# Patient Record
Sex: FEMALE | Race: WHITE | NOT HISPANIC OR LATINO | Employment: FULL TIME | URBAN - METROPOLITAN AREA
[De-identification: names, ages, dates, MRNs, and addresses within clinical notes are randomized per-mention and may not be internally consistent; named-entity substitution may affect disease eponyms.]

---

## 2017-01-09 DIAGNOSIS — Z12.39 BREAST CANCER SCREENING: Primary | ICD-10-CM

## 2017-02-06 ENCOUNTER — OFFICE VISIT (OUTPATIENT)
Dept: OBSTETRICS AND GYNECOLOGY | Facility: CLINIC | Age: 52
End: 2017-02-06
Payer: COMMERCIAL

## 2017-02-06 VITALS
SYSTOLIC BLOOD PRESSURE: 120 MMHG | DIASTOLIC BLOOD PRESSURE: 78 MMHG | WEIGHT: 231.5 LBS | BODY MASS INDEX: 38.57 KG/M2 | HEIGHT: 65 IN

## 2017-02-06 DIAGNOSIS — B00.9 HERPES: ICD-10-CM

## 2017-02-06 DIAGNOSIS — Z01.419 ENCOUNTER FOR GYNECOLOGICAL EXAMINATION: ICD-10-CM

## 2017-02-06 DIAGNOSIS — Z11.51 SCREENING FOR HPV (HUMAN PAPILLOMAVIRUS): ICD-10-CM

## 2017-02-06 DIAGNOSIS — Z12.4 PAP SMEAR FOR CERVICAL CANCER SCREENING: Primary | ICD-10-CM

## 2017-02-06 PROCEDURE — 87624 HPV HI-RISK TYP POOLED RSLT: CPT

## 2017-02-06 PROCEDURE — 99999 PR PBB SHADOW E&M-EST. PATIENT-LVL II: CPT | Mod: PBBFAC,,, | Performed by: OBSTETRICS & GYNECOLOGY

## 2017-02-06 PROCEDURE — 88175 CYTOPATH C/V AUTO FLUID REDO: CPT

## 2017-02-06 PROCEDURE — 99396 PREV VISIT EST AGE 40-64: CPT | Mod: S$GLB,,, | Performed by: OBSTETRICS & GYNECOLOGY

## 2017-02-06 RX ORDER — FLUTICASONE FUROATE AND VILANTEROL TRIFENATATE 100; 25 UG/1; UG/1
POWDER RESPIRATORY (INHALATION)
Refills: 2 | COMMUNITY
Start: 2017-01-24 | End: 2019-09-17

## 2017-02-06 RX ORDER — LISINOPRIL 20 MG/1
TABLET ORAL
COMMUNITY
Start: 2016-01-27 | End: 2019-09-17

## 2017-02-06 RX ORDER — TRAZODONE HYDROCHLORIDE 50 MG/1
TABLET ORAL
Refills: 0 | COMMUNITY
Start: 2016-11-01 | End: 2020-05-20

## 2017-02-06 RX ORDER — VALACYCLOVIR HYDROCHLORIDE 500 MG/1
500 TABLET, FILM COATED ORAL 2 TIMES DAILY
Qty: 60 TABLET | Refills: 6 | Status: SHIPPED | OUTPATIENT
Start: 2017-02-06 | End: 2018-03-01

## 2017-02-06 RX ORDER — ALBUTEROL SULFATE 90 UG/1
AEROSOL, METERED RESPIRATORY (INHALATION)
Refills: 2 | COMMUNITY
Start: 2017-01-19 | End: 2017-10-16

## 2017-02-06 RX ORDER — DULOXETIN HYDROCHLORIDE 60 MG/1
CAPSULE, DELAYED RELEASE ORAL
Refills: 2 | COMMUNITY
Start: 2017-01-14 | End: 2021-01-24 | Stop reason: SDUPTHER

## 2017-02-06 RX ORDER — METFORMIN HYDROCHLORIDE 500 MG/1
TABLET, EXTENDED RELEASE ORAL
Refills: 2 | COMMUNITY
Start: 2017-01-24 | End: 2017-10-16

## 2017-02-06 RX ORDER — ALPRAZOLAM 0.5 MG/1
TABLET ORAL
Refills: 0 | COMMUNITY
Start: 2016-12-26 | End: 2020-05-28 | Stop reason: SDUPTHER

## 2017-02-06 NOTE — MR AVS SNAPSHOT
[id number]  [place] [address]  Phone: [phone]  Fax: [phone]                  Joy Crawford   2017 2:15 PM   Office Visit    Description:  Female : 1965   Provider:  José Miguel Leon MD   Department:  [facility]           Reason for Visit     Well Woman           Diagnoses this Visit        Comments    Pap smear for cervical cancer screening    -  Primary     Herpes         Screening for HPV (human papillomavirus)         Encounter for gynecological examination                To Do List           Goals (5 Years of Data)     None      Follow-Up and Disposition     Return in about 1 year (around 2018).    Follow-up and Disposition History       These Medications        Disp Refills Start End    valacyclovir (VALTREX) 500 MG tablet 60 tablet 6 2017 3/8/2017    Take 1 tablet (500 mg total) by mouth 2 (two) times daily. - Oral    Pharmacy: [facility] [address] [place] [address] EXPY AT [facility] Ph #: [phone]         [facility] On Call     [facility] On Call Nurse Care Line -  Assistance  Registered nurses in the [facility] On Call Center provide clinical advisement, health education, appointment booking, and other advisory services.  Call for this free service at [phone].             Medications           Message regarding Medications     Verify the changes and/or additions to your medication regime listed below are the same as discussed with your clinician today.  If any of these changes or additions are incorrect, please notify your healthcare provider.             Verify that the below list of medications is an accurate representation of the medications you are currently taking.  If none reported, the list may be blank. If incorrect, please contact your healthcare provider. Carry this list with you in case of emergency.           Current Medications     albuterol 2.5 mg /3 mL (0.083  "%) Nebu 3 mL, albuterol 5 mg/mL Nebu 0.5 mL use as directed    lisinopril (PRINIVIL,ZESTRIL) 20 MG tablet once a day    alprazolam (XANAX) 0.5 MG tablet TK 1 T PO QD TO BID PRF ANXIETY    BREO ELLIPTA 100-25 mcg/dose diskus inhaler INHALE 1 PUFF PO QD    duloxetine (CYMBALTA) 60 MG capsule TK 1 C PO QD    metformin (GLUCOPHAGE-XR) 500 MG 24 hr tablet TK 1 T PO QD    PROAIR HFA 90 mcg/actuation inhaler INHALE 1 PUFF PO Q 4 TO 6 H PRF SOB    trazodone (DESYREL) 50 MG tablet TK 1 T PO  QHS    valacyclovir (VALTREX) 500 MG tablet Take 1 tablet (500 mg total) by mouth 2 (two) times daily.           Clinical Reference Information           Your Vitals Were     BP Height Weight BMI       120/78 5' 5" (1.651 m) 105 kg (231 lb 7.7 oz) 38.52 kg/m2       Blood Pressure          Most Recent Value    BP  120/78      Allergies as of 2/6/2017     No Known Allergies      Immunizations Administered on Date of Encounter - 2/6/2017     None      Orders Placed During Today's Visit      Normal Orders This Visit    HPV DNA probe, amplified     Liquid-based pap smear, screening       [facility] Sign-Up     Activating your [facility] account is as easy as 1-2-3!     1) Visit [email or web address], select Sign Up Now, enter this activation code and your date of birth, then select Next.  [email or web address]  Expires: 3/23/2017  2:02 PM      2) Create a username and password to use when you visit [facility] in the future and select a security question in case you lose your password and select Next.    3) Enter your e-mail address and click Sign Up!    Additional Information  If you have questions, please e-mail [email or web address] or call [phone] to talk to our [facility] staff. Remember, [facility] is NOT to be used for urgent needs. For medical emergencies, dial 911.         Language Assistance Services     ATTENTION: Language assistance services are available, free of charge. Please call [phone].      ATENCIÓN: Si jenny conte " disposición servicios gratuitos de asistencia lingüística. Elder al [phone].     SHANIA Ý: N?u b?n nói Ti?ng Vi?t, có các d?ch v? h? tr? ngôn ng? mi?n phí dành cho b?n. G?i s? [phone].         [facility] complies with applicable Federal civil rights laws and does not discriminate on the basis of race, color, national origin, age, disability, or sex.

## 2017-02-06 NOTE — PROGRESS NOTES
"CC: Well woman exam    Joy Crawford is a 51 y.o. female  presents for a well woman exam.  She is established.  LMP: No LMP recorded. Patient is not currently having periods (Reason: Perimenopausal)..   Annual Exam, c/o no cycle since Nov. Stopped ocp's..Last mammo   Patient stopped her OCPs in October.  Had one last cycle in November and none since.  Discussed menopause being defined as the absence of the period for an entire year.  Patient concerned about inability to lose weight.  Discussed options of high proteins low-carb diet.  Also discussed medical weight loss.     Health Maintenance   Topic Date Due    TETANUS VACCINE  1984    Pap Smear  1987    Mammogram  2006    Lipid Panel  10/01/2012    Colonoscopy  2016    Influenza Vaccine  2016         Past Medical History   Diagnosis Date    Anxiety     Esophageal reflux     Herpes simplex virus (HSV) infection     Peptic ulcer        Past Surgical History   Procedure Laterality Date    Rhinoplasty         OB History    Para Term  AB SAB TAB Ectopic Multiple Living   1 1 1       1      # Outcome Date GA Lbr Balwinder/2nd Weight Sex Delivery Anes PTL Lv   1 Term  40w0d  3.175 kg (7 lb) M Vag-Spont   Y          Family History   Problem Relation Age of Onset    Skin cancer Father     Hypertension Father     Hypertension Mother     No Known Problems Son        Social History   Substance Use Topics    Smoking status: Never Smoker    Smokeless tobacco: None    Alcohol use Yes       Visit Vitals    /78    Ht 5' 5" (1.651 m)    Wt 105 kg (231 lb 7.7 oz)    BMI 38.52 kg/m2         ROS:  GENERAL: Denies weight gain or weight loss. Feeling well overall.   SKIN: Denies rash or lesions.   HEAD: Denies head injury or headache.   NODES: Denies enlarged lymph nodes.   CHEST: Denies chest pain or shortness of breath.   CARDIOVASCULAR: Denies palpitations or left sided chest pain.   ABDOMEN: No " abdominal pain, constipation, diarrhea, nausea, vomiting or rectal bleeding.   URINARY: No frequency, dysuria, hematuria, or burning on urination.  REPRODUCTIVE: See HPI.   BREASTS: The patient performs breast self-examination and denies pain, lumps, or nipple discharge.   HEMATOLOGIC: No easy bruisability or excessive bleeding.  MUSCULOSKELETAL: Denies joint pain or swelling.   NEUROLOGIC: Denies syncope or weakness.   PSYCHIATRIC: Denies depression, anxiety or mood swings.    Physical Exam:    APPEARANCE: Well nourished, well developed, in no acute distress.  AFFECT: WNL, alert and oriented x 3  SKIN: No acne or hirsutism  ABDOMEN: Soft.  No tenderness or masses.  No hepatosplenomegaly.  No hernias.  BREASTS: Symmetrical, no skin changes or visible lesions.  No palpable masses, nipple discharge bilaterally.  PELVIC: Normal external genitalia without lesions.  Normal hair distribution.  Adequate perineal body, normal urethral meatus.  Vagina moist and well rugated without lesions or discharge.  Cervix pink, without lesions, discharge or tenderness.  No significant cystocele or rectocele.  Bimanual exam shows uterus to be normal size, regular, mobile and nontender.  Adnexa without masses or tenderness.    EXTREMITIES: No edema.    ASSESSMENT AND PLAN  1. Pap smear for cervical cancer screening  Liquid-based pap smear, screening   2. Herpes  valacyclovir (VALTREX) 500 MG tablet   3. Screening for HPV (human papillomavirus)  HPV DNA probe, amplified   4. Encounter for gynecological examination   Pap smear performed.         Patient was counseled today on A.C.S. Pap guidelines and recommendations for yearly pelvic exams, mammograms and monthly self breast exams; to see her PCP for other health maintenance.     Return in about 1 year (around 2/6/2018).

## 2017-02-13 LAB — HUMAN PAPILLOMAVIRUS (HPV): NOT DETECTED

## 2017-10-11 ENCOUNTER — OFFICE VISIT (OUTPATIENT)
Dept: URGENT CARE | Facility: CLINIC | Age: 52
End: 2017-10-11
Payer: COMMERCIAL

## 2017-10-11 VITALS
DIASTOLIC BLOOD PRESSURE: 94 MMHG | HEART RATE: 93 BPM | RESPIRATION RATE: 18 BRPM | BODY MASS INDEX: 39.15 KG/M2 | OXYGEN SATURATION: 96 % | HEIGHT: 65 IN | WEIGHT: 235 LBS | TEMPERATURE: 98 F | SYSTOLIC BLOOD PRESSURE: 142 MMHG

## 2017-10-11 DIAGNOSIS — R53.83 FATIGUE, UNSPECIFIED TYPE: ICD-10-CM

## 2017-10-11 DIAGNOSIS — J01.90 ACUTE SINUSITIS, RECURRENCE NOT SPECIFIED, UNSPECIFIED LOCATION: Primary | ICD-10-CM

## 2017-10-11 DIAGNOSIS — J45.901 EXACERBATION OF ASTHMA, UNSPECIFIED ASTHMA SEVERITY, UNSPECIFIED WHETHER PERSISTENT: ICD-10-CM

## 2017-10-11 PROCEDURE — 99203 OFFICE O/P NEW LOW 30 MIN: CPT | Mod: 25,S$GLB,, | Performed by: NURSE PRACTITIONER

## 2017-10-11 PROCEDURE — 96372 THER/PROPH/DIAG INJ SC/IM: CPT | Mod: S$GLB,,, | Performed by: NURSE PRACTITIONER

## 2017-10-11 RX ORDER — CYANOCOBALAMIN 1000 UG/ML
1000 INJECTION, SOLUTION INTRAMUSCULAR; SUBCUTANEOUS ONCE
Status: COMPLETED | OUTPATIENT
Start: 2017-10-11 | End: 2017-10-11

## 2017-10-11 RX ORDER — BETAMETHASONE SODIUM PHOSPHATE AND BETAMETHASONE ACETATE 3; 3 MG/ML; MG/ML
6 INJECTION, SUSPENSION INTRA-ARTICULAR; INTRALESIONAL; INTRAMUSCULAR; SOFT TISSUE
Status: COMPLETED | OUTPATIENT
Start: 2017-10-11 | End: 2017-10-11

## 2017-10-11 RX ORDER — AMOXICILLIN AND CLAVULANATE POTASSIUM 875; 125 MG/1; MG/1
1 TABLET, FILM COATED ORAL 2 TIMES DAILY
Qty: 20 TABLET | Refills: 0 | Status: SHIPPED | OUTPATIENT
Start: 2017-10-11 | End: 2017-10-21

## 2017-10-11 RX ORDER — METHYLPREDNISOLONE 4 MG/1
TABLET ORAL
Qty: 1 PACKAGE | Refills: 0 | Status: SHIPPED | OUTPATIENT
Start: 2017-10-11 | End: 2018-03-01

## 2017-10-11 RX ADMIN — BETAMETHASONE SODIUM PHOSPHATE AND BETAMETHASONE ACETATE 6 MG: 3; 3 INJECTION, SUSPENSION INTRA-ARTICULAR; INTRALESIONAL; INTRAMUSCULAR; SOFT TISSUE at 06:10

## 2017-10-11 RX ADMIN — CYANOCOBALAMIN 1000 MCG: 1000 INJECTION, SOLUTION INTRAMUSCULAR; SUBCUTANEOUS at 06:10

## 2017-10-11 NOTE — PATIENT INSTRUCTIONS
Please drink plenty of fluids.  Please get plenty of rest.    Please return here or go to the Emergency Department for any concerns or worsening of condition.    If you were prescribed antibiotics, please take them to completion.    If you were given a steroid shot in the clinic and have also been given a prescription for a steroid such as Prednisone or a Medrol Dose Pack, please begin taking them tomorrow.    It is ok to continue taking mucinex OTC as directed on box.    If you do have Hypertension or palpitations, it is safe to take Coricidin HBP for relief of sinus symptoms.    If not allergic, please take over the counter Tylenol (Acetaminophen) and/or Motrin (Ibuprofen) as directed for control of pain and/or fever.  Please follow up with your primary care doctor or specialist as needed.    If you  smoke, please stop smoking.      Upper Respiratory Illness with Wheezing (Adult)   When the infection causes a lot of irritation, the air passages can go into spasm. This causes wheezing and shortness of breath.    This illness is contagious during the first few days. It is spread through the air by coughing and sneezing. It may also be spread by direct contact (touching the sick person and then touching your own eyes, nose, or mouth). Frequent handwashing will decrease the risk.  Most upper respiratory illnesses go away within 7 to 10 days with rest and simple home remedies. Sometimes the illness may last for several weeks. Antibiotics will not kill a virus, and they are generally not prescribed for this condition.  Home care  · If symptoms are severe, rest at home for the first 2 to 3 days. When you resume activity, don't let yourself get too tired.  · Avoid being exposed to cigarette smoke (yours or others).  · You may use acetaminophen or ibuprofen to control pain and fever, unless another medicine was prescribed. (Note: If you have chronic liver or kidney disease, have ever had a stomach ulcer or  gastrointestinal bleeding, or are taking blood-thinning medicines, talk with your healthcare provider before using these medicines.) Aspirin should never be given to anyone under 18 years of age who is ill with a viral infection or fever. It may cause severe liver or brain damage.  · Your appetite may be poor, so a light diet is fine. Avoid dehydration by drinking 6 to 8 glasses of fluids per day (water, soft drinks, juices, tea, or soup). Extra fluids will help loosen secretions in the nose and lungs.  · Over-the-counter cold medicines will not shorten the length of time youre sick, but they may be helpful for the following symptoms: cough, sore throat, and nasal and sinus congestion. (Note: Do not use decongestants if you have high blood pressure.)  Follow-up care  Follow up with your healthcare provider, or as advised.  When to seek medical advice  Call your healthcare provider right away if any of these occur:  · Cough with lots of colored sputum (mucus)  · Severe headache; face, neck, or ear pain  · Difficulty swallowing due to throat pain  · Fever of 100.4ºF (38ºC) or higher, or as directed by your healthcare provider  Call 911, or get immediate medical care  Call emergency services right away if any of these occur:  · Chest pain, shortness of breath, worsening wheezing, or difficulty breathing  · Coughing up blood  · Inability to swallow due to throat pain  Date Last Reviewed: 9/13/2015  © 3817-4981 [employer]. [address] [place] [address], [place] [address]. All rights reserved. This information is not intended as a substitute for professional medical care. Always follow your healthcare professional's instructions.

## 2017-10-11 NOTE — PROGRESS NOTES
"Subjective:       Patient ID: Joy Crawford is a 52 y.o. female.    Vitals:  height is 5' 5" (1.651 m) and weight is 106.6 kg (235 lb). Her temperature is 98.1 °F (36.7 °C). Her blood pressure is 142/94 (abnormal) and her pulse is 93. Her respiration is 18 and oxygen saturation is 96%.     Chief Complaint: URI    Pt reports getting sick 4 weeks ago with cough, sore throat, fatigue, sinus congestion, with some improvement and then recent worsening with wheezing and increased need for albuterol inhaler.  Pt has a history of asthma.  Pt reports some pain in left posterior lower ribs upon deep inspiration.  Denies chest pressure or heaviness.  Pt states she had pleuricy about a year ago and feels a similar pain with deep breaths.      URI    This is a new problem. The current episode started 1 to 4 weeks ago. The problem has been unchanged. There has been no fever. Associated symptoms include congestion, coughing, headaches, a plugged ear sensation, rhinorrhea, sinus pain and a sore throat. Pertinent negatives include no abdominal pain, chest pain, ear pain, nausea or wheezing. She has tried inhaler use, decongestant, antihistamine and acetaminophen for the symptoms. The treatment provided mild relief.     Review of Systems   Constitution: Negative for chills, fever and malaise/fatigue.   HENT: Positive for congestion, rhinorrhea, sinus pain and sore throat. Negative for ear pain and hoarse voice.    Eyes: Negative for discharge and redness.   Cardiovascular: Positive for dyspnea on exertion. Negative for chest pain and leg swelling.   Respiratory: Positive for cough and sputum production. Negative for shortness of breath and wheezing.    Musculoskeletal: Positive for back pain. Negative for myalgias.   Gastrointestinal: Negative for abdominal pain and nausea.   Neurological: Positive for headaches.       Objective:      Physical Exam   Constitutional: She is oriented to person, place, and time. She appears " well-developed and well-nourished. She is cooperative.  Non-toxic appearance. She does not have a sickly appearance. She does not appear ill. No distress.   HENT:   Head: Normocephalic and atraumatic.   Right Ear: Hearing, tympanic membrane, external ear and ear canal normal.   Left Ear: Hearing, tympanic membrane, external ear and ear canal normal.   Nose: Mucosal edema and rhinorrhea present. Right sinus exhibits maxillary sinus tenderness. Left sinus exhibits maxillary sinus tenderness.   Mouth/Throat: Uvula is midline. Posterior oropharyngeal edema and posterior oropharyngeal erythema present. Tonsils are 1+ on the right. Tonsils are 1+ on the left. No tonsillar exudate.   Eyes: Conjunctivae and lids are normal.   Neck: Normal range of motion and full passive range of motion without pain. Neck supple. No neck rigidity. No edema, no erythema and normal range of motion present.   Cardiovascular: Normal rate, regular rhythm and normal heart sounds.    Pulmonary/Chest: Effort normal. No accessory muscle usage. No apnea, no tachypnea and no bradypnea. No respiratory distress. She has no decreased breath sounds. She has wheezes in the right upper field, the right middle field, the left upper field and the left middle field. She has no rhonchi. She has no rales.   Abdominal: Normal appearance.   Lymphadenopathy:        Head (right side): No submental, no submandibular, no tonsillar, no preauricular, no posterior auricular and no occipital adenopathy present.        Head (left side): No submental, no submandibular, no tonsillar, no preauricular, no posterior auricular and no occipital adenopathy present.     She has cervical adenopathy.        Right cervical: Superficial cervical adenopathy present.        Left cervical: Superficial cervical adenopathy present.   Neurological: She is alert and oriented to person, place, and time.   Psychiatric: She has a normal mood and affect. Her speech is normal and behavior is  normal.   Nursing note and vitals reviewed.      Assessment:       1. Upper respiratory tract infection, unspecified type        Plan:         Upper respiratory tract infection, unspecified type  -     X-Ray Chest PA And Lateral; Future; Expected date: 10/11/2017      Pt instructed to follow up with primary care for no improvement after treatment therapy or go to ER for worsening of symptoms.

## 2017-10-12 ENCOUNTER — TELEPHONE (OUTPATIENT)
Dept: OBSTETRICS AND GYNECOLOGY | Facility: CLINIC | Age: 52
End: 2017-10-12

## 2017-10-12 NOTE — TELEPHONE ENCOUNTER
Bone pt -pt said she is having a burning sensation in her left nipple and would like to see what  wants her to come in to get it checked out.

## 2017-10-16 ENCOUNTER — OFFICE VISIT (OUTPATIENT)
Dept: OBSTETRICS AND GYNECOLOGY | Facility: CLINIC | Age: 52
End: 2017-10-16
Payer: COMMERCIAL

## 2017-10-16 VITALS
DIASTOLIC BLOOD PRESSURE: 82 MMHG | BODY MASS INDEX: 39.82 KG/M2 | WEIGHT: 239 LBS | SYSTOLIC BLOOD PRESSURE: 126 MMHG | HEIGHT: 65 IN

## 2017-10-16 DIAGNOSIS — M79.622 AXILLARY PAIN, LEFT: ICD-10-CM

## 2017-10-16 DIAGNOSIS — N64.4 MASTODYNIA OF LEFT BREAST: Primary | ICD-10-CM

## 2017-10-16 PROCEDURE — 99213 OFFICE O/P EST LOW 20 MIN: CPT | Mod: S$GLB,,, | Performed by: NURSE PRACTITIONER

## 2017-10-16 PROCEDURE — 99999 PR PBB SHADOW E&M-EST. PATIENT-LVL III: CPT | Mod: PBBFAC,,, | Performed by: NURSE PRACTITIONER

## 2017-10-16 NOTE — PROGRESS NOTES
"Chief Complaint: Breast pain     (Dr. Leon patient)    HPI:      Joy Crawford is a 52 y.o.  who presents complaining of left breast pain.  Reports feeling intermittent shooting pain that had originally started in the nipple, but now she reports feeling pain at bottom half of breast that extends to side beneath axilla, and wraps around to back. Patient has regular monthly menses. Patient's last menstrual period was 2017.   Mammogram done at [facility] in 2017 was normal. (see report under media tab)      ROS:     GENERAL: Denies unintentional weight gain or weight loss. Feeling well overall.   ABDOMEN: Denies abdominal pain, constipation, diarrhea, nausea, vomiting, change in appetite.   URINARY: Denies frequency, dysuria, hematuria.  BREAST: See HPI  GYN: Denies abnormal bleeding or discharge.    Physical Exam:      PHYSICAL EXAM:  /82   Ht 5' 5" (1.651 m)   Wt 108.4 kg (238 lb 15.7 oz)   LMP 2017   BMI 39.77 kg/m²   Body mass index is 39.77 kg/m².      APPEARANCE: Well nourished, well developed, in no acute distress.  BREASTS: Left breast:  Soft, tender to palpation to lower half of breast beneath areola and towards side/axilla; no lumps/masses, no changes in skin texture, and no nipple discharge.                     Right breast:  Soft, non-tender, no masses/lumps; no changes in skin texture, no nipple discharge.  ABDOMEN: Soft.  No tenderness or masses.    EXTREMITIES: No edema.         Assessment/Plan:   Mastodynia of left breast  -     US Breast Left Complete; Future; Expected date: 10/16/2017  -     Mammo Digital Diagnostic Left with Tomosynthesis_CAD; Future; Expected date: 10/16/2017    Axillary pain, left  -     US Soft Tissue Axilla; Future; Expected date: 10/16/2017    * D/W the patient that the pain to her left side beneath the left axilla that wraps around her back may/may not be related to breast tissue.  She reports that she had very bad URI past 2-3 weeks, and was coughing a " jacqueline.  Reviewed possibility that the pain could also be related to costochondritis from excessive coughing.  She verbalized understanding.  Will get ultrasounds, and determine follow-up POC at that time.      Orders Placed This Encounter   Procedures    US Breast Left Complete     Standing Status:   Future     Standing Expiration Date:   12/16/2018     Order Specific Question:   May the Radiologist modify the order per protocol to meet the clinical needs of the patient?     Answer:   Yes    Mammo Digital Diagnostic Left with Tomosynthesis_CAD     Standing Status:   Future     Standing Expiration Date:   12/16/2018     Order Specific Question:   May the Radiologist modify the order per protocol to meet the clinical needs of the patient?     Answer:   Yes    US Soft Tissue Axilla     Standing Status:   Future     Standing Expiration Date:   10/16/2018     Order Specific Question:   May the Radiologist modify the order per protocol to meet the clinical needs of the patient?     Answer:   Yes         Return if symptoms worsen or fail to improve, for Annual.

## 2018-01-15 PROBLEM — J01.90 ACUTE SINUSITIS: Status: RESOLVED | Noted: 2017-10-11 | Resolved: 2018-01-15

## 2018-02-11 ENCOUNTER — OFFICE VISIT (OUTPATIENT)
Dept: URGENT CARE | Facility: CLINIC | Age: 53
End: 2018-02-11
Payer: COMMERCIAL

## 2018-02-11 VITALS
SYSTOLIC BLOOD PRESSURE: 151 MMHG | DIASTOLIC BLOOD PRESSURE: 105 MMHG | BODY MASS INDEX: 36.82 KG/M2 | HEIGHT: 65 IN | TEMPERATURE: 101 F | HEART RATE: 101 BPM | RESPIRATION RATE: 18 BRPM | WEIGHT: 221 LBS | OXYGEN SATURATION: 98 %

## 2018-02-11 DIAGNOSIS — J10.1 INFLUENZA A: Primary | ICD-10-CM

## 2018-02-11 DIAGNOSIS — R11.2 NON-INTRACTABLE VOMITING WITH NAUSEA, UNSPECIFIED VOMITING TYPE: ICD-10-CM

## 2018-02-11 LAB
CTP QC/QA: YES
FLUAV AG NPH QL: POSITIVE
FLUBV AG NPH QL: NEGATIVE

## 2018-02-11 PROCEDURE — 87804 INFLUENZA ASSAY W/OPTIC: CPT | Mod: QW,S$GLB,, | Performed by: NURSE PRACTITIONER

## 2018-02-11 PROCEDURE — S0119 ONDANSETRON 4 MG: HCPCS | Mod: S$GLB,,, | Performed by: EMERGENCY MEDICINE

## 2018-02-11 PROCEDURE — 99213 OFFICE O/P EST LOW 20 MIN: CPT | Mod: S$GLB,,, | Performed by: NURSE PRACTITIONER

## 2018-02-11 PROCEDURE — 3008F BODY MASS INDEX DOCD: CPT | Mod: S$GLB,,, | Performed by: NURSE PRACTITIONER

## 2018-02-11 RX ORDER — ONDANSETRON 4 MG/1
4 TABLET, ORALLY DISINTEGRATING ORAL
Status: COMPLETED | OUTPATIENT
Start: 2018-02-11 | End: 2018-02-11

## 2018-02-11 RX ORDER — OSELTAMIVIR PHOSPHATE 75 MG/1
75 CAPSULE ORAL 2 TIMES DAILY
Qty: 10 CAPSULE | Refills: 0 | Status: SHIPPED | OUTPATIENT
Start: 2018-02-11 | End: 2018-02-16

## 2018-02-11 RX ORDER — ACETAMINOPHEN 500 MG
1000 TABLET ORAL
Status: COMPLETED | OUTPATIENT
Start: 2018-02-11 | End: 2018-02-11

## 2018-02-11 RX ORDER — CODEINE PHOSPHATE AND GUAIFENESIN 10; 100 MG/5ML; MG/5ML
5 SOLUTION ORAL NIGHTLY PRN
Qty: 118 ML | Refills: 0 | Status: SHIPPED | OUTPATIENT
Start: 2018-02-11 | End: 2018-02-21

## 2018-02-11 RX ORDER — ONDANSETRON 4 MG/1
4 TABLET, ORALLY DISINTEGRATING ORAL EVERY 6 HOURS PRN
Qty: 20 TABLET | Refills: 0 | Status: SHIPPED | OUTPATIENT
Start: 2018-02-11 | End: 2018-02-16

## 2018-02-11 RX ADMIN — ONDANSETRON 4 MG: 4 TABLET, ORALLY DISINTEGRATING ORAL at 12:02

## 2018-02-11 RX ADMIN — Medication 1000 MG: at 12:02

## 2018-02-11 NOTE — PROGRESS NOTES
"Subjective:       Patient ID: Joy Crawford is a 52 y.o. female.    Vitals:  height is 5' 5" (1.651 m) and weight is 100.2 kg (221 lb). Her oral temperature is 101.3 °F (38.5 °C) (abnormal). Her blood pressure is 151/105 (abnormal) and her pulse is 101. Her respiration is 18 and oxygen saturation is 98%.     Chief Complaint: Fever    Pt states symptoms began yesterday.  Pt has a history of asthma.      Fever    This is a recurrent problem. The current episode started yesterday. The problem occurs constantly. The problem has been gradually worsening. Her temperature was unmeasured prior to arrival. Associated symptoms include coughing, ear pain, headaches and a sore throat. Pertinent negatives include no abdominal pain, chest pain, congestion, nausea or wheezing. She has tried acetaminophen for the symptoms.     Review of Systems   Constitution: Positive for chills, fever and malaise/fatigue.   HENT: Positive for ear pain and sore throat. Negative for congestion and hoarse voice.    Eyes: Negative for discharge and redness.   Cardiovascular: Negative for chest pain, dyspnea on exertion and leg swelling.   Respiratory: Positive for cough and sputum production. Negative for shortness of breath and wheezing.    Musculoskeletal: Negative for myalgias.   Gastrointestinal: Negative for abdominal pain and nausea.   Neurological: Positive for headaches.       Objective:      Physical Exam   Constitutional: She is oriented to person, place, and time. Vital signs are normal. She appears well-developed and well-nourished. She is cooperative.  Non-toxic appearance. She does not have a sickly appearance. She does not appear ill. No distress.   HENT:   Head: Normocephalic and atraumatic.   Right Ear: Hearing, external ear and ear canal normal. A middle ear effusion is present.   Left Ear: Hearing, external ear and ear canal normal. A middle ear effusion is present.   Nose: Mucosal edema and rhinorrhea present. Right sinus exhibits " maxillary sinus tenderness. Left sinus exhibits maxillary sinus tenderness.   Mouth/Throat: Uvula is midline and mucous membranes are normal. Posterior oropharyngeal edema and posterior oropharyngeal erythema present.   Eyes: Conjunctivae and lids are normal.   Neck: Normal range of motion and full passive range of motion without pain. Neck supple. No neck rigidity. No edema, no erythema and normal range of motion present.   Cardiovascular: Normal rate, regular rhythm and normal heart sounds.    Pulmonary/Chest: Effort normal and breath sounds normal. No accessory muscle usage. No apnea, no tachypnea and no bradypnea. No respiratory distress. She has no decreased breath sounds. She has no wheezes. She has no rhonchi. She has no rales.   Abdominal: Normal appearance.   Lymphadenopathy:        Head (right side): No submental, no submandibular, no tonsillar, no preauricular, no posterior auricular and no occipital adenopathy present.        Head (left side): No submental, no submandibular, no tonsillar, no preauricular, no posterior auricular and no occipital adenopathy present.     She has cervical adenopathy.        Right cervical: Superficial cervical adenopathy present.        Left cervical: Superficial cervical adenopathy present.   Neurological: She is alert and oriented to person, place, and time.   Psychiatric: She has a normal mood and affect. Her speech is normal and behavior is normal.   Nursing note and vitals reviewed.      Assessment:       1. Influenza A    2. Non-intractable vomiting with nausea, unspecified vomiting type        Plan:         Influenza A  -     POCT Influenza A/B  -     acetaminophen tablet 1,000 mg; Take 2 tablets (1,000 mg total) by mouth one time.  -     oseltamivir (TAMIFLU) 75 MG capsule; Take 1 capsule (75 mg total) by mouth 2 (two) times daily.  Dispense: 10 capsule; Refill: 0  -     guaifenesin-codeine 100-10 mg/5 ml (TUSSI-ORGANIDIN NR)  mg/5 mL syrup; Take 5 mLs by mouth  nightly as needed.  Dispense: 118 mL; Refill: 0    Non-intractable vomiting with nausea, unspecified vomiting type  -     ondansetron disintegrating tablet 4 mg; Take 1 tablet (4 mg total) by mouth one time.  -     ondansetron (ZOFRAN-ODT) 4 MG TbDL; Take 1 tablet (4 mg total) by mouth every 6 (six) hours as needed.  Dispense: 20 tablet; Refill: 0      Discussed positive results of flu test with pt and symptom therapy for fevers and congestion with tylenol, ibuprofen, and mucinex as directed. Increase fluid intake. Instructed pt not to take decongestants due to her elevated BP.

## 2018-02-11 NOTE — PATIENT INSTRUCTIONS
Please drink plenty of fluids.  Please get plenty of rest.    Please return here or go to the Emergency Department for any concerns or worsening of condition.    If you were prescribed antibiotics, please take them to completion.    If you were prescribed a narcotic medication (cough syrup), do not drive or operate heavy equipment or machinery while taking these medications.    If you do have Hypertension or palpitations, it is safe to take Coricidin HBP or Mucinex DM for relief of congestion and cough. Take as directed on bottle with at least 2 glasses of water.    If not allergic, please take over the counter Tylenol (Acetaminophen) and/or Motrin (Ibuprofen) as directed on bottle for control of pain and/or fever.    Please follow up with your primary care doctor or specialist as needed.    If you  smoke, please stop smoking.    Influenza (Adult)    Influenza is also called the flu. It is a viral illness that affects the air passages of your lungs. It is different from the common cold. The flu can easily be passed from one to person to another. It may be spread through the air by coughing and sneezing. Or it can be spread by touching the sick person and then touching your own eyes, nose, or mouth.  The flu starts 1 to 3 days after you are exposed to the flu virus. It may last for 1 to 2 weeks but many people feel tired or fatigued for many weeks afterward. You usually dont need to take antibiotics unless you have a complication. This might be an ear or sinus infection or pneumonia.  Symptoms of the flu may be mild or severe. They can include extreme tiredness (wanting to stay in bed all day), chills, fevers, muscle aches, soreness with eye movement, headache, and a dry, hacking cough.  Home care  Follow these guidelines when caring for yourself at home:  · Avoid being around cigarette smoke, whether yours or other peoples.  · Acetaminophen or ibuprofen will help ease your fever, muscle aches, and headache. Dont  give aspirin to anyone younger than 18 who has the flu. Aspirin can harm the liver.  · Nausea and loss of appetite are common with the flu. Eat light meals. Drink 6 to 8 glasses of liquids every day. Good choices are water, sport drinks, soft drinks without caffeine, juices, tea, and soup. Extra fluids will also help loosen secretions in your nose and lungs.  · Over-the-counter cold medicines will not make the flu go away faster. But the medicines may help with coughing, sore throat, and congestion in your nose and sinuses. Dont use a decongestant if you have high blood pressure.  · Stay home until your fever has been gone for at least 24 hours without using medicine to reduce fever.  Follow-up care  Follow up with your healthcare provider, or as advised, if you are not getting better over the next week.  If you are age 65 or older, talk with your provider about getting a pneumococcal vaccine every 5 years. You should also get this vaccine if you have chronic asthma or COPD. All adults should get a flu vaccine every fall. Ask your provider about this.  When to seek medical advice  Call your healthcare provider right away if any of these occur:  · Cough with lots of colored mucus (sputum) or blood in your mucus  · Chest pain, shortness of breath, wheezing, or trouble breathing  · Severe headache, or face, neck, or ear pain  · New rash with fever  · Fever of 100.4°F (38°C) or higher, or as directed by your healthcare provider  · Confusion, behavior change, or seizure  · Severe weakness or dizziness  · You get a new fever or cough after getting better for a few days  Date Last Reviewed: 1/1/2017  © 4479-5718 [employer]. [address] [place] [address], [place] [address]. All rights reserved. This information is not intended as a substitute for professional medical care. Always follow your healthcare professional's instructions.

## 2018-03-01 ENCOUNTER — OFFICE VISIT (OUTPATIENT)
Dept: OBSTETRICS AND GYNECOLOGY | Facility: CLINIC | Age: 53
End: 2018-03-01
Payer: COMMERCIAL

## 2018-03-01 VITALS
HEIGHT: 65 IN | BODY MASS INDEX: 37.83 KG/M2 | WEIGHT: 227.06 LBS | DIASTOLIC BLOOD PRESSURE: 84 MMHG | SYSTOLIC BLOOD PRESSURE: 128 MMHG

## 2018-03-01 DIAGNOSIS — Z01.419 ENCOUNTER FOR GYNECOLOGICAL EXAMINATION: ICD-10-CM

## 2018-03-01 DIAGNOSIS — Z12.31 ENCOUNTER FOR SCREENING MAMMOGRAM FOR BREAST CANCER: Primary | ICD-10-CM

## 2018-03-01 PROCEDURE — 99396 PREV VISIT EST AGE 40-64: CPT | Mod: S$GLB,,, | Performed by: OBSTETRICS & GYNECOLOGY

## 2018-03-01 PROCEDURE — 99999 PR PBB SHADOW E&M-EST. PATIENT-LVL III: CPT | Mod: PBBFAC,,, | Performed by: OBSTETRICS & GYNECOLOGY

## 2018-03-01 RX ORDER — ALBUTEROL SULFATE 90 UG/1
AEROSOL, METERED RESPIRATORY (INHALATION)
Refills: 1 | COMMUNITY
Start: 2017-12-29 | End: 2021-07-30 | Stop reason: SDUPTHER

## 2018-03-01 NOTE — PROGRESS NOTES
"CC: Well woman exam    Joy Crawford is a 52 y.o. female  presents for a well woman exam.  She is established.  LMP: No LMP recorded..      Annual Exam, c/o of small skin tag on mons   Last Pap/Hpv 17 Negative --   MMG 17, Normal (DIS) --   Colonosocpy 12 yrs ago  LMP 18 light  Still with irreg cycles    Health Maintenance   Topic Date Due    TETANUS VACCINE  1984    Mammogram  2006    Lipid Panel  10/01/2012    Colonoscopy  2016    Influenza Vaccine  2017    Pap Smear with HPV Cotest  2020         Past Medical History:   Diagnosis Date    Anxiety     Esophageal reflux     H/O mammogram 2017    Normal  (DIS)     Herpes simplex virus (HSV) infection     Peptic ulcer        Past Surgical History:   Procedure Laterality Date    RHINOPLASTY         OB History    Para Term  AB Living   1 1 1     1   SAB TAB Ectopic Multiple Live Births           1      # Outcome Date GA Lbr Balwinder/2nd Weight Sex Delivery Anes PTL Lv   1 Term  40w0d  3.175 kg (7 lb) M Vag-Spont EPI  ENEIDA          Family History   Problem Relation Age of Onset    Skin cancer Father     Hypertension Father     Hypertension Mother     Diabetes Mother     No Known Problems Son     Breast cancer Neg Hx        Social History   Substance Use Topics    Smoking status: Never Smoker    Smokeless tobacco: Never Used    Alcohol use Yes       /84   Ht 5' 5" (1.651 m)   Wt 103 kg (227 lb 1.2 oz)   BMI 37.79 kg/m²       ROS:  GENERAL: Denies weight gain or weight loss. Feeling well overall.   SKIN: Denies rash or lesions.   HEAD: Denies head injury or headache.   NODES: Denies enlarged lymph nodes.   CHEST: Denies chest pain or shortness of breath.   CARDIOVASCULAR: Denies palpitations or left sided chest pain.   ABDOMEN: No abdominal pain, constipation, diarrhea, nausea, vomiting or rectal bleeding.   URINARY: No frequency, dysuria, hematuria, or burning on " urination.  REPRODUCTIVE: See HPI.   BREASTS: The patient performs breast self-examination and denies pain, lumps, or nipple discharge.   HEMATOLOGIC: No easy bruisability or excessive bleeding.  MUSCULOSKELETAL: Denies joint pain or swelling.   NEUROLOGIC: Denies syncope or weakness.   PSYCHIATRIC: Denies depression, anxiety or mood swings.    Physical Exam:    APPEARANCE: Well nourished, well developed, in no acute distress.  AFFECT: WNL, alert and oriented x 3  SKIN: No acne or hirsutism  ABDOMEN: Soft.  No tenderness or masses.  No hepatosplenomegaly.  No hernias.  BREASTS: Symmetrical, no skin changes or visible lesions.  No palpable masses, nipple discharge bilaterally.  PELVIC: Normal external genitalia with small 3-4 mm slin tag on mons..  Normal hair distribution.  Adequate perineal body, normal urethral meatus.  Vagina moist and well rugated without lesions or discharge.  Cervix pink, without lesions, discharge or tenderness.  No significant cystocele or rectocele.  Bimanual exam shows uterus to be normal size, regular, mobile and nontender.  Adnexa without masses or tenderness.    EXTREMITIES: No edema.    ASSESSMENT AND PLAN  1. Encounter for screening mammogram for breast cancer  Mammo Digital Screening Bilat with Tomosynthesis CAD    Mammo Digital Screening Bilat with Tomosynthesis CAD   2. Encounter for gynecological examination  No pap done,   rec MMG and colonoscopy       Patient was counseled today on A.C.S. Pap guidelines and recommendations for yearly pelvic exams, mammograms and monthly self breast exams; to see her PCP for other health maintenance.     Follow-up in about 1 year (around 3/1/2019).

## 2018-04-17 ENCOUNTER — OFFICE VISIT (OUTPATIENT)
Dept: URGENT CARE | Facility: CLINIC | Age: 53
End: 2018-04-17
Payer: COMMERCIAL

## 2018-04-17 VITALS
SYSTOLIC BLOOD PRESSURE: 108 MMHG | BODY MASS INDEX: 34.99 KG/M2 | HEIGHT: 65 IN | RESPIRATION RATE: 12 BRPM | TEMPERATURE: 97 F | OXYGEN SATURATION: 99 % | HEART RATE: 84 BPM | WEIGHT: 210 LBS | DIASTOLIC BLOOD PRESSURE: 78 MMHG

## 2018-04-17 DIAGNOSIS — J45.901 EXACERBATION OF ASTHMA, UNSPECIFIED ASTHMA SEVERITY, UNSPECIFIED WHETHER PERSISTENT: Primary | ICD-10-CM

## 2018-04-17 PROCEDURE — 96372 THER/PROPH/DIAG INJ SC/IM: CPT | Mod: S$GLB,,, | Performed by: EMERGENCY MEDICINE

## 2018-04-17 PROCEDURE — 99214 OFFICE O/P EST MOD 30 MIN: CPT | Mod: 25,S$GLB,, | Performed by: NURSE PRACTITIONER

## 2018-04-17 RX ORDER — BETAMETHASONE SODIUM PHOSPHATE AND BETAMETHASONE ACETATE 3; 3 MG/ML; MG/ML
6 INJECTION, SUSPENSION INTRA-ARTICULAR; INTRALESIONAL; INTRAMUSCULAR; SOFT TISSUE
Status: COMPLETED | OUTPATIENT
Start: 2018-04-17 | End: 2018-04-17

## 2018-04-17 RX ADMIN — BETAMETHASONE SODIUM PHOSPHATE AND BETAMETHASONE ACETATE 6 MG: 3; 3 INJECTION, SUSPENSION INTRA-ARTICULAR; INTRALESIONAL; INTRAMUSCULAR; SOFT TISSUE at 10:04

## 2018-04-17 NOTE — PATIENT INSTRUCTIONS
Asthma Action Plan     Your name:  _________________________  Emergency contact:  _________________________  Healthcare provider:  _________________________ Today's Date:  _________________________  Phone:  _________________________  Signature:  _________________________ Next appt (date/time):  _________________________  Phone:  _________________________  Phone:  _________________________      Green zone   My symptoms What I should do My medicine   · No wheezing, coughing, or chest tightness  · Asthma is not bothering your sleep, work, or school  · You rarely or never use your quick-relief medicine  Peak flow is:     _____________________  80%-100% of personal best · Keep taking your long-term  controller medicines  · Take your quick-relief   medicines as needed  Avoid your asthma triggers (list):  __________________________     __________________________     __________________________     __________________________     __________________________ Long-term controllers:  __________________________  Name:  __________________________  Dose:  __________________________  How often:  __________________________  Special instructions:  __________________________  Quick-relief:  __________________________  __________________________  Before exercise:  __________________________      Yellow zone   My symptoms What I should do My medicine   · Some wheezing, coughing, or chest tightness  · When at rest, your breathing is a little faster than normal  · Asthma symptoms wake you up at night  Peak flow is:     ___________________________  50%-80% of personal best, or   has lessened by at least 15%     You begin to have symptoms of a respiratory infection, if infections trigger your symptoms · Keep taking your long-term controller medicines  · Use your quick-relief medicine  · If you do not feel better within an hour after using your quick-relief medicine, make sure you know what to do! You might use more medicine or use another  medicine.  · Call your healthcare provider if you are unsure Continue to take long-term controllers:  _________________________  Name:  _________________________  Dose:  _________________________  How often:  _________________________  Special instructions  _________________________     Name:  _________________________  Dose:  _________________________  How often:  _________________________  Special instructions:  _________________________  Quick-relief:  _________________________  _________________________     If your symptoms don't go away after 1 hour, take:  _________________________      Red zone   My symptoms What I should do My medicine   · Continuous wheezing, coughing, or trouble breathing  · Trouble walking or talking  · Asthma symptoms make it hard for you to sleep     Peak flow is:     __________________________  Less than 50% of personal best · Use your quick-relief medicines  · Call your healthcare provider     Call 911 if:  · It is getting harder to breathe  · You can't walk or talk  · Your lips or fingers look gray or blue Quick-relief:  __________________________  __________________________     Quick-relief:  __________________________  __________________________     Quick-relief:  __________________________  __________________________      Date Last Reviewed: 10/1/2016  © 3064-9192 [employer]. [address] [place] [address], [place] [address]. All rights reserved. This information is not intended as a substitute for professional medical care. Always follow your healthcare professional's instructions.        My Asthma Symptom Diary  Keep track of symptoms with the chart below. (Make some copies first.) Show your records to your healthcare provider at your visits. As your asthma control improves you should have fewer episodes of symptoms to record.     Date Symptoms Possible triggers Action taken Results Did symptoms interfere with your sleep? Yes or No?      3/3 Example:  Wheezing, peak flow  75%    Cold air    2 puffs albuterol, went inside    Symptoms gone in 20 min. No                                                                                                                           Date Last Reviewed: 10/1/2016  © 5180-8999 [employer]. [address] [place] [address], [place] [address]. All rights reserved. This information is not intended as a substitute for professional medical care. Always follow your healthcare professional's instructions.        Asthma Trigger Checklist  Allergens, irritants, and other things may trigger your asthma. Check the box next to each of your triggers. After each trigger is a list of ways to avoid it.     Dust mites. Dust mites live in mattresses, bedding, carpets, curtains, and indoor dust.  · To kill dust mites, wash bedding in hot water (130°F) each week.  · Cover mattress and pillows with special dust-mite-proof cases.  · Dont use upholstered furniture like sofas or chairs in the bedroom.  · Use allergy-proof filters for air conditioners and furnaces. Replace or clean them as instructed.  · If you can, replace carpeting with wood or tile betty, especially in the bedroom.    Animals. Animals with fur or feathers shed dander (allergens).  · Its best to choose a pet that doesnt have fur or feathers, such as a fish or a reptile.  · If you have pets, keep them off of your bed and out of your bedroom.  · Wash your hands and clothes after handling pets.      Mold. Mold grows in damp places, such as bathrooms, basements, and closets.  · Ask someone to clean damp areas in your home every week. Or try wearing a face mask while you clean.  · Run an exhaust fan while bathing. Or leave a window open in the bathroom.  · Repair water leaks in or around your home.  · Have someone else cut grass or rake leaves, if possible.  · Dont use vaporizers or humidifiers. They encourage mold growth.      Pollen. Pollen from trees, grasses, and weeds is a common allergen.  (Flower pollens are generally not a problem).  · Try to learn what types of pollen affect you most. Pollen levels vary depending on the plant, the season, and the time of day.  · If possible, use air conditioning instead of opening the windows in your home or car.  · Have someone else do yard work, if possible.      Cockroaches. Roaches are found in many homes and produce allergens.  · Keep your kitchen clean and dry. A leaky faucet or drain can attract roaches.  · Remove garbage from your home daily.  · Store food in tightly sealed containers. Wash dishes as soon as they are used.  · Use bait stations or traps to control roaches. Avoid using chemical sprays.      Smoke. Smoke may be from cigarettes, cigars, pipes, incense or candles, barbecues or grills, and fireplaces.  · Dont smoke. And dont let people smoke in your home or car.  · When you travel, ask for nonsmoking rental cars and hotel rooms.  · Avoid fireplaces and wood stoves. If you cant, sit away from them. Make sure the smoke is directed outside.  · Dont burn incense or use candles.  · Move away from smoky outdoor cooking grills.      Smog.  Smog is from car exhaust and other pollution.  · Read or listen to local air-quality reports. These let you know when air quality is poor.  · Stay indoors as much as you can on smoggy days. If possible, use air conditioning instead of opening the windows.  · In your car, set air conditioning to recirculate air, so less pollution gets in.      Strong odors. These include air fresheners, deodorizers, and cleaning products; perfume, deodorant, and other beauty products; incense and candles; and insect sprays and other sprays.  · Use scent-free products like deodorant or body lotion.  · Avoid using cleaning products with bleach and ammonia. Make your own cleaning solution with white vinegar, baking soda, or mild dish soap.  · Use exhaust fans while cooking. Or open a window, if possible.   · Avoid perfumes, air  fresheners, potpourri, and other scented products.      Other irritants. These include dust, aerosol sprays, and powders.  · Wear a face mask while doing tasks like sanding, dusting, sweeping, and yard work. Open doors and windows if working indoors.  · Use pump spray bottles instead of aerosols.  · Pour liquid  onto a rag or cloth instead of spraying them.      Weather. Weather conditions can trigger symptoms or make them worse.  · Watch for very high or low temperatures, very humid conditions, or a lot of wind, as these conditions can make symptoms worse.  · Limit outdoor activity during the type of weather that affects you.  · Wear a scarf over your mouth and nose in cold weather.      Colds, flu, and sinus infections. Upper respiratory infections can trigger asthma.  · Wash your hands often with soap and warm water or use a hand  containing alcohol.  · Get a yearly flu shot. And ask if you should get a pneumonia vaccine.  · Take care of your general health. Get plenty of sleep. And eat a variety of healthy foods.      Food additives. Food additives can trigger asthma flare-ups in some people.  · Check food labels for sulfites or other similar ingredients. These are often found in foods such as wine, beer, and dried fruits.  · Avoid foods that contain these additives.      Medicine. Aspirin, NSAIDS like ibuprofen and naproxen, and heart medicines like beta-blockers may be triggers.  · Tell your health care provider if you think certain medicines trigger symptoms.   · Be sure to read the labels on over-the-counter medicines. They may have ingredients that cause symptoms for you.     , Emotions. Laughing, crying, or feeling excited are triggers for some people.   · To help you stay calm: Try breathing in slowly through your nose for a count of 2 seconds. Close your lips and breathe out for 4 seconds. Repeat.  · Try to focus on a soothing image in your mind. This will help relax you and calm your  breathing.  · Remember to take your daily controller medicines. When youre upset or under stress, its easy to forget.      Exercise. For some people, exercise can trigger symptoms. Dont let this keep you from being active.   · If you have not been exercising regularly, start slow and work up gradually.  · Take all of your medicines as prescribed.  · If you use quick-relief medicine, make sure you have it with you when you exercise.  · Stop if you have any symptoms. Make sure you talk with your provider about these symptoms.  Date Last Reviewed: 1/1/2017 © 2000-2017 [employer]. [address] [place] [address], [place] [address]. All rights reserved. This information is not intended as a substitute for professional medical care. Always follow your healthcare professional's instructions.        Asthma (Adult)  Asthma is a disease where the medium and  small air passages within the lung go into spasm and restrict the flow of air. Inflammation and swelling of the airways cause further restriction. During an acute asthma attack, these factors cause difficulty breathing, wheezing, cough and chest tightness.    An asthma attack can be triggered by many things. Common triggers include infections such as the common cold, bronchitis, pneumonia. Irritants such as smoke or pollutants in the air, emotional upset, and exercise can also trigger an attack. In many adults with asthma, allergies to dust, mold, pollen and animal dander can cause an asthma attack. Skipping doses of daily asthma medicine can also bring on an asthma attack.  Asthma can be controlled using the proper medicines prescribed by your healthcare provider and avoiding exposure to known triggers including allergens and irritants.  Home care  · Take prescribed medicine exactly at the times advised. If you need medicine such as from a hand held inhaler or aerosol breathing machine more than every 4 hours, contact your healthcare provider or seek immediate  "medical attention. If prescribed an antibiotic or prednisone, take all of the medicine as prescribed, even if you are feeling better after a few days.  · Do not smoke. Avoid being exposed to the smoke of others.  · Some people with asthma have worsening of their symptoms when they take aspirin and non-steroidal or fever-reducing medicines like ibuprofen and naproxen. Talk to your healthcare provider if you think this may apply to you.  Follow-up care  Follow up with your healthcare provider, or as advised. Always bring all of your current medicines to any appointments with your healthcare provider. Also bring a complete list of medications even those not taken for asthma. If you do not already have one, talk to your healthcare provider about developing a personalized "Asthma Action Plan."  A pneumococcal (pneumonia) vaccine and yearly flu shot (every fall) are recommended. Ask your doctor about this.  When to seek medical advice  Call your healthcare provider right away if any of these occur:   · Increased wheezing or shortness of breath  · Need to use your inhalers more often than usual without relief  · Fever of 100.4ºF (38ºC) or higher, or as directed by your healthcare provider  · Coughing up lots of dark-colored or bloody sputum (mucus)  · Chest pain with each breath  · If you use a peak flow meter as part of an Asthma Action Plan, and you are still in the yellow zone (50% to 80%) 15 minutes after using inhaler medicine.  Call 911  Call 911 if any of the following occur  · Trouble walking or talking because of shortness of breath  · If you use a peak flow meter as part of an Asthma Action Plan and you are still in the red zone (less than 50%) 15 minutes after using inhaler medicine  · Lips or fingernails turning gray or blue  Date Last Reviewed: 12/2/2015  © 2595-3150 [employer]. [address] [place] [address], [place] [address]. All rights reserved. This information is not intended as a substitute for " professional medical care. Always follow your healthcare professional's instructions.      It is critical to f/u with your pcp who is currently managing your asthma as a flare can result in serious injury or death.  Please drink plenty of fluids.  Please get plenty of rest.  Please return here or go to the Emergency Department for any concerns or worsening of condition.  If you were given a steroid shot in the clinic and have also been given a prescription for a steroid such as Prednisone or a Medrol Dose Pack, please begin taking them tomorrow.  Please follow up with your primary care doctor or specialist as needed.    If you  smoke, please stop smoking.

## 2018-04-17 NOTE — LETTER
April 17, 2018      [facility] [address], [address]  [place] [address]  Phone: [phone]  Fax: [phone]       Patient: Joy Crawford   YOB: 1965  Date of Visit: 04/17/2018    To Whom It May Concern:    Nelson Crawford  was at [facility] on 04/17/2018. She may return to work/school on 04/17/18 with limited exertion restrictions and if symptoms worsen she is to report to the ER. If you have any questions or concerns, or if I can be of further assistance, please do not hesitate to contact me.    Sincerely,    Chai Nuñez NP

## 2018-04-17 NOTE — PROGRESS NOTES
"Subjective:       Patient ID: Joy Crawford is a 53 y.o. female.    Vitals:  height is 5' 5" (1.651 m) and weight is 95.3 kg (210 lb). Her oral temperature is 97.4 °F (36.3 °C). Her blood pressure is 108/78 and her pulse is 84. Her respiration is 12 and oxygen saturation is 99%.     Chief Complaint: Shortness of Breath    Shortness of Breath   This is a new problem. The current episode started yesterday. The problem occurs constantly. The problem has been gradually worsening. Associated symptoms include chest pain (pressure), rhinorrhea, sputum production and wheezing. Pertinent negatives include no abdominal pain, claudication, coryza, ear pain, fever, headaches, hemoptysis, leg pain, leg swelling, neck pain, orthopnea, PND, rash, sore throat, swollen glands, syncope or vomiting. The symptoms are aggravated by any activity. The patient has no known risk factors for DVT/PE. She has tried beta agonist inhalers for the symptoms. The treatment provided no relief. Her past medical history is significant for allergies, asthma and pneumonia. There is no history of aspirin allergies, bronchiolitis, CAD, chronic lung disease, COPD, DVT, a heart failure, PE or a recent surgery.     Review of Systems   Constitution: Negative for chills, fever and malaise/fatigue.   HENT: Positive for congestion and rhinorrhea. Negative for ear pain, hoarse voice and sore throat.    Eyes: Negative for discharge and redness.   Cardiovascular: Positive for chest pain (pressure). Negative for claudication, dyspnea on exertion, leg swelling, orthopnea, paroxysmal nocturnal dyspnea and syncope.   Respiratory: Positive for cough, shortness of breath, sputum production and wheezing. Negative for hemoptysis.    Skin: Negative for rash.   Musculoskeletal: Negative for myalgias and neck pain.   Gastrointestinal: Negative for abdominal pain, nausea and vomiting.   Neurological: Negative for headaches.   All other systems reviewed and are negative.    "   Objective:      Physical Exam   Constitutional: She is oriented to person, place, and time. She appears well-developed and well-nourished. She is cooperative.  Non-toxic appearance. She does not appear ill. No distress.   HENT:   Head: Normocephalic and atraumatic.   Right Ear: Hearing, tympanic membrane, external ear and ear canal normal.   Left Ear: Hearing, tympanic membrane, external ear and ear canal normal.   Nose: Rhinorrhea present. No mucosal edema or nasal deformity. No epistaxis. Right sinus exhibits no maxillary sinus tenderness and no frontal sinus tenderness. Left sinus exhibits no maxillary sinus tenderness and no frontal sinus tenderness.   Mouth/Throat: Uvula is midline, oropharynx is clear and moist and mucous membranes are normal. No trismus in the jaw. Normal dentition. No uvula swelling. No posterior oropharyngeal erythema.   Eyes: Conjunctivae and lids are normal. No scleral icterus.   Sclera clear bilat   Neck: Trachea normal, full passive range of motion without pain and phonation normal. Neck supple.   Cardiovascular: Normal rate, regular rhythm, normal heart sounds, intact distal pulses and normal pulses.    Pulmonary/Chest: Effort normal and breath sounds normal. No respiratory distress. She has no decreased breath sounds. She has no wheezes. She has no rhonchi. She has no rales.   Negative assessment   Abdominal: Soft. Normal appearance and bowel sounds are normal. She exhibits no distension. There is no tenderness.   Musculoskeletal: Normal range of motion. She exhibits no edema or deformity.   Neurological: She is alert and oriented to person, place, and time. She exhibits normal muscle tone. Coordination normal.   Skin: Skin is warm, dry and intact. She is not diaphoretic. No pallor.   Psychiatric: She has a normal mood and affect. Her speech is normal and behavior is normal. Judgment and thought content normal. Cognition and memory are normal.   Nursing note and vitals reviewed.       Assessment:       1. Exacerbation of asthma, unspecified asthma severity, unspecified whether persistent        Plan:         Exacerbation of asthma, unspecified asthma severity, unspecified whether persistent    Other orders  -     betamethasone acetate-betamethasone sodium phosphate injection 6 mg; Inject 1 mL (6 mg total) into the muscle one time.      Pt was stable upon exam with no wheezing and in no distress.  Pt is high risk for a severe event and has been directed to f/u asap with the provider who is managing her asthma.  She stated she called this morning for an appointment and steroids.       Asthma Action Plan     Your name:  _________________________  Emergency contact:  _________________________  Healthcare provider:  _________________________ Today's Date:  _________________________  Phone:  _________________________  Signature:  _________________________ Next appt (date/time):  _________________________  Phone:  _________________________  Phone:  _________________________      Green zone   My symptoms What I should do My medicine   · No wheezing, coughing, or chest tightness  · Asthma is not bothering your sleep, work, or school  · You rarely or never use your quick-relief medicine  Peak flow is:     _____________________  80%-100% of personal best · Keep taking your long-term  controller medicines  · Take your quick-relief   medicines as needed  Avoid your asthma triggers (list):  __________________________     __________________________     __________________________     __________________________     __________________________ Long-term controllers:  __________________________  Name:  __________________________  Dose:  __________________________  How often:  __________________________  Special instructions:  __________________________  Quick-relief:  __________________________  __________________________  Before exercise:  __________________________      Yellow zone   My symptoms What I should do  My medicine   · Some wheezing, coughing, or chest tightness  · When at rest, your breathing is a little faster than normal  · Asthma symptoms wake you up at night  Peak flow is:     ___________________________  50%-80% of personal best, or   has lessened by at least 15%     You begin to have symptoms of a respiratory infection, if infections trigger your symptoms · Keep taking your long-term controller medicines  · Use your quick-relief medicine  · If you do not feel better within an hour after using your quick-relief medicine, make sure you know what to do! You might use more medicine or use another medicine.  · Call your healthcare provider if you are unsure Continue to take long-term controllers:  _________________________  Name:  _________________________  Dose:  _________________________  How often:  _________________________  Special instructions  _________________________     Name:  _________________________  Dose:  _________________________  How often:  _________________________  Special instructions:  _________________________  Quick-relief:  _________________________  _________________________     If your symptoms don't go away after 1 hour, take:  _________________________      Red zone   My symptoms What I should do My medicine   · Continuous wheezing, coughing, or trouble breathing  · Trouble walking or talking  · Asthma symptoms make it hard for you to sleep     Peak flow is:     __________________________  Less than 50% of personal best · Use your quick-relief medicines  · Call your healthcare provider     Call 911 if:  · It is getting harder to breathe  · You can't walk or talk  · Your lips or fingers look gray or blue Quick-relief:  __________________________  __________________________     Quick-relief:  __________________________  __________________________     Quick-relief:  __________________________  __________________________      Date Last Reviewed: 10/1/2016  © 6154-2346 [employer]. [address] [place] [address]. All rights reserved. This information is not intended as a substitute for professional medical care. Always follow your healthcare professional's instructions.        My Asthma Symptom Diary  Keep track of symptoms with the chart below. (Make some copies first.) Show your records to your healthcare provider at your visits. As your asthma control improves you should have fewer episodes of symptoms to record.     Date Symptoms Possible triggers Action taken Results Did symptoms interfere with your sleep? Yes or No?      3/3 Example:  Wheezing, peak flow 75%    Cold air    2 puffs albuterol, went inside    Symptoms gone in 20 min. No                                                                                                                           Date Last Reviewed: 10/1/2016  © 5753-9595 [employer]. [address] [place] [address]. All rights reserved. This information is not intended as a substitute for professional medical care. Always follow your healthcare professional's instructions.        Asthma Trigger Checklist  Allergens, irritants, and other things may trigger your asthma. Check the box next to each of your triggers. After each trigger is a list of ways to avoid it.     Dust mites. Dust mites live in mattresses, bedding, carpets, curtains, and indoor dust.  · To kill dust mites, wash bedding in hot water (130°F) each week.  · Cover mattress and pillows with special dust-mite-proof cases.  · Dont use upholstered furniture like sofas or chairs in the bedroom.  · Use allergy-proof filters for air conditioners and furnaces. Replace or clean them as instructed.  · If you can, replace carpeting with wood or tile betty, especially in the bedroom.    Animals. Animals with fur or feathers shed dander (allergens).  · Its best to choose a pet that doesnt have fur or feathers, such as a fish or a reptile.  · If you have pets,  keep them off of your bed and out of your bedroom.  · Wash your hands and clothes after handling pets.      Mold. Mold grows in damp places, such as bathrooms, basements, and closets.  · Ask someone to clean damp areas in your home every week. Or try wearing a face mask while you clean.  · Run an exhaust fan while bathing. Or leave a window open in the bathroom.  · Repair water leaks in or around your home.  · Have someone else cut grass or rake leaves, if possible.  · Dont use vaporizers or humidifiers. They encourage mold growth.      Pollen. Pollen from trees, grasses, and weeds is a common allergen. (Flower pollens are generally not a problem).  · Try to learn what types of pollen affect you most. Pollen levels vary depending on the plant, the season, and the time of day.  · If possible, use air conditioning instead of opening the windows in your home or car.  · Have someone else do yard work, if possible.      Cockroaches. Roaches are found in many homes and produce allergens.  · Keep your kitchen clean and dry. A leaky faucet or drain can attract roaches.  · Remove garbage from your home daily.  · Store food in tightly sealed containers. Wash dishes as soon as they are used.  · Use bait stations or traps to control roaches. Avoid using chemical sprays.      Smoke. Smoke may be from cigarettes, cigars, pipes, incense or candles, barbecues or grills, and fireplaces.  · Dont smoke. And dont let people smoke in your home or car.  · When you travel, ask for nonsmoking rental cars and hotel rooms.  · Avoid fireplaces and wood stoves. If you cant, sit away from them. Make sure the smoke is directed outside.  · Dont burn incense or use candles.  · Move away from smoky outdoor cooking grills.      Smog.  Smog is from car exhaust and other pollution.  · Read or listen to local air-quality reports. These let you know when air quality is poor.  · Stay indoors as much as you can on smoggy days. If possible, use air  conditioning instead of opening the windows.  · In your car, set air conditioning to recirculate air, so less pollution gets in.      Strong odors. These include air fresheners, deodorizers, and cleaning products; perfume, deodorant, and other beauty products; incense and candles; and insect sprays and other sprays.  · Use scent-free products like deodorant or body lotion.  · Avoid using cleaning products with bleach and ammonia. Make your own cleaning solution with white vinegar, baking soda, or mild dish soap.  · Use exhaust fans while cooking. Or open a window, if possible.   · Avoid perfumes, air fresheners, potpourri, and other scented products.      Other irritants. These include dust, aerosol sprays, and powders.  · Wear a face mask while doing tasks like sanding, dusting, sweeping, and yard work. Open doors and windows if working indoors.  · Use pump spray bottles instead of aerosols.  · Pour liquid  onto a rag or cloth instead of spraying them.      Weather. Weather conditions can trigger symptoms or make them worse.  · Watch for very high or low temperatures, very humid conditions, or a lot of wind, as these conditions can make symptoms worse.  · Limit outdoor activity during the type of weather that affects you.  · Wear a scarf over your mouth and nose in cold weather.      Colds, flu, and sinus infections. Upper respiratory infections can trigger asthma.  · Wash your hands often with soap and warm water or use a hand  containing alcohol.  · Get a yearly flu shot. And ask if you should get a pneumonia vaccine.  · Take care of your general health. Get plenty of sleep. And eat a variety of healthy foods.      Food additives. Food additives can trigger asthma flare-ups in some people.  · Check food labels for sulfites or other similar ingredients. These are often found in foods such as wine, beer, and dried fruits.  · Avoid foods that contain these additives.      Medicine. Aspirin, NSAIDS  like ibuprofen and naproxen, and heart medicines like beta-blockers may be triggers.  · Tell your health care provider if you think certain medicines trigger symptoms.   · Be sure to read the labels on over-the-counter medicines. They may have ingredients that cause symptoms for you.     , Emotions. Laughing, crying, or feeling excited are triggers for some people.   · To help you stay calm: Try breathing in slowly through your nose for a count of 2 seconds. Close your lips and breathe out for 4 seconds. Repeat.  · Try to focus on a soothing image in your mind. This will help relax you and calm your breathing.  · Remember to take your daily controller medicines. When youre upset or under stress, its easy to forget.      Exercise. For some people, exercise can trigger symptoms. Dont let this keep you from being active.   · If you have not been exercising regularly, start slow and work up gradually.  · Take all of your medicines as prescribed.  · If you use quick-relief medicine, make sure you have it with you when you exercise.  · Stop if you have any symptoms. Make sure you talk with your provider about these symptoms.  Date Last Reviewed: 1/1/2017  © 4114-6238 [employer]. [address] [place] [address], [place] [address]. All rights reserved. This information is not intended as a substitute for professional medical care. Always follow your healthcare professional's instructions.        Asthma (Adult)  Asthma is a disease where the medium and  small air passages within the lung go into spasm and restrict the flow of air. Inflammation and swelling of the airways cause further restriction. During an acute asthma attack, these factors cause difficulty breathing, wheezing, cough and chest tightness.    An asthma attack can be triggered by many things. Common triggers include infections such as the common cold, bronchitis, pneumonia. Irritants such as smoke or pollutants in the air, emotional upset, and  "exercise can also trigger an attack. In many adults with asthma, allergies to dust, mold, pollen and animal dander can cause an asthma attack. Skipping doses of daily asthma medicine can also bring on an asthma attack.  Asthma can be controlled using the proper medicines prescribed by your healthcare provider and avoiding exposure to known triggers including allergens and irritants.  Home care  · Take prescribed medicine exactly at the times advised. If you need medicine such as from a hand held inhaler or aerosol breathing machine more than every 4 hours, contact your healthcare provider or seek immediate medical attention. If prescribed an antibiotic or prednisone, take all of the medicine as prescribed, even if you are feeling better after a few days.  · Do not smoke. Avoid being exposed to the smoke of others.  · Some people with asthma have worsening of their symptoms when they take aspirin and non-steroidal or fever-reducing medicines like ibuprofen and naproxen. Talk to your healthcare provider if you think this may apply to you.  Follow-up care  Follow up with your healthcare provider, or as advised. Always bring all of your current medicines to any appointments with your healthcare provider. Also bring a complete list of medications even those not taken for asthma. If you do not already have one, talk to your healthcare provider about developing a personalized "Asthma Action Plan."  A pneumococcal (pneumonia) vaccine and yearly flu shot (every fall) are recommended. Ask your doctor about this.  When to seek medical advice  Call your healthcare provider right away if any of these occur:   · Increased wheezing or shortness of breath  · Need to use your inhalers more often than usual without relief  · Fever of 100.4ºF (38ºC) or higher, or as directed by your healthcare provider  · Coughing up lots of dark-colored or bloody sputum (mucus)  · Chest pain with each breath  · If you use a peak flow meter as part of " an Asthma Action Plan, and you are still in the yellow zone (50% to 80%) 15 minutes after using inhaler medicine.  Call 911  Call 911 if any of the following occur  · Trouble walking or talking because of shortness of breath  · If you use a peak flow meter as part of an Asthma Action Plan and you are still in the red zone (less than 50%) 15 minutes after using inhaler medicine  · Lips or fingernails turning gray or blue  Date Last Reviewed: 12/2/2015  © 2427-7574 [employer]. [address] [place] [address]. All rights reserved. This information is not intended as a substitute for professional medical care. Always follow your healthcare professional's instructions.      It is critical to f/u with your pcp who is currently managing your asthma as a flare can result in serious injury or death.  Please drink plenty of fluids.  Please get plenty of rest.  Please return here or go to the Emergency Department for any concerns or worsening of condition.  If you were given a steroid shot in the clinic and have also been given a prescription for a steroid such as Prednisone or a Medrol Dose Pack, please begin taking them tomorrow.  Please follow up with your primary care doctor or specialist as needed.    If you  smoke, please stop smoking.

## 2019-05-05 ENCOUNTER — OFFICE VISIT (OUTPATIENT)
Dept: URGENT CARE | Facility: CLINIC | Age: 54
End: 2019-05-05
Payer: COMMERCIAL

## 2019-05-05 VITALS
OXYGEN SATURATION: 96 % | BODY MASS INDEX: 34.99 KG/M2 | RESPIRATION RATE: 18 BRPM | WEIGHT: 210 LBS | HEIGHT: 65 IN | DIASTOLIC BLOOD PRESSURE: 90 MMHG | SYSTOLIC BLOOD PRESSURE: 135 MMHG | HEART RATE: 94 BPM | TEMPERATURE: 99 F

## 2019-05-05 DIAGNOSIS — H60.503 ACUTE OTITIS EXTERNA OF BOTH EARS, UNSPECIFIED TYPE: ICD-10-CM

## 2019-05-05 DIAGNOSIS — T20.15XA SUPERFICIAL BURN OF SCALP, INITIAL ENCOUNTER: Primary | ICD-10-CM

## 2019-05-05 PROCEDURE — 3008F PR BODY MASS INDEX (BMI) DOCUMENTED: ICD-10-PCS | Mod: CPTII,S$GLB,, | Performed by: PHYSICIAN ASSISTANT

## 2019-05-05 PROCEDURE — 3008F BODY MASS INDEX DOCD: CPT | Mod: CPTII,S$GLB,, | Performed by: PHYSICIAN ASSISTANT

## 2019-05-05 PROCEDURE — 99214 OFFICE O/P EST MOD 30 MIN: CPT | Mod: S$GLB,,, | Performed by: PHYSICIAN ASSISTANT

## 2019-05-05 PROCEDURE — 99214 PR OFFICE/OUTPT VISIT, EST, LEVL IV, 30-39 MIN: ICD-10-PCS | Mod: S$GLB,,, | Performed by: PHYSICIAN ASSISTANT

## 2019-05-05 RX ORDER — OFLOXACIN 3 MG/ML
5 SOLUTION AURICULAR (OTIC) DAILY
Qty: 5 ML | Refills: 0 | Status: SHIPPED | OUTPATIENT
Start: 2019-05-05 | End: 2019-05-05

## 2019-05-05 RX ORDER — OFLOXACIN 3 MG/ML
5 SOLUTION AURICULAR (OTIC) DAILY
Qty: 5 ML | Refills: 0 | Status: SHIPPED | OUTPATIENT
Start: 2019-05-05 | End: 2020-03-09 | Stop reason: CLARIF

## 2019-05-05 RX ORDER — SILVER SULFADIAZINE 10 G/1000G
CREAM TOPICAL 2 TIMES DAILY
Qty: 20 G | Refills: 0 | Status: SHIPPED | OUTPATIENT
Start: 2019-05-05 | End: 2019-09-17

## 2019-05-05 RX ORDER — SILVER SULFADIAZINE 10 G/1000G
CREAM TOPICAL 2 TIMES DAILY
Qty: 20 G | Refills: 0 | Status: SHIPPED | OUTPATIENT
Start: 2019-05-05 | End: 2019-05-05

## 2019-05-05 NOTE — PROGRESS NOTES
Subjective:       Patient ID: Joy Crawford is a 54 y.o. female.    Vitals:  vitals were not taken for this visit.     Chief Complaint: Sinus Problem    Sinus problems  Right ear problem    Sinus Problem   Pertinent negatives include no chills, congestion, coughing, diaphoresis, ear pain, shortness of breath, sinus pressure or sore throat.       Constitution: Negative for chills, sweating, fatigue and fever.   HENT: Negative for ear pain, congestion, sinus pain, sinus pressure, sore throat and voice change.    Neck: Negative for painful lymph nodes.   Eyes: Negative for eye redness.   Respiratory: Negative for chest tightness, cough, sputum production, bloody sputum, COPD, shortness of breath, stridor, wheezing and asthma.    Gastrointestinal: Negative for nausea and vomiting.   Musculoskeletal: Negative for muscle ache.   Skin: Negative for rash.   Allergic/Immunologic: Negative for seasonal allergies and asthma.   Hematologic/Lymphatic: Negative for swollen lymph nodes.       Objective:      Physical Exam    Assessment:       No diagnosis found.    Plan:         There are no diagnoses linked to this encounter.

## 2019-05-05 NOTE — PROGRESS NOTES
"Subjective:       Patient ID: Joy Crawford is a 54 y.o. female.    Vitals:  height is 5' 5" (1.651 m) and weight is 95.3 kg (210 lb). Her temperature is 98.7 °F (37.1 °C). Her blood pressure is 135/90 (abnormal) and her pulse is 94. Her respiration is 18 and oxygen saturation is 96%.     Chief Complaint: Sinus Problem    for 3 days pt has been having bilat ear pain (left > right) that has gotten worse since last night and sinus congestion for a week now. She has taken ibuprofen , tylenol for sinus that has helped. She isnt having any hearing loss or ringing.  Pt is having headaches. States that she is also having some pain at the tip of her scalp. States that she may have burned her head with her curling iron.    Sinus Problem   This is a new problem. The current episode started in the past 7 days. The problem is unchanged. There has been no fever. The pain is mild. Associated symptoms include congestion, ear pain and headaches. Pertinent negatives include no chills, coughing, diaphoresis, shortness of breath, sinus pressure or sore throat.       Constitution: Negative for chills, sweating, fatigue and fever.   HENT: Positive for ear pain and congestion. Negative for sinus pain, sinus pressure, sore throat and voice change.    Neck: Negative for painful lymph nodes.   Eyes: Negative for eye redness.   Respiratory: Negative for chest tightness, cough, sputum production, bloody sputum, COPD, shortness of breath, stridor, wheezing and asthma.    Gastrointestinal: Negative for nausea and vomiting.   Musculoskeletal: Negative for muscle ache.   Skin: Negative for rash.   Allergic/Immunologic: Negative for seasonal allergies and asthma.   Neurological: Positive for headaches.   Hematologic/Lymphatic: Negative for swollen lymph nodes.       Objective:      Physical Exam   Constitutional: She is oriented to person, place, and time. She appears well-developed and well-nourished. She is cooperative.  Non-toxic appearance. " She does not appear ill. No distress.   HENT:   Head: Normocephalic and atraumatic.       Right Ear: Hearing, external ear and ear canal normal. Tympanic membrane is erythematous and bulging.   Left Ear: Hearing, external ear and ear canal normal. Tympanic membrane is erythematous and bulging.   Nose: Nose normal. No mucosal edema, rhinorrhea or nasal deformity. No epistaxis. Right sinus exhibits no maxillary sinus tenderness and no frontal sinus tenderness. Left sinus exhibits no maxillary sinus tenderness and no frontal sinus tenderness.   Mouth/Throat: Uvula is midline, oropharynx is clear and moist and mucous membranes are normal. No trismus in the jaw. Normal dentition. No uvula swelling. No posterior oropharyngeal erythema.   bilat erythematous ear canal   Eyes: Conjunctivae and lids are normal. No scleral icterus.   Sclera clear bilat   Neck: Trachea normal, full passive range of motion without pain and phonation normal. Neck supple.   Cardiovascular: Normal rate, regular rhythm, normal heart sounds, intact distal pulses and normal pulses.   Pulmonary/Chest: Effort normal and breath sounds normal. No respiratory distress.   Abdominal: Soft. Normal appearance and bowel sounds are normal. She exhibits no distension. There is no tenderness.   Musculoskeletal: Normal range of motion. She exhibits no edema or deformity.   Neurological: She is alert and oriented to person, place, and time. She exhibits normal muscle tone. Coordination normal.   Skin: Skin is warm, dry and intact. She is not diaphoretic. No pallor.   Psychiatric: She has a normal mood and affect. Her speech is normal and behavior is normal. Judgment and thought content normal. Cognition and memory are normal.   Nursing note and vitals reviewed.      Assessment:       1. Superficial burn of scalp, initial encounter    2. Acute otitis externa of both ears, unspecified type        Plan:         Superficial burn of scalp, initial encounter  -     silver  sulfADIAZINE 1% (SILVADENE) 1 % cream; Apply topically 2 (two) times daily.  Dispense: 20 g; Refill: 0    Acute otitis externa of both ears, unspecified type  -     ofloxacin (FLOXIN) 0.3 % otic solution; Place 5 drops into both ears once daily.  Dispense: 5 mL; Refill: 0      Patient Instructions   General Discharge Instructions   If you were prescribed a narcotic or controlled medication, do not drive or operate heavy equipment or machinery while taking these medications.  If you were prescribed antibiotics, please take them to completion.  You must understand that you've received an Urgent Care treatment only and that you may be released before all your medical problems are known or treated. You, the patient, will arrange for follow up care as instructed.  Follow up with your PCP or specialty clinic as directed in the next 1-2 weeks if not improved or as needed.  You can call [phone] to schedule an appointment with the appropriate provider.  If your condition worsens we recommend that you receive another evaluation at the emergency room immediately or contact your primary medical clinics after hours call service to discuss your concerns.  Please return here or go to the Emergency Department for any concerns or worsening of condition.      External Ear Infection (Adult)    External otitis (also called swimmers ear) is an infection in the ear canal. It is often caused by bacteria or fungus. It can occur a few days after water gets trapped in the ear canal (from swimming or bathing). It can also occur after cleaning too deeply in the ear canal with a cotton swab or other object. Sometimes, hair care products get into the ear canal and cause this problem.  Symptoms can include pain, fever, itching, redness, drainage, or swelling of the ear canal. Temporary hearing loss may also occur.  Home care  · Do not try to clean the ear canal. This can push pus and bacteria deeper into the canal.  · Use prescribed ear  drops as directed. These help reduce swelling and fight the infection. If an ear wick was placed in the ear canal, apply drops right onto the end of the wick. The wick will draw the medication into the ear canal even if it is swollen closed.  · A cotton ball may be loosely placed in the outer ear to absorb any drainage.  · You may use acetaminophen or ibuprofen to control pain, unless another medication was prescribed. Note: If you have chronic liver or kidney disease or ever had a stomach ulcer or GI bleeding, talk to your health care provider before taking any of these medications.  · Do not allow water to get into your ear when bathing. Also, avoid swimming until the infection has cleared.  Prevention  · Keep your ears dry. This helps lower the risk of infection. Dry your ears with a towel or hair dryer after getting wet. Also, use ear plugs when swimming.  · Do not stick any objects in the ear to remove wax.  · If you feel water trapped in your ear, use ear drops right away. You can get these drops over the counter at most drugstores. They work by removing water from the ear canal.  Follow-up care  Follow up with your health care provider in one week, or as advised.  When to seek medical advice  Call your health care provider right away if any of these occur:  · Ear pain becomes worse or doesnt improve after 3 days of treatment  · Redness or swelling of the outer ear occurs or gets worse  · Headache  · Painful or stiff neck  · Drowsiness or confusion  · Fever of 100.4ºF (38ºC) or higher, or as directed by your health care provider  · Seizure  Date Last Reviewed: 3/22/2015  © 9286-7253 [employer]. [address] [place] [address], [place] [address]. All rights reserved. This information is not intended as a substitute for professional medical care. Always follow your healthcare professional's instructions.        First-Degree Burn  A burn occurs when skin is exposed to too much heat, sun, or harsh  chemicals. A first-degree burn (superficial burn) causes mainly redness. It heals in a few days.  Home care  Follow these guidelines when caring for yourself at home:  · Use pain medicine as directed. You may use over-the-counter medicine to control pain if no pain medicine was prescribed. If you have chronic liver or kidney disease, talk with your healthcare provider before using acetaminophen or ibuprofen. Also talk with your provider if you've had a stomach ulcer or GI bleeding.  · On the first day, you may put a cool compress on the burn to relieve pain. You can use a small towel soaked in cool water as a cool compress.  · Numbing medicines for sunburn can be used for temporary relief of the burning feeling. These medicines include lidocaine or benzocaine. You dont need a prescription for them.  · Moisturizers with aloe vera can help soothe the burn.  · Don't pick or scratch at the affected areas. Use over-the-counter medicines like diphenhydramine for itching. This medicine is taken by mouth.  · Since you don't have an open wound, you don't need to use antibiotic cream or ointment. Sometimes an infection may occur even with proper treatment. Be sure to check the burn daily for the signs of infection listed below.  · Wear a hat, sunscreen, and long sleeves while in the sun.  · Wear loose-fitting clothing until the burn heals.  Follow-up care  Follow up with your healthcare provider, or as advised. Most first-degree burns heal well without complications.  When to seek medical advice  Call your healthcare provider right away if any of these signs of infection occur:  · Fever of 100.4°F (38°C) or higher, or as directed by your healthcare provider  · Pain gets worse  · Redness or swelling gets worse  · Pus comes from the burn  · Red streaks in your skin come from the burn  · Wound doesn't appear to be healing  Date Last Reviewed: 12/1/2016  © 8430-8693 [employer]. [address] [place] [address], [place]  [address]. All rights reserved. This information is not intended as a substitute for professional medical care. Always follow your healthcare professional's instructions.

## 2019-06-06 ENCOUNTER — OFFICE VISIT (OUTPATIENT)
Dept: URGENT CARE | Facility: CLINIC | Age: 54
End: 2019-06-06
Payer: COMMERCIAL

## 2019-06-06 VITALS — HEIGHT: 65 IN | TEMPERATURE: 99 F | WEIGHT: 215 LBS | BODY MASS INDEX: 35.82 KG/M2

## 2019-06-06 DIAGNOSIS — M54.6 ACUTE LEFT-SIDED THORACIC BACK PAIN: ICD-10-CM

## 2019-06-06 DIAGNOSIS — R07.89 MUSCULOSKELETAL CHEST PAIN: ICD-10-CM

## 2019-06-06 DIAGNOSIS — S20.212A CONTUSION OF LEFT CHEST WALL, INITIAL ENCOUNTER: ICD-10-CM

## 2019-06-06 DIAGNOSIS — S39.012A BACK STRAIN, INITIAL ENCOUNTER: Primary | ICD-10-CM
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
## 2019-06-06 PROCEDURE — 71100 XR RIBS 2 VIEW LEFT: ICD-10-PCS | Mod: FY,LT,S$GLB, | Performed by: RADIOLOGY

## 2019-06-06 PROCEDURE — 99203 OFFICE O/P NEW LOW 30 MIN: CPT | Mod: S$GLB,,, | Performed by: PHYSICIAN ASSISTANT

## 2019-06-06 PROCEDURE — 71100 X-RAY EXAM RIBS UNI 2 VIEWS: CPT | Mod: FY,LT,S$GLB, | Performed by: RADIOLOGY

## 2019-06-06 PROCEDURE — 72070 XR THORACIC SPINE AP LATERAL: ICD-10-PCS | Mod: FY,S$GLB,, | Performed by: RADIOLOGY

## 2019-06-06 PROCEDURE — 72070 X-RAY EXAM THORAC SPINE 2VWS: CPT | Mod: FY,S$GLB,, | Performed by: RADIOLOGY

## 2019-06-06 PROCEDURE — 99203 PR OFFICE/OUTPT VISIT, NEW, LEVL III, 30-44 MIN: ICD-10-PCS | Mod: S$GLB,,, | Performed by: PHYSICIAN ASSISTANT

## 2019-06-06 RX ORDER — NAPROXEN 500 MG/1
500 TABLET ORAL 2 TIMES DAILY WITH MEALS
Qty: 30 TABLET | Refills: 0 | Status: SHIPPED | OUTPATIENT
Start: 2019-06-06 | End: 2019-06-27 | Stop reason: SDUPTHER

## 2019-06-06 RX ORDER — CYCLOBENZAPRINE HCL 10 MG
10 TABLET ORAL 3 TIMES DAILY PRN
Qty: 20 TABLET | Refills: 0 | Status: SHIPPED | OUTPATIENT
Start: 2019-06-06 | End: 2019-06-13

## 2019-06-06 NOTE — LETTER
[facility]  [facility] [facility], [address]  [facility] [address]  Phone: [phone]  Fax: [phone]  [facility]: [email or web address]    Pt Name: Joy Boogie Date: 05/17/2019   Employee ID:  Date of FIRST Treatment: 06/06/2019   Company: [employer]      Appointment Time: NONE Arrived: 1342 PM   Provider: Ishmael Hansen PA-C Time Out: 1530 PM     Office Treatment:   EXAM  XRAYS  RX GIVEN X 2  PHYSICAL THERAPY TO BEGIN ONCE APPROVED  REGULAR DUTY      1. Back strain, initial encounter    2. Acute left-sided thoracic back pain    3. Musculoskeletal chest pain    4. Fall, initial encounter    5. Contusion of left chest wall, initial encounter      Medications Ordered This Encounter   Medications    cyclobenzaprine (FLEXERIL) 10 MG tablet    naproxen (NAPROSYN) 500 MG tablet      Patient Instructions: Daily home exercises/warm soaks, PT to be scheduled once authorized      Restrictions: Regular Duty     Return Appointment: 6/27/2019 at 0930 AM

## 2019-06-06 NOTE — PROGRESS NOTES
Subjective:       Patient ID: Joy Crawford is a 54 y.o. female.    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  She did not get treatments since that day even though she reports being in pain since the fall.  She originally hurt both hands, both knees, and her chest.  Today she complains of CHEST PAIN and BACK PAIN.  She has been taking OTC tylenol and Ibuprofen and BC powder since the fall.  CT    Fall   The accident occurred more than 1 week ago. The fall occurred while walking. She landed on grass. The pain is present in the back. The pain is at a severity of 9/10. The pain is moderate. The symptoms are aggravated by standing, pressure on injury, rotation, sitting and movement. Pertinent negatives include no abdominal pain, fever or numbness. She has tried NSAID and acetaminophen for the symptoms. The treatment provided no relief.   Back Pain   This is a new problem. The current episode started 1 to 4 weeks ago. The problem occurs constantly. The problem is unchanged. The pain is present in the thoracic spine, lumbar spine and costovertebral angle. The quality of the pain is described as aching, shooting, stabbing and cramping. The pain does not radiate. The pain is at a severity of 8/10. The pain is moderate. The pain is the same all the time. The symptoms are aggravated by sitting, standing, twisting and position. Stiffness is present all day. Associated symptoms include chest pain (Musculoskeletal). Pertinent negatives include no abdominal pain, fever, numbness or paresthesias. Risk factors include recent trauma. She has tried NSAIDs for the symptoms. The treatment provided no relief.   Chest Pain    This is a new problem. The current episode started 1 to 4 weeks ago. The problem occurs  constantly. The problem has been waxing and waning. The pain is present in the lateral region. The pain is at a severity of 8/10. The pain is moderate. The quality of the pain is described as sharp. The pain does not radiate. Associated symptoms include back pain. Pertinent negatives include no abdominal pain, cough, fever, numbness, shortness of breath or syncope. The pain is aggravated by deep breathing, movement, breathing and coughing. She has tried acetaminophen and NSAIDs for the symptoms. The treatment provided no relief.   Her past medical history is significant for muscle weakness.     Review of Systems   Constitution: Negative for chills and fever.   HENT: Negative for hearing loss and nosebleeds.    Eyes: Negative for blurred vision and redness.   Cardiovascular: Positive for chest pain (Musculoskeletal). Negative for syncope.   Respiratory: Negative for cough and shortness of breath.    Hematologic/Lymphatic: Negative for bleeding problem.   Skin: Negative for color change and rash.   Musculoskeletal: Positive for back pain, muscle cramps, muscle weakness and stiffness. Negative for joint pain and neck pain.   Gastrointestinal: Negative for abdominal pain.   Neurological: Negative for numbness and paresthesias.   Psychiatric/Behavioral: The patient is not nervous/anxious.        Objective:      Physical Exam   Constitutional: She appears well-developed and well-nourished. She is active. No distress.   HENT:   Head: Normocephalic and atraumatic.   Right Ear: Hearing and external ear normal.   Left Ear: Hearing and external ear normal.   Nose: Nose normal. No nasal deformity. No epistaxis.   Mouth/Throat: Oropharynx is clear and moist and mucous membranes are normal.   Eyes: Conjunctivae and lids are normal. No scleral icterus.   Neck: Trachea normal and normal range of motion.   Cardiovascular: Intact distal pulses and normal pulses.   Pulmonary/Chest: Effort normal and breath sounds normal. No stridor.  No respiratory distress.         She exhibits tenderness. She exhibits no edema and no swelling.       Musculoskeletal:        Right shoulder: Normal.        Left shoulder: Normal.        Cervical back: Normal. She exhibits normal range of motion and no tenderness.        Thoracic back: She exhibits tenderness. She exhibits normal range of motion.        Lumbar back: She exhibits normal range of motion and no tenderness.   Neurological: She is alert. She has normal strength. She is not disoriented. No sensory deficit. GCS eye subscore is 4. GCS verbal subscore is 5. GCS motor subscore is 6.   Reflex Scores:       Patellar reflexes are 2+ on the right side and 2+ on the left side.  Skin: Skin is warm, dry and intact. Capillary refill takes less than 2 seconds. She is not diaphoretic.   Psychiatric: She has a normal mood and affect. Her speech is normal and behavior is normal. She is attentive.   Nursing note and vitals reviewed.        X-ray Ribs 2 View Left    Result Date: 6/6/2019  EXAMINATION: XR RIBS 2 VIEW LEFT CLINICAL HISTORY: Pain in thoracic spine FINDINGS: No rib fracture or rib lesion seen.  No pneumothorax pleural fluid or lung contusion seen. Electronically signed by: Robert Cervantes MD Date:    06/06/2019 Time:    15:02    X-ray Thoracic Spine Ap Lateral    Result Date: 6/6/2019  EXAMINATION: XR THORACIC SPINE AP LATERAL CLINICAL HISTORY: Pain in thoracic spine FINDINGS: No fracture dislocation bone destruction seen.  There is mild DJD mild kyphosis.     Mild DJD and mild kyphosis. Electronically signed by: Robert Cervantes MD Date:    06/06/2019 Time:    15:02    Assessment:       1. Back strain, initial encounter    2. Acute left-sided thoracic back pain    3. Musculoskeletal chest pain    4. Fall, initial encounter    5. Contusion of left chest wall, initial encounter        Plan:         Medications Ordered This Encounter   Medications    cyclobenzaprine (FLEXERIL) 10 MG tablet     Sig: Take 1 tablet  (10 mg total) by mouth 3 (three) times daily as needed for Muscle spasms. Take off duty only. May cause drowsiness.     Dispense:  20 tablet     Refill:  0    naproxen (NAPROSYN) 500 MG tablet     Sig: Take 1 tablet (500 mg total) by mouth 2 (two) times daily with meals.     Dispense:  30 tablet     Refill:  0     Patient Instructions: Daily home exercises/warm soaks, PT to be scheduled once authorized   Restrictions: Regular Duty  Follow up in about 3 weeks (around 6/27/2019).        Patient Instructions     Back Sprain or Strain    Injury to the muscles (strain) or ligaments (sprain) around the spine can be troubling. Injury may occur after a sudden forceful twisting or bending force such as in a car accident, after a simple awkward movement, or after lifting something heavy with poor body positioning. In any case, muscle spasm is often present and adds to the pain.  Thankfully, most people feel better in 1 to 2 weeks, and most of the rest in 1 to 2 months. Most people can remain active. Unless you had a forceful or traumatic physical injury such as a car accident or fall, X-rays may not be ordered for the first evaluation of a back sprain or strain. If pain continues and does not respond to medical treatment, your healthcare provider may then order X-rays and other tests.  Home care  The following guidelines will help you care for your injury at home:  · When in bed, try to find a comfortable position. A firm mattress is best. Try lying flat on your back with pillows under your knees. You can also try lying on your side with your knees bent up toward your chest and a pillow between your knees.  · Don't sit for long periods. Try not to take long car rides or take other trips that have you sitting for a long time. This puts more stress on the lower back than standing or walking.  · During the first 24 to 72 hours after an injury or flare-up, apply an ice pack to the painful area for 20 minutes. Then remove it for  20 minutes. Do this for 60 to 90 minutes, or several times a day. This will reduce swelling and pain. Be sure to wrap the ice pack in a thin towel or plastic to protect your skin.  · You can start with ice, then switch to heat. Heat from a hot shower, hot bath, or heating pad reduces pain and works well for muscle spasms. Put heat on the painful area for 20 minutes, then remove for 20 minutes. Do this for 60 to 90 minutes, or several times a day. Do not use a heating pad while sleeping. It can burn the skin.  · You can alternate the ice and heat. Talk with your healthcare provider to find out the best treatment or therapy for your back pain.  · Therapeutic massage will help relax the back muscles without stretching them.  · Be aware of safe lifting methods. Do not lift anything over 15 pounds until all of the pain is gone.  Medicines  Talk to your healthcare provider before using medicines, especially if you have other health problems or are taking other medicines.  · You may use acetaminophen or ibuprofen to control pain, unless another pain medicine was prescribed. If you have chronic conditions like diabetes, liver or kidney disease, stomach ulcers, or gastrointestinal bleeding, or are taking blood-thinner medicines, talk with your doctor before taking any medicines.  · Be careful if you are given prescription medicines, narcotics, or medicine for muscle spasm. They can cause drowsiness, and affect your coordination, reflexes, and judgment. Do not drive or operate heavy machinery when taking these types of medicines. Only take pain medicine as prescribed by your healthcare provider.  Follow-up care  Follow up with your healthcare provider, or as advised. You may need physical therapy or more tests if your symptoms get worse.  If you had X-rays your healthcare provider may be checking for any broken bones, breaks, or fractures. Bruises and sprains can sometimes hurt as much as a fracture. These injuries can take  time to heal completely. If your symptoms dont improve or they get worse, talk with your healthcare provider. You may need a repeat X-ray or other tests.  Call 911  Call for emergency care if any of the following occur:  · Trouble breathing  · Confused  · Very drowsy or trouble awakening  · Fainting or loss of consciousness  · Rapid or very slow heart rate  · Loss of bowel or bladder control  When to seek medical advice  Call your healthcare provider right away if any of the following occur:  · Pain gets worse or spreads to your arms or legs  · Weakness or numbness in one or both arms or legs  · Numbness in the groin or genital area  Date Last Reviewed: 6/1/2016  © 2948-0855 [employer]. [address] [place] [address], [place] [address]. All rights reserved. This information is not intended as a substitute for professional medical care. Always follow your healthcare professional's instructions.

## 2019-06-06 NOTE — PATIENT INSTRUCTIONS
Back Sprain or Strain    Injury to the muscles (strain) or ligaments (sprain) around the spine can be troubling. Injury may occur after a sudden forceful twisting or bending force such as in a car accident, after a simple awkward movement, or after lifting something heavy with poor body positioning. In any case, muscle spasm is often present and adds to the pain.  Thankfully, most people feel better in 1 to 2 weeks, and most of the rest in 1 to 2 months. Most people can remain active. Unless you had a forceful or traumatic physical injury such as a car accident or fall, X-rays may not be ordered for the first evaluation of a back sprain or strain. If pain continues and does not respond to medical treatment, your healthcare provider may then order X-rays and other tests.  Home care  The following guidelines will help you care for your injury at home:  · When in bed, try to find a comfortable position. A firm mattress is best. Try lying flat on your back with pillows under your knees. You can also try lying on your side with your knees bent up toward your chest and a pillow between your knees.  · Don't sit for long periods. Try not to take long car rides or take other trips that have you sitting for a long time. This puts more stress on the lower back than standing or walking.  · During the first 24 to 72 hours after an injury or flare-up, apply an ice pack to the painful area for 20 minutes. Then remove it for 20 minutes. Do this for 60 to 90 minutes, or several times a day. This will reduce swelling and pain. Be sure to wrap the ice pack in a thin towel or plastic to protect your skin.  · You can start with ice, then switch to heat. Heat from a hot shower, hot bath, or heating pad reduces pain and works well for muscle spasms. Put heat on the painful area for 20 minutes, then remove for 20 minutes. Do this for 60 to 90 minutes, or several times a day. Do not use a heating pad while sleeping. It can burn the  skin.  · You can alternate the ice and heat. Talk with your healthcare provider to find out the best treatment or therapy for your back pain.  · Therapeutic massage will help relax the back muscles without stretching them.  · Be aware of safe lifting methods. Do not lift anything over 15 pounds until all of the pain is gone.  Medicines  Talk to your healthcare provider before using medicines, especially if you have other health problems or are taking other medicines.  · You may use acetaminophen or ibuprofen to control pain, unless another pain medicine was prescribed. If you have chronic conditions like diabetes, liver or kidney disease, stomach ulcers, or gastrointestinal bleeding, or are taking blood-thinner medicines, talk with your doctor before taking any medicines.  · Be careful if you are given prescription medicines, narcotics, or medicine for muscle spasm. They can cause drowsiness, and affect your coordination, reflexes, and judgment. Do not drive or operate heavy machinery when taking these types of medicines. Only take pain medicine as prescribed by your healthcare provider.  Follow-up care  Follow up with your healthcare provider, or as advised. You may need physical therapy or more tests if your symptoms get worse.  If you had X-rays your healthcare provider may be checking for any broken bones, breaks, or fractures. Bruises and sprains can sometimes hurt as much as a fracture. These injuries can take time to heal completely. If your symptoms dont improve or they get worse, talk with your healthcare provider. You may need a repeat X-ray or other tests.  Call 911  Call for emergency care if any of the following occur:  · Trouble breathing  · Confused  · Very drowsy or trouble awakening  · Fainting or loss of consciousness  · Rapid or very slow heart rate  · Loss of bowel or bladder control  When to seek medical advice  Call your healthcare provider right away if any of the following occur:  · Pain  gets worse or spreads to your arms or legs  · Weakness or numbness in one or both arms or legs  · Numbness in the groin or genital area  Date Last Reviewed: 6/1/2016  © 1785-2356 [employer]. [address] [place] [address], [place] [address]. All rights reserved. This information is not intended as a substitute for professional medical care. Always follow your healthcare professional's instructions.

## 2019-06-19 ENCOUNTER — TELEPHONE (OUTPATIENT)
Dept: OBSTETRICS AND GYNECOLOGY | Facility: CLINIC | Age: 54
End: 2019-06-19

## 2019-06-19 DIAGNOSIS — Z12.39 SCREENING FOR BREAST CANCER: Primary | ICD-10-CM

## 2019-06-19 NOTE — TELEPHONE ENCOUNTER
Pt has annual scheduled on 9/17 and would like mammo orders faxed to [facility].

## 2019-06-27 ENCOUNTER — OFFICE VISIT (OUTPATIENT)
Dept: URGENT CARE | Facility: CLINIC | Age: 54
End: 2019-06-27
Payer: COMMERCIAL

## 2019-06-27 VITALS
WEIGHT: 215 LBS | OXYGEN SATURATION: 99 % | DIASTOLIC BLOOD PRESSURE: 79 MMHG | BODY MASS INDEX: 35.78 KG/M2 | HEART RATE: 88 BPM | SYSTOLIC BLOOD PRESSURE: 119 MMHG | TEMPERATURE: 98 F

## 2019-06-27 DIAGNOSIS — S39.012A BACK STRAIN, INITIAL ENCOUNTER: ICD-10-CM

## 2019-06-27 DIAGNOSIS — S39.012D BACK STRAIN, SUBSEQUENT ENCOUNTER: Primary | ICD-10-CM
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
## 2019-06-27 DIAGNOSIS — M54.6 ACUTE LEFT-SIDED THORACIC BACK PAIN: ICD-10-CM

## 2019-06-27 PROCEDURE — 99214 PR OFFICE/OUTPT VISIT, EST, LEVL IV, 30-39 MIN: ICD-10-PCS | Mod: S$GLB,,, | Performed by: PHYSICIAN ASSISTANT

## 2019-06-27 PROCEDURE — 99214 OFFICE O/P EST MOD 30 MIN: CPT | Mod: S$GLB,,, | Performed by: PHYSICIAN ASSISTANT

## 2019-06-27 RX ORDER — ORPHENADRINE CITRATE 100 MG/1
100 TABLET, EXTENDED RELEASE ORAL 2 TIMES DAILY PRN
Qty: 20 TABLET | Refills: 0 | Status: SHIPPED | OUTPATIENT
Start: 2019-06-27 | End: 2019-07-07

## 2019-06-27 RX ORDER — NAPROXEN 500 MG/1
500 TABLET ORAL 2 TIMES DAILY WITH MEALS
Qty: 30 TABLET | Refills: 0 | Status: SHIPPED | OUTPATIENT
Start: 2019-06-27 | End: 2019-09-17

## 2019-06-27 RX ORDER — ACETAMINOPHEN 500 MG
1000 TABLET ORAL EVERY 6 HOURS PRN
Qty: 60 TABLET | Refills: 0 | Status: SHIPPED | OUTPATIENT
Start: 2019-06-27 | End: 2019-09-17

## 2019-06-27 NOTE — LETTER
[facility] [place] [address] [address] [facility] [address]  Phone: [phone]  Fax: [phone]  [facility]: [email or web address]    Pt Name: Joy Crawford  Injury Date: 05/17/2019   Employee ID:  Date of First Treatment: 06/27/2019   Company: [employer]      Appointment Time: 03:55 PM Arrived: 1956   Provider: Ishmael Hansen PA-C Time Out:6976     Office Treatment:   1. Back strain, subsequent encounter    2. Acute left-sided thoracic back pain    3. Work related injury    4. Back strain, initial encounter      Medications Ordered This Encounter   Medications    acetaminophen (TYLENOL EXTRA STRENGTH) 500 MG tablet    naproxen (NAPROSYN) 500 MG tablet    orphenadrine (NORFLEX) 100 mg tablet      Patient Instructions: Daily home exercises/warm soaks, Continue Physical Therapy    Restrictions: Regular Duty     Return Appointment: 2 weeks

## 2019-06-27 NOTE — PROGRESS NOTES
Subjective:       Patient ID: Joy Crawford is a 54 y.o. female.    Chief Complaint: Shoulder Pain    Pt still having significant pain in the left scapular and neck area. Pt uses heat and is in physical therapy now.     Shoulder Pain    The pain is present in the left shoulder and neck. This is a new problem. The current episode started 1 to 4 weeks ago. The problem occurs intermittently. The problem has been gradually improving. The quality of the pain is described as aching. The pain is mild. Associated symptoms include stiffness. Pertinent negatives include no fever, itching or numbness. The symptoms are aggravated by standing. She has tried heat (physical therapy) for the symptoms.     Review of Systems   Constitution: Negative for chills, fever and malaise/fatigue.   HENT: Negative for hearing loss and nosebleeds.    Eyes: Negative for blurred vision and redness.   Cardiovascular: Negative for chest pain and syncope.   Respiratory: Negative for cough and shortness of breath.    Skin: Negative for itching and rash.   Musculoskeletal: Positive for back pain, neck pain and stiffness. Negative for joint pain, muscle cramps and muscle weakness.   Gastrointestinal: Negative for abdominal pain and bowel incontinence.   Genitourinary: Negative for bladder incontinence, dysuria, hematuria and urgency.   Neurological: Negative for disturbances in coordination, numbness and paresthesias.   Psychiatric/Behavioral: The patient is not nervous/anxious.        Objective:      Physical Exam   Constitutional: She appears well-developed and well-nourished. She is active. No distress.   HENT:   Head: Normocephalic and atraumatic.   Right Ear: Hearing and external ear normal.   Left Ear: Hearing and external ear normal.   Nose: Nose normal. No nasal deformity. No epistaxis.   Mouth/Throat: Oropharynx is clear and moist and mucous membranes are normal.   Eyes: Conjunctivae and lids are normal. No scleral icterus.   Neck: Trachea  normal and normal range of motion.   Cardiovascular: Intact distal pulses and normal pulses.   Pulmonary/Chest: Effort normal. No stridor. No respiratory distress.   Musculoskeletal:        Cervical back: She exhibits tenderness. She exhibits normal range of motion.        Thoracic back: She exhibits tenderness. She exhibits normal range of motion.        Back:    Neurological: She is alert. She has normal strength. She is not disoriented. No sensory deficit. GCS eye subscore is 4. GCS verbal subscore is 5. GCS motor subscore is 6.   Skin: Skin is warm, dry and intact. Capillary refill takes less than 2 seconds. She is not diaphoretic.   Psychiatric: She has a normal mood and affect. Her speech is normal and behavior is normal. She is attentive.   Nursing note and vitals reviewed.      Assessment:       1. Back strain, subsequent encounter    2. Acute left-sided thoracic back pain    3. Work related injury    4. Back strain, initial encounter        Plan:         Medications Ordered This Encounter   Medications    acetaminophen (TYLENOL EXTRA STRENGTH) 500 MG tablet     Sig: Take 2 tablets (1,000 mg total) by mouth every 6 (six) hours as needed for Pain.     Dispense:  60 tablet     Refill:  0    naproxen (NAPROSYN) 500 MG tablet     Sig: Take 1 tablet (500 mg total) by mouth 2 (two) times daily with meals.     Dispense:  30 tablet     Refill:  0    orphenadrine (NORFLEX) 100 mg tablet     Sig: Take 1 tablet (100 mg total) by mouth 2 (two) times daily as needed for Muscle spasms or Pain. Take off duty only. May cause drowsiness.     Dispense:  20 tablet     Refill:  0     Patient Instructions: Daily home exercises/warm soaks, Continue Physical Therapy   Restrictions: Regular Duty  Follow up in about 2 weeks (around 7/11/2019).

## 2019-07-22 ENCOUNTER — OFFICE VISIT (OUTPATIENT)
Dept: URGENT CARE | Facility: CLINIC | Age: 54
End: 2019-07-22
Payer: COMMERCIAL
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
## 2019-07-22 DIAGNOSIS — S39.012D BACK STRAIN, SUBSEQUENT ENCOUNTER: ICD-10-CM

## 2019-07-22 DIAGNOSIS — M54.6 ACUTE LEFT-SIDED THORACIC BACK PAIN: Primary | ICD-10-CM

## 2019-07-22 PROCEDURE — 99213 OFFICE O/P EST LOW 20 MIN: CPT | Mod: S$GLB,,, | Performed by: FAMILY MEDICINE

## 2019-07-22 PROCEDURE — 99213 PR OFFICE/OUTPT VISIT, EST, LEVL III, 20-29 MIN: ICD-10-PCS | Mod: S$GLB,,, | Performed by: FAMILY MEDICINE

## 2019-07-22 NOTE — LETTER
[facility]  [address] [facility], [address]  [facility] [address]  Phone: [phone]  Fax: [phone]  [facility]: [id number]    Pt Name: Joy Crawford  Injury Date: 05/17/2019   Employee ID:  Date of Treatment: 07/22/2019   Company: [employer]      Appointment Time: 08:15 AM Arrived: 8:30 AM   Provider: Heladio Rascon MD Time Out: 9:20 AM     Office Treatment:      1. Acute left-sided thoracic back pain    2. Back strain, subsequent encounter    3. Work related injury          Patient Instructions: Continue Physical Therapy    Restrictions: Avoid frequent bending/lifting/twisting     Return Appointment: 7/29/2019 at 11:00AM

## 2019-07-22 NOTE — PROGRESS NOTES
"Subjective:       Patient ID: Joy Crawford is a 54 y.o. female.    Chief Complaint: Neck Injury (left)    Pt still having  pain in the left scapular and neck area. Pt uses heat and is in physical therapy the patient pain today's 3/10.    Feels much improved post PT. Describes pain as "sharp" and "bearable". Off of all meds. Only taking the occasional OTC analgesic. Has 3 more PT sessions left.    Neck Injury    The current episode started more than 1 month ago. The problem occurs constantly. The pain is present in the left side. The quality of the pain is described as aching. The pain is at a severity of 3/10. Pertinent negatives include no fever or numbness.   Shoulder Pain    The pain is present in the left shoulder and neck. This is a new problem. The current episode started 1 to 4 weeks ago. The problem occurs intermittently. The problem has been gradually improving. The quality of the pain is described as aching. The pain is mild. Associated symptoms include stiffness. Pertinent negatives include no fever, itching or numbness. The symptoms are aggravated by standing. She has tried heat (physical therapy) for the symptoms.     Review of Systems   Constitution: Negative for fever.   Skin: Negative for itching.   Musculoskeletal: Positive for stiffness.   Neurological: Negative for numbness.       Objective:      Physical Exam   Constitutional: She appears well-developed and well-nourished.   Eyes: Pupils are equal, round, and reactive to light. Conjunctivae and EOM are normal.   Neck: Normal range of motion. Neck supple.   Musculoskeletal:        Thoracic back: She exhibits decreased range of motion and tenderness. She exhibits no bony tenderness, no swelling, no edema, no deformity, no laceration, no pain, no spasm and normal pulse.        Back:    Limited twisting motion right and left. Causes sharp back pain.      FROM flexion and extension and lateral leaning b/l   Psychiatric: She has a normal mood and " affect. Her behavior is normal. Judgment and thought content normal.       Assessment:       1. Acute left-sided thoracic back pain    2. Back strain, subsequent encounter    3. Work related injury        Plan:            Patient Instructions: Continue Physical Therapy   Restrictions: Avoid frequent bending/lifting/twisting  Follow up in about 1 week (around 7/29/2019).

## 2019-09-17 ENCOUNTER — OFFICE VISIT (OUTPATIENT)
Dept: OBSTETRICS AND GYNECOLOGY | Facility: CLINIC | Age: 54
End: 2019-09-17
Attending: OBSTETRICS & GYNECOLOGY
Payer: COMMERCIAL

## 2019-09-17 VITALS
WEIGHT: 216.06 LBS | DIASTOLIC BLOOD PRESSURE: 80 MMHG | SYSTOLIC BLOOD PRESSURE: 124 MMHG | HEIGHT: 65 IN | BODY MASS INDEX: 36 KG/M2

## 2019-09-17 DIAGNOSIS — L91.8 SKIN TAG: ICD-10-CM

## 2019-09-17 DIAGNOSIS — N90.89 VULVAR LESION: ICD-10-CM

## 2019-09-17 DIAGNOSIS — Z01.419 ENCOUNTER FOR GYNECOLOGICAL EXAMINATION: Primary | ICD-10-CM

## 2019-09-17 PROCEDURE — 56605 BIOPSY (GYNECOLOGICAL): ICD-10-PCS | Mod: 51,S$GLB,, | Performed by: OBSTETRICS & GYNECOLOGY

## 2019-09-17 PROCEDURE — 11200 RMVL SKIN TAGS UP TO&INC 15: CPT | Mod: S$GLB,,, | Performed by: OBSTETRICS & GYNECOLOGY

## 2019-09-17 PROCEDURE — 88305 TISSUE EXAM BY PATHOLOGIST: CPT | Performed by: PATHOLOGY

## 2019-09-17 PROCEDURE — 88341 PR IHC OR ICC EACH ADD'L SINGLE ANTIBODY  STAINPR: ICD-10-PCS | Mod: 26,,, | Performed by: PATHOLOGY

## 2019-09-17 PROCEDURE — 11200 BIOPSY (GYNECOLOGICAL): ICD-10-PCS | Mod: S$GLB,,, | Performed by: OBSTETRICS & GYNECOLOGY

## 2019-09-17 PROCEDURE — 88341 IMHCHEM/IMCYTCHM EA ADD ANTB: CPT | Mod: 26,,, | Performed by: PATHOLOGY

## 2019-09-17 PROCEDURE — 99396 PREV VISIT EST AGE 40-64: CPT | Mod: 25,S$GLB,, | Performed by: OBSTETRICS & GYNECOLOGY

## 2019-09-17 PROCEDURE — 56606 BIOPSY OF VULVA/PERINEUM: CPT | Mod: S$GLB,,, | Performed by: OBSTETRICS & GYNECOLOGY

## 2019-09-17 PROCEDURE — 99999 PR PBB SHADOW E&M-EST. PATIENT-LVL III: CPT | Mod: PBBFAC,,, | Performed by: OBSTETRICS & GYNECOLOGY

## 2019-09-17 PROCEDURE — 56605 BIOPSY OF VULVA/PERINEUM: CPT | Mod: 51,S$GLB,, | Performed by: OBSTETRICS & GYNECOLOGY

## 2019-09-17 PROCEDURE — 88342 IMHCHEM/IMCYTCHM 1ST ANTB: CPT | Mod: 26,,, | Performed by: PATHOLOGY

## 2019-09-17 PROCEDURE — 99999 PR PBB SHADOW E&M-EST. PATIENT-LVL III: ICD-10-PCS | Mod: PBBFAC,,, | Performed by: OBSTETRICS & GYNECOLOGY

## 2019-09-17 PROCEDURE — 99396 PR PREVENTIVE VISIT,EST,40-64: ICD-10-PCS | Mod: 25,S$GLB,, | Performed by: OBSTETRICS & GYNECOLOGY

## 2019-09-17 PROCEDURE — 56606 PR BX,VULVA/PERINEUM,ADDL LESION: ICD-10-PCS | Mod: S$GLB,,, | Performed by: OBSTETRICS & GYNECOLOGY

## 2019-09-17 PROCEDURE — 88305 TISSUE SPECIMEN TO PATHOLOGY, OBSTETRICS/GYNECOLOGY: ICD-10-PCS | Mod: 26,,, | Performed by: PATHOLOGY

## 2019-09-17 PROCEDURE — 88342 TISSUE SPECIMEN TO PATHOLOGY, OBSTETRICS/GYNECOLOGY: ICD-10-PCS | Mod: 26,,, | Performed by: PATHOLOGY

## 2019-09-17 RX ORDER — MONTELUKAST SODIUM 10 MG/1
TABLET ORAL
Refills: 0 | COMMUNITY
Start: 2019-07-25 | End: 2020-03-09 | Stop reason: CLARIF

## 2019-09-17 RX ORDER — CELECOXIB 200 MG/1
CAPSULE ORAL
Refills: 1 | COMMUNITY
Start: 2019-09-12 | End: 2020-03-09 | Stop reason: CLARIF

## 2019-09-17 RX ORDER — TRIAMCINOLONE ACETONIDE 1 MG/G
OINTMENT TOPICAL
Refills: 0 | COMMUNITY
Start: 2019-06-26 | End: 2020-03-09 | Stop reason: CLARIF

## 2019-09-17 RX ORDER — FLUTICASONE FUROATE AND VILANTEROL TRIFENATATE 200; 25 UG/1; UG/1
POWDER RESPIRATORY (INHALATION)
Refills: 0 | COMMUNITY
Start: 2019-08-04 | End: 2020-09-04

## 2019-09-17 RX ORDER — LISINOPRIL 10 MG/1
TABLET ORAL
Refills: 2 | COMMUNITY
Start: 2019-09-10 | End: 2021-04-16 | Stop reason: SDUPTHER

## 2019-09-17 NOTE — PROCEDURES
Biopsy (Gynecological)  Date/Time: 9/17/2019 1:10 PM  Performed by: José Miguel Leon MD  Authorized by: José Miguel Leon MD     Consent Done?:  Yes (Verbal)   Patient was prepped and draped in the normal sterile fashion.  Local anesthesia used?: Yes    Local anesthetic:  Lidocaine 1% without epinephrine  Anesthetic total (ml):  5    Biopsy Location:  Vulva  Vulva:     # of lesions:  3  Estimated blood loss (cc):  2   Patient tolerated the procedure well with no immediate complications.     Two small 4 mm flat hyper pigmented areas on the upper mons consistent with possible flat condyloma.  One small 3 mm nodule on the left labia minora  One small skin tag noted along the right inner thigh.  All 3 excised with ease and sent to pathology.    Silver nitrate applied to the base.

## 2019-09-17 NOTE — PROGRESS NOTES
Chief Complaint: well woman exam  Well Woman (Annual Exam, Last pap/hpv  negative, Last mammo -24-19 birads 2 [facility]. C/o vaginal skin tags, would like removed)      Joy Crawford is a 54 y.o. female  presents for a well woman exam.    She is established.  C/o vaginal skin tags and wants them removed today    Cycles:none No cycle for over a year 2018  LMP: Patient's last menstrual period was 2018 (approximate)..    Last pap: 2017  normal HPV neg  Last MMG:  2019 diagnostic imaging BI-RADS 2  Last colonoscopy:  13 years ago patient to follow up with GI physician this year or at very least primary care doctor for Cologuard        Medication List with Changes/Refills   Current Medications    ALBUTEROL 2.5 MG /3 ML (0.083 %) NEBU 3 ML, ALBUTEROL 5 MG/ML NEBU 0.5 ML    use as directed    ALPRAZOLAM (XANAX) 0.5 MG TABLET    TK 1 T PO QD TO BID PRF ANXIETY    BREO ELLIPTA 200-25 MCG/DOSE DSDV DISKUS INHALER    INL 1 PUFF PO QD    CELECOXIB (CELEBREX) 200 MG CAPSULE        DULOXETINE (CYMBALTA) 60 MG CAPSULE    TK 1 C PO QD    LISINOPRIL 10 MG TABLET    TK 1 T PO QD    MONTELUKAST (SINGULAIR) 10 MG TABLET    TK 1 T PO QD FOR ALLERGY AND CONGESTION PRF  INFECTION    OFLOXACIN (FLOXIN) 0.3 % OTIC SOLUTION    Place 5 drops into both ears once daily.    TRAZODONE (DESYREL) 50 MG TABLET    TK 1 T PO  QHS    TRIAMCINOLONE ACETONIDE 0.1% (KENALOG) 0.1 % OINTMENT    APPLY TO THE EAR BID  PRN    VENTOLIN HFA 90 MCG/ACTUATION INHALER    INHALE ONE PUFF ORALLY Q 4 TO 6 H PRF SOB   Discontinued Medications    ACETAMINOPHEN (TYLENOL EXTRA STRENGTH) 500 MG TABLET    Take 2 tablets (1,000 mg total) by mouth every 6 (six) hours as needed for Pain.    BREO ELLIPTA 100-25 MCG/DOSE DISKUS INHALER    INHALE 1 PUFF PO QD    LISINOPRIL (PRINIVIL,ZESTRIL) 20 MG TABLET    once a day    NAPROXEN (NAPROSYN) 500 MG TABLET    Take 1 tablet (500 mg total) by mouth 2 (two) times daily with meals.    SILVER SULFADIAZINE 1%  "(SILVADENE) 1 % CREAM    Apply topically 2 (two) times daily.       Past Medical History:   Diagnosis Date    Anxiety     Depression     Esophageal reflux     H/O mammogram 2017    Normal  ([facility])     Herpes simplex virus (HSV) infection     Hypertension     Peptic ulcer        Past Surgical History:   Procedure Laterality Date    RHINOPLASTY  2006    SINUS SURGERY         OB History    Para Term  AB Living   1 1 1     1   SAB TAB Ectopic Multiple Live Births           1      # Outcome Date GA Lbr Balwinder/2nd Weight Sex Delivery Anes PTL Lv   1 Term  40w0d  3.175 kg (7 lb) M Vag-Spont EPI  ENEIDA       Family History   Problem Relation Age of Onset    Skin cancer Father     Hypertension Father     Hyperlipidemia Father     Hypertension Mother     Diabetes Mother     Hyperlipidemia Mother     No Known Problems Son     Breast cancer Neg Hx        Social History     Tobacco Use    Smoking status: Never Smoker    Smokeless tobacco: Never Used   Substance Use Topics    Alcohol use: Yes    Drug use: No         ROS:  GENERAL: Denies weight gain or weight loss. Feeling well overall.   SKIN: Denies rash or lesions.   HEENT: Denies headaches, or vision changes.   CARDIOVASCULAR: Denies palpitations or left sided chest pain.   RESPIRATORY: Denies shortness of breath or dyspnea on exertion.  BREASTS: Denies pain, lumps, or nipple discharge.   ABDOMEN: Denies abdominal pain, constipation, diarrhea, nausea, vomiting, change in appetite or rectal bleeding.   URINARY: Denies frequency, dysuria, hematuria.  NEUROLOGIC: Denies syncope or weakness.   PSYCHIATRIC: Denies depression, anxiety or mood swings.    Physical Exam:  /80   Ht 5' 5" (1.651 m)   Wt 98 kg (216 lb 0.8 oz)   LMP 2018 (Approximate)   BMI 35.95 kg/m²     APPEARANCE: Well nourished, well developed, in no acute distress.  AFFECT: WNL, alert and oriented x 3  SKIN: No acne or hirsutism  BREASTS: Symmetrical, no skin " changes.                     No nipple discharge. No palpable masses bilaterally  NODES: No inguinal nor axillary LAD  ABDOMEN: soft Non tender Non distended No masses  PELVIC:   Two small 4 mm flat hyper pigmented areas on the upper mons consistent with possible flat condyloma.  One small 3 mm nodule on the left labia minora  One small skin tag noted along the right inner thigh.  See separate procedure note for excision of all 3 lesions    Normal hair distribution.   Adequate perineal body, normal urethral meatus. No signif cystocele or rectocele.  Vagina moist and well rugated without lesions or discharge.    Cervix pink, without lesions, discharge or tenderness.     PAP not performed   Bimanual exam shows uterus to be normal size, regular, mobile and nontender.  Adnexa without masses or tenderness.    EXTREMITIES: No edema.        ASSESSMENT AND PLAN    Joy was seen today for well woman.    Diagnoses and all orders for this visit:    Encounter for gynecological examination    Skin tag  -     Tissue Specimen To Pathology, Obstetrics/Gynecology    Vulvar lesion x3  -     Biopsy (Gynecological) x3   Three separate specimens were sent to the lab.          Follow up in about 1 week (around 9/24/2019).    Patient was counseled today on A.C.S. Pap guidelines and recommendations for yearly pelvic exams, mammograms and monthly self breast exams; to see her PCP for other health maintenance.     Patient encouraged to register for portal and results will be sent via portal.       Health Maintenance   Topic Date Due    TETANUS VACCINE  04/05/1984    Mammogram  04/05/2006    Lipid Panel  10/01/2012    Colonoscopy  04/05/2016    Pap Smear with HPV Cotest  02/06/2020

## 2019-10-04 NOTE — PROGRESS NOTES
Spoke to pt re vulvar bx  Bx 1 and 3 benign   Bx 2 sent to [facility]  Results pending   If they are not bacck in a week- pt to call me next fri so I can f/u with path

## 2019-10-11 NOTE — PROGRESS NOTES
Spoke to patient.  Biopsy 2.  Finally returned with mild dysplasia.  Explain that she had mildly atypical cells that were seen on the biopsy and needs close follow-up.  We will let the area heal and follow her up in 6 months.  Plan to do a vulvar colposcopy at that appointment.    Daniela please call pt Monday and sched a 6 mo f/u   pt aware she is going to come back and see me in 6 months.  Please  schedule patient in 6 months reevaluation and do it in the colpo room so I can look at the vulva more carefully.

## 2019-10-18 ENCOUNTER — TELEPHONE (OUTPATIENT)
Dept: OBSTETRICS AND GYNECOLOGY | Facility: CLINIC | Age: 54
End: 2019-10-18

## 2019-10-18 DIAGNOSIS — N95.0 PMB (POSTMENOPAUSAL BLEEDING): Primary | ICD-10-CM

## 2019-10-18 NOTE — TELEPHONE ENCOUNTER
Pt was having cramping, sometimes feels like she is getting a period but hasn't had a period in over 1 year.  She reports scant amount of vaginal bleeding today.  Denies vaginal dryness and other vaginal complaints.  Scheduled US and appt with Fariha per Dr. Leon 10/28

## 2019-10-18 NOTE — TELEPHONE ENCOUNTER
Dr Leon pt calling, noticed today she has vaginal spotting bright red not a lot but pt said she is a worrier and wants to discuss.Pt # [phone]

## 2019-11-29 ENCOUNTER — OFFICE VISIT (OUTPATIENT)
Dept: URGENT CARE | Facility: CLINIC | Age: 54
End: 2019-11-29
Payer: COMMERCIAL

## 2019-11-29 ENCOUNTER — CLINICAL SUPPORT (OUTPATIENT)
Dept: URGENT CARE | Facility: CLINIC | Age: 54
End: 2019-11-29

## 2019-11-29 VITALS
RESPIRATION RATE: 18 BRPM | DIASTOLIC BLOOD PRESSURE: 88 MMHG | HEART RATE: 83 BPM | SYSTOLIC BLOOD PRESSURE: 147 MMHG | TEMPERATURE: 98 F | WEIGHT: 216 LBS | BODY MASS INDEX: 35.99 KG/M2 | HEIGHT: 65 IN | OXYGEN SATURATION: 97 %

## 2019-11-29 DIAGNOSIS — Z20.828 EXPOSURE TO THE FLU: ICD-10-CM

## 2019-11-29 DIAGNOSIS — J02.9 SORE THROAT: ICD-10-CM

## 2019-11-29 DIAGNOSIS — R53.83 FATIGUE, UNSPECIFIED TYPE: Primary | ICD-10-CM

## 2019-11-29 DIAGNOSIS — H66.001 NON-RECURRENT ACUTE SUPPURATIVE OTITIS MEDIA OF RIGHT EAR WITHOUT SPONTANEOUS RUPTURE OF TYMPANIC MEMBRANE: Primary | ICD-10-CM

## 2019-11-29 DIAGNOSIS — R09.82 POST-NASAL DRIP: ICD-10-CM

## 2019-11-29 LAB
CTP QC/QA: YES
CTP QC/QA: YES
FLUAV AG NPH QL: NEGATIVE
FLUBV AG NPH QL: NEGATIVE
S PYO RRNA THROAT QL PROBE: NEGATIVE

## 2019-11-29 PROCEDURE — 96372 THER/PROPH/DIAG INJ SC/IM: CPT | Mod: S$GLB,,, | Performed by: PHYSICIAN ASSISTANT

## 2019-11-29 PROCEDURE — 99214 PR OFFICE/OUTPT VISIT, EST, LEVL IV, 30-39 MIN: ICD-10-PCS | Mod: 25,S$GLB,, | Performed by: PHYSICIAN ASSISTANT

## 2019-11-29 PROCEDURE — 99214 OFFICE O/P EST MOD 30 MIN: CPT | Mod: 25,S$GLB,, | Performed by: PHYSICIAN ASSISTANT

## 2019-11-29 PROCEDURE — 87804 INFLUENZA ASSAY W/OPTIC: CPT | Mod: QW,S$GLB,, | Performed by: PHYSICIAN ASSISTANT

## 2019-11-29 PROCEDURE — 87880 POCT RAPID STREP A: ICD-10-PCS | Mod: QW,S$GLB,, | Performed by: PHYSICIAN ASSISTANT

## 2019-11-29 PROCEDURE — 87880 STREP A ASSAY W/OPTIC: CPT | Mod: QW,S$GLB,, | Performed by: PHYSICIAN ASSISTANT

## 2019-11-29 PROCEDURE — 87804 POCT INFLUENZA A/B: ICD-10-PCS | Mod: 59,QW,S$GLB, | Performed by: PHYSICIAN ASSISTANT

## 2019-11-29 PROCEDURE — 96372 PR INJECTION,THERAP/PROPH/DIAG2ST, IM OR SUBCUT: ICD-10-PCS | Mod: S$GLB,,, | Performed by: PHYSICIAN ASSISTANT

## 2019-11-29 RX ORDER — CYANOCOBALAMIN 1000 UG/ML
1000 INJECTION, SOLUTION INTRAMUSCULAR; SUBCUTANEOUS
Status: COMPLETED | OUTPATIENT
Start: 2019-11-29 | End: 2019-11-29

## 2019-11-29 RX ORDER — AMOXICILLIN AND CLAVULANATE POTASSIUM 875; 125 MG/1; MG/1
1 TABLET, FILM COATED ORAL 2 TIMES DAILY
Qty: 14 TABLET | Refills: 0 | Status: SHIPPED | OUTPATIENT
Start: 2019-11-29 | End: 2019-12-06

## 2019-11-29 RX ORDER — BETAMETHASONE SODIUM PHOSPHATE AND BETAMETHASONE ACETATE 3; 3 MG/ML; MG/ML
9 INJECTION, SUSPENSION INTRA-ARTICULAR; INTRALESIONAL; INTRAMUSCULAR; SOFT TISSUE
Status: COMPLETED | OUTPATIENT
Start: 2019-11-29 | End: 2019-11-29

## 2019-11-29 RX ADMIN — BETAMETHASONE SODIUM PHOSPHATE AND BETAMETHASONE ACETATE 9 MG: 3; 3 INJECTION, SUSPENSION INTRA-ARTICULAR; INTRALESIONAL; INTRAMUSCULAR; SOFT TISSUE at 10:11

## 2019-11-29 RX ADMIN — CYANOCOBALAMIN 1000 MCG: 1000 INJECTION, SOLUTION INTRAMUSCULAR; SUBCUTANEOUS at 10:11

## 2019-11-29 NOTE — PATIENT INSTRUCTIONS
Ear Infection  Take full course of antibiotics as prescribed.  -  Take antibiotics with meals and to add yogurt or probiotic over the counter to diet to help prevent GI upset while taking antibiotic.    Warm compresses to affected ear.  Elevate head on a pillow at night.     Use flonase nasal spray directed for nasal congestion.  Use zyrtec or claritin daily for allergies and drainage.    You received a steroid shot today - As discussed, this can elevate your blood pressure, elevate your blood sugar, water weight gain, nervous energy, redness to the face and dimpling of the skin where the shot goes in.     Tylenol or Motrin every 4 - 6 hours as needed for fever or ear pain.    Follow up with your PCP in 1 week of initiating antibiotics or sooner for no improvement in symptoms  Follow up in the ER for any worsening of symptoms such as new fever, increasing ear pain, neck stiffness, shortness of breath, etc.  If you smoke, stop smoking.          Middle Ear Infection (Adult)  You have an infection of the middle ear, the space behind the eardrum. This is also called acute otitis media (AOM). Sometimes it is caused by the common cold. This is because congestion can block the internal passage (eustachian tube) that drains fluid from the middle ear. When the middle ear fills with fluid, bacteria can grow there and cause an infection. Oral antibiotics are used to treat this illness, not ear drops. Symptoms usually start to improve within 1 to 2 days of treatment.    Home care  The following are general care guidelines:  · Finish all of the antibiotic medicine given, even though you may feel better after the first few days.  · You may use over-the-counter medicine, such as acetaminophen or ibuprofen, to control pain and fever, unless something else was prescribed. If you have chronic liver or kidney disease or have ever had a stomach ulcer or gastrointestinal bleeding, talk with your healthcare provider before using these  medicines. Do not give aspirin to anyone under 18 years of age who has a fever. It may cause severe illness or death.  Follow-up care  Follow up with your healthcare provider, or as advised, in 2 weeks if all symptoms have not gotten better, or if hearing doesn't go back to normal within 1 month.  When to seek medical advice  Call your healthcare provider right away if any of these occur:  · Ear pain gets worse or does not improve after 3 days of treatment  · Unusual drowsiness or confusion  · Neck pain, stiff neck, or headache  · Fluid or blood draining from the ear canal  · Fever of 100.4°F (38°C) or as advised   · Seizure  Date Last Reviewed: 6/1/2016  © 3837-1801 [employer]. [address] [place] [address], [place] [address]. All rights reserved. This information is not intended as a substitute for professional medical care. Always follow your healthcare professional's instructions.

## 2019-11-29 NOTE — PROGRESS NOTES
"Subjective:       Patient ID: Joy Crawford is a 54 y.o. female.    Vitals:  height is 5' 5" (1.651 m) and weight is 98 kg (216 lb). Her temperature is 97.7 °F (36.5 °C). Her blood pressure is 147/88 (abnormal) and her pulse is 83. Her respiration is 18 and oxygen saturation is 97%.     Chief Complaint: Sinus Problem    Pt c/o right sided ear pain, PND, and sore throat. States she has been taking tylenol and Zpak for her ear with no relief. Denies any medical allerigies.      Sinus Problem   This is a new problem. Episode onset: 4 days  The problem has been gradually worsening since onset. There has been no fever. Her pain is at a severity of 7/10. The pain is moderate. Associated symptoms include ear pain and a sore throat. Pertinent negatives include no chills, congestion, coughing, diaphoresis, shortness of breath or sinus pressure. Past treatments include antibiotics. The treatment provided no relief.       Constitution: Negative for chills, sweating, fatigue and fever.   HENT: Positive for ear pain, sore throat and trouble swallowing. Negative for congestion, sinus pain, sinus pressure and voice change.    Neck: Negative for painful lymph nodes.   Eyes: Negative for eye redness.   Respiratory: Negative for chest tightness, cough, sputum production, bloody sputum, COPD, shortness of breath, stridor, wheezing and asthma.    Gastrointestinal: Negative for nausea and vomiting.   Musculoskeletal: Negative for muscle ache.   Skin: Negative for rash.   Allergic/Immunologic: Negative for seasonal allergies and asthma.   Hematologic/Lymphatic: Negative for swollen lymph nodes.       Objective:      Physical Exam   Constitutional: She is oriented to person, place, and time. She appears well-developed and well-nourished. She is cooperative.  Non-toxic appearance. She does not have a sickly appearance. She does not appear ill. No distress.   Patient is sitting pleasantly on exam table in no acute distress. Nontoxic " appearing.    HENT:   Head: Normocephalic and atraumatic.   Right Ear: Hearing, external ear and ear canal normal. Tympanic membrane is injected and bulging. A middle ear effusion is present.   Left Ear: Hearing, external ear and ear canal normal. Tympanic membrane is bulging. A middle ear effusion is present.   Nose: Mucosal edema present. No rhinorrhea or nasal deformity. No epistaxis. Right sinus exhibits no maxillary sinus tenderness and no frontal sinus tenderness. Left sinus exhibits no maxillary sinus tenderness and no frontal sinus tenderness.   Mouth/Throat: Uvula is midline and mucous membranes are normal. No trismus in the jaw. Normal dentition. No uvula swelling. Posterior oropharyngeal erythema and cobblestoning (with PND) present. No oropharyngeal exudate or posterior oropharyngeal edema. No tonsillar exudate.   Eyes: Pupils are equal, round, and reactive to light. Conjunctivae and lids are normal. No scleral icterus.   Neck: Trachea normal, full passive range of motion without pain and phonation normal. Neck supple. No neck rigidity. No edema and no erythema present.   Cardiovascular: Normal rate, regular rhythm, normal heart sounds, intact distal pulses and normal pulses.   Pulmonary/Chest: Effort normal and breath sounds normal. No respiratory distress. She has no decreased breath sounds. She has no wheezes. She has no rhonchi.   Abdominal: Normal appearance.   Musculoskeletal: Normal range of motion. She exhibits no edema or deformity.   Lymphadenopathy:     She has cervical adenopathy.   Neurological: She is alert and oriented to person, place, and time. She exhibits normal muscle tone. Coordination normal.   Skin: Skin is warm, dry, intact, not diaphoretic and not pale.   Psychiatric: She has a normal mood and affect. Her speech is normal and behavior is normal. Judgment and thought content normal. Cognition and memory are normal.   Nursing note and vitals reviewed.        Results for orders  placed or performed in visit on 11/29/19   POCT rapid strep A   Result Value Ref Range    Rapid Strep A Screen Negative Negative     Acceptable Yes    POCT Influenza A/B   Result Value Ref Range    Rapid Influenza A Ag Negative Negative    Rapid Influenza B Ag Negative Negative     Acceptable Yes      Discussed risks of steroids with patient. Failure on Zpak. Will change to augmentin. Advised to take antibiotics with meals and to add yogurt or probiotic over the counter to diet to help prevent GI upset while taking antibiotic. Advised on return/follow-up precautions. Advised on ER precautions. Answered all patient questions. Patient verbalized understanding and voiced agreement with current treatment plan.    Assessment:       1. Non-recurrent acute suppurative otitis media of right ear without spontaneous rupture of tympanic membrane    2. Post-nasal drip    3. Sore throat    4. Exposure to the flu        Plan:         Non-recurrent acute suppurative otitis media of right ear without spontaneous rupture of tympanic membrane  -     amoxicillin-clavulanate 875-125mg (AUGMENTIN) 875-125 mg per tablet; Take 1 tablet by mouth 2 (two) times daily. for 7 days  Dispense: 14 tablet; Refill: 0  -     betamethasone acetate-betamethasone sodium phosphate injection 9 mg    Post-nasal drip    Sore throat  -     POCT rapid strep A    Exposure to the flu  -     POCT Influenza A/B      Patient Instructions     Ear Infection  Take full course of antibiotics as prescribed.  -  Take antibiotics with meals and to add yogurt or probiotic over the counter to diet to help prevent GI upset while taking antibiotic.    Warm compresses to affected ear.  Elevate head on a pillow at night.     Use flonase nasal spray directed for nasal congestion.  Use zyrtec or claritin daily for allergies and drainage.    You received a steroid shot today - As discussed, this can elevate your blood pressure, elevate your blood  sugar, water weight gain, nervous energy, redness to the face and dimpling of the skin where the shot goes in.     Tylenol or Motrin every 4 - 6 hours as needed for fever or ear pain.    Follow up with your PCP in 1 week of initiating antibiotics or sooner for no improvement in symptoms  Follow up in the ER for any worsening of symptoms such as new fever, increasing ear pain, neck stiffness, shortness of breath, etc.  If you smoke, stop smoking.          Middle Ear Infection (Adult)  You have an infection of the middle ear, the space behind the eardrum. This is also called acute otitis media (AOM). Sometimes it is caused by the common cold. This is because congestion can block the internal passage (eustachian tube) that drains fluid from the middle ear. When the middle ear fills with fluid, bacteria can grow there and cause an infection. Oral antibiotics are used to treat this illness, not ear drops. Symptoms usually start to improve within 1 to 2 days of treatment.    Home care  The following are general care guidelines:  · Finish all of the antibiotic medicine given, even though you may feel better after the first few days.  · You may use over-the-counter medicine, such as acetaminophen or ibuprofen, to control pain and fever, unless something else was prescribed. If you have chronic liver or kidney disease or have ever had a stomach ulcer or gastrointestinal bleeding, talk with your healthcare provider before using these medicines. Do not give aspirin to anyone under 18 years of age who has a fever. It may cause severe illness or death.  Follow-up care  Follow up with your healthcare provider, or as advised, in 2 weeks if all symptoms have not gotten better, or if hearing doesn't go back to normal within 1 month.  When to seek medical advice  Call your healthcare provider right away if any of these occur:  · Ear pain gets worse or does not improve after 3 days of treatment  · Unusual drowsiness or  confusion  · Neck pain, stiff neck, or headache  · Fluid or blood draining from the ear canal  · Fever of 100.4°F (38°C) or as advised   · Seizure  Date Last Reviewed: 6/1/2016  © 9499-7028 [employer]. [address] [place] [address], [place] [address]. All rights reserved. This information is not intended as a substitute for professional medical care. Always follow your healthcare professional's instructions.

## 2020-01-27 ENCOUNTER — OFFICE VISIT (OUTPATIENT)
Dept: URGENT CARE | Facility: CLINIC | Age: 55
End: 2020-01-27
Payer: COMMERCIAL

## 2020-01-27 VITALS
WEIGHT: 216 LBS | HEIGHT: 65 IN | SYSTOLIC BLOOD PRESSURE: 131 MMHG | TEMPERATURE: 99 F | DIASTOLIC BLOOD PRESSURE: 85 MMHG | BODY MASS INDEX: 35.99 KG/M2 | OXYGEN SATURATION: 97 % | RESPIRATION RATE: 16 BRPM | HEART RATE: 79 BPM

## 2020-01-27 DIAGNOSIS — R05.9 COUGH: ICD-10-CM

## 2020-01-27 DIAGNOSIS — J06.9 VIRAL URI: Primary | ICD-10-CM

## 2020-01-27 LAB
CTP QC/QA: YES
FLUAV AG NPH QL: NEGATIVE
FLUBV AG NPH QL: NEGATIVE

## 2020-01-27 PROCEDURE — 87804 INFLUENZA ASSAY W/OPTIC: CPT | Mod: QW,S$GLB,, | Performed by: NURSE PRACTITIONER

## 2020-01-27 PROCEDURE — 87804 POCT INFLUENZA A/B: ICD-10-PCS | Mod: 59,QW,S$GLB, | Performed by: NURSE PRACTITIONER

## 2020-01-27 PROCEDURE — 99214 OFFICE O/P EST MOD 30 MIN: CPT | Mod: 25,S$GLB,, | Performed by: NURSE PRACTITIONER

## 2020-01-27 PROCEDURE — 99214 PR OFFICE/OUTPT VISIT, EST, LEVL IV, 30-39 MIN: ICD-10-PCS | Mod: 25,S$GLB,, | Performed by: NURSE PRACTITIONER

## 2020-01-27 RX ORDER — PROMETHAZINE HYDROCHLORIDE AND CODEINE PHOSPHATE 6.25; 1 MG/5ML; MG/5ML
5 SOLUTION ORAL EVERY 6 HOURS PRN
Qty: 140 ML | Refills: 0 | Status: SHIPPED | OUTPATIENT
Start: 2020-01-27 | End: 2020-02-06

## 2020-01-27 RX ORDER — PREDNISONE 20 MG/1
40 TABLET ORAL DAILY
Qty: 10 TABLET | Refills: 0 | Status: SHIPPED | OUTPATIENT
Start: 2020-01-27 | End: 2020-02-01

## 2020-01-27 NOTE — PATIENT INSTRUCTIONS
"If your condition worsens or fails to improve we recommend that you receive another evaluation at the ER immediately or contact your PCP to discuss your concerns or return here. You must understand that you've received an urgent care treatment only and that you may be released before all your medical problems are known or treated. You the patient will arrange for follouwp care as instructed.     If we discussed that I think your illness is viral, it will not respond to antibiotics and will last 5-7 days.    You received a steroid rx today -  this can elevate your blood pressure, elevate your blood sugar, water weight gain, nervous energy, redness to the face.    -  You can used cough medication prescribed as directed as needed for cough.    -  Flonase (fluticasone) is a nasal spray which may help with your symptoms.     -  If you just have drainage you can take plain Zyrtec, Claritin or Allegra   -  Zyrtec D, Claritin D or Allegra D can also help with symptoms of congestion and drainage.   -  If you have hypertension avoid using the "D" which is the decongestant.  Instead you can use Coricidin HBP for cold and cough symptoms.      -  If you just have a congested feeling you can take pseudoephedrine (unless you have high blood pressure) which you have to sign for behind the counter. Do not buy the phenylephrine which is on the shelf as it is not effective     -  Rest and fluids are also important.     Below are suggestions for symptomatic relief:              -Salt water gargles to soothe throat pain.              -Chloroseptic spray also helps to numb throat pain.              -Warm face compresses to help with facial sinus pain/pressure.              -Vicks vapor rub at night.              -Simple foods like chicken noodle soup.    -  Tylenol or ibuprofen can also be used as directed for pain unless you have an allergy to them or medical condition such as stomach ulcers, kidney or liver disease or blood thinners " etc for which you should not be taking these type of medications.

## 2020-01-27 NOTE — LETTER
January 27, 2020      [facility] [address], [address] [address]  Phone: [phone]  Fax: [phone]       Patient: Joy Crawford   YOB: 1965  Date of Visit: 01/27/2020    To Whom It May Concern:    Nelson Crawford  was at [facility] on 01/27/2020. She may return to work/school on January 29th with no restrictions. If you have any questions or concerns, or if I can be of further assistance, please do not hesitate to contact me.    Sincerely,      Fatmata Nur, NP

## 2020-01-27 NOTE — PROGRESS NOTES
"Subjective:       Patient ID: Joy Crawford is a 54 y.o. female.    Vitals:  height is 5' 5" (1.651 m) and weight is 98 kg (216 lb). Her temperature is 98.6 °F (37 °C). Her blood pressure is 131/85 and her pulse is 79. Her respiration is 16 and oxygen saturation is 97%.     Chief Complaint: URI    Pt c/o cough with sputum production, congestion, sinus pressure and sore throat x 3 days. She took some Sudafed over the weekend with mild relief. No fever, chills, body aches, abd pain, n/v/d.hx of asthma. Uses breo daily. Used her albuterol inhaler several times last night.     URI    This is a new problem. The current episode started in the past 7 days. The problem has been unchanged. There has been no fever. Associated symptoms include congestion and coughing. Pertinent negatives include no chest pain, diarrhea, dysuria, headaches, nausea, rash, sinus pain, sore throat or vomiting. Treatments tried: sudafed.       Constitution: Negative for chills, fatigue and fever.   HENT: Positive for congestion and sinus pressure. Negative for sinus pain and sore throat.    Neck: Negative for painful lymph nodes.   Cardiovascular: Negative for chest pain and leg swelling.   Eyes: Negative for double vision and blurred vision.   Respiratory: Positive for cough. Negative for shortness of breath.    Gastrointestinal: Negative for nausea, vomiting and diarrhea.   Genitourinary: Negative for dysuria, frequency, urgency and history of kidney stones.   Musculoskeletal: Negative for joint pain, joint swelling, muscle cramps and muscle ache.   Skin: Negative for color change, pale, rash and bruising.   Allergic/Immunologic: Negative for seasonal allergies.   Neurological: Negative for dizziness, history of vertigo, light-headedness, passing out and headaches.   Hematologic/Lymphatic: Negative for swollen lymph nodes.   Psychiatric/Behavioral: Negative for nervous/anxious, sleep disturbance and depression. The patient is not nervous/anxious. "        Objective:      Physical Exam   Constitutional: She is oriented to person, place, and time. She appears well-developed and well-nourished. She is cooperative.  Non-toxic appearance. She does not have a sickly appearance. She does not appear ill. No distress.   HENT:   Head: Normocephalic and atraumatic.   Right Ear: Hearing, tympanic membrane, external ear and ear canal normal.   Left Ear: Hearing, tympanic membrane, external ear and ear canal normal.   Nose: Mucosal edema and rhinorrhea present. No nasal deformity. No epistaxis. Right sinus exhibits no maxillary sinus tenderness and no frontal sinus tenderness. Left sinus exhibits no maxillary sinus tenderness and no frontal sinus tenderness.   Mouth/Throat: Uvula is midline and mucous membranes are normal. No trismus in the jaw. Normal dentition. No uvula swelling. Posterior oropharyngeal erythema and cobblestoning present. No oropharyngeal exudate or posterior oropharyngeal edema. Tonsils are 1+ on the right. Tonsils are 1+ on the left. No tonsillar exudate.   Eyes: Conjunctivae and lids are normal. No scleral icterus.   Neck: Trachea normal, normal range of motion, full passive range of motion without pain and phonation normal. Neck supple. No neck rigidity. No edema and no erythema present.   Cardiovascular: Normal rate, regular rhythm, normal heart sounds, intact distal pulses and normal pulses.   Pulmonary/Chest: Effort normal and breath sounds normal. No respiratory distress. She has no decreased breath sounds. She has no wheezes. She has no rhonchi. She has no rales.   No wheezing. Lungs clear.   Abdominal: Normal appearance.   Musculoskeletal: Normal range of motion. She exhibits no edema or deformity.   Lymphadenopathy:     She has no cervical adenopathy.   Neurological: She is alert and oriented to person, place, and time. She exhibits normal muscle tone. Coordination normal.   Skin: Skin is warm, dry, intact, not diaphoretic and not pale.    Psychiatric: She has a normal mood and affect. Her speech is normal and behavior is normal. Judgment and thought content normal. Cognition and memory are normal.   Nursing note and vitals reviewed.    Results for orders placed or performed in visit on 01/27/20   POCT Influenza A/B   Result Value Ref Range    Rapid Influenza A Ag Negative Negative    Rapid Influenza B Ag Negative Negative     Acceptable Yes            Assessment:       1. Viral URI    2. Cough        Plan:         Viral URI  -     predniSONE (DELTASONE) 20 MG tablet; Take 2 tablets (40 mg total) by mouth once daily. for 5 days  Dispense: 10 tablet; Refill: 0  -     promethazine-codeine 6.25-10 mg/5 ml (PHENERGAN WITH CODEINE) 6.25-10 mg/5 mL syrup; Take 5 mLs by mouth every 6 (six) hours as needed for Cough.  Dispense: 140 mL; Refill: 0    Cough  -     POCT Influenza A/B         Reviewed previous pertinent office visits, PMH, PSH, fam hx  We discussed that this is likely a viral illness and will not benefit from antibiotics.  Recommended otc motrin or tylenol as needed for fever/aches unless contraindicated  Advised on return/follow-up precautions. Advised on ER precautions. Answered all patient questions. Patient verbalized understanding and voiced agreement with current treatment plan.    Patient Instructions   If your condition worsens or fails to improve we recommend that you receive another evaluation at the ER immediately or contact your PCP to discuss your concerns or return here. You must understand that you've received an urgent care treatment only and that you may be released before all your medical problems are known or treated. You the patient will arrange for follouw care as instructed.     If we discussed that I think your illness is viral, it will not respond to antibiotics and will last 5-7 days.    You received a steroid rx today -  this can elevate your blood pressure, elevate your blood sugar, water weight gain,  "nervous energy, redness to the face.    -  You can used cough medication prescribed as directed as needed for cough.    -  Flonase (fluticasone) is a nasal spray which may help with your symptoms.     -  If you just have drainage you can take plain Zyrtec, Claritin or Allegra   -  Zyrtec D, Claritin D or Allegra D can also help with symptoms of congestion and drainage.   -  If you have hypertension avoid using the "D" which is the decongestant.  Instead you can use Coricidin HBP for cold and cough symptoms.      -  If you just have a congested feeling you can take pseudoephedrine (unless you have high blood pressure) which you have to sign for behind the counter. Do not buy the phenylephrine which is on the shelf as it is not effective     -  Rest and fluids are also important.     Below are suggestions for symptomatic relief:              -Salt water gargles to soothe throat pain.              -Chloroseptic spray also helps to numb throat pain.              -Warm face compresses to help with facial sinus pain/pressure.              -Vicks vapor rub at night.              -Simple foods like chicken noodle soup.    -  Tylenol or ibuprofen can also be used as directed for pain unless you have an allergy to them or medical condition such as stomach ulcers, kidney or liver disease or blood thinners etc for which you should not be taking these type of medications.             "

## 2020-02-17 ENCOUNTER — PROCEDURE VISIT (OUTPATIENT)
Dept: OBSTETRICS AND GYNECOLOGY | Facility: CLINIC | Age: 55
End: 2020-02-17
Payer: COMMERCIAL

## 2020-02-17 ENCOUNTER — TELEPHONE (OUTPATIENT)
Dept: OBSTETRICS AND GYNECOLOGY | Facility: CLINIC | Age: 55
End: 2020-02-17

## 2020-02-17 DIAGNOSIS — R87.69 VULVAR LOW-GRADE SQUAMOUS INTRAEPITHELIAL LESION (LGSIL): Primary | ICD-10-CM

## 2020-02-17 PROCEDURE — 56820 COLPOSCOPY VULVA: CPT | Mod: S$GLB,,, | Performed by: OBSTETRICS & GYNECOLOGY

## 2020-02-17 PROCEDURE — 56820 COLPOSCOPY: ICD-10-PCS | Mod: S$GLB,,, | Performed by: OBSTETRICS & GYNECOLOGY

## 2020-02-17 NOTE — PROCEDURES
Colposcopy  Date/Time: 2/17/2020 1:00 PM  Performed by: José Miguel Leon MD  Authorized by: José Miguel Leon MD     Consent Done?:  Yes (Written)  Assistants?: Yes    List of assistants:  Annmarie   SHANIA was present for the entire procedure.    Colposcopy Site:  Vulva  Position:  Supine   Patient was prepped and draped in the normal sterile fashion.  Acrowhite Lesion? Yes    Atypical Vessels: No    Biopsy?: No     Patient tolerated the procedure well with no immediate complications.   Post-operative instructions were provided for the patient.   Patient was discharged and will follow up if any complications occur     Patient was seen 6 months ago with a biopsy of the left vulva which showed mild dysplasia.  Patient here for follow-up 6 months later and repeat re-evaluation.  Colpo performed on vulva.  Small aceto-white focal lesion along the left labia minora in the same place as her previous biopsy.  In light of her suspected recurrence of mild dysplasia will do a wide local excision for AVELINA 1.    Pt is postmenopausal last menstrual period well over year ago.  Had an episode of spotting in September but never came in for the ultrasound as directed.  Will do ultrasound now prior to taking to patient patient to OR.  Has not had any spotting since

## 2020-02-18 NOTE — TELEPHONE ENCOUNTER
Requested Date: Any   Requested Time: Any   Case Length:30 minutes    Surgeon: José Miguel Leon      Co- Surgeon: None   Visit Type: Outpatient    PROCEDURE:Wide local excision of Vulva  Diagnosis: VIN1  Anesthesia type: General   Comments/Special Equipment Needed:  Rep needed: no  U/S needed at pre-op: no  PCP clearance: Not Needed

## 2020-02-20 ENCOUNTER — TELEPHONE (OUTPATIENT)
Dept: OBSTETRICS AND GYNECOLOGY | Facility: CLINIC | Age: 55
End: 2020-02-20

## 2020-02-20 NOTE — TELEPHONE ENCOUNTER
L/M want to offer pt 03/10 or 03/24          Requested Date: Any   Requested Time: Any   Case Length:30 minutes     Surgeon: José Miguel Leon      Co- Surgeon: None   Visit Type: Outpatient     PROCEDURE:Wide local excision of Vulva  Diagnosis: VIN1  Anesthesia type: General   Comments/Special Equipment Needed:  Rep needed: no  U/S needed at pre-op: no  PCP clearance: Not Needed

## 2020-02-26 ENCOUNTER — TELEPHONE (OUTPATIENT)
Dept: OBSTETRICS AND GYNECOLOGY | Facility: CLINIC | Age: 55
End: 2020-02-26

## 2020-02-26 DIAGNOSIS — N90.0 VIN I (VULVAR INTRAEPITHELIAL NEOPLASIA I): Primary | ICD-10-CM

## 2020-02-27 ENCOUNTER — OFFICE VISIT (OUTPATIENT)
Dept: OBSTETRICS AND GYNECOLOGY | Facility: CLINIC | Age: 55
End: 2020-02-27
Payer: COMMERCIAL

## 2020-02-27 VITALS — BODY MASS INDEX: 35.99 KG/M2 | WEIGHT: 216 LBS | HEIGHT: 65 IN

## 2020-02-27 DIAGNOSIS — N95.0 POST-MENOPAUSAL BLEEDING: Primary | ICD-10-CM

## 2020-02-27 DIAGNOSIS — N90.0 VIN I (VULVAR INTRAEPITHELIAL NEOPLASIA I): ICD-10-CM

## 2020-02-27 PROCEDURE — 99999 PR PBB SHADOW E&M-EST. PATIENT-LVL III: ICD-10-PCS | Mod: PBBFAC,,, | Performed by: OBSTETRICS & GYNECOLOGY

## 2020-02-27 PROCEDURE — 99999 PR PBB SHADOW E&M-EST. PATIENT-LVL III: CPT | Mod: PBBFAC,,, | Performed by: OBSTETRICS & GYNECOLOGY

## 2020-02-27 PROCEDURE — 3008F PR BODY MASS INDEX (BMI) DOCUMENTED: ICD-10-PCS | Mod: CPTII,S$GLB,, | Performed by: OBSTETRICS & GYNECOLOGY

## 2020-02-27 PROCEDURE — 3008F BODY MASS INDEX DOCD: CPT | Mod: CPTII,S$GLB,, | Performed by: OBSTETRICS & GYNECOLOGY

## 2020-02-27 PROCEDURE — 99213 OFFICE O/P EST LOW 20 MIN: CPT | Mod: S$GLB,,, | Performed by: OBSTETRICS & GYNECOLOGY

## 2020-02-27 PROCEDURE — 99213 PR OFFICE/OUTPT VISIT, EST, LEVL III, 20-29 MIN: ICD-10-PCS | Mod: S$GLB,,, | Performed by: OBSTETRICS & GYNECOLOGY

## 2020-02-27 RX ORDER — TRIAMCINOLONE ACETONIDE 55 UG/1
SPRAY, METERED NASAL
COMMUNITY
Start: 2020-02-24 | End: 2021-07-12

## 2020-02-27 NOTE — PROGRESS NOTES
Subjective:       Patient ID: Joy Crawford is a 54 y.o. female.    Chief Complaint:  Gyn ultrasound      History of Present Illness  Here for u/s due to one episode of PMB spotting last Sept   She was sched for u/s but cancelled appt but has not happened since   U/s today Normal uterus [id number] aith a 3mm strip   Right and left ovary normal with a .9mm follicle      GYN & OB History  Patient's last menstrual period was 2018 (approximate).   Date of Last Pap: 2017    OB History    Para Term  AB Living   1 1 1     1   SAB TAB Ectopic Multiple Live Births           1      # Outcome Date GA Lbr Balwinder/2nd Weight Sex Delivery Anes PTL Lv   1 Term  40w0d  3.175 kg (7 lb) M Vag-Spont EPI  ENEIDA       Review of Systems  Review of Systems   All other systems reviewed and are negative.       Objective:     There were no vitals filed for this visit.    Physical Exam:   Constitutional: She is oriented to person, place, and time. She appears well-developed and well-nourished.    HENT:   Head: Normocephalic.       Pulmonary/Chest: Effort normal.                  Musculoskeletal: Normal range of motion.       Neurological: She is alert and oriented to person, place, and time.    Skin: Skin is warm and dry.    Psychiatric: She has a normal mood and affect. Her behavior is normal.          Assessment/ Plan:          Joy was seen today for gyn ultrasound.    Diagnoses and all orders for this visit:    Post-menopausal bleeding   resolved and u/s with 3mm stripe    AVELINA I (vulvar intraepithelial neoplasia I)   Consents done today   Scheduled for [facility] on 3/11/2020   Consents done today      Follow up in about 4 weeks (around 3/26/2020), or postop visit.      Health Maintenance       Date Due Completion Date    HIV Screening 1981 ---    TETANUS VACCINE 1984 ---    Shingles Vaccine (1 of 2) 2016 ---    Colonoscopy 2016 ---    Mammogram 2017    Influenza Vaccine (1)  09/01/2019 ---    Pap Smear with HPV Cotest 02/06/2020 2/6/2017    Lipid Panel 01/10/2022 1/10/2017

## 2020-03-09 ENCOUNTER — HOSPITAL ENCOUNTER (OUTPATIENT)
Dept: PREADMISSION TESTING | Facility: OTHER | Age: 55
Discharge: HOME | End: 2020-03-09
Attending: OBSTETRICS & GYNECOLOGY
Payer: COMMERCIAL

## 2020-03-09 ENCOUNTER — ANESTHESIA EVENT (OUTPATIENT)
Dept: SURGERY | Facility: OTHER | Age: 55
End: 2020-03-09
Payer: COMMERCIAL

## 2020-03-09 VITALS
TEMPERATURE: 98 F | DIASTOLIC BLOOD PRESSURE: 92 MMHG | HEART RATE: 85 BPM | WEIGHT: 216 LBS | BODY MASS INDEX: 35.99 KG/M2 | SYSTOLIC BLOOD PRESSURE: 128 MMHG | HEIGHT: 65 IN | OXYGEN SATURATION: 97 %

## 2020-03-09 DIAGNOSIS — Z01.818 PREOP TESTING: Primary | ICD-10-CM

## 2020-03-09 LAB
BASOPHILS # BLD AUTO: 0.05 K/UL (ref 0–0.2)
BASOPHILS NFR BLD: 0.6 % (ref 0–1.9)
DIFFERENTIAL METHOD: ABNORMAL
EOSINOPHIL # BLD AUTO: 0.6 K/UL (ref 0–0.5)
EOSINOPHIL NFR BLD: 8.2 % (ref 0–8)
ERYTHROCYTE [DISTWIDTH] IN BLOOD BY AUTOMATED COUNT: 13.3 % (ref 11.5–14.5)
HCT VFR BLD AUTO: 43.7 % (ref 37–48.5)
HGB BLD-MCNC: 13.6 G/DL (ref 12–16)
IMM GRANULOCYTES # BLD AUTO: 0.02 K/UL (ref 0–0.04)
IMM GRANULOCYTES NFR BLD AUTO: 0.3 % (ref 0–0.5)
LYMPHOCYTES # BLD AUTO: 2.3 K/UL (ref 1–4.8)
LYMPHOCYTES NFR BLD: 29 % (ref 18–48)
MCH RBC QN AUTO: 29.7 PG (ref 27–31)
MCHC RBC AUTO-ENTMCNC: 31.1 G/DL (ref 32–36)
MCV RBC AUTO: 95 FL (ref 82–98)
MONOCYTES # BLD AUTO: 0.7 K/UL (ref 0.3–1)
MONOCYTES NFR BLD: 8.6 % (ref 4–15)
NEUTROPHILS # BLD AUTO: 4.1 K/UL (ref 1.8–7.7)
NEUTROPHILS NFR BLD: 53.3 % (ref 38–73)
NRBC BLD-RTO: 0 /100 WBC
PLATELET # BLD AUTO: 277 K/UL (ref 150–350)
PMV BLD AUTO: 9.3 FL (ref 9.2–12.9)
RBC # BLD AUTO: 4.58 M/UL (ref 4–5.4)
WBC # BLD AUTO: 7.77 K/UL (ref 3.9–12.7)

## 2020-03-09 PROCEDURE — 36415 COLL VENOUS BLD VENIPUNCTURE: CPT

## 2020-03-09 PROCEDURE — 85025 COMPLETE CBC W/AUTO DIFF WBC: CPT

## 2020-03-09 RX ORDER — MIDAZOLAM HYDROCHLORIDE 1 MG/ML
2 INJECTION INTRAMUSCULAR; INTRAVENOUS
Status: CANCELLED | OUTPATIENT
Start: 2020-03-09 | End: 2020-03-09

## 2020-03-09 RX ORDER — LIDOCAINE HYDROCHLORIDE 10 MG/ML
0.5 INJECTION, SOLUTION EPIDURAL; INFILTRATION; INTRACAUDAL; PERINEURAL ONCE
Status: CANCELLED | OUTPATIENT
Start: 2020-03-09 | End: 2020-03-09

## 2020-03-09 RX ORDER — ALBUTEROL SULFATE 0.83 MG/ML
2.5 SOLUTION RESPIRATORY (INHALATION)
Status: CANCELLED | OUTPATIENT
Start: 2020-03-09 | End: 2020-03-09

## 2020-03-09 RX ORDER — PREGABALIN 50 MG/1
50 CAPSULE ORAL
Status: CANCELLED | OUTPATIENT
Start: 2020-03-09 | End: 2020-03-09

## 2020-03-09 RX ORDER — ACETAMINOPHEN 500 MG
1000 TABLET ORAL
Status: CANCELLED | OUTPATIENT
Start: 2020-03-09 | End: 2020-03-09

## 2020-03-09 RX ORDER — SODIUM CHLORIDE, SODIUM LACTATE, POTASSIUM CHLORIDE, CALCIUM CHLORIDE 600; 310; 30; 20 MG/100ML; MG/100ML; MG/100ML; MG/100ML
INJECTION, SOLUTION INTRAVENOUS CONTINUOUS
Status: CANCELLED | OUTPATIENT
Start: 2020-03-09

## 2020-03-09 RX ORDER — CELECOXIB 200 MG/1
400 CAPSULE ORAL
Status: CANCELLED | OUTPATIENT
Start: 2020-03-09 | End: 2020-03-09

## 2020-03-09 NOTE — ANESTHESIA PREPROCEDURE EVALUATION
03/09/2020  Joy Crawford is a 54 y.o., female.    Anesthesia Evaluation    I have reviewed the Patient Summary Reports.    I have reviewed the Nursing Notes.   I have reviewed the Medications.     Review of Systems  Anesthesia Hx:  No problems with previous Anesthesia  History of prior surgery of interest to airway management or planning: Previous anesthesia: General Rhinoplasty 2006 [facility]  with general anesthesia.  Denies Family Hx of Anesthesia complications.   Denies Personal Hx of Anesthesia complications.   Social:  Non-Smoker    Hematology/Oncology:  Hematology Normal   Oncology Normal     EENT/Dental:EENT/Dental Normal   Cardiovascular:   Exercise tolerance: good Hypertension, well controlled    Pulmonary:   Asthma mild Sleep Apnea    Renal/:  Renal/ Normal     Hepatic/GI:   GERD, well controlled    Musculoskeletal:  Musculoskeletal Normal    Neurological:  Neurology Normal    Endocrine:  Endocrine Normal    Psych:   Psychiatric History anxiety          Physical Exam  General:  Obesity    Airway/Jaw/Neck:  Airway Findings: Mouth Opening: Normal Tongue: Normal  General Airway Assessment: Adult  Mallampati: II      Dental:  Dental Findings: In tact        Mental Status:  Mental Status Findings:  Anxious, Cooperative         Anesthesia Plan  Type of Anesthesia, risks & benefits discussed:  Anesthesia Type:  general  Patient's Preference:   Intra-op Monitoring Plan: standard ASA monitors  Intra-op Monitoring Plan Comments:   Post Op Pain Control Plan: per primary service following discharge from PACU and multimodal analgesia  Post Op Pain Control Plan Comments:   Induction:   IV  Beta Blocker:         Informed Consent: Patient understands risks and agrees with Anesthesia plan.  Questions answered. Anesthesia consent signed with patient.  ASA Score: 2     Day of Surgery Review of History & Physical:     H&P update referred to the surgeon.     Anesthesia Plan Notes: CBC today,takes pain meds for ganglion on foot    HCT 43.7        Ready For Surgery From Anesthesia Perspective.

## 2020-03-09 NOTE — DISCHARGE INSTRUCTIONS
Information to Prepare you for your Surgery    PRE-ADMIT TESTING -  [phone]    [address]  [place] [facility]          Your surgery has been scheduled at [facility]. We are pleased to have the opportunity to serve you. For Further Information please call [phone].    On the day of surgery please report to the Information Desk on the 1st floor.    · CONTACT YOUR PHYSICIAN'S OFFICE THE DAY PRIOR TO YOUR SURGERY TO OBTAIN YOUR ARRIVAL TIME.     · The evening before surgery do not eat anything after 9 p.m. ( this includes hard candy, chewing gum and mints).  You may only have GATORADE, POWERADE AND WATER  from 9 p.m. until you leave your home.   DO NOT DRINK ANY LIQUIDS ON THE WAY TO THE HOSPITAL.      SPECIAL MEDICATION INSTRUCTIONS: TAKE medications checked off by the Anesthesiologist on your Medication List.    Angiogram Patients: Take medications as instructed by your physician, including aspirin.     Surgery Patients:    If you take ASPIRIN - Your PHYSICIAN/SURGEON will need to inform you IF/OR when you need to stop taking aspirin prior to your surgery.     Do Not take any medications containing IBUPROFEN.  Do Not Wear any make-up or dark nail polish   (especially eye make-up) to surgery. If you come to surgery with makeup on you will be required to remove the makeup or nail polish.    Do not shave your surgical area at least 5 days prior to your surgery. The surgical prep will be performed at the hospital according to Infection Control regulations.    Leave all valuables at home.   Do Not wear any jewelry or watches, including any metal in body piercings. Jewelry must be removed prior to coming to the hospital.  There is a possibility that rings that are unable to be removed may be cut off if they are on the surgical extremity.    Contact Lens must be removed before surgery. Either do not wear the contact lens or bring a case and solution for  storage.  Please bring a container for eyeglasses or dentures as required.  Bring any paperwork your physician has provided, such as consent forms,  history and physicals, doctor's orders, etc.   Bring comfortable clothes that are loose fitting to wear upon discharge. Take into consideration the type of surgery being performed.  Maintain your diet as advised per your physician the day prior to surgery.      Adequate rest the night before surgery is advised.   Park in the Parking lot behind the hospital or in the [facility] across the street from the parking lot. Parking is complimentary.  If you will be discharged the same day as your procedure, please arrange for a responsible adult to drive you home or to accompany you if traveling by taxi.   YOU WILL NOT BE PERMITTED TO DRIVE OR TO LEAVE THE HOSPITAL ALONE AFTER SURGERY.   It is strongly recommended that you arrange for someone to remain with you for the first 24 hrs following your surgery.    The Surgeon will speak to your family/visitor after your surgery regarding the outcome of your surgery and post op care.  The Surgeon may speak to you after your surgery, but there is a possibility you may not remember the details.  Please check with your family members regarding the conversation with the Surgeon.    We strongly recommend whoever is bringing you home be present for discharge instructions.  This will ensure a thorough understanding for your post op home care.    EACH PATIENT IS ALLOWED TWO FAMILY MEMBERS OR VISITORS IN THE ROOM AND IN THE WAITING ROOMS WHILE YOU ARE IN SURGERY. ALL CHILDREN MUST ALWAYS BE ACCOMPANIED BY AN ADULT.    Thank you for your cooperation.  The Staff of [facility].                Bathing Instructions with Hibiclens     Shower the evening before and morning of your procedure with Hibiclens:   Wash your face with water and your regular face wash/soap   Apply Hibiclens directly on your skin or on a  wet washcloth and wash gently. When showering: Move away from the shower stream when applying Hibiclens to avoid rinsing off too soon.   Rinse thoroughly with warm water   Do not dilute Hibiclens         Dry off as usual, do not use any deodorant, powder, body lotions, perfume, after shave or cologne.

## 2020-03-10 NOTE — H&P
54 y.o.  presents for pre-op H&P for Wide local excision of the vulva for VIN1  Found of Bx that since removal has recurred on left labia.  Patient was seen 6 months ago with a biopsy of the left vulva which showed mild dysplasia.  Patient here for follow-up 6 months later and repeat re-evaluation.  Colpo performed on vulva.  Small aceto-white focal lesion along the left labia minora in the same place as her previous biopsy.  In light of her suspected recurrence of mild dysplasia will do a wide local excision   .  Patient's last menstrual period was 2018 (approximate)..    Past Medical History:   Diagnosis Date    Anxiety     Asthma     Depression     Esophageal reflux     H/O mammogram 2017    Normal  (DIS)     Herpes simplex virus (HSV) infection     Hypertension     Peptic ulcer     Sleep apnea     doesnt use cpap     Past Surgical History:   Procedure Laterality Date    RHINOPLASTY  2006    SINUS SURGERY       Family History   Problem Relation Age of Onset    Skin cancer Father     Hypertension Father     Hyperlipidemia Father     Hypertension Mother     Diabetes Mother     Hyperlipidemia Mother     No Known Problems Son     Breast cancer Neg Hx      Review of patient's allergies indicates:  No Known Allergies  No current facility-administered medications for this encounter.     Current Outpatient Medications:     24 HOUR NASAL ALLERGY 55 mcg nasal inhaler, SPRAY TWICE IEN QD, Disp: , Rfl:     albuterol 2.5 mg /3 mL (0.083 %) Nebu 3 mL, albuterol 5 mg/mL Nebu 0.5 mL, use as directed, Disp: , Rfl:     alprazolam (XANAX) 0.5 MG tablet, TK 1 T PO QD TO BID PRF ANXIETY, Disp: , Rfl: 0    BREO ELLIPTA 200-25 mcg/dose DsDv diskus inhaler, INL 1 PUFF PO QD, Disp: , Rfl: 0    duloxetine (CYMBALTA) 60 MG capsule, TK 1 C PO QD, Disp: , Rfl: 2    lisinopril 10 MG tablet, TK 1 T PO QD, Disp: , Rfl: 2    trazodone (DESYREL) 50 MG tablet, TK 1 T PO  QHS, Disp: , Rfl: 0    VENTOLIN  HFA 90 mcg/actuation inhaler, INHALE ONE PUFF ORALLY Q 4 TO 6 H PRF SOB, Disp: , Rfl: 1  Social History     Socioeconomic History    Marital status: Single     Spouse name: Not on file    Number of children: Not on file    Years of education: Not on file    Highest education level: Not on file   Occupational History    Not on file   Social Needs    Financial resource strain: Not on file    Food insecurity:     Worry: Not on file     Inability: Not on file    Transportation needs:     Medical: Not on file     Non-medical: Not on file   Tobacco Use    Smoking status: Never Smoker    Smokeless tobacco: Never Used   Substance and Sexual Activity    Alcohol use: Yes     Comment: social    Drug use: No    Sexual activity: Not Currently     Partners: Male     Birth control/protection: Post-menopausal   Lifestyle    Physical activity:     Days per week: Not on file     Minutes per session: Not on file    Stress: Not on file   Relationships    Social connections:     Talks on phone: Not on file     Gets together: Not on file     Attends Worship service: Not on file     Active member of club or organization: Not on file     Attends meetings of clubs or organizations: Not on file     Relationship status: Not on file   Other Topics Concern    Not on file   Social History Narrative    Not on file         Last pap  2017 normal and hpv eng  Last pathology:Biopsy 2.  returned with mild dysplasia.     #1Two small 4 mm flat hyper pigmented areas on the upper mons consistent with possible flat condyloma.-  #2One small 3 mm nodule on the left labia minora  #3One small skin tag noted along the right inner thigh.  Last labs:  Lab Results   Component Value Date    WBC 7.77 03/09/2020    HGB 13.6 03/09/2020    HCT 43.7 03/09/2020    MCV 95 03/09/2020     03/09/2020       There were no vitals filed for this visit.  General Appearance: Alert, appropriate appearance for age. No acute distress, Chest/Respiratory  Exam: Normal chest wall and respirations. Clear to auscultation.   Cardiovascular Exam: regular rate and rhythm,   Gastrointestinal Exam: soft, non-tender; bowel sounds normal; no masses  Pelvic Exam Female: Exam deferred but Small 4mm aceto-white focal lesion along the left labia minora in the same place as her previous biopsy  Psychiatric Exam: Alert and oriented, appropriate affect.    Assessment: AVELINA recurrent      Problem List Items Addressed This Visit     None      Visit Diagnoses     AVELINA I (vulvar intraepithelial neoplasia I)    -  Primary          Plan: Wide local excision  I have discussed the risks, benefits, indications, and alternatives of the procedure in detail.  The patient verbalizes her understanding.  All questions answered.  Consents signed.  The patient agrees to proceed to proceed as planned.

## 2020-03-11 ENCOUNTER — ANESTHESIA (OUTPATIENT)
Dept: SURGERY | Facility: OTHER | Age: 55
End: 2020-03-11
Payer: COMMERCIAL

## 2020-03-11 ENCOUNTER — HOSPITAL ENCOUNTER (OUTPATIENT)
Facility: OTHER | Age: 55
Discharge: HOME | End: 2020-03-11
Attending: OBSTETRICS & GYNECOLOGY | Admitting: OBSTETRICS & GYNECOLOGY
Payer: COMMERCIAL

## 2020-03-11 VITALS
DIASTOLIC BLOOD PRESSURE: 70 MMHG | BODY MASS INDEX: 35.99 KG/M2 | HEART RATE: 87 BPM | RESPIRATION RATE: 16 BRPM | WEIGHT: 216 LBS | OXYGEN SATURATION: 96 % | SYSTOLIC BLOOD PRESSURE: 133 MMHG | TEMPERATURE: 99 F | HEIGHT: 65 IN

## 2020-03-11 DIAGNOSIS — N90.0 VIN I (VULVAR INTRAEPITHELIAL NEOPLASIA I): Primary | ICD-10-CM

## 2020-03-11 PROCEDURE — 25000003 PHARM REV CODE 250: Performed by: ANESTHESIOLOGY

## 2020-03-11 PROCEDURE — 37000008 HC ANESTHESIA 1ST 15 MINUTES: Performed by: OBSTETRICS & GYNECOLOGY

## 2020-03-11 PROCEDURE — 37000009 HC ANESTHESIA EA ADD 15 MINS: Performed by: OBSTETRICS & GYNECOLOGY

## 2020-03-11 PROCEDURE — 71000016 HC POSTOP RECOV ADDL HR: Performed by: OBSTETRICS & GYNECOLOGY

## 2020-03-11 PROCEDURE — 36000706: Performed by: OBSTETRICS & GYNECOLOGY

## 2020-03-11 PROCEDURE — 71000033 HC RECOVERY, INTIAL HOUR: Performed by: OBSTETRICS & GYNECOLOGY

## 2020-03-11 PROCEDURE — 56620 PR PART SIMPLE REMV VULVA: ICD-10-PCS | Mod: ,,, | Performed by: OBSTETRICS & GYNECOLOGY

## 2020-03-11 PROCEDURE — 94761 N-INVAS EAR/PLS OXIMETRY MLT: CPT

## 2020-03-11 PROCEDURE — 11400 EXC TR-EXT B9+MARG 0.5 CM<: CPT | Mod: 51,,, | Performed by: OBSTETRICS & GYNECOLOGY

## 2020-03-11 PROCEDURE — 27201423 OPTIME MED/SURG SUP & DEVICES STERILE SUPPLY: Performed by: OBSTETRICS & GYNECOLOGY

## 2020-03-11 PROCEDURE — 88305 TISSUE EXAM BY PATHOLOGIST: ICD-10-PCS | Mod: 26,,, | Performed by: PATHOLOGY

## 2020-03-11 PROCEDURE — 56620 VULVECTOMY SIMPLE PARTIAL: CPT | Mod: ,,, | Performed by: OBSTETRICS & GYNECOLOGY

## 2020-03-11 PROCEDURE — 88305 TISSUE EXAM BY PATHOLOGIST: CPT | Performed by: PATHOLOGY

## 2020-03-11 PROCEDURE — 94640 AIRWAY INHALATION TREATMENT: CPT

## 2020-03-11 PROCEDURE — 25000242 PHARM REV CODE 250 ALT 637 W/ HCPCS: Performed by: ANESTHESIOLOGY

## 2020-03-11 PROCEDURE — 88305 TISSUE EXAM BY PATHOLOGIST: CPT | Mod: 26,,, | Performed by: PATHOLOGY

## 2020-03-11 PROCEDURE — 36000707: Performed by: OBSTETRICS & GYNECOLOGY

## 2020-03-11 PROCEDURE — 25000003 PHARM REV CODE 250: Performed by: NURSE ANESTHETIST, CERTIFIED REGISTERED

## 2020-03-11 PROCEDURE — 63600175 PHARM REV CODE 636 W HCPCS: Performed by: NURSE ANESTHETIST, CERTIFIED REGISTERED

## 2020-03-11 PROCEDURE — 11400 PR EXC SKIN BENIG <0.5 CM TRUNK,ARM,LEG: ICD-10-PCS | Mod: 51,,, | Performed by: OBSTETRICS & GYNECOLOGY

## 2020-03-11 PROCEDURE — 63600175 PHARM REV CODE 636 W HCPCS: Performed by: ANESTHESIOLOGY

## 2020-03-11 PROCEDURE — 63600175 PHARM REV CODE 636 W HCPCS: Performed by: OBSTETRICS & GYNECOLOGY

## 2020-03-11 PROCEDURE — 71000015 HC POSTOP RECOV 1ST HR: Performed by: OBSTETRICS & GYNECOLOGY

## 2020-03-11 RX ORDER — SCOLOPAMINE TRANSDERMAL SYSTEM 1 MG/1
1 PATCH, EXTENDED RELEASE TRANSDERMAL
Status: COMPLETED | OUTPATIENT
Start: 2020-03-11 | End: 2020-03-11

## 2020-03-11 RX ORDER — MEPERIDINE HYDROCHLORIDE 25 MG/ML
12.5 INJECTION INTRAMUSCULAR; INTRAVENOUS; SUBCUTANEOUS ONCE AS NEEDED
Status: DISCONTINUED | OUTPATIENT
Start: 2020-03-11 | End: 2020-03-11 | Stop reason: HOSPADM

## 2020-03-11 RX ORDER — IBUPROFEN 800 MG/1
800 TABLET ORAL EVERY 8 HOURS PRN
Qty: 15 TABLET | Refills: 0 | Status: SHIPPED | OUTPATIENT
Start: 2020-03-11 | End: 2020-09-16

## 2020-03-11 RX ORDER — HYDROCODONE BITARTRATE AND ACETAMINOPHEN 5; 325 MG/1; MG/1
1 TABLET ORAL EVERY 4 HOURS PRN
Status: DISCONTINUED | OUTPATIENT
Start: 2020-03-11 | End: 2020-03-11 | Stop reason: HOSPADM

## 2020-03-11 RX ORDER — IBUPROFEN 600 MG/1
600 TABLET ORAL EVERY 6 HOURS PRN
Status: DISCONTINUED | OUTPATIENT
Start: 2020-03-11 | End: 2020-03-11 | Stop reason: HOSPADM

## 2020-03-11 RX ORDER — PROPOFOL 10 MG/ML
VIAL (ML) INTRAVENOUS
Status: DISCONTINUED | OUTPATIENT
Start: 2020-03-11 | End: 2020-03-11

## 2020-03-11 RX ORDER — DIPHENHYDRAMINE HCL 25 MG
25 CAPSULE ORAL EVERY 4 HOURS PRN
Status: DISCONTINUED | OUTPATIENT
Start: 2020-03-11 | End: 2020-03-11 | Stop reason: HOSPADM

## 2020-03-11 RX ORDER — PREGABALIN 50 MG/1
50 CAPSULE ORAL
Status: DISCONTINUED | OUTPATIENT
Start: 2020-03-11 | End: 2020-03-11 | Stop reason: HOSPADM

## 2020-03-11 RX ORDER — FENTANYL CITRATE 50 UG/ML
INJECTION, SOLUTION INTRAMUSCULAR; INTRAVENOUS
Status: DISCONTINUED | OUTPATIENT
Start: 2020-03-11 | End: 2020-03-11

## 2020-03-11 RX ORDER — SODIUM CHLORIDE 9 MG/ML
INJECTION, SOLUTION INTRAVENOUS CONTINUOUS
Status: DISCONTINUED | OUTPATIENT
Start: 2020-03-11 | End: 2020-03-11 | Stop reason: HOSPADM

## 2020-03-11 RX ORDER — ONDANSETRON 2 MG/ML
4 INJECTION INTRAMUSCULAR; INTRAVENOUS DAILY PRN
Status: DISCONTINUED | OUTPATIENT
Start: 2020-03-11 | End: 2020-03-11 | Stop reason: HOSPADM

## 2020-03-11 RX ORDER — LIDOCAINE HYDROCHLORIDE 10 MG/ML
0.5 INJECTION, SOLUTION EPIDURAL; INFILTRATION; INTRACAUDAL; PERINEURAL ONCE
Status: DISCONTINUED | OUTPATIENT
Start: 2020-03-11 | End: 2020-03-11 | Stop reason: HOSPADM

## 2020-03-11 RX ORDER — OXYCODONE HYDROCHLORIDE 5 MG/1
5 TABLET ORAL
Status: DISCONTINUED | OUTPATIENT
Start: 2020-03-11 | End: 2020-03-11 | Stop reason: HOSPADM

## 2020-03-11 RX ORDER — CELECOXIB 200 MG/1
400 CAPSULE ORAL
Status: DISCONTINUED | OUTPATIENT
Start: 2020-03-11 | End: 2020-03-11 | Stop reason: HOSPADM

## 2020-03-11 RX ORDER — LIDOCAINE HCL/PF 100 MG/5ML
SYRINGE (ML) INTRAVENOUS
Status: DISCONTINUED | OUTPATIENT
Start: 2020-03-11 | End: 2020-03-11

## 2020-03-11 RX ORDER — KETOROLAC TROMETHAMINE 30 MG/ML
INJECTION, SOLUTION INTRAMUSCULAR; INTRAVENOUS
Status: DISCONTINUED | OUTPATIENT
Start: 2020-03-11 | End: 2020-03-11

## 2020-03-11 RX ORDER — ALBUTEROL SULFATE 0.83 MG/ML
2.5 SOLUTION RESPIRATORY (INHALATION)
Status: COMPLETED | OUTPATIENT
Start: 2020-03-11 | End: 2020-03-11

## 2020-03-11 RX ORDER — CEFAZOLIN SODIUM 2 G/50ML
2 SOLUTION INTRAVENOUS
Status: COMPLETED | OUTPATIENT
Start: 2020-03-11 | End: 2020-03-11

## 2020-03-11 RX ORDER — SODIUM CHLORIDE, SODIUM LACTATE, POTASSIUM CHLORIDE, CALCIUM CHLORIDE 600; 310; 30; 20 MG/100ML; MG/100ML; MG/100ML; MG/100ML
INJECTION, SOLUTION INTRAVENOUS CONTINUOUS
Status: DISCONTINUED | OUTPATIENT
Start: 2020-03-11 | End: 2020-03-11 | Stop reason: HOSPADM

## 2020-03-11 RX ORDER — DOXYCYCLINE HYCLATE 100 MG
200 TABLET ORAL ONCE
Status: DISCONTINUED | OUTPATIENT
Start: 2020-03-11 | End: 2020-03-11

## 2020-03-11 RX ORDER — ACETAMINOPHEN 500 MG
1000 TABLET ORAL
Status: DISCONTINUED | OUTPATIENT
Start: 2020-03-11 | End: 2020-03-11 | Stop reason: HOSPADM

## 2020-03-11 RX ORDER — HYDROMORPHONE HYDROCHLORIDE 2 MG/ML
0.4 INJECTION, SOLUTION INTRAMUSCULAR; INTRAVENOUS; SUBCUTANEOUS EVERY 5 MIN PRN
Status: DISCONTINUED | OUTPATIENT
Start: 2020-03-11 | End: 2020-03-11 | Stop reason: HOSPADM

## 2020-03-11 RX ORDER — DIPHENHYDRAMINE HYDROCHLORIDE 50 MG/ML
25 INJECTION INTRAMUSCULAR; INTRAVENOUS EVERY 4 HOURS PRN
Status: DISCONTINUED | OUTPATIENT
Start: 2020-03-11 | End: 2020-03-11 | Stop reason: HOSPADM

## 2020-03-11 RX ORDER — SODIUM CHLORIDE 0.9 % (FLUSH) 0.9 %
3 SYRINGE (ML) INJECTION
Status: DISCONTINUED | OUTPATIENT
Start: 2020-03-11 | End: 2020-03-11 | Stop reason: HOSPADM

## 2020-03-11 RX ORDER — AMOXICILLIN 250 MG
1 CAPSULE ORAL DAILY
COMMUNITY
Start: 2020-03-11 | End: 2020-12-03

## 2020-03-11 RX ORDER — DEXAMETHASONE SODIUM PHOSPHATE 4 MG/ML
INJECTION, SOLUTION INTRA-ARTICULAR; INTRALESIONAL; INTRAMUSCULAR; INTRAVENOUS; SOFT TISSUE
Status: DISCONTINUED | OUTPATIENT
Start: 2020-03-11 | End: 2020-03-11

## 2020-03-11 RX ORDER — GLYCOPYRROLATE 0.2 MG/ML
INJECTION INTRAMUSCULAR; INTRAVENOUS
Status: DISCONTINUED | OUTPATIENT
Start: 2020-03-11 | End: 2020-03-11

## 2020-03-11 RX ORDER — ONDANSETRON HYDROCHLORIDE 2 MG/ML
INJECTION, SOLUTION INTRAMUSCULAR; INTRAVENOUS
Status: DISCONTINUED | OUTPATIENT
Start: 2020-03-11 | End: 2020-03-11

## 2020-03-11 RX ORDER — MIDAZOLAM HYDROCHLORIDE 1 MG/ML
2 INJECTION INTRAMUSCULAR; INTRAVENOUS
Status: COMPLETED | OUTPATIENT
Start: 2020-03-11 | End: 2020-03-11

## 2020-03-11 RX ADMIN — FENTANYL CITRATE 100 MCG: 50 INJECTION, SOLUTION INTRAMUSCULAR; INTRAVENOUS at 11:03

## 2020-03-11 RX ADMIN — KETOROLAC TROMETHAMINE 30 MG: 30 INJECTION, SOLUTION INTRAMUSCULAR; INTRAVENOUS at 11:03

## 2020-03-11 RX ADMIN — CEFAZOLIN SODIUM 2 G: 2 SOLUTION INTRAVENOUS at 11:03

## 2020-03-11 RX ADMIN — ALBUTEROL SULFATE 2.5 MG: 2.5 SOLUTION RESPIRATORY (INHALATION) at 09:03

## 2020-03-11 RX ADMIN — LIDOCAINE HYDROCHLORIDE 40 MG: 20 INJECTION, SOLUTION INTRAVENOUS at 11:03

## 2020-03-11 RX ADMIN — FENTANYL CITRATE 50 MCG: 50 INJECTION, SOLUTION INTRAMUSCULAR; INTRAVENOUS at 11:03

## 2020-03-11 RX ADMIN — ONDANSETRON 4 MG: 2 INJECTION, SOLUTION INTRAMUSCULAR; INTRAVENOUS at 11:03

## 2020-03-11 RX ADMIN — SCOPALAMINE 1 PATCH: 1 PATCH, EXTENDED RELEASE TRANSDERMAL at 10:03

## 2020-03-11 RX ADMIN — DEXAMETHASONE SODIUM PHOSPHATE 8 MG: 4 INJECTION, SOLUTION INTRAMUSCULAR; INTRAVENOUS at 11:03

## 2020-03-11 RX ADMIN — CARBOXYMETHYLCELLULOSE SODIUM 2 DROP: 2.5 SOLUTION/ DROPS OPHTHALMIC at 11:03

## 2020-03-11 RX ADMIN — GLYCOPYRROLATE 0.2 MG: 0.2 INJECTION, SOLUTION INTRAMUSCULAR; INTRAVENOUS at 11:03

## 2020-03-11 RX ADMIN — MIDAZOLAM HYDROCHLORIDE 2 MG: 1 INJECTION, SOLUTION INTRAMUSCULAR; INTRAVENOUS at 10:03

## 2020-03-11 RX ADMIN — OXYCODONE 5 MG: 5 TABLET ORAL at 12:03

## 2020-03-11 RX ADMIN — PROPOFOL 200 MG: 10 INJECTION, EMULSION INTRAVENOUS at 11:03

## 2020-03-11 RX ADMIN — SODIUM CHLORIDE, SODIUM LACTATE, POTASSIUM CHLORIDE, AND CALCIUM CHLORIDE: 600; 310; 30; 20 INJECTION, SOLUTION INTRAVENOUS at 10:03

## 2020-03-11 NOTE — INTERVAL H&P NOTE
The patient has been examined and the H&P has been reviewed:    I concur with the findings and no changes have occurred since H&P was written.   Will also do EMB while alseep so will not need to eval in clinic at later date. Consent for EMB signed    Anesthesia/Surgery risks, benefits and alternative options discussed and understood by patient/family.          Active Hospital Problems    Diagnosis  POA    AVELINA I (vulvar intraepithelial neoplasia I) [N90.0]  Yes      Resolved Hospital Problems   No resolved problems to display.

## 2020-03-11 NOTE — DISCHARGE INSTRUCTIONS

## 2020-03-11 NOTE — TRANSFER OF CARE
"Anesthesia Transfer of Care Note    Patient: Joy Crawford    Procedure(s) Performed: Procedure(s) (LRB):  VULVECTOMY (N/A)  BIOPSY, ENDOMETRIUM (N/A)    Patient location: PACU    Anesthesia Type: general    Transport from OR: Transported from OR on 2-3 L/min O2 by NC with adequate spontaneous ventilation    Post pain: adequate analgesia    Post assessment: no apparent anesthetic complications    Post vital signs: stable    Level of consciousness: awake and alert    Nausea/Vomiting: no nausea/vomiting    Complications: none    Transfer of care protocol was followed      Last vitals:   Visit Vitals  /75 (BP Location: Left arm, Patient Position: Lying)   Pulse 79   Temp 36.8 °C (98.2 °F) (Oral)   Resp 18   Ht 5' 5" (1.651 m)   Wt 98 kg (216 lb)   LMP 07/12/2018 (Approximate)   SpO2 98%   Breastfeeding? No   BMI 35.94 kg/m²     "

## 2020-03-11 NOTE — OP NOTE
[facility]  General Surgery  Operative Note    SUMMARY     Date of Procedure: 3/11/2020     Procedure: Procedure(s) (LRB):  VULVECTOMY (N/A)  BIOPSY, ENDOMETRIUM (N/A)       Surgeon(s) and Role:  Panel 1:     * José Miguel Leon MD - Primary  Panel 2:     * José Miguel Leon MD - Primary    Assisting Surgeon: None    Pre-Operative Diagnosis: AVELINA I (vulvar intraepithelial neoplasia I) [N90.0]  Postmenopausal spotting x 1 episode    Post-Operative Diagnosis: Post-Op Diagnosis Codes:     * AVELINA I (vulvar intraepithelial neoplasia I) [N90.0]  Post menopausal spotting x 1 episode    Anesthesia: General    Procedure:   Left wide local excision of vulva  Right Wide local excision of vulva  Right anal biopsy  Endometrial biospy    Description of the Findings of the Procedure: small 4 mm linear area c/w of AVELINA on left perineum  3 small nodules on right perineum and  A 2mm nodule to right anal area     OP Note:  After assuring informed consent the patient was taken to the operating room where general laryngeal anesthesia was administered.  A time-out was taken.  She was then prepped and draped the usual sterile fashion in the dorsal lithotomy position using the Dano stirrups.  Her bladder was drained  .    Upon evaluation of the patient's perineum a 4 mm linear raised area on her left perineum was noted.  An elliptical incision was made and this entire area was excised approximately 1-1/2 by 1 cm.  This area was reapproximated using 3-0 Vicryl. 3 small nodules on the right side which was previously biopsied and benign was noted on the right perineum.  An elliptical incision was also made 1/2 by 1 cm and both of these 3 nodules were excised also.  A 3 0 Vicryl was also used to close this area in a running stitch.    A small nodular area to the right of the rectum was also noted. This was only 2-3 mm in width.  This was also excised and sent to pathology.  Adequate hemostasis was achieved.  No sutures were  needed.    Patient had reported at preop that approximately 6 months ago she had an episode of postmenopausal bleeding/spotting for 1 day with no recurrence since.  Her endometrial stripe was 3 mm with no significant pathology.  but since we are here in the OR we will proceed with doing an endometrial biopsy under anesthesia.  Endometrial Pipelle was then introduced into the uterine cavity with ease.  Uterine sound was 6 cm.  Only a very scant amount of tissue was obtained and this was sent to pathology.    Complications: No    Estimated Blood Loss (EBL): * No values recorded between 3/11/2020 11:22 AM and 3/11/2020 11:56 AM *           Specimens:    Left perineum  Right perineum  Right anal  Endometrial bx    Condition: Good    Disposition: PACU - hemodynamically stable.    Attestation: I was present and scrubbed for the entire procedure.

## 2020-03-11 NOTE — DISCHARGE SUMMARY
[facility]  Discharge Summary  Gynecology      Admit Date: 3/11/2020    Discharge Date and Time: 3/11/2020    Attending Physician: José Miguel Leon MD     Discharge Provider: José Miguel Leon    Reason for Admission:  VIN1 of left vulva,  Postmenopausalspotting x1     Surgery:  Wide local excision and Endometerial biospy,   Procedures Performed: Procedure(s) (LRB):  VULVECTOMY (N/A)  BIOPSY, ENDOMETRIUM (N/A)    Hospital Course (synopsis of major diagnoses, care, treatment, and services provided during the course of the hospital stay): Patient was admitted for surgery. Following surgery she was transferred to the floor and underwent routine postoperative care. She tolerated po, ambulated without difficulty, and pain was well controlled. She remained afebrile throughout hospital course and her labs were stable. She was discharged to home in stable condition.      Consults: none    Significant Diagnostic Studies: Labs: CBC   Lab Results   Component Value Date    WBC 7.77 03/09/2020    HGB 13.6 03/09/2020    HCT 43.7 03/09/2020    MCV 95 03/09/2020     03/09/2020       Final Diagnoses:   Principal Problem: AVELINA I (vulvar intraepithelial neoplasia I)   Secondary Diagnoses: Postmenopausal spotting  Active Hospital Problems    Diagnosis  POA    *AVELINA I (vulvar intraepithelial neoplasia I) [N90.0]  Yes      Resolved Hospital Problems   No resolved problems to display.       Discharged Condition: stable    Disposition: Home    Follow Up/Patient Instructions: 2 weeks    Medications:  Reconciled Home Medications:   Current Discharge Medication List      START taking these medications    Details   ibuprofen (ADVIL,MOTRIN) 800 MG tablet Take 1 tablet (800 mg total) by mouth every 8 (eight) hours as needed for Pain.  Qty: 15 tablet, Refills: 0      senna-docusate 8.6-50 mg (PERICOLACE) 8.6-50 mg per tablet Take 1 tablet by mouth once daily.         CONTINUE these medications which have NOT CHANGED    Details    24 HOUR NASAL ALLERGY 55 mcg nasal inhaler SPRAY TWICE IEN QD      alprazolam (XANAX) 0.5 MG tablet TK 1 T PO QD TO BID PRF ANXIETY  Refills: 0      BREO ELLIPTA 200-25 mcg/dose DsDv diskus inhaler INL 1 PUFF PO QD  Refills: 0      duloxetine (CYMBALTA) 60 MG capsule TK 1 C PO QD  Refills: 2      lisinopril 10 MG tablet TK 1 T PO QD  Refills: 2      trazodone (DESYREL) 50 MG tablet TK 1 T PO  QHS  Refills: 0      VENTOLIN HFA 90 mcg/actuation inhaler INHALE ONE PUFF ORALLY Q 4 TO 6 H PRF SOB  Refills: 1      albuterol 2.5 mg /3 mL (0.083 %) Nebu 3 mL, albuterol 5 mg/mL Nebu 0.5 mL use as directed           Discharge Procedure Orders   Diet general     Call MD for:  temperature >100.4     Call MD for:  persistent nausea and vomiting     Call MD for:  severe uncontrolled pain     Call MD for:  redness, tenderness, or signs of infection (pain, swelling, redness, odor or green/yellow discharge around incision site)

## 2020-03-12 NOTE — TELEPHONE ENCOUNTER
Have her send him a portal message or call his office an explain that she does not want to come in now for visit due to coronavirus.  Maybe if she schedules in a month then he will give her enough until then??

## 2020-03-12 NOTE — TELEPHONE ENCOUNTER
Spoke with pt, she is asking if  would be willing to refill her medications from her pcp. She called there & its an answering machine. She does not want to sit in the pcp's waiting room due to the corona virus.   Cymbalta, generic  Albuterol inhaler  Trazadone  Lisinopril

## 2020-03-13 LAB
FINAL PATHOLOGIC DIAGNOSIS: NORMAL
GROSS: NORMAL

## 2020-03-20 NOTE — PROGRESS NOTES
Spoke to pt   No dysplasia on bx  Can cancel postop appt for next week and resched in 6 months  Ptlisbeth call and sched on mon

## 2020-03-23 ENCOUNTER — TELEPHONE (OUTPATIENT)
Dept: OBSTETRICS AND GYNECOLOGY | Facility: CLINIC | Age: 55
End: 2020-03-23

## 2020-03-23 NOTE — TELEPHONE ENCOUNTER
Pt. Notified that  does not need to see her until 6 mnths from her surgery. She has appt scheduled in Sept. 2020.   States she is doing fine.

## 2020-05-17 ENCOUNTER — TELEPHONE (OUTPATIENT)
Dept: INTERNAL MEDICINE | Facility: CLINIC | Age: 55
End: 2020-05-17

## 2020-05-20 ENCOUNTER — HOSPITAL ENCOUNTER (OUTPATIENT)
Dept: RADIOLOGY | Facility: HOSPITAL | Age: 55
Discharge: HOME | End: 2020-05-20
Attending: INTERNAL MEDICINE
Payer: COMMERCIAL

## 2020-05-20 ENCOUNTER — OFFICE VISIT (OUTPATIENT)
Dept: INTERNAL MEDICINE | Facility: CLINIC | Age: 55
End: 2020-05-20
Payer: COMMERCIAL

## 2020-05-20 VITALS
SYSTOLIC BLOOD PRESSURE: 132 MMHG | WEIGHT: 240.5 LBS | OXYGEN SATURATION: 98 % | DIASTOLIC BLOOD PRESSURE: 80 MMHG | HEIGHT: 65 IN | BODY MASS INDEX: 40.07 KG/M2 | HEART RATE: 78 BPM

## 2020-05-20 DIAGNOSIS — F32.A DEPRESSION, UNSPECIFIED DEPRESSION TYPE: ICD-10-CM

## 2020-05-20 DIAGNOSIS — M25.572 CHRONIC PAIN OF LEFT ANKLE: ICD-10-CM

## 2020-05-20 DIAGNOSIS — R06.02 SHORTNESS OF BREATH: ICD-10-CM

## 2020-05-20 DIAGNOSIS — J45.909 ASTHMA, CURRENTLY ACTIVE: ICD-10-CM

## 2020-05-20 DIAGNOSIS — G89.29 CHRONIC PAIN OF LEFT ANKLE: ICD-10-CM

## 2020-05-20 DIAGNOSIS — I10 HYPERTENSION, UNSPECIFIED TYPE: ICD-10-CM

## 2020-05-20 DIAGNOSIS — Z00.00 ROUTINE GENERAL MEDICAL EXAMINATION AT A HEALTH CARE FACILITY: ICD-10-CM

## 2020-05-20 DIAGNOSIS — J45.909 ASTHMA, CURRENTLY ACTIVE: Primary | ICD-10-CM

## 2020-05-20 DIAGNOSIS — F41.9 ANXIETY: ICD-10-CM

## 2020-05-20 DIAGNOSIS — R79.9 ABNORMAL BLOOD CHEMISTRY: ICD-10-CM

## 2020-05-20 PROCEDURE — 99999 PR PBB SHADOW E&M-EST. PATIENT-LVL IV: ICD-10-PCS | Mod: PBBFAC,,, | Performed by: INTERNAL MEDICINE

## 2020-05-20 PROCEDURE — 99386 PR PREVENTIVE VISIT,NEW,40-64: ICD-10-PCS | Mod: S$GLB,,, | Performed by: INTERNAL MEDICINE

## 2020-05-20 PROCEDURE — 73610 X-RAY EXAM OF ANKLE: CPT | Mod: 26,LT,, | Performed by: RADIOLOGY

## 2020-05-20 PROCEDURE — 71046 X-RAY EXAM CHEST 2 VIEWS: CPT | Mod: TC

## 2020-05-20 PROCEDURE — 71046 XR CHEST PA AND LATERAL: ICD-10-PCS | Mod: 26,,, | Performed by: RADIOLOGY

## 2020-05-20 PROCEDURE — 99999 PR PBB SHADOW E&M-EST. PATIENT-LVL IV: CPT | Mod: PBBFAC,,, | Performed by: INTERNAL MEDICINE

## 2020-05-20 PROCEDURE — 73610 X-RAY EXAM OF ANKLE: CPT | Mod: TC,LT

## 2020-05-20 PROCEDURE — 3079F DIAST BP 80-89 MM HG: CPT | Mod: CPTII,S$GLB,, | Performed by: INTERNAL MEDICINE

## 2020-05-20 PROCEDURE — 71046 X-RAY EXAM CHEST 2 VIEWS: CPT | Mod: 26,,, | Performed by: RADIOLOGY

## 2020-05-20 PROCEDURE — 3075F SYST BP GE 130 - 139MM HG: CPT | Mod: CPTII,S$GLB,, | Performed by: INTERNAL MEDICINE

## 2020-05-20 PROCEDURE — 73610 XR ANKLE COMPLETE 3 VIEW LEFT: ICD-10-PCS | Mod: 26,LT,, | Performed by: RADIOLOGY

## 2020-05-20 PROCEDURE — 3079F PR MOST RECENT DIASTOLIC BLOOD PRESSURE 80-89 MM HG: ICD-10-PCS | Mod: CPTII,S$GLB,, | Performed by: INTERNAL MEDICINE

## 2020-05-20 PROCEDURE — 99386 PREV VISIT NEW AGE 40-64: CPT | Mod: S$GLB,,, | Performed by: INTERNAL MEDICINE

## 2020-05-20 PROCEDURE — 3075F PR MOST RECENT SYSTOLIC BLOOD PRESS GE 130-139MM HG: ICD-10-PCS | Mod: CPTII,S$GLB,, | Performed by: INTERNAL MEDICINE

## 2020-05-20 RX ORDER — TRAZODONE HYDROCHLORIDE 150 MG/1
150 TABLET ORAL NIGHTLY
Qty: 30 TABLET | Refills: 3 | Status: SHIPPED | OUTPATIENT
Start: 2020-05-20 | End: 2020-10-06

## 2020-05-20 RX ORDER — MELOXICAM 15 MG/1
1 TABLET ORAL DAILY
COMMUNITY
Start: 2020-03-29 | End: 2020-08-24 | Stop reason: ALTCHOICE

## 2020-05-20 RX ORDER — LISDEXAMFETAMINE DIMESYLATE 40 MG/1
1 CAPSULE ORAL DAILY
COMMUNITY
Start: 2020-04-29 | End: 2020-12-03

## 2020-05-20 RX ORDER — TRAZODONE HYDROCHLORIDE 100 MG/1
1 TABLET ORAL NIGHTLY
COMMUNITY
Start: 2020-04-01 | End: 2020-05-20

## 2020-05-20 RX ORDER — DULOXETIN HYDROCHLORIDE 30 MG/1
30 CAPSULE, DELAYED RELEASE ORAL DAILY
Qty: 30 CAPSULE | Refills: 3 | Status: SHIPPED | OUTPATIENT
Start: 2020-05-20 | End: 2021-01-24 | Stop reason: SDUPTHER

## 2020-05-20 RX ORDER — HYDROXYZINE PAMOATE 25 MG/1
1 CAPSULE ORAL NIGHTLY
COMMUNITY
Start: 2020-04-01 | End: 2020-05-20

## 2020-05-20 NOTE — PROGRESS NOTES
Chief Complaint: Establish care    HPI: This is a 55 year old woman who presents to [facility]    She has had asthma since a young child. She was hospitalized as a child. She lived in [place] as a child. She has not been hospitalized for her asthma as an adult.  Last ER visit was 5-6 year ago.  She takes Breo one puff daily.  She takes prednisone when she feels a flare coming.  She took prednisone yesterday when necessary (not often at all). She has lots of allergies. She has not seen a pulmonogist in a long time. She was using a walk in clinic in [place] as her primary care (Dr Winter and Janis).  It has been years since her last pulmonary function tests.  She is short of breath upon exertion - right away.  She has palpitations with anxiety    She has anxiety and depression.  She had a bad divorce 10-11 years ago.   She routinely takes duloxetine 60 mg once daily and trazodone 50 mg at bedtime. Her been more nervous and depressed with the COVID pandemic..  She had lots of difficulty sleeping. She is now taking duloxetine 60 mg daily and trazodone 100 mg at bedtime and hydroxyzine 25 mg at bedtime - she is sleeping well now.  She had a panic attack recently (3-4 days ago).  Depression 5/10. No homicidal or suicidal ideations. She has sadness.     She has had difficulty focusing in the past. Dr Carlton prescribed Vyvanse 40 mg in the past which helped her a lot. She has never been formally diagnosed with ADHD. She had been off Vyvanse 40 mg for over a year. She recently started back on Vyvanse 40 mg daily (prescribed on 4/29/20) which has helped her focus.     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 She has been working remotely which has been very different for her which has caused some stress.       She has a ganglion cyst of the left ankle and takes meloxicam 15 mg daily    She takes lisinopril 10 mg once daily for hypertension.     She has gained weight recently - 20 pounds in the last several months    She  "has wine every evening - 2 glasses wine.     No periods.  She is uptodate on mammogram. Has not had a colonoscopy    Past Medical History:   Diagnosis Date    Anxiety     Asthma     Depression     Esophageal reflux     H/O mammogram 01/20/2017    Normal  (DIS)     Herpes simplex virus (HSV) infection     no out breaks    Hypertension     Peptic ulcer     Sleep apnea     doesnt use cpap     Past Surgical History:   Procedure Laterality Date    ENDOMETRIAL BIOPSY N/A 3/11/2020    Procedure: BIOPSY, ENDOMETRIUM;  Surgeon: José Miguel Leon MD;  Location: [facility] OR;  Service: OB/GYN;  Laterality: N/A;    RHINOPLASTY  2006    SINUS SURGERY  2006    VULVECTOMY N/A 3/11/2020    Procedure: VULVECTOMY;  Surgeon: José Miguel Leon MD;  Location: [facility] OR;  Service: OB/GYN;  Laterality: N/A;         Meds and allergies: updated on EPIC    REVIEW OF SYSTEMS: She denies fevers, chills, night sweats, fatigue, visual change, hearing loss, sinus congestion, sore throat, chest pain, nausea, vomiting, diarrhea, dysuria, hematuria, polydipsia, polyuria headaches    She has a BM every other day recently - she is trying to do a ketogenic diet.     Sh has back issues, hip issues.     Physical exam:  /80   Pulse 78   Ht 5' 5" (1.651 m)   Wt 109.1 kg (240 lb 8.4 oz)   LMP 07/12/2018 (Approximate)   SpO2 98%   BMI 40.02 kg/m²     General: alert, oriented x 3, no apparent distress.  Affect normal  HEENT: Conjunctivae: anicteric, PERRL, EOMI, TM clear, Oralpharynx clear  Neck: supple, no thyroid enlargement, no cervical lymphadenopathy  Resp: effort normal, lungs clear bilaterally  CV: Regular rate and rhythm without murmurs, gallops or rubs, no lower extremity edema,   Abdomen: soft, non-distended, non-tender, bowel sounds present, no hepatosplenomegaly.    Assessment/Plan:    Annual exam - discussed diet, exercise and safety issues.    Asthma - Complete PFT, CXR, Pulmonary  Shortness of breath - EKG and ECHO - rule out " underlying heart issues  Anxiety and depression - psychiatry referral for medication management and for counseling.  INcrease duloxetine to 90 mg (30 mg plus a 60 m g capsule). Increase trazodone to 150 mg at night and stop hydroxyzine.  Left ankle pain - xray left ankle and to ortho  Fasting labs

## 2020-05-22 DIAGNOSIS — Z12.11 COLON CANCER SCREENING: ICD-10-CM

## 2020-05-22 DIAGNOSIS — J45.909 ASTHMA, CURRENTLY ACTIVE: ICD-10-CM

## 2020-05-22 DIAGNOSIS — Z13.9 SCREENING PROCEDURE: Primary | ICD-10-CM

## 2020-05-22 DIAGNOSIS — R76.8 ELEVATED IGE LEVEL: ICD-10-CM

## 2020-05-25 ENCOUNTER — LAB VISIT (OUTPATIENT)
Dept: INTERNAL MEDICINE | Facility: CLINIC | Age: 55
End: 2020-05-25
Payer: COMMERCIAL

## 2020-05-25 DIAGNOSIS — Z13.9 SCREENING PROCEDURE: ICD-10-CM

## 2020-05-25 LAB — SARS-COV-2 RNA RESP QL NAA+PROBE: NOT DETECTED

## 2020-05-25 PROCEDURE — U0003 INFECTIOUS AGENT DETECTION BY NUCLEIC ACID (DNA OR RNA); SEVERE ACUTE RESPIRATORY SYNDROME CORONAVIRUS 2 (SARS-COV-2) (CORONAVIRUS DISEASE [COVID-19]), AMPLIFIED PROBE TECHNIQUE, MAKING USE OF HIGH THROUGHPUT TECHNOLOGIES AS DESCRIBED BY CMS-2020-01-R: HCPCS

## 2020-05-26 ENCOUNTER — HOSPITAL ENCOUNTER (OUTPATIENT)
Dept: PULMONOLOGY | Facility: CLINIC | Age: 55
Discharge: HOME | End: 2020-05-26
Payer: COMMERCIAL

## 2020-05-26 ENCOUNTER — OFFICE VISIT (OUTPATIENT)
Dept: PULMONOLOGY | Facility: CLINIC | Age: 55
End: 2020-05-26
Payer: COMMERCIAL

## 2020-05-26 VITALS
OXYGEN SATURATION: 95 % | WEIGHT: 223 LBS | DIASTOLIC BLOOD PRESSURE: 76 MMHG | BODY MASS INDEX: 37.15 KG/M2 | HEIGHT: 65 IN | HEART RATE: 92 BPM | SYSTOLIC BLOOD PRESSURE: 116 MMHG

## 2020-05-26 DIAGNOSIS — R06.02 SHORTNESS OF BREATH: ICD-10-CM

## 2020-05-26 DIAGNOSIS — J45.909 ASTHMA, CURRENTLY ACTIVE: Primary | ICD-10-CM

## 2020-05-26 DIAGNOSIS — G47.30 SLEEP APNEA, UNSPECIFIED TYPE: ICD-10-CM

## 2020-05-26 DIAGNOSIS — J45.909 ASTHMA, CURRENTLY ACTIVE: ICD-10-CM

## 2020-05-26 PROCEDURE — 94729 PR C02/MEMBANE DIFFUSE CAPACITY: ICD-10-PCS | Mod: S$GLB,,, | Performed by: INTERNAL MEDICINE

## 2020-05-26 PROCEDURE — 99204 OFFICE O/P NEW MOD 45 MIN: CPT | Mod: 25,S$GLB,, | Performed by: NURSE PRACTITIONER

## 2020-05-26 PROCEDURE — 99999 PR PBB SHADOW E&M-EST. PATIENT-LVL III: CPT | Mod: PBBFAC,,, | Performed by: NURSE PRACTITIONER

## 2020-05-26 PROCEDURE — 94060 EVALUATION OF WHEEZING: CPT | Mod: S$GLB,,, | Performed by: INTERNAL MEDICINE

## 2020-05-26 PROCEDURE — 99204 PR OFFICE/OUTPT VISIT, NEW, LEVL IV, 45-59 MIN: ICD-10-PCS | Mod: 25,S$GLB,, | Performed by: NURSE PRACTITIONER

## 2020-05-26 PROCEDURE — 94727 GAS DIL/WSHOT DETER LNG VOL: CPT | Mod: S$GLB,,, | Performed by: INTERNAL MEDICINE

## 2020-05-26 PROCEDURE — 94060 PR EVAL OF BRONCHOSPASM: ICD-10-PCS | Mod: S$GLB,,, | Performed by: INTERNAL MEDICINE

## 2020-05-26 PROCEDURE — 94729 DIFFUSING CAPACITY: CPT | Mod: S$GLB,,, | Performed by: INTERNAL MEDICINE

## 2020-05-26 PROCEDURE — 99999 PR PBB SHADOW E&M-EST. PATIENT-LVL III: ICD-10-PCS | Mod: PBBFAC,,, | Performed by: NURSE PRACTITIONER

## 2020-05-26 PROCEDURE — 94727 PR PULM FUNCTION TEST BY GAS: ICD-10-PCS | Mod: S$GLB,,, | Performed by: INTERNAL MEDICINE

## 2020-05-26 RX ORDER — MONTELUKAST SODIUM 10 MG/1
10 TABLET ORAL NIGHTLY
Qty: 30 TABLET | Refills: 0 | Status: SHIPPED | OUTPATIENT
Start: 2020-05-26 | End: 2020-06-25

## 2020-05-26 NOTE — PROGRESS NOTES
"  Subjective:       Patient ID: Joy Crawford is a 55 y.o. female.    Chief Complaint: Cough and Asthma    HPI     Ms. Crawford is a 55-year-old female presenting to Pulmonary Clinic for cough and asthma.  She was referred to Pulmonary Services by PCP.  Reports diagnosed with asthma as a child.  Was hospitalized as a child for asthma, but not as an adult.  Last ER visitation was approximately 6 years ago.  She is currently on Breo 1 puff daily.  She does disclose using prednisone when having asthma exacerbations.  She reports having symptoms with exacerbation of chest tightness, shortness of breath, wheezing and cough.  She states having lots of allergies.  Triggers include dust, pollen, cats, dogs and seasonal allergies.  She denies any recent travel history.  She denies history of tuberculosis.  She denies personal family history of lung cancer.  She denies fever or chills.  Today, she feels well occasionally reports having some shortness of breath.  She attributes her shortness of breath to deconditioning;has gained appx 20 lbs in .  States, "I am just lazy and not walking since the COVID outbreak."  Additionally, she reports diagnosed with sleep apnea.  She was posted to use CPAP machine however she has not required.  Her last sleep study was 3-4 years ago.  Denies smoking history.     Explains having ECHO, EKG and labs ordered for tomorrow ordered by PCP.     Social History     Tobacco Use   Smoking Status Never Smoker   Smokeless Tobacco Never Used      Review of Systems   Constitutional: Positive for fatigue. Negative for fever, chills, weight loss and night sweats.   HENT: Positive for rhinorrhea and congestion. Negative for postnasal drip and trouble swallowing.    Respiratory: Positive for apnea, snoring and shortness of breath. Negative for cough, sputum production, choking, chest tightness, wheezing, dyspnea on extertion and use of rescue inhaler.    Cardiovascular: Positive for leg swelling (Reports on " "occassion). Negative for chest pain.   Musculoskeletal: Negative for joint swelling.   Skin: Negative for rash.   Gastrointestinal: Negative for acid reflux.   Neurological: Negative for dizziness and light-headedness.   Hematological: Negative for adenopathy.   Psychiatric/Behavioral: The patient is nervous/anxious (Reports hx of anxiety).        Objective:      Vitals:    20 1252   BP: 116/76   Pulse: 92   SpO2: 95%   Weight: 101.2 kg (223 lb)   Height: 5' 5" (1.651 m)      Physical Exam   Constitutional: She is oriented to person, place, and time. She appears well-developed and well-nourished. No distress.   HENT:   Head: Normocephalic.   Neck: Normal range of motion. Neck supple.   Cardiovascular: Normal rate, regular rhythm and normal heart sounds.   Pulmonary/Chest: Normal expansion, effort normal and breath sounds normal. No respiratory distress. She has no wheezes. She has no rhonchi.   Abdominal: Soft. Bowel sounds are normal. There is no tenderness.   Musculoskeletal: Normal range of motion. She exhibits no edema.   Lymphadenopathy: No supraclavicular adenopathy is present.     She has no cervical adenopathy.   Neurological: She is alert and oriented to person, place, and time. Gait normal.   Skin: Skin is warm and dry. Nails show no clubbing.   Psychiatric: She has a normal mood and affect. Her behavior is normal.   Vitals reviewed.    Personal Diagnostic Review  Pulmonary function tests: FEV1: 1.74  (64 % predicted), FVC:  2.73 (79 % predicted), FEV1/FVC:  64, T.46 (87 % predicted), DLCO: 21.3 (88 % predicted)  Post BD:  FEV1: 1.78  (65 % predicted), FVC:  2.86 (83 % predicted), FEV1/FVC:  62    X-Ray Chest PA And Lateral 2020  FINDINGS:  Heart size and mediastinal contour are normal.  Lungs are expanded and essentially clear.  A few thin lines of fibrosis are present at left base.  No acute lung consolidation or pleural fluid is detected.  Skeletal structures are intact without acute " finding.  Impression: No acute cardiopulmonary disease.     Ref. Range 3/9/2020 08:45   WBC Latest Ref Range: 3.90 - 12.70 K/uL 7.77   RBC Latest Ref Range: 4.00 - 5.40 M/uL 4.58   Hemoglobin Latest Ref Range: 12.0 - 16.0 g/dL 13.6   Hematocrit Latest Ref Range: 37.0 - 48.5 % 43.7   MCV Latest Ref Range: 82 - 98 fL 95   MCH Latest Ref Range: 27.0 - 31.0 pg 29.7   MCHC Latest Ref Range: 32.0 - 36.0 g/dL 31.1 (L)   RDW Latest Ref Range: 11.5 - 14.5 % 13.3   Platelets Latest Ref Range: 150 - 350 K/uL 277   MPV Latest Ref Range: 9.2 - 12.9 fL 9.3   Gran% Latest Ref Range: 38.0 - 73.0 % 53.3   Gran # (ANC) Latest Ref Range: 1.8 - 7.7 K/uL 4.1   Lymph% Latest Ref Range: 18.0 - 48.0 % 29.0   Lymph # Latest Ref Range: 1.0 - 4.8 K/uL 2.3   Mono% Latest Ref Range: 4.0 - 15.0 % 8.6   Mono # Latest Ref Range: 0.3 - 1.0 K/uL 0.7   Eosinophil% Latest Ref Range: 0.0 - 8.0 % 8.2 (H)   Eos # Latest Ref Range: 0.0 - 0.5 K/uL 0.6 (H)   Basophil% Latest Ref Range: 0.0 - 1.9 % 0.6   Baso # Latest Ref Range: 0.00 - 0.20 K/uL 0.05   nRBC Latest Ref Range: 0 /100 WBC 0   Differential Method Unknown Automated   Immature Grans (Abs) Latest Ref Range: 0.00 - 0.04 K/uL 0.02   Immature Granulocytes Latest Ref Range: 0.0 - 0.5 % 0.3         Assessment:       1. Asthma, currently active    2. Sleep apnea, unspecified type    3. Shortness of breath    4. BMI 37.0-37.9, adult        Outpatient Encounter Medications as of 5/26/2020   Medication Sig Dispense Refill    24 HOUR NASAL ALLERGY 55 mcg nasal inhaler SPRAY TWICE IEN QD      albuterol 2.5 mg /3 mL (0.083 %) Nebu 3 mL, albuterol 5 mg/mL Nebu 0.5 mL use as directed      alprazolam (XANAX) 0.5 MG tablet TK 1 T PO QD TO BID PRF ANXIETY  0    BREO ELLIPTA 200-25 mcg/dose DsDv diskus inhaler INL 1 PUFF PO QD  0    DULoxetine (CYMBALTA) 30 MG capsule Take 1 capsule (30 mg total) by mouth once daily. In addition to 60 mg daily to equal 90 mg daily. 30 capsule 3    duloxetine (CYMBALTA) 60 MG  capsule TK 1 C PO QD  2    ibuprofen (ADVIL,MOTRIN) 800 MG tablet Take 1 tablet (800 mg total) by mouth every 8 (eight) hours as needed for Pain. (Patient not taking: Reported on 5/20/2020) 15 tablet 0    lisinopril 10 MG tablet TK 1 T PO QD  2    meloxicam (MOBIC) 15 MG tablet Take 1 tablet by mouth once daily.      montelukast (SINGULAIR) 10 mg tablet Take 1 tablet (10 mg total) by mouth every evening. 30 tablet 0    senna-docusate 8.6-50 mg (PERICOLACE) 8.6-50 mg per tablet Take 1 tablet by mouth once daily. (Patient not taking: Reported on 5/20/2020)      traZODone (DESYREL) 150 MG tablet Take 1 tablet (150 mg total) by mouth every evening. 30 tablet 3    VENTOLIN HFA 90 mcg/actuation inhaler INHALE ONE PUFF ORALLY Q 4 TO 6 H PRF SOB  1    VYVANSE 40 mg Cap Take 1 capsule by mouth once daily.       No facility-administered encounter medications on file as of 5/26/2020.      Orders Placed This Encounter   Procedures    IgE     Standing Status:   Future     Number of Occurrences:   1     Standing Expiration Date:   7/25/2021    Ambulatory referral/consult to Sleep Disorders     Standing Status:   Future     Standing Expiration Date:   6/26/2021     Referral Priority:   Routine     Referral Type:   Consultation     Requested Specialty:   Sleep Medicine     Number of Visits Requested:   1     Plan:         Problem List Items Addressed This Visit        Pulmonary    Asthma, currently active - Primary    Overview     Spirometry shows moderate-severe obstruction. Lung volume determination shows TLC is normal with FRC and/or  RV reduced. Spirometry remains unimproved following bronchodilator. DLCO is normal.         Current Assessment & Plan     Reports asthma is relatively well controlled. Explains having significant allergies. Concerns for fixed obstruction is related  possible eosinophilic asthma? Has allergic triggers.     Plan:  -Continue Breo. Discussed to rinse mouth after use. My need to step up  therapy to Symbicort or Advair.   -Continue albuterol as needed   -Prescribe Singular for asthma and chronic rhinitis  -Advised on starting daily antihistamine and topical nasal steroid  -Consider ambulatory referral to Allergy for elevated eosinophils/IgE. Will repeat CBC with Diff and obtain IgE level.           Relevant Medications    montelukast (SINGULAIR) 10 mg tablet    Other Relevant Orders    IgE (Completed)       Endocrine    BMI 37.0-37.9, adult    Overview     Discussed with pt at length about the need to work on diet and weight loss.  Have offered suggestions on approaches for this problem.  Also discussed the need to start a regular exercise program.  We discussed at length the importance of regular exercise and working at getting to a healthier weight.  All questions answered.  Pt voices understanding of these principles and hopefully will take action.              Other    Sleep apnea    Overview     Patient reports history of sleep apnea-not on treatment.  States last sleep study was approximately 3-4 years ago.  She reports daytime fatigue, shortness of breath  and snoring.  Plan on having patient be revaluated for sleep disorder.  Placed ambulatory referral sleep medicine.         Relevant Orders    Ambulatory referral/consult to Sleep Disorders    Shortness of breath    Overview     Etiology of patient's shortness of breath is unclear at this time.  Differential diagnosis would include lung disease, heart disease, sleep apnea, deconditioning or a multifactorial component.     Plan:  -Continue ICS/LABA daily and albuterol as needed  -Has EKG, ECHO, and labs ordered for tomorrow as per PCP  -Encourage exercise regimen.   -Hx of Sleep Apnea. Untreated. Place Ambulatory referral to sleep medicine.   -Consider CT of chest on follow up.            Follow up after testing and labs.     This note is dictated on M*Modal word recognition program.  There are word recognition mistakes that are occasionally  missed on review.

## 2020-05-26 NOTE — LETTER
May 27, 2020      Jennifer Ramos MD  [address]  [place] [address]           [facility]  [address]  [place] [address]  Phone: [phone]          Patient: Joy Crawford   MR Number: [id number]   YOB: 1965   Date of Visit: 5/26/2020       Dear Dr. Jennifer Ramos:    Thank you for referring Joy Crawford to me for evaluation. Attached you will find relevant portions of my assessment and plan of care.    If you have questions, please do not hesitate to call me. I look forward to following Joy Crawford along with you.    Sincerely,    Asha Rosales, NP    Enclosure  CC:  No Recipients    If you would like to receive this communication electronically, please contact [email or web address] or [phone] to request more information on [facility] Link access.    For providers and/or their staff who would like to refer a patient to [facility], please contact us through our one-stop-shop provider referral line, [facility], at [phone].    If you feel you have received this communication in error or would no longer like to receive these types of communications, please e-mail [email or web address]

## 2020-05-27 ENCOUNTER — LAB VISIT (OUTPATIENT)
Dept: LAB | Facility: HOSPITAL | Age: 55
End: 2020-05-27
Attending: INTERNAL MEDICINE
Payer: COMMERCIAL

## 2020-05-27 ENCOUNTER — HOSPITAL ENCOUNTER (OUTPATIENT)
Dept: CARDIOLOGY | Facility: CLINIC | Age: 55
Discharge: HOME | End: 2020-05-27
Attending: INTERNAL MEDICINE
Payer: COMMERCIAL

## 2020-05-27 ENCOUNTER — HOSPITAL ENCOUNTER (OUTPATIENT)
Dept: CARDIOLOGY | Facility: CLINIC | Age: 55
Discharge: HOME | End: 2020-05-27
Payer: COMMERCIAL

## 2020-05-27 VITALS
HEIGHT: 65 IN | WEIGHT: 223 LBS | HEART RATE: 78 BPM | DIASTOLIC BLOOD PRESSURE: 76 MMHG | BODY MASS INDEX: 37.15 KG/M2 | SYSTOLIC BLOOD PRESSURE: 116 MMHG

## 2020-05-27 DIAGNOSIS — R79.9 ABNORMAL BLOOD CHEMISTRY: ICD-10-CM

## 2020-05-27 DIAGNOSIS — R06.02 SHORTNESS OF BREATH: ICD-10-CM

## 2020-05-27 DIAGNOSIS — I10 HYPERTENSION, UNSPECIFIED TYPE: ICD-10-CM

## 2020-05-27 PROBLEM — J45.909 ASTHMA, CURRENTLY ACTIVE: Status: ACTIVE | Noted: 2020-05-27

## 2020-05-27 PROBLEM — G47.30 SLEEP APNEA: Status: ACTIVE | Noted: 2020-05-27

## 2020-05-27 LAB
25(OH)D3+25(OH)D2 SERPL-MCNC: 23 NG/ML (ref 30–96)
ALBUMIN SERPL BCP-MCNC: 4.4 G/DL (ref 3.5–5.2)
ALP SERPL-CCNC: 80 U/L (ref 55–135)
ALT SERPL W/O P-5'-P-CCNC: 19 U/L (ref 10–44)
ANION GAP SERPL CALC-SCNC: 7 MMOL/L (ref 8–16)
ASCENDING AORTA: 2.84 CM
AST SERPL-CCNC: 16 U/L (ref 10–40)
AV INDEX (PROSTH): 0.87
AV MEAN GRADIENT: 4 MMHG
AV PEAK GRADIENT: 7 MMHG
AV VALVE AREA: 3.09 CM2
AV VELOCITY RATIO: 0.85
BASOPHILS # BLD AUTO: 0.04 K/UL (ref 0–0.2)
BASOPHILS NFR BLD: 0.7 % (ref 0–1.9)
BILIRUB SERPL-MCNC: 0.5 MG/DL (ref 0.1–1)
BSA FOR ECHO PROCEDURE: 2.15 M2
BUN SERPL-MCNC: 20 MG/DL (ref 6–20)
CALCIUM SERPL-MCNC: 10.2 MG/DL (ref 8.7–10.5)
CHLORIDE SERPL-SCNC: 105 MMOL/L (ref 95–110)
CHOLEST SERPL-MCNC: 219 MG/DL (ref 120–199)
CHOLEST/HDLC SERPL: 3.3 {RATIO} (ref 2–5)
CO2 SERPL-SCNC: 29 MMOL/L (ref 23–29)
CREAT SERPL-MCNC: 0.9 MG/DL (ref 0.5–1.4)
CV ECHO LV RWT: 0.39 CM
DIFFERENTIAL METHOD: ABNORMAL
DOP CALC AO PEAK VEL: 1.3 M/S
DOP CALC AO VTI: 24.93 CM
DOP CALC LVOT AREA: 3.6 CM2
DOP CALC LVOT DIAMETER: 2.13 CM
DOP CALC LVOT PEAK VEL: 1.1 M/S
DOP CALC LVOT STROKE VOLUME: 76.96 CM3
DOP CALCLVOT PEAK VEL VTI: 21.61 CM
E WAVE DECELERATION TIME: 195.31 MSEC
E/A RATIO: 1.21
E/E' RATIO: 10.13 M/S
ECHO LV POSTERIOR WALL: 0.89 CM (ref 0.6–1.1)
EOSINOPHIL # BLD AUTO: 0.4 K/UL (ref 0–0.5)
EOSINOPHIL NFR BLD: 7 % (ref 0–8)
ERYTHROCYTE [DISTWIDTH] IN BLOOD BY AUTOMATED COUNT: 13.5 % (ref 11.5–14.5)
EST. GFR  (AFRICAN AMERICAN): >60 ML/MIN/1.73 M^2
EST. GFR  (NON AFRICAN AMERICAN): >60 ML/MIN/1.73 M^2
ESTIMATED AVG GLUCOSE: 100 MG/DL (ref 68–131)
FERRITIN SERPL-MCNC: 63 NG/ML (ref 20–300)
FRACTIONAL SHORTENING: 39 % (ref 28–44)
GLUCOSE SERPL-MCNC: 91 MG/DL (ref 70–110)
HBA1C MFR BLD HPLC: 5.1 % (ref 4–5.6)
HCT VFR BLD AUTO: 47.2 % (ref 37–48.5)
HDLC SERPL-MCNC: 66 MG/DL (ref 40–75)
HDLC SERPL: 30.1 % (ref 20–50)
HGB BLD-MCNC: 14.5 G/DL (ref 12–16)
IMM GRANULOCYTES # BLD AUTO: 0.01 K/UL (ref 0–0.04)
IMM GRANULOCYTES NFR BLD AUTO: 0.2 % (ref 0–0.5)
INTERVENTRICULAR SEPTUM: 0.88 CM (ref 0.6–1.1)
LA MAJOR: 3.92 CM
LA MINOR: 3.81 CM
LA WIDTH: 3.54 CM
LDLC SERPL CALC-MCNC: 128 MG/DL (ref 63–159)
LEFT ATRIUM SIZE: 3.4 CM
LEFT ATRIUM VOLUME INDEX: 19.1 ML/M2
LEFT ATRIUM VOLUME: 39.53 CM3
LEFT INTERNAL DIMENSION IN SYSTOLE: 2.81 CM (ref 2.1–4)
LEFT VENTRICLE DIASTOLIC VOLUME INDEX: 37.19 ML/M2
LEFT VENTRICLE DIASTOLIC VOLUME: 77.04 ML
LEFT VENTRICLE MASS INDEX: 65 G/M2
LEFT VENTRICLE SYSTOLIC VOLUME INDEX: 14.4 ML/M2
LEFT VENTRICLE SYSTOLIC VOLUME: 29.78 ML
LEFT VENTRICULAR INTERNAL DIMENSION IN DIASTOLE: 4.6 CM (ref 3.5–6)
LEFT VENTRICULAR MASS: 134.67 G
LV LATERAL E/E' RATIO: 9 M/S
LV SEPTAL E/E' RATIO: 11.57 M/S
LYMPHOCYTES # BLD AUTO: 1.8 K/UL (ref 1–4.8)
LYMPHOCYTES NFR BLD: 29.6 % (ref 18–48)
MCH RBC QN AUTO: 30.3 PG (ref 27–31)
MCHC RBC AUTO-ENTMCNC: 30.7 G/DL (ref 32–36)
MCV RBC AUTO: 99 FL (ref 82–98)
MONOCYTES # BLD AUTO: 0.6 K/UL (ref 0.3–1)
MONOCYTES NFR BLD: 9.9 % (ref 4–15)
MV PEAK A VEL: 0.67 M/S
MV PEAK E VEL: 0.81 M/S
NEUTROPHILS # BLD AUTO: 3.2 K/UL (ref 1.8–7.7)
NEUTROPHILS NFR BLD: 52.6 % (ref 38–73)
NONHDLC SERPL-MCNC: 153 MG/DL
NRBC BLD-RTO: 0 /100 WBC
PISA TR MAX VEL: 2.16 M/S
PLATELET # BLD AUTO: 234 K/UL (ref 150–350)
PMV BLD AUTO: 9.7 FL (ref 9.2–12.9)
POTASSIUM SERPL-SCNC: 4.7 MMOL/L (ref 3.5–5.1)
PROT SERPL-MCNC: 7.5 G/DL (ref 6–8.4)
PULM VEIN S/D RATIO: 1.16
PV PEAK D VEL: 0.51 M/S
PV PEAK S VEL: 0.59 M/S
RA MAJOR: 3.83 CM
RA PRESSURE: 3 MMHG
RA WIDTH: 3.25 CM
RBC # BLD AUTO: 4.78 M/UL (ref 4–5.4)
RIGHT VENTRICULAR END-DIASTOLIC DIMENSION: 3.55 CM
RV TISSUE DOPPLER FREE WALL SYSTOLIC VELOCITY 1 (APICAL 4 CHAMBER VIEW): 11.91 CM/S
SINUS: 3 CM
SODIUM SERPL-SCNC: 141 MMOL/L (ref 136–145)
STJ: 2.59 CM
TDI LATERAL: 0.09 M/S
TDI SEPTAL: 0.07 M/S
TDI: 0.08 M/S
TR MAX PG: 19 MMHG
TRICUSPID ANNULAR PLANE SYSTOLIC EXCURSION: 1.64 CM
TRIGL SERPL-MCNC: 125 MG/DL (ref 30–150)
TSH SERPL DL<=0.005 MIU/L-ACNC: 0.72 UIU/ML (ref 0.4–4)
TV REST PULMONARY ARTERY PRESSURE: 22 MMHG
URATE SERPL-MCNC: 6.1 MG/DL (ref 2.4–5.7)
VIT B12 SERPL-MCNC: 286 PG/ML (ref 210–950)
WBC # BLD AUTO: 5.98 K/UL (ref 3.9–12.7)

## 2020-05-27 PROCEDURE — 85025 COMPLETE CBC W/AUTO DIFF WBC: CPT

## 2020-05-27 PROCEDURE — 93306 TTE W/DOPPLER COMPLETE: CPT | Mod: 26,,, | Performed by: INTERNAL MEDICINE

## 2020-05-27 PROCEDURE — 82306 VITAMIN D 25 HYDROXY: CPT

## 2020-05-27 PROCEDURE — 93306 TTE W/DOPPLER COMPLETE: CPT

## 2020-05-27 PROCEDURE — 93005 EKG 12-LEAD: ICD-10-PCS | Mod: S$GLB,,, | Performed by: INTERNAL MEDICINE

## 2020-05-27 PROCEDURE — 93005 ELECTROCARDIOGRAM TRACING: CPT | Mod: S$GLB,,, | Performed by: INTERNAL MEDICINE

## 2020-05-27 PROCEDURE — 84550 ASSAY OF BLOOD/URIC ACID: CPT

## 2020-05-27 PROCEDURE — 82607 VITAMIN B-12: CPT

## 2020-05-27 PROCEDURE — 93010 ELECTROCARDIOGRAM REPORT: CPT | Mod: S$GLB,,, | Performed by: INTERNAL MEDICINE

## 2020-05-27 PROCEDURE — 93306 ECHO (CUPID ONLY): ICD-10-PCS | Mod: 26,,, | Performed by: INTERNAL MEDICINE

## 2020-05-27 PROCEDURE — 80061 LIPID PANEL: CPT

## 2020-05-27 PROCEDURE — 84443 ASSAY THYROID STIM HORMONE: CPT

## 2020-05-27 PROCEDURE — 83036 HEMOGLOBIN GLYCOSYLATED A1C: CPT

## 2020-05-27 PROCEDURE — 82728 ASSAY OF FERRITIN: CPT

## 2020-05-27 PROCEDURE — 80053 COMPREHEN METABOLIC PANEL: CPT

## 2020-05-27 PROCEDURE — 93010 EKG 12-LEAD: ICD-10-PCS | Mod: S$GLB,,, | Performed by: INTERNAL MEDICINE

## 2020-05-27 NOTE — ASSESSMENT & PLAN NOTE
Reports asthma is relatively well controlled. Explains having significant allergies. Concerns for fixed obstruction is related  possible eosinophilic asthma? Has allergic triggers.     Plan:  -Continue Breo. Discussed to rinse mouth after use. My need to step up therapy to Symbicort or Advair.   -Continue albuterol as needed   -Prescribe Singular for asthma and chronic rhinitis  -Advised on starting daily antihistamine and topical nasal steroid  -Consider ambulatory referral to Allergy for elevated eosinophils/IgE. Will repeat CBC with Diff and obtain IgE level.

## 2020-05-28 ENCOUNTER — PATIENT MESSAGE (OUTPATIENT)
Dept: INTERNAL MEDICINE | Facility: CLINIC | Age: 55
End: 2020-05-28

## 2020-05-28 RX ORDER — ALPRAZOLAM 0.5 MG/1
0.5 TABLET ORAL 2 TIMES DAILY PRN
Qty: 30 TABLET | Refills: 0 | Status: SHIPPED | OUTPATIENT
Start: 2020-05-28 | End: 2020-07-10 | Stop reason: SDUPTHER

## 2020-06-02 ENCOUNTER — LAB VISIT (OUTPATIENT)
Dept: LAB | Facility: HOSPITAL | Age: 55
End: 2020-06-02
Payer: COMMERCIAL

## 2020-06-02 ENCOUNTER — OFFICE VISIT (OUTPATIENT)
Dept: ORTHOPEDICS | Facility: CLINIC | Age: 55
End: 2020-06-02
Payer: COMMERCIAL

## 2020-06-02 ENCOUNTER — OFFICE VISIT (OUTPATIENT)
Dept: ALLERGY | Facility: CLINIC | Age: 55
End: 2020-06-02
Payer: COMMERCIAL

## 2020-06-02 VITALS — WEIGHT: 236.56 LBS | BODY MASS INDEX: 39.41 KG/M2 | HEIGHT: 65 IN

## 2020-06-02 DIAGNOSIS — J30.9 CHRONIC ALLERGIC RHINITIS: ICD-10-CM

## 2020-06-02 DIAGNOSIS — J45.909 ASTHMA, CURRENTLY ACTIVE: Primary | ICD-10-CM

## 2020-06-02 DIAGNOSIS — G89.29 CHRONIC PAIN OF LEFT ANKLE: ICD-10-CM

## 2020-06-02 DIAGNOSIS — J45.909 ASTHMA, CURRENTLY ACTIVE: ICD-10-CM

## 2020-06-02 DIAGNOSIS — M72.2 PLANTAR FASCIITIS OF LEFT FOOT: ICD-10-CM

## 2020-06-02 DIAGNOSIS — M67.472 GANGLION CYST OF LEFT FOOT: Primary | ICD-10-CM

## 2020-06-02 DIAGNOSIS — M25.572 CHRONIC PAIN OF LEFT ANKLE: ICD-10-CM

## 2020-06-02 PROCEDURE — 99204 OFFICE O/P NEW MOD 45 MIN: CPT | Mod: S$GLB,,, | Performed by: PEDIATRICS

## 2020-06-02 PROCEDURE — 99203 PR OFFICE/OUTPT VISIT, NEW, LEVL III, 30-44 MIN: ICD-10-PCS | Mod: S$GLB,,, | Performed by: ORTHOPAEDIC SURGERY

## 2020-06-02 PROCEDURE — 99203 OFFICE O/P NEW LOW 30 MIN: CPT | Mod: S$GLB,,, | Performed by: ORTHOPAEDIC SURGERY

## 2020-06-02 PROCEDURE — 99204 PR OFFICE/OUTPT VISIT, NEW, LEVL IV, 45-59 MIN: ICD-10-PCS | Mod: S$GLB,,, | Performed by: PEDIATRICS

## 2020-06-02 PROCEDURE — 99999 PR PBB SHADOW E&M-EST. PATIENT-LVL II: ICD-10-PCS | Mod: PBBFAC,,, | Performed by: ORTHOPAEDIC SURGERY

## 2020-06-02 PROCEDURE — 86003 ALLG SPEC IGE CRUDE XTRC EA: CPT

## 2020-06-02 PROCEDURE — 3008F BODY MASS INDEX DOCD: CPT | Mod: CPTII,S$GLB,, | Performed by: PEDIATRICS

## 2020-06-02 PROCEDURE — 99999 PR PBB SHADOW E&M-EST. PATIENT-LVL IV: CPT | Mod: PBBFAC,,, | Performed by: PEDIATRICS

## 2020-06-02 PROCEDURE — 99999 PR PBB SHADOW E&M-EST. PATIENT-LVL IV: ICD-10-PCS | Mod: PBBFAC,,, | Performed by: PEDIATRICS

## 2020-06-02 PROCEDURE — 3008F PR BODY MASS INDEX (BMI) DOCUMENTED: ICD-10-PCS | Mod: CPTII,S$GLB,, | Performed by: PEDIATRICS

## 2020-06-02 PROCEDURE — 99999 PR PBB SHADOW E&M-EST. PATIENT-LVL II: CPT | Mod: PBBFAC,,, | Performed by: ORTHOPAEDIC SURGERY

## 2020-06-02 PROCEDURE — 36415 COLL VENOUS BLD VENIPUNCTURE: CPT

## 2020-06-02 PROCEDURE — 86003 ALLG SPEC IGE CRUDE XTRC EA: CPT | Mod: 59

## 2020-06-02 NOTE — PROGRESS NOTES
"ALLERGY & IMMUNOLOGY CLINIC - INITIAL CONSULTATION     HISTORY OF PRESENT ILLNESS     Patient ID: Joy Crawford is a 55 y.o. female    HPI:   Asthma: Has had since infancy. Severe in childhood with frequent hospitalizations. None in the past 20 years. Currently reports tightness and wheezing 1-2 times a week during the day and 1-2 times a month at night. Will take prednisone 3-4 times yearly when symptoms do not resolve with albuterol. Albuterol generally offers good relief. Denies frequent ER visits. Symptoms are worst in the winter spring and also worse indoors. Triggered by sinus issues. Current regimen includes Breo daily and was recently started on montelukast. Recent lab work significant for a peripheral eosinophilia and elevated IgE. Reports that she is "allergic to everything from testing done in childhood. She is unsure if symptoms have worsened recently or if she is just noticing them more since being home 2/2 COVID-19 epidemic.     Chronic Allergic Rhinitis: Was on allergy shots three times as a child without significant relief. Primary symptoms include headache, congestion, and PND. Reports having trialed both fluticasone and azelastine in the past without relief. Has never trialed them in combination. Has had multiple sinus surgeries in the past including polyp removal in 2007. Recently saw ENT and had CT sinus done which did not show any evidence of polyps. She denies anosmia. She takes ibuprofen QID without worsening of asthma symptoms on the medication. Reports that she gets a couple sinus infections a year. Pneumonia as a child but never as an adult.    REVIEW OF SYSTEMS   CONST: no F/C/NS, no unintentional weight changes  NEURO: no H/A, no weakness, no paresthesias  EYES: no discharge, no pruritus, no erythema  EARS: no hearing loss, no sensation of fullness  NOSE: no congestion, no rhinorrhea, no discharge, no itching, no sneezing  PULM: no SOB, no wheezing, no cough, no snoring  CV: no CP, no " "palpitations, no leg swelling  GI: no dysphagia, no heartburn, no pain, no N/V/D, no BRBPR/melena  URO: no dysuria, no hematuria, no nocturia  MSK: no joint pain, no muscle pain  DERM: no rashes, no skin breaks   MEDICAL HISTORY   MedHx: active problems reviewed  SocHx: No pets, dog passed in December. No known mold or water damage in the house. But lives in an old house. Not especially dinesh. 4th grade.  FamHx: Both Parents with asthma, brother with asthma. Son without asthma.  Allergies: see below  Medications: MAR reviewed    H/o Asthma: See HPI  H/o Eczema: denies  H/o Rhinitis: See HPI  Oral Allergy: denies  Food Allergy: denies  Venom Allergy: denies  Latex Allergy: denies  Other Allergies: denies  Env/Occ: denies any evironmental or occupational exposures. 2 years at her most recent school.   PHYSICAL EXAM   VS: Ht 5' 5" (1.651 m)   Wt 107.3 kg (236 lb 8.9 oz)   LMP 07/12/2018 (Approximate)   BMI 39.36 kg/m²   GENERAL: AAOx4, NAD, well-appearing, cooperative  EYES: PERRL, EOMI, no conjunctival injection, no discharge, no infraorbital shiners  ORAL: Mask in place.  NECK: supple, trachea midline, no thyromegaly, no LAD  LUNGS: CTAB, no w/r/c, no increased WOB  HEART: RRR, normal S1/S2, no m/g/r  ABDOMEN: BS present, soft, non-tender, non-distended, no HSM  EXTREMITIES: +2 distal pulses, no c/c/e  LYMPHATICS: no cervical/submandibular LAD, no axillary LAD, no inguinal LAD  DERM: no rashes, no skin breaks, no dystrophic fingernails  NEURO: normal gait, no facial asymmetry   LABORATORY STUDIES     Component      Latest Ref Rng & Units 5/27/2020   WBC      3.90 - 12.70 K/uL 5.98   RBC      4.00 - 5.40 M/uL 4.78   Hemoglobin      12.0 - 16.0 g/dL 14.5   Hematocrit      37.0 - 48.5 % 47.2   MCV      82 - 98 fL 99 (H)   MCH      27.0 - 31.0 pg 30.3   MCHC      32.0 - 36.0 g/dL 30.7 (L)   RDW      11.5 - 14.5 % 13.5   Platelets      150 - 350 K/uL 234   MPV      9.2 - 12.9 fL 9.7   Immature Granulocytes      0.0 - " 0.5 % 0.2   Gran # (ANC)      1.8 - 7.7 K/uL 3.2   Immature Grans (Abs)      0.00 - 0.04 K/uL 0.01   Lymph #      1.0 - 4.8 K/uL 1.8   Mono #      0.3 - 1.0 K/uL 0.6   Eos #      0.0 - 0.5 K/uL 0.4   Baso #      0.00 - 0.20 K/uL 0.04   nRBC      0 /100 WBC 0   Gran%      38.0 - 73.0 % 52.6   Lymph%      18.0 - 48.0 % 29.6   Mono%      4.0 - 15.0 % 9.9   Eosinophil%      0.0 - 8.0 % 7.0   Basophil%      0.0 - 1.9 % 0.7   Differential Method       Automated   IgE      0 - 100 IU/mL 753 (H)      PULMONARY FUNCTION   PFTs: Pulmonary function tests: FEV1: 1.74  (64 % predicted), FVC:  2.73 (79 % predicted), FEV1/FVC:  64, T.46 (87 % predicted), DLCO: 21.3 (88 % predicted)  Post BD:  FEV1: 1.78  (65 % predicted), FVC:  2.86 (83 % predicted), FEV1/FVC:  62     CHART REVIEW   Reviewed previous notes, labs, imaging, PFT's   ASSESSMENT & PLAN     Joy Crawford is a 55 y.o. female with     Asthma, currently active: Under ok control currently managed by pulmonary. Obstruction without significant reversibility on most recent PFT's. Currently on Breo and montelukast. From lab work she displays a clear TH2 high asthma phenotype. If symptoms not well controlled on ICS/LABA going forward could be a candidate for multiple biologic therapies. Allergic triggers likely during asthma symptoms. Will try to optimize those treatments and assess asthma control at follow up.  -     Ambulatory referral/consult to Allergy  -     Dermatophagoides Bruin; Future; Expected date: 2020  -     Dermatophagoides Pteronyssinus; Future; Expected date: 2020  -     Bermuda; Future; Expected date: 2020  -     Scooter; Future; Expected date: 2020  -     [place]; Future; Expected date: 2020  -     English Plantain; Future; Expected date: 2020  -     Oak; Future; Expected date: 2020  -     Pecan; Future; Expected date: 2020  -     Jose Guadalupe Llanos; Future; Expected date: 2020  -     Ragweed; Future;  Expected date: 06/02/2020  -     Alternaria; Future; Expected date: 06/02/2020  -     Aspergillus; Future; Expected date: 06/02/2020  -     Cat; Future; Expected date: 06/02/2020  -     Cockroach; Future; Expected date: 06/02/2020  -     Dog; Future; Expected date: 06/02/2020    Chronic allergic rhinitis: Poorly controlled. No allergy testing done in the past several years. Will order immuno caps today. Will also trial on combination fluticasone azelastine to see if this gives her improved symptoms. Asthma currently too poorly controlled to consider SCIT. If better controlled in the future this could be a possibility. History of polyps but none on most recent imaging by ENT. Continue monitoring for recurrence. No concern for AERD from history given frequent ibuprofen use.    Follow up: 2-3 months.     Rafael Tavera DO  Allergy Immunology Fellow

## 2020-06-02 NOTE — LETTER
Joanne 3, 2020      Jennifer Ramos MD  [address]  [facility] [address]           [facility]  [address] [place] [address]  Phone: [phone]  Fax: [phone]          Patient: Joy Crawford   MR Number: [id number]   YOB: 1965   Date of Visit: 6/2/2020       Dear Dr. Jennifer Ramos:    Thank you for referring Joy Crawford to me for evaluation. Attached you will find relevant portions of my assessment and plan of care.    If you have questions, please do not hesitate to call me. I look forward to following Joy Crawford along with you.    Sincerely,    Gallo Maddox MD    Enclosure  CC:  No Recipients    If you would like to receive this communication electronically, please contact [email or web address] or [phone] to request more information on [employer] Link access.    For providers and/or their staff who would like to refer a patient to [facility], please contact us through our one-stop-shop provider referral line, [facility], at [phone].    If you feel you have received this communication in error or would no longer like to receive these types of communications, please e-mail [email or web address]

## 2020-06-03 ENCOUNTER — OFFICE VISIT (OUTPATIENT)
Dept: SLEEP MEDICINE | Facility: CLINIC | Age: 55
End: 2020-06-03
Payer: COMMERCIAL

## 2020-06-03 ENCOUNTER — TELEPHONE (OUTPATIENT)
Dept: ORTHOPEDICS | Facility: CLINIC | Age: 55
End: 2020-06-03

## 2020-06-03 VITALS
BODY MASS INDEX: 38.89 KG/M2 | SYSTOLIC BLOOD PRESSURE: 127 MMHG | HEART RATE: 77 BPM | HEIGHT: 65 IN | WEIGHT: 233.44 LBS | DIASTOLIC BLOOD PRESSURE: 91 MMHG

## 2020-06-03 DIAGNOSIS — G47.30 SLEEP APNEA, UNSPECIFIED TYPE: ICD-10-CM

## 2020-06-03 DIAGNOSIS — G25.81 RLS (RESTLESS LEGS SYNDROME): ICD-10-CM

## 2020-06-03 DIAGNOSIS — E66.9 OBESITY, CLASS II, BMI 35-39.9: Primary | ICD-10-CM

## 2020-06-03 DIAGNOSIS — G47.00 INSOMNIA, UNSPECIFIED TYPE: ICD-10-CM

## 2020-06-03 DIAGNOSIS — G47.33 OBSTRUCTIVE SLEEP APNEA: ICD-10-CM

## 2020-06-03 PROCEDURE — 3008F BODY MASS INDEX DOCD: CPT | Mod: CPTII,S$GLB,, | Performed by: NURSE PRACTITIONER

## 2020-06-03 PROCEDURE — 99203 OFFICE O/P NEW LOW 30 MIN: CPT | Mod: S$GLB,,, | Performed by: NURSE PRACTITIONER

## 2020-06-03 PROCEDURE — 3074F PR MOST RECENT SYSTOLIC BLOOD PRESSURE < 130 MM HG: ICD-10-PCS | Mod: CPTII,S$GLB,, | Performed by: NURSE PRACTITIONER

## 2020-06-03 PROCEDURE — 99203 PR OFFICE/OUTPT VISIT, NEW, LEVL III, 30-44 MIN: ICD-10-PCS | Mod: S$GLB,,, | Performed by: NURSE PRACTITIONER

## 2020-06-03 PROCEDURE — 3008F PR BODY MASS INDEX (BMI) DOCUMENTED: ICD-10-PCS | Mod: CPTII,S$GLB,, | Performed by: NURSE PRACTITIONER

## 2020-06-03 PROCEDURE — 99999 PR PBB SHADOW E&M-EST. PATIENT-LVL IV: ICD-10-PCS | Mod: PBBFAC,,, | Performed by: NURSE PRACTITIONER

## 2020-06-03 PROCEDURE — 3074F SYST BP LT 130 MM HG: CPT | Mod: CPTII,S$GLB,, | Performed by: NURSE PRACTITIONER

## 2020-06-03 PROCEDURE — 3080F PR MOST RECENT DIASTOLIC BLOOD PRESSURE >= 90 MM HG: ICD-10-PCS | Mod: CPTII,S$GLB,, | Performed by: NURSE PRACTITIONER

## 2020-06-03 PROCEDURE — 99999 PR PBB SHADOW E&M-EST. PATIENT-LVL IV: CPT | Mod: PBBFAC,,, | Performed by: NURSE PRACTITIONER

## 2020-06-03 PROCEDURE — 3080F DIAST BP >= 90 MM HG: CPT | Mod: CPTII,S$GLB,, | Performed by: NURSE PRACTITIONER

## 2020-06-03 NOTE — LETTER
Joanne 3, 2020      Asha Rosales NP  [address]  [place] [address]           [id number]  [address]  [facility] [address]  Phone: [phone]          Patient: Joy Crawford   MR Number: [id number]   YOB: 1965   Date of Visit: 6/3/2020       Dear Asha Rosales:    Thank you for referring Joy Crawford to me for evaluation. Attached you will find relevant portions of my assessment and plan of care.    If you have questions, please do not hesitate to call me. I look forward to following Joy Crawford along with you.    Sincerely,    George Alexandra NP    Enclosure  CC:  No Recipients    If you would like to receive this communication electronically, please contact [email or web address] or [phone] to request more information on [employer] Link access.    For providers and/or their staff who would like to refer a patient to [facility], please contact us through our one-stop-shop provider referral line, [facility], at [phone].    If you feel you have received this communication in error or would no longer like to receive these types of communications, please e-mail [email or web address]

## 2020-06-03 NOTE — PROGRESS NOTES
"Joy Crawford  was seen as a new patient at the request of  Asha Rosales NP for the evaluation of obstructive sleep apnea.    CHIEF COMPLAINT:  BECKY management     06/03/2020 PIERRE Alexandra NP: Initial HISTORY OF PRESENT ILLNESS: Joy Crawford is a 55 y.o. female is here for sleep evaluation.       BECKY  Diagnosed with BECKY via HST ~ 5 years ago "at a walk in clinic". First HST inconclusive, second HST repeated showed BECKY but not treated.   No records for my immediate review today   Reports snoring, witnessed apneas, and daytime fatigue.  Now prioritizing overall health and wishes to treat BECKY     Insomnia   Reports difficulty falling and staying asleep. Improved with trazodone 150 mg recently Rx by PCP    RLS  Also having RLS symptoms. Leg movements as falling asleep in bed. No pain.     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    Asthma, currently active 05/27/2020    Sleep apnea 05/27/2020    Shortness of breath 05/27/2020    BMI 37.0-37.9, adult 05/27/2020     Resolved Ambulatory Problems     Diagnosis Date Noted    Acute sinusitis 10/11/2017     Past Medical History:   Diagnosis Date    Asthma     Depression     Esophageal reflux     H/O mammogram 01/20/2017    Herpes simplex virus (HSV) infection     Hypertension                 PAST SURGICAL HISTORY:    Past Surgical History:   Procedure Laterality Date    ENDOMETRIAL BIOPSY N/A 3/11/2020    Procedure: BIOPSY, ENDOMETRIUM;  Surgeon: José Miguel Leon MD;  Location: [facility];  " Service: OB/GYN;  Laterality: N/A;    RHINOPLASTY  2006    SINUS SURGERY  2006    VULVECTOMY N/A 3/11/2020    Procedure: VULVECTOMY;  Surgeon: José Miguel Leon MD;  Location: [facility] OR;  Service: OB/GYN;  Laterality: N/A;         FAMILY HISTORY:                Family History   Problem Relation Age of Onset    Skin cancer Father     Hypertension Father     Hyperlipidemia Father     Hypertension Mother     Diabetes Mother     Hyperlipidemia Mother     No Known Problems Son     Depression Brother     Breast cancer Neg Hx        SOCIAL HISTORY:          Tobacco:   Social History     Tobacco Use   Smoking Status Never Smoker   Smokeless Tobacco Never Used       Alcohol use:    Social History     Substance and Sexual Activity   Alcohol Use Yes    Comment: social                 ALLERGIES:  Review of patient's allergies indicates:  No Known Allergies    CURRENT MEDICATIONS:    Current Outpatient Medications   Medication Sig Dispense Refill    24 HOUR NASAL ALLERGY 55 mcg nasal inhaler SPRAY TWICE IEN QD      albuterol 2.5 mg /3 mL (0.083 %) Nebu 3 mL, albuterol 5 mg/mL Nebu 0.5 mL use as directed      ALPRAZolam (XANAX) 0.5 MG tablet Take 1 tablet (0.5 mg total) by mouth 2 (two) times daily as needed for Anxiety. 30 tablet 0    BREO ELLIPTA 200-25 mcg/dose DsDv diskus inhaler INL 1 PUFF PO QD  0    DULoxetine (CYMBALTA) 30 MG capsule Take 1 capsule (30 mg total) by mouth once daily. In addition to 60 mg daily to equal 90 mg daily. 30 capsule 3    duloxetine (CYMBALTA) 60 MG capsule TK 1 C PO QD  2    ibuprofen (ADVIL,MOTRIN) 800 MG tablet Take 1 tablet (800 mg total) by mouth every 8 (eight) hours as needed for Pain. 15 tablet 0    lisinopril 10 MG tablet TK 1 T PO QD  2    meloxicam (MOBIC) 15 MG tablet Take 1 tablet by mouth once daily.      montelukast (SINGULAIR) 10 mg tablet Take 1 tablet (10 mg total) by mouth every evening. 30 tablet 0    senna-docusate 8.6-50 mg (PERICOLACE) 8.6-50 mg per tablet  "Take 1 tablet by mouth once daily.      traZODone (DESYREL) 150 MG tablet Take 1 tablet (150 mg total) by mouth every evening. 30 tablet 3    VENTOLIN HFA 90 mcg/actuation inhaler INHALE ONE PUFF ORALLY Q 4 TO 6 H PRF SOB  1    VYVANSE 40 mg Cap Take 1 capsule by mouth once daily.       No current facility-administered medications for this visit.                   REVIEW OF SYSTEMS:     Sleep related symptoms as per HPI.  CONST:Denies weight gain    HEENT: [place] sinus congestion  PULM: Denies dyspnea  CARD:  Denies palpitations   GI:  Denies acid reflux  : Denies polyuria  NEURO: Denies headaches  PSYCH: Sometimes mood disturbance  HEME: Denies anemia    Otherwise, a balance of 10 systems reviewed is negative.          PHYSICAL EXAM:  BP (!) 127/91   Pulse 77   Ht 5' 5" (1.651 m)   Wt 105.9 kg (233 lb 7.5 oz)   LMP 07/12/2018 (Approximate)   BMI 38.85 kg/m²   GENERAL: Normal development, well groomed                                          ASSESSMENT:    Sleep apnea, previously diagnosed, severity unknown. The patient symptomatically has snoring, witnessed apneas, and daytime fatigue. Medical co-morbidities: anxiety, asthma, and obesity.     Obesity, BMI > 35, discussed relationship between BECKY and weight     Insomnia NEC, sleep onset and maintenance; multifactorial: anxiety/stress, untreated BECKY, RLS; discussed relationship of untreated BECKY-sleep maintenance insomnia and untreated BECKY-RLS contributing to difficulty initiating sleep; recently prescribed trazodone 150 mg by PCP     RLS, leg movements in bed as falling asleep, no pain; discussed relationship of untreated BECKY and RLS     PLAN:    Treatment:  -HARPREET signed to obtain sleep records from Dr. Winter's office.   -Once received and reviewed, message pt with pt portal with impression/plan; of note, pt open to CPAP prn  -RTC after starting therapy     Education: During our discussion today, we talked about the etiology of obstructive sleep apnea as " well as the potential ramifications of untreated sleep apnea, which could include daytime sleepiness, hypertension, heart disease and/or stroke. We discussed potential treatment options, which could include weight loss, body positioning (pillow or Shannan NightBalance), continuous positive airway pressure (CPAP), OA, EPAP, or referral for surgical consideration (UPPP and [facility]).     Precautions: The patient was advised to abstain from driving should they feel sleepy  or drowsy.     Thank you for allowing me the opportunity to participate in the care of your patient.      I spent 30 minutes of this 40-min visit in counseling the patient regarding BECKY etiology, as well as the potential ramifications of untreated sleep apnea, which could include daytime sleepiness, hypertension, heart disease and/or stroke. We discussed potential treatment options, which could include weight loss, body positioning (pillow or Shannan NightBalance), continuous positive airway pressure (CPAP), OA, EPAP, or referral for surgical consideration      06/16/2020 ADDENDUM: Received outside sleep records from Dr. Winter's office    08/05/2016 [facility] HST, weight not recorded, AHI 7, RDI 20, O2 robert 72.2%; sleep efficiency 82.3%.

## 2020-06-03 NOTE — PROGRESS NOTES
Subjective:      Patient ID: Joy Crawford is a 55 y.o. female.    Chief Complaint:  Cyst and left heel pain  HPI:  This is a 55-year-old female who has had a long-standing large cyst on the anterior aspect of her left ankle and foot.  She has had the cyst drained several times but it quickly recurs.  She reports that the contents of the cyst when it is drained looks like apple jelly.  She reports that the cyst does cause pain is also aggravated by shoe wear.  She comes in to discuss removal of the cyst.  She had an MRI performed which reveals a complex cystic lesion on the anterior aspect of her foot ankle.  She also reports symptoms consistent with plantar fasciitis.  She has not had any treatment.    Past Medical History:   Diagnosis Date    Anxiety     Asthma     Depression     Esophageal reflux     H/O mammogram 01/20/2017    Normal  (DIS)     Herpes simplex virus (HSV) infection     no out breaks    Hypertension     Peptic ulcer     Sleep apnea     doesnt use cpap         ROS:  Negative for chest pain, shortness of breath, fevers, or unexplained weight loss.      Objective:      This is a well-developed well-nourished 5 ft 5 in, 236 lb female who walks with a normal gait.  On inspection she has obvious cystic enlargements on the anterior aspect of her left foot and ankle.  On sitting exam the cystic lesion is minimally tender and consistent with a ganglion cyst.  She has painless motion of her ankle subtalar joint.  She has normal strength and sensation.  She has good distal pulses.  She has tenderness of the medial tuberosity of her left calcaneus.    X-ray:  Left ankle x-ray that was performed 2 weeks ago reveal some spurring of the calcaneus  near the origin of the plantar fascia.  There are no significant degenerative changes.                Assessment:       Encounter Diagnoses   Name Primary?    Chronic pain of left ankle     Ganglion cyst of left foot/ankle Yes    Plantar fasciitis of left  foot           Plan:       Diagnoses and all orders for this visit:    Ganglion cyst of left foot/ankle    Chronic pain of left ankle  -     Ambulatory referral/consult to Orthopedics    Plantar fasciitis of left foot     Recommendation:  We are going to schedule her for excision of the ganglion cyst.  I discussed conservative treatment for her plantar fasciitis.  She will return for preoperative H&P and consent.

## 2020-06-04 ENCOUNTER — TELEPHONE (OUTPATIENT)
Dept: ORTHOPEDICS | Facility: CLINIC | Age: 55
End: 2020-06-04

## 2020-06-04 DIAGNOSIS — Z01.818 PRE-OP TESTING: Primary | ICD-10-CM

## 2020-06-04 NOTE — TELEPHONE ENCOUNTER
----- Message from Maria Eugenia Miller sent at 6/4/2020 10:23 AM CDT -----  Contact: self   Pt stated that she missed a call from the office in regards to her surgery and is asking for a call back       Contact info-  [phone]

## 2020-06-05 LAB
A ALTERNATA IGE QN: 0.32 KU/L
A FUMIGATUS IGE QN: 9.23 KU/L
BERMUDA GRASS IGE QN: 3.11 KU/L
CAT DANDER IGE QN: 16.3 KU/L
CEDAR IGE QN: <0.1 KU/L
D FARINAE IGE QN: 53.8 KU/L
D PTERONYSS IGE QN: 48.8 KU/L
DEPRECATED A ALTERNATA IGE RAST QL: ABNORMAL
DEPRECATED A FUMIGATUS IGE RAST QL: ABNORMAL
DEPRECATED BERMUDA GRASS IGE RAST QL: ABNORMAL
DEPRECATED CAT DANDER IGE RAST QL: ABNORMAL
DEPRECATED CEDAR IGE RAST QL: NORMAL
DEPRECATED D FARINAE IGE RAST QL: ABNORMAL
DEPRECATED D PTERONYSS IGE RAST QL: ABNORMAL
DEPRECATED DOG DANDER IGE RAST QL: ABNORMAL
DEPRECATED ELDER IGE RAST QL: ABNORMAL
DEPRECATED ENGL PLANTAIN IGE RAST QL: ABNORMAL
DEPRECATED PECAN/HICK TREE IGE RAST QL: ABNORMAL
DEPRECATED ROACH IGE RAST QL: ABNORMAL
DEPRECATED TIMOTHY IGE RAST QL: ABNORMAL
DEPRECATED WEST RAGWEED IGE RAST QL: ABNORMAL
DEPRECATED WHITE OAK IGE RAST QL: ABNORMAL
DOG DANDER IGE QN: 36.5 KU/L
ELDER IGE QN: 0.12 KU/L
ENGL PLANTAIN IGE QN: 0.15 KU/L
PECAN/HICK TREE IGE QN: 0.73 KU/L
ROACH IGE QN: 0.13 KU/L
TIMOTHY IGE QN: 3.33 KU/L
WEST RAGWEED IGE QN: 0.15 KU/L
WHITE OAK IGE QN: 0.24 KU/L

## 2020-06-10 ENCOUNTER — TELEPHONE (OUTPATIENT)
Dept: SLEEP MEDICINE | Facility: CLINIC | Age: 55
End: 2020-06-10

## 2020-06-10 NOTE — TELEPHONE ENCOUNTER
----- Message from George Alexandra NP sent at 6/3/2020 11:26 AM CDT -----  HARPREET signed 06/03/2020     Did we receive sleep records from Dr. Winter? [phone]      Marco, have we received any sleep records for pt? If not, re-send HARPREET and also notify pt in case she's able to help.

## 2020-06-11 ENCOUNTER — OFFICE VISIT (OUTPATIENT)
Dept: PSYCHIATRY | Facility: CLINIC | Age: 55
End: 2020-06-11
Payer: COMMERCIAL

## 2020-06-11 DIAGNOSIS — F41.1 GAD (GENERALIZED ANXIETY DISORDER): Primary | ICD-10-CM

## 2020-06-11 PROCEDURE — 90791 PSYCH DIAGNOSTIC EVALUATION: CPT | Mod: S$GLB,,, | Performed by: SOCIAL WORKER

## 2020-06-11 PROCEDURE — 90791 PR PSYCHIATRIC DIAGNOSTIC EVALUATION: ICD-10-PCS | Mod: S$GLB,,, | Performed by: SOCIAL WORKER

## 2020-06-12 NOTE — PROGRESS NOTES
"Psychiatry Initial Visit (PhD/LCSW)  Diagnostic Interview - CPT 96954    Date: 6/11/2020    Site: [facility]    Referral source: self    Clinical status of patient: Outpatient    Joy Crawford, a 55 y.o. female, for initial evaluation visit.  Met with patient.    Chief complaint/reason for encounter: anxiety    History of present illness: Pt states she made this appointment due to a recent "terrible incident" that had her very upset and agitated.  She gives a history of going through a horrible divorce in 2009.   was having an affair and everyone in their [place] seemed to know except for her.  She states that she believes  is a narcissist.  Their son decided to stay with her  as he seemed to have more material things to offer to him and pt feels she lost him then.  However, he recently called pt as his father is now manipulating him as he did pt and he seems to sees that pt was wronged by the situation.  Pt is a  at [employer].  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  She says she is an extrovert and is passionate about her job.  When she took the job at [employer] she did not realize that the staff was split and that it was a very political situation.  Many of the teachers did not like the principal who was new but pt has been friends with his wife at her previous job, so she was seen as his ally.  There is a teacher at this school who has been a long time friend of the pt for 21 years but once she went to [employer], the friend began to ignore her and to engage in bright comments with a group of other teacher about pt.  The first year there, she was extremely hurt by this and had difficulty getting to work.  The 2nd year (last school year) this woman worked in another building and she did not see her.  Then several weeks ago she heard that this woman was going to be in a classroom on her pardo and she fell apart when she heard this. "  She later talked to the principal who said it was not true and she calmed down but she realized there is something hudson in her thinking if she got so upset by this.  She states she has had depression for years and high anxiety and never felt equal to her  and her treated her that way also.  She would like therapy to work on these residual issues.    Pain: 0    Symptoms:   · Mood: depressed mood, worthlessness/guilt and tearfulness  · Anxiety: excessive anxiety/worry  · Substance abuse: denied  · Cognitive functioning: denied  · Health behaviors: noncontributory    Psychiatric history: has participated in counseling/psychotherapy on an outpatient basis in the past    Medical history: noncontributory    Family history of psychiatric illness: not known    Social history (marriage, employment, etc.): Pt was  for 21 years with one son, now 20.  She has been  since 2009.  She is an  and has a good support system of friends and famiy.    Substance use:   Alcohol: infrequent   Drugs: none   Tobacco: none   Caffeine: none    Current medications and drug reactions (include OTC, herbal): see medication list     Strengths and liabilities: Strength: Patient accepts guidance/feedback, Strength: Patient is expressive/articulate., Strength: Patient is intelligent., Strength: Patient is motivated for change., Strength: Patient is physically healthy., Strength: Patient has positive support network., Strength: Patient has reasonable judgment., Liability: Patient is unstable., Liability: Patient lacks coping skills.    Current Evaluation:     Mental Status Exam:  General Appearance:  unremarkable, age appropriate   Speech: normal tone, normal rate, normal pitch, normal volume      Level of Cooperation: cooperative      Thought Processes: normal and logical   Mood: anxious      Thought Content: normal, no suicidality, no homicidality, delusions, or paranoia   Affect: congruent and  appropriate   Orientation: Oriented x3   Memory: intact   Attention Span & Concentration: intact   Fund of General Knowledge: intact and appropriate to age and level of education   Abstract Reasoning: did not assess   Judgment & Insight: fair     Language  intact     Diagnostic Impression - Plan:       ICD-10-CM ICD-9-CM   1. RAYNA (generalized anxiety disorder) F41.1 300.02       Plan:individual psychotherapy    Return to Clinic: 1 week    Length of Service (minutes): 45

## 2020-06-16 ENCOUNTER — PATIENT MESSAGE (OUTPATIENT)
Dept: SLEEP MEDICINE | Facility: CLINIC | Age: 55
End: 2020-06-16

## 2020-06-17 DIAGNOSIS — M67.472 GANGLION CYST OF LEFT FOOT: Primary | ICD-10-CM

## 2020-06-18 ENCOUNTER — OFFICE VISIT (OUTPATIENT)
Dept: PSYCHIATRY | Facility: CLINIC | Age: 55
End: 2020-06-18
Payer: COMMERCIAL

## 2020-06-18 DIAGNOSIS — F41.1 GAD (GENERALIZED ANXIETY DISORDER): Primary | ICD-10-CM

## 2020-06-18 PROCEDURE — 90834 PR PSYCHOTHERAPY W/PATIENT, 45 MIN: ICD-10-PCS | Mod: S$GLB,,, | Performed by: SOCIAL WORKER

## 2020-06-18 PROCEDURE — 90834 PSYTX W PT 45 MINUTES: CPT | Mod: S$GLB,,, | Performed by: SOCIAL WORKER

## 2020-06-18 NOTE — PROGRESS NOTES
Individual Psychotherapy (PhD/LCSW)    6/18/2020    Site:  [facility]         Therapeutic Intervention: Met with patient.  Outpatient - Insight oriented psychotherapy 45 min - CPT code 66735    Chief complaint/reason for encounter: anxiety     Interval history and content of current session: First follow-up with pt.  States that she did not have her medication for 2 days and spent both days crying.  A male friend was here from out of town and she went to a cemetary in [place] with him where his wife was buried as well as other family members.  This triggered all of her grief about the loss of her marriage.  She realizes that she is not past his betrayal of her especially when the betrayal of her good friend at her job occurred last year.  Discussed also past relationships with men since her divorce where she has wound up taking care of them instead of it being a 2 way relationship.  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    Treatment plan:  · Target symptoms: depression, anxiety   · Why chosen therapy is appropriate versus another modality: relevant to diagnosis  · Outcome monitoring methods: self-report, observation  · Therapeutic intervention type: insight oriented psychotherapy    Risk parameters:  Patient reports no suicidal ideation  Patient reports no homicidal ideation  Patient reports no self-injurious behavior  Patient reports no violent behavior    Verbal deficits: None    Patient's response to intervention:  The patient's response to intervention is motivated.    Progress toward goals and other mental status changes:  The patient's progress toward goals is fair .    Diagnosis:     ICD-10-CM ICD-9-CM   1. RAYNA (generalized anxiety disorder)  F41.1 300.02       Plan:  individual psychotherapy    Return to clinic: as scheduled    Length of Service (minutes): 45

## 2020-06-23 ENCOUNTER — LAB VISIT (OUTPATIENT)
Dept: INTERNAL MEDICINE | Facility: CLINIC | Age: 55
End: 2020-06-23
Payer: COMMERCIAL

## 2020-06-23 ENCOUNTER — OFFICE VISIT (OUTPATIENT)
Dept: ORTHOPEDICS | Facility: CLINIC | Age: 55
End: 2020-06-23
Payer: COMMERCIAL

## 2020-06-23 VITALS — BODY MASS INDEX: 38.94 KG/M2 | WEIGHT: 242.31 LBS | HEIGHT: 66 IN

## 2020-06-23 DIAGNOSIS — Z01.818 PRE-OP EVALUATION: Primary | ICD-10-CM

## 2020-06-23 DIAGNOSIS — Z01.818 PRE-OP TESTING: ICD-10-CM

## 2020-06-23 DIAGNOSIS — M67.472 GANGLION CYST OF LEFT FOOT: ICD-10-CM

## 2020-06-23 DIAGNOSIS — G89.18 POST-OP PAIN: ICD-10-CM

## 2020-06-23 PROCEDURE — 99499 NO LOS: ICD-10-PCS | Mod: S$GLB,,, | Performed by: PHYSICIAN ASSISTANT

## 2020-06-23 PROCEDURE — U0003 INFECTIOUS AGENT DETECTION BY NUCLEIC ACID (DNA OR RNA); SEVERE ACUTE RESPIRATORY SYNDROME CORONAVIRUS 2 (SARS-COV-2) (CORONAVIRUS DISEASE [COVID-19]), AMPLIFIED PROBE TECHNIQUE, MAKING USE OF HIGH THROUGHPUT TECHNOLOGIES AS DESCRIBED BY CMS-2020-01-R: HCPCS

## 2020-06-23 PROCEDURE — 99999 PR PBB SHADOW E&M-EST. PATIENT-LVL III: CPT | Mod: PBBFAC,,, | Performed by: PHYSICIAN ASSISTANT

## 2020-06-23 PROCEDURE — 99499 UNLISTED E&M SERVICE: CPT | Mod: S$GLB,,, | Performed by: PHYSICIAN ASSISTANT

## 2020-06-23 PROCEDURE — 99999 PR PBB SHADOW E&M-EST. PATIENT-LVL III: ICD-10-PCS | Mod: PBBFAC,,, | Performed by: PHYSICIAN ASSISTANT

## 2020-06-23 RX ORDER — TRAMADOL HYDROCHLORIDE 50 MG/1
50 TABLET ORAL EVERY 6 HOURS PRN
Qty: 28 TABLET | Refills: 0 | Status: SHIPPED | OUTPATIENT
Start: 2020-06-23 | End: 2020-06-30

## 2020-06-23 NOTE — PROGRESS NOTES
Joy is a 55 y.o. female here today for a pre-operative visit in preparation for a removal of complex cyst of left ankle and foot to be performed by Dr. Maddox on 6-.  she was last seen and treated in the clinic on 6-2-2020.  she has since seen their primary care for optimization of the chronic health concerns.  There has been no significant change in their past medical or orthopedic status since the previous visit.  No fever, chills, malaise or unexplained weight change.     Allergies, medications, past medical history and past surgical history were reviewed with the patient.     Physical examination was performed.     Consents were signed.   Patient has walker and will bring with them the day of surgery. They have been counseled on proper use of the assistive device.     Discussed COVID19 with the patient, they are aware of our current policies and procedures, were given the option of delaying surgery, and they elect to proceed. Covid testing to be done 48 hours prior to surgery.    Pre, kerry, and post operative procedure and expectations were discussed.  Questions were answered. The patient has been educated and is ready to proceed with surgery.  Approximately 30 minutes was spent discussing surgical outcomes, plans, procedures, pre, kerry, and post operative expectations and care. The risks, benefits and alternatives to surgery were discussed with the patient at great length.  These include bleeding, infection, vessel/nerve damage, pain, numbness, tingling, complex regional pain syndrome, hardware/surgical failure, need for further surgery, malunion, nonunion, DVT, PE, arthritis and death. He also understands that the risks of surgery may be greater for some patients due to health or lifestyle issues, such as a current condition or a history of heart disease, obesity, clotting disorders, recurrent infections, smoking, sedentary lifestyle, or noncompliance with medications, therapy, or followup. The  degree of the increased risk is hard to estimate with any degree of precision.  Patient states an understanding and wishes to proceed with surgery.   All questions were answered.  No guarantees were implied or stated.  Informed consent was obtained  The patient will contact us if the have any questions, concerns, and changes in their medical condition prior to surgery.

## 2020-06-23 NOTE — H&P (VIEW-ONLY)
Subjective:      Patient ID: Joy Crawford is a 55 y.o. female.    Chief Complaint: Pre-op Exam of the Left Ankle  This is a 55-year-old female who has had a long-standing large cyst on the anterior aspect of her left ankle and foot. She has had the cyst drained several times but it quickly recurs. She reports that the contents of the cyst when it is drained looks like apple jelly. She reports that the cyst does cause pain is also aggravated by shoe wear. She had an MRI performed which reveals a complex cystic lesion on the anterior aspect of her foot ankle.      Past Medical History:   Diagnosis Date    Anxiety     Asthma     Depression     Esophageal reflux     H/O mammogram 01/20/2017    Normal  (DIS)     Herpes simplex virus (HSV) infection     no out breaks    Hypertension     Peptic ulcer     Sleep apnea     doesnt use cpap     Past Surgical History:   Procedure Laterality Date    ENDOMETRIAL BIOPSY N/A 3/11/2020    Procedure: BIOPSY, ENDOMETRIUM;  Surgeon: José Miguel Leon MD;  Location: [facility];  Service: OB/GYN;  Laterality: N/A;    RHINOPLASTY  2006    SINUS SURGERY  2006    VULVECTOMY N/A 3/11/2020    Procedure: VULVECTOMY;  Surgeon: José Miguel Leon MD;  Location: [facility];  Service: OB/GYN;  Laterality: N/A;     Social History     Occupational History    Not on file   Tobacco Use    Smoking status: Never Smoker    Smokeless tobacco: Never Used   Substance and Sexual Activity    Alcohol use: Yes     Comment: social    Drug use: No    Sexual activity: Not Currently     Partners: Male     Birth control/protection: Post-menopausal      Review of Systems   Constitution: Negative for chills, fever and night sweats.   Cardiovascular: Negative for chest pain.   Respiratory: Negative for cough and shortness of breath.    Hematologic/Lymphatic: Does not bruise/bleed easily.   Skin: Negative for color change.   Gastrointestinal: Negative for heartburn.   Genitourinary: Negative for dysuria.  "  Neurological: Negative for numbness and paresthesias.   Psychiatric/Behavioral: Negative for altered mental status.   Allergic/Immunologic: Negative for persistent infections.     Current Outpatient Medications on File Prior to Visit   Medication Sig Dispense Refill    24 HOUR NASAL ALLERGY 55 mcg nasal inhaler SPRAY TWICE IEN QD      albuterol 2.5 mg /3 mL (0.083 %) Nebu 3 mL, albuterol 5 mg/mL Nebu 0.5 mL use as directed      ALPRAZolam (XANAX) 0.5 MG tablet Take 1 tablet (0.5 mg total) by mouth 2 (two) times daily as needed for Anxiety. 30 tablet 0    BREO ELLIPTA 200-25 mcg/dose DsDv diskus inhaler INL 1 PUFF PO QD  0    DULoxetine (CYMBALTA) 30 MG capsule Take 1 capsule (30 mg total) by mouth once daily. In addition to 60 mg daily to equal 90 mg daily. 30 capsule 3    duloxetine (CYMBALTA) 60 MG capsule TK 1 C PO QD  2    ibuprofen (ADVIL,MOTRIN) 800 MG tablet Take 1 tablet (800 mg total) by mouth every 8 (eight) hours as needed for Pain. 15 tablet 0    lisinopril 10 MG tablet TK 1 T PO QD  2    meloxicam (MOBIC) 15 MG tablet Take 1 tablet by mouth once daily.      montelukast (SINGULAIR) 10 mg tablet Take 1 tablet (10 mg total) by mouth every evening. 30 tablet 0    senna-docusate 8.6-50 mg (PERICOLACE) 8.6-50 mg per tablet Take 1 tablet by mouth once daily.      traZODone (DESYREL) 150 MG tablet Take 1 tablet (150 mg total) by mouth every evening. 30 tablet 3    VENTOLIN HFA 90 mcg/actuation inhaler INHALE ONE PUFF ORALLY Q 4 TO 6 H PRF SOB  1    VYVANSE 40 mg Cap Take 1 capsule by mouth once daily.       No current facility-administered medications on file prior to visit.      Review of patient's allergies indicates:  No Known Allergies      Objective:      Vitals:    06/23/20 0904   Weight: 109.9 kg (242 lb 4.6 oz)   Height: 5' 6" (1.676 m)     Ortho Exam     General: She is not in acute distress.  Appearance: She is well-developed. She is not diaphoretic.   Head: Normocephalic and " atraumatic.   Eyes: Conjunctiva/sclera: Conjunctivae normal.   Cardiovascular: Rate and Rhythm: Normal rate.   Pulmonary: Effort: Pulmonary effort is normal.   Breath sounds: Normal breath sounds.   Musculoskeletal: This is a well-developed well-nourished 5 ft 5 in, 236 lb female who walks with a normal gait. On inspection she has obvious cystic enlargements on the anterior aspect of her left foot and ankle. On sitting exam the cystic lesion is minimally tender and consistent with a ganglion cyst. She has painless motion of her ankle subtalar joint. She has normal strength and sensation. She has good distal pulses.  Skin: Skin is warm and dry.   Neurological: Mental Status: She is alert and oriented to person, place, and time.   Psychiatric: Behavior normal.       X-ray: Left ankle x-ray reveal some spurring of the calcaneus near the origin of the plantar fascia. There are no significant degenerative changes.         Assessment:       1. Pre-op evaluation    2. Post-op pain    3. Ganglion cyst of left foot/ankle          Plan:       Surgery per Dr. Maddox.

## 2020-06-23 NOTE — H&P
Subjective:      Patient ID: Joy Crawford is a 55 y.o. female.    Chief Complaint: Pre-op Exam of the Left Ankle  This is a 55-year-old female who has had a long-standing large cyst on the anterior aspect of her left ankle and foot. She has had the cyst drained several times but it quickly recurs. She reports that the contents of the cyst when it is drained looks like apple jelly. She reports that the cyst does cause pain is also aggravated by shoe wear. She had an MRI performed which reveals a complex cystic lesion on the anterior aspect of her foot ankle.      Past Medical History:   Diagnosis Date    Anxiety     Asthma     Depression     Esophageal reflux     H/O mammogram 01/20/2017    Normal  (DIS)     Herpes simplex virus (HSV) infection     no out breaks    Hypertension     Peptic ulcer     Sleep apnea     doesnt use cpap     Past Surgical History:   Procedure Laterality Date    ENDOMETRIAL BIOPSY N/A 3/11/2020    Procedure: BIOPSY, ENDOMETRIUM;  Surgeon: José Miguel Leon MD;  Location: [facility];  Service: OB/GYN;  Laterality: N/A;    RHINOPLASTY  2006    SINUS SURGERY  2006    VULVECTOMY N/A 3/11/2020    Procedure: VULVECTOMY;  Surgeon: José Miguel Leon MD;  Location: [facility];  Service: OB/GYN;  Laterality: N/A;     Social History     Occupational History    Not on file   Tobacco Use    Smoking status: Never Smoker    Smokeless tobacco: Never Used   Substance and Sexual Activity    Alcohol use: Yes     Comment: social    Drug use: No    Sexual activity: Not Currently     Partners: Male     Birth control/protection: Post-menopausal      Review of Systems   Constitution: Negative for chills, fever and night sweats.   Cardiovascular: Negative for chest pain.   Respiratory: Negative for cough and shortness of breath.    Hematologic/Lymphatic: Does not bruise/bleed easily.   Skin: Negative for color change.   Gastrointestinal: Negative for heartburn.   Genitourinary: Negative for dysuria.  "  Neurological: Negative for numbness and paresthesias.   Psychiatric/Behavioral: Negative for altered mental status.   Allergic/Immunologic: Negative for persistent infections.     Current Outpatient Medications on File Prior to Visit   Medication Sig Dispense Refill    24 HOUR NASAL ALLERGY 55 mcg nasal inhaler SPRAY TWICE IEN QD      albuterol 2.5 mg /3 mL (0.083 %) Nebu 3 mL, albuterol 5 mg/mL Nebu 0.5 mL use as directed      ALPRAZolam (XANAX) 0.5 MG tablet Take 1 tablet (0.5 mg total) by mouth 2 (two) times daily as needed for Anxiety. 30 tablet 0    BREO ELLIPTA 200-25 mcg/dose DsDv diskus inhaler INL 1 PUFF PO QD  0    DULoxetine (CYMBALTA) 30 MG capsule Take 1 capsule (30 mg total) by mouth once daily. In addition to 60 mg daily to equal 90 mg daily. 30 capsule 3    duloxetine (CYMBALTA) 60 MG capsule TK 1 C PO QD  2    ibuprofen (ADVIL,MOTRIN) 800 MG tablet Take 1 tablet (800 mg total) by mouth every 8 (eight) hours as needed for Pain. 15 tablet 0    lisinopril 10 MG tablet TK 1 T PO QD  2    meloxicam (MOBIC) 15 MG tablet Take 1 tablet by mouth once daily.      montelukast (SINGULAIR) 10 mg tablet Take 1 tablet (10 mg total) by mouth every evening. 30 tablet 0    senna-docusate 8.6-50 mg (PERICOLACE) 8.6-50 mg per tablet Take 1 tablet by mouth once daily.      traZODone (DESYREL) 150 MG tablet Take 1 tablet (150 mg total) by mouth every evening. 30 tablet 3    VENTOLIN HFA 90 mcg/actuation inhaler INHALE ONE PUFF ORALLY Q 4 TO 6 H PRF SOB  1    VYVANSE 40 mg Cap Take 1 capsule by mouth once daily.       No current facility-administered medications on file prior to visit.      Review of patient's allergies indicates:  No Known Allergies      Objective:      Vitals:    06/23/20 0904   Weight: 109.9 kg (242 lb 4.6 oz)   Height: 5' 6" (1.676 m)     Ortho Exam     General: She is not in acute distress.  Appearance: She is well-developed. She is not diaphoretic.   Head: Normocephalic and " atraumatic.   Eyes: Conjunctiva/sclera: Conjunctivae normal.   Cardiovascular: Rate and Rhythm: Normal rate.   Pulmonary: Effort: Pulmonary effort is normal.   Breath sounds: Normal breath sounds.   Musculoskeletal: This is a well-developed well-nourished 5 ft 5 in, 236 lb female who walks with a normal gait. On inspection she has obvious cystic enlargements on the anterior aspect of her left foot and ankle. On sitting exam the cystic lesion is minimally tender and consistent with a ganglion cyst. She has painless motion of her ankle subtalar joint. She has normal strength and sensation. She has good distal pulses.  Skin: Skin is warm and dry.   Neurological: Mental Status: She is alert and oriented to person, place, and time.   Psychiatric: Behavior normal.       X-ray: Left ankle x-ray reveal some spurring of the calcaneus near the origin of the plantar fascia. There are no significant degenerative changes.         Assessment:       1. Pre-op evaluation    2. Post-op pain    3. Ganglion cyst of left foot/ankle          Plan:       Surgery per Dr. Maddox.

## 2020-06-24 ENCOUNTER — ANESTHESIA EVENT (OUTPATIENT)
Dept: SURGERY | Facility: HOSPITAL | Age: 55
End: 2020-06-24
Payer: COMMERCIAL

## 2020-06-24 ENCOUNTER — TELEPHONE (OUTPATIENT)
Dept: ORTHOPEDICS | Facility: CLINIC | Age: 55
End: 2020-06-24

## 2020-06-24 LAB — SARS-COV-2 RNA RESP QL NAA+PROBE: NOT DETECTED

## 2020-06-24 NOTE — TELEPHONE ENCOUNTER
Tried calling patient, vm full. Sent message with surgery arrival time of 9:30am and reminder not to eat or drink after midnight.

## 2020-06-25 ENCOUNTER — HOSPITAL ENCOUNTER (EMERGENCY)
Facility: HOSPITAL | Age: 55
Discharge: HOME | End: 2020-06-26
Attending: EMERGENCY MEDICINE
Payer: COMMERCIAL

## 2020-06-25 ENCOUNTER — NURSE TRIAGE (OUTPATIENT)
Dept: ADMINISTRATIVE | Facility: CLINIC | Age: 55
End: 2020-06-25

## 2020-06-25 ENCOUNTER — ANESTHESIA (OUTPATIENT)
Dept: SURGERY | Facility: HOSPITAL | Age: 55
End: 2020-06-25
Payer: COMMERCIAL

## 2020-06-25 ENCOUNTER — HOSPITAL ENCOUNTER (OUTPATIENT)
Facility: HOSPITAL | Age: 55
Discharge: HOME | End: 2020-06-25
Attending: ORTHOPAEDIC SURGERY | Admitting: ORTHOPAEDIC SURGERY
Payer: COMMERCIAL

## 2020-06-25 DIAGNOSIS — Z98.890 POST-OPERATIVE STATE: ICD-10-CM

## 2020-06-25 DIAGNOSIS — M67.472 GANGLION CYST OF LEFT FOOT: ICD-10-CM

## 2020-06-25 DIAGNOSIS — E66.9 OBESITY, UNSPECIFIED CLASSIFICATION, UNSPECIFIED OBESITY TYPE, UNSPECIFIED WHETHER SERIOUS COMORBIDITY PRESENT: ICD-10-CM

## 2020-06-25 DIAGNOSIS — R51.9 NONINTRACTABLE HEADACHE, UNSPECIFIED CHRONICITY PATTERN, UNSPECIFIED HEADACHE TYPE: Primary | ICD-10-CM

## 2020-06-25 DIAGNOSIS — M67.472 GANGLION CYST OF LEFT FOOT: Primary | ICD-10-CM

## 2020-06-25 PROCEDURE — S0028 INJECTION, FAMOTIDINE, 20 MG: HCPCS | Performed by: NURSE ANESTHETIST, CERTIFIED REGISTERED

## 2020-06-25 PROCEDURE — 25000003 PHARM REV CODE 250: Performed by: ANESTHESIOLOGY

## 2020-06-25 PROCEDURE — 37000008 HC ANESTHESIA 1ST 15 MINUTES: Performed by: ORTHOPAEDIC SURGERY

## 2020-06-25 PROCEDURE — 99284 EMERGENCY DEPT VISIT MOD MDM: CPT | Mod: ,,, | Performed by: PHYSICIAN ASSISTANT

## 2020-06-25 PROCEDURE — 27630 REMOVAL OF TENDON LESION: CPT | Mod: LT,,, | Performed by: ORTHOPAEDIC SURGERY

## 2020-06-25 PROCEDURE — 36000707: Performed by: ORTHOPAEDIC SURGERY

## 2020-06-25 PROCEDURE — 36000706: Performed by: ORTHOPAEDIC SURGERY

## 2020-06-25 PROCEDURE — 76942 ECHO GUIDE FOR BIOPSY: CPT | Performed by: STUDENT IN AN ORGANIZED HEALTH CARE EDUCATION/TRAINING PROGRAM

## 2020-06-25 PROCEDURE — 25000003 PHARM REV CODE 250: Performed by: ORTHOPAEDIC SURGERY

## 2020-06-25 PROCEDURE — 71000015 HC POSTOP RECOV 1ST HR: Performed by: ORTHOPAEDIC SURGERY

## 2020-06-25 PROCEDURE — 99284 PR EMERGENCY DEPT VISIT,LEVEL IV: ICD-10-PCS | Mod: ,,, | Performed by: PHYSICIAN ASSISTANT

## 2020-06-25 PROCEDURE — 25000003 PHARM REV CODE 250: Performed by: NURSE ANESTHETIST, CERTIFIED REGISTERED

## 2020-06-25 PROCEDURE — 88304 PR  SURG PATH,LEVEL III: ICD-10-PCS | Mod: 26,,, | Performed by: PATHOLOGY

## 2020-06-25 PROCEDURE — 63600175 PHARM REV CODE 636 W HCPCS: Performed by: ANESTHESIOLOGY

## 2020-06-25 PROCEDURE — 94761 N-INVAS EAR/PLS OXIMETRY MLT: CPT

## 2020-06-25 PROCEDURE — 28090 PR EXCIS TENDN/CAPSULE LESN,FOOT: ICD-10-PCS | Mod: 51,LT,, | Performed by: ORTHOPAEDIC SURGERY

## 2020-06-25 PROCEDURE — 71000033 HC RECOVERY, INTIAL HOUR: Performed by: ORTHOPAEDIC SURGERY

## 2020-06-25 PROCEDURE — 64450 NJX AA&/STRD OTHER PN/BRANCH: CPT | Mod: 59,LT,, | Performed by: ANESTHESIOLOGY

## 2020-06-25 PROCEDURE — 63600175 PHARM REV CODE 636 W HCPCS: Performed by: PHYSICIAN ASSISTANT

## 2020-06-25 PROCEDURE — 64445 NJX AA&/STRD SCIATIC NRV IMG: CPT | Performed by: STUDENT IN AN ORGANIZED HEALTH CARE EDUCATION/TRAINING PROGRAM

## 2020-06-25 PROCEDURE — 28090 REMOVAL OF FOOT LESION: CPT | Mod: 51,LT,, | Performed by: ORTHOPAEDIC SURGERY

## 2020-06-25 PROCEDURE — 76942 PR U/S GUIDANCE FOR NEEDLE GUIDANCE: ICD-10-PCS | Mod: 26,,, | Performed by: ANESTHESIOLOGY

## 2020-06-25 PROCEDURE — 63600175 PHARM REV CODE 636 W HCPCS: Performed by: NURSE ANESTHETIST, CERTIFIED REGISTERED

## 2020-06-25 PROCEDURE — D9220A PRA ANESTHESIA: Mod: CRNA,,, | Performed by: NURSE ANESTHETIST, CERTIFIED REGISTERED

## 2020-06-25 PROCEDURE — 27630 PR EXCIS LESN TENDON SHEALTH LEG/ANKLE: ICD-10-PCS | Mod: LT,,, | Performed by: ORTHOPAEDIC SURGERY

## 2020-06-25 PROCEDURE — 99900035 HC TECH TIME PER 15 MIN (STAT)

## 2020-06-25 PROCEDURE — 88304 TISSUE EXAM BY PATHOLOGIST: CPT | Mod: 26,,, | Performed by: PATHOLOGY

## 2020-06-25 PROCEDURE — 64447 NJX AA&/STRD FEMORAL NRV IMG: CPT | Performed by: STUDENT IN AN ORGANIZED HEALTH CARE EDUCATION/TRAINING PROGRAM

## 2020-06-25 PROCEDURE — 94770 HC EXHALED C02 TEST: CPT | Mod: 59

## 2020-06-25 PROCEDURE — 88304 TISSUE EXAM BY PATHOLOGIST: CPT | Performed by: PATHOLOGY

## 2020-06-25 PROCEDURE — 37000009 HC ANESTHESIA EA ADD 15 MINS: Performed by: ORTHOPAEDIC SURGERY

## 2020-06-25 PROCEDURE — 25000003 PHARM REV CODE 250: Performed by: STUDENT IN AN ORGANIZED HEALTH CARE EDUCATION/TRAINING PROGRAM

## 2020-06-25 PROCEDURE — D9220A PRA ANESTHESIA: ICD-10-PCS | Mod: ANES,,, | Performed by: ANESTHESIOLOGY

## 2020-06-25 PROCEDURE — 64447 PR NERVE BLOCK INJ, ANES/STEROID, FEMORAL, INCL IMAG GUIDANCE: ICD-10-PCS | Mod: 59,LT,, | Performed by: ANESTHESIOLOGY

## 2020-06-25 PROCEDURE — 64447 NJX AA&/STRD FEMORAL NRV IMG: CPT | Mod: 59,LT,, | Performed by: ANESTHESIOLOGY

## 2020-06-25 PROCEDURE — 96375 TX/PRO/DX INJ NEW DRUG ADDON: CPT

## 2020-06-25 PROCEDURE — D9220A PRA ANESTHESIA: ICD-10-PCS | Mod: CRNA,,, | Performed by: NURSE ANESTHETIST, CERTIFIED REGISTERED

## 2020-06-25 PROCEDURE — 96374 THER/PROPH/DIAG INJ IV PUSH: CPT | Mod: 59

## 2020-06-25 PROCEDURE — 76942 ECHO GUIDE FOR BIOPSY: CPT | Mod: 26,,, | Performed by: ANESTHESIOLOGY

## 2020-06-25 PROCEDURE — 64450 PR NERVE BLOCK INJ, ANES/STEROID, OTHER PERIPHERAL: ICD-10-PCS | Mod: 59,LT,, | Performed by: ANESTHESIOLOGY

## 2020-06-25 PROCEDURE — 99284 EMERGENCY DEPT VISIT MOD MDM: CPT | Mod: 25

## 2020-06-25 PROCEDURE — D9220A PRA ANESTHESIA: Mod: ANES,,, | Performed by: ANESTHESIOLOGY

## 2020-06-25 PROCEDURE — 27200750 HC INSULATED NEEDLE/ STIMUPLEX: Performed by: ANESTHESIOLOGY

## 2020-06-25 PROCEDURE — 63600175 PHARM REV CODE 636 W HCPCS: Performed by: ORTHOPAEDIC SURGERY

## 2020-06-25 RX ORDER — CEFAZOLIN SODIUM 1 G/3ML
2 INJECTION, POWDER, FOR SOLUTION INTRAMUSCULAR; INTRAVENOUS
Status: COMPLETED | OUTPATIENT
Start: 2020-06-25 | End: 2020-06-25

## 2020-06-25 RX ORDER — BUPIVACAINE HYDROCHLORIDE AND EPINEPHRINE 5; 5 MG/ML; UG/ML
INJECTION, SOLUTION EPIDURAL; INTRACAUDAL; PERINEURAL
Status: DISCONTINUED | OUTPATIENT
Start: 2020-06-25 | End: 2020-06-25

## 2020-06-25 RX ORDER — MIDAZOLAM HYDROCHLORIDE 1 MG/ML
INJECTION, SOLUTION INTRAMUSCULAR; INTRAVENOUS
Status: DISCONTINUED | OUTPATIENT
Start: 2020-06-25 | End: 2020-06-25

## 2020-06-25 RX ORDER — PROPOFOL 10 MG/ML
VIAL (ML) INTRAVENOUS CONTINUOUS PRN
Status: DISCONTINUED | OUTPATIENT
Start: 2020-06-25 | End: 2020-06-25

## 2020-06-25 RX ORDER — FENTANYL CITRATE 50 UG/ML
25 INJECTION, SOLUTION INTRAMUSCULAR; INTRAVENOUS EVERY 5 MIN PRN
Status: DISCONTINUED | OUTPATIENT
Start: 2020-06-25 | End: 2020-06-25 | Stop reason: HOSPADM

## 2020-06-25 RX ORDER — FENTANYL CITRATE 50 UG/ML
100 INJECTION, SOLUTION INTRAMUSCULAR; INTRAVENOUS EVERY 5 MIN PRN
Status: DISCONTINUED | OUTPATIENT
Start: 2020-06-25 | End: 2020-06-25 | Stop reason: HOSPADM

## 2020-06-25 RX ORDER — LIDOCAINE HYDROCHLORIDE 20 MG/ML
INJECTION INTRAVENOUS
Status: DISCONTINUED | OUTPATIENT
Start: 2020-06-25 | End: 2020-06-25

## 2020-06-25 RX ORDER — FAMOTIDINE 10 MG/ML
INJECTION INTRAVENOUS
Status: DISCONTINUED | OUTPATIENT
Start: 2020-06-25 | End: 2020-06-25

## 2020-06-25 RX ORDER — KETAMINE HCL IN 0.9 % NACL 50 MG/5 ML
SYRINGE (ML) INTRAVENOUS
Status: DISCONTINUED | OUTPATIENT
Start: 2020-06-25 | End: 2020-06-25

## 2020-06-25 RX ORDER — CELECOXIB 200 MG/1
400 CAPSULE ORAL ONCE
Status: COMPLETED | OUTPATIENT
Start: 2020-06-25 | End: 2020-06-25

## 2020-06-25 RX ORDER — FENTANYL CITRATE 50 UG/ML
INJECTION, SOLUTION INTRAMUSCULAR; INTRAVENOUS
Status: DISCONTINUED | OUTPATIENT
Start: 2020-06-25 | End: 2020-06-25

## 2020-06-25 RX ORDER — ACETAMINOPHEN 500 MG
1000 TABLET ORAL
Status: COMPLETED | OUTPATIENT
Start: 2020-06-25 | End: 2020-06-25

## 2020-06-25 RX ORDER — PROPOFOL 10 MG/ML
VIAL (ML) INTRAVENOUS
Status: DISCONTINUED | OUTPATIENT
Start: 2020-06-25 | End: 2020-06-25

## 2020-06-25 RX ORDER — MIDAZOLAM HYDROCHLORIDE 1 MG/ML
0.5 INJECTION INTRAMUSCULAR; INTRAVENOUS
Status: DISCONTINUED | OUTPATIENT
Start: 2020-06-25 | End: 2020-06-25 | Stop reason: HOSPADM

## 2020-06-25 RX ORDER — KETOROLAC TROMETHAMINE 30 MG/ML
15 INJECTION, SOLUTION INTRAMUSCULAR; INTRAVENOUS
Status: COMPLETED | OUTPATIENT
Start: 2020-06-25 | End: 2020-06-25

## 2020-06-25 RX ORDER — OXYCODONE HYDROCHLORIDE 5 MG/1
5 TABLET ORAL
Status: DISCONTINUED | OUTPATIENT
Start: 2020-06-25 | End: 2020-06-25 | Stop reason: HOSPADM

## 2020-06-25 RX ORDER — LIDOCAINE HYDROCHLORIDE 10 MG/ML
1 INJECTION, SOLUTION EPIDURAL; INFILTRATION; INTRACAUDAL; PERINEURAL ONCE
Status: DISCONTINUED | OUTPATIENT
Start: 2020-06-25 | End: 2020-06-25 | Stop reason: HOSPADM

## 2020-06-25 RX ORDER — DEXAMETHASONE SODIUM PHOSPHATE 4 MG/ML
INJECTION, SOLUTION INTRA-ARTICULAR; INTRALESIONAL; INTRAMUSCULAR; INTRAVENOUS; SOFT TISSUE
Status: DISCONTINUED | OUTPATIENT
Start: 2020-06-25 | End: 2020-06-25

## 2020-06-25 RX ORDER — SODIUM CHLORIDE 9 MG/ML
INJECTION, SOLUTION INTRAVENOUS CONTINUOUS
Status: DISCONTINUED | OUTPATIENT
Start: 2020-06-25 | End: 2020-06-25 | Stop reason: HOSPADM

## 2020-06-25 RX ORDER — METOCLOPRAMIDE HYDROCHLORIDE 5 MG/ML
10 INJECTION INTRAMUSCULAR; INTRAVENOUS
Status: COMPLETED | OUTPATIENT
Start: 2020-06-25 | End: 2020-06-26

## 2020-06-25 RX ORDER — SODIUM CHLORIDE 0.9 % (FLUSH) 0.9 %
3 SYRINGE (ML) INJECTION EVERY 6 HOURS
Status: DISCONTINUED | OUTPATIENT
Start: 2020-06-25 | End: 2020-06-25 | Stop reason: HOSPADM

## 2020-06-25 RX ADMIN — FENTANYL CITRATE 50 MCG: 50 INJECTION, SOLUTION INTRAMUSCULAR; INTRAVENOUS at 10:06

## 2020-06-25 RX ADMIN — BUPIVACAINE HYDROCHLORIDE AND EPINEPHRINE BITARTRATE 45 ML: 5; .0091 INJECTION, SOLUTION EPIDURAL; INTRACAUDAL; PERINEURAL at 10:06

## 2020-06-25 RX ADMIN — DEXAMETHASONE SODIUM PHOSPHATE 8 MG: 4 INJECTION, SOLUTION INTRAMUSCULAR; INTRAVENOUS at 11:06

## 2020-06-25 RX ADMIN — PROPOFOL 20 MG: 10 INJECTION, EMULSION INTRAVENOUS at 11:06

## 2020-06-25 RX ADMIN — Medication 20 MG: at 11:06

## 2020-06-25 RX ADMIN — KETOROLAC TROMETHAMINE 15 MG: 30 INJECTION, SOLUTION INTRAMUSCULAR at 11:06

## 2020-06-25 RX ADMIN — LIDOCAINE HYDROCHLORIDE 50 MG: 20 INJECTION, SOLUTION INTRAVENOUS at 11:06

## 2020-06-25 RX ADMIN — MIDAZOLAM 1 MG: 1 INJECTION INTRAMUSCULAR; INTRAVENOUS at 11:06

## 2020-06-25 RX ADMIN — CEFAZOLIN 2 G: 330 INJECTION, POWDER, FOR SOLUTION INTRAMUSCULAR; INTRAVENOUS at 11:06

## 2020-06-25 RX ADMIN — FAMOTIDINE 20 MG: 10 INJECTION, SOLUTION INTRAVENOUS at 11:06

## 2020-06-25 RX ADMIN — FENTANYL CITRATE 50 MCG: 50 INJECTION, SOLUTION INTRAMUSCULAR; INTRAVENOUS at 11:06

## 2020-06-25 RX ADMIN — CELECOXIB 400 MG: 200 CAPSULE ORAL at 09:06

## 2020-06-25 RX ADMIN — MIDAZOLAM HYDROCHLORIDE 2 MG: 1 INJECTION, SOLUTION INTRAMUSCULAR; INTRAVENOUS at 10:06

## 2020-06-25 RX ADMIN — PROPOFOL 125 MCG/KG/MIN: 10 INJECTION, EMULSION INTRAVENOUS at 11:06

## 2020-06-25 RX ADMIN — ACETAMINOPHEN 1000 MG: 500 TABLET ORAL at 09:06

## 2020-06-25 RX ADMIN — SODIUM CHLORIDE: 0.9 INJECTION, SOLUTION INTRAVENOUS at 09:06

## 2020-06-25 NOTE — BRIEF OP NOTE
[facility]  Brief Operative Note    Surgery Date: 6/25/2020     Surgeon(s) and Role:     * Gallo Maddox MD - Primary     * Ishmael Noel MD - Resident - Assisting        Pre-op Diagnosis:  Ganglion cyst of left foot [M67.472]    Post-op Diagnosis:  Post-Op Diagnosis Codes:     * Ganglion cyst of left foot [M67.472]    Procedure(s) (LRB):  REMOVAL-MASS Complex cyst left ankle and foot (Left)    Anesthesia: Regional    Description of the findings of the procedure(s): as above    Estimated Blood Loss: * No values recorded between 6/25/2020 11:37 AM and 6/25/2020 12:19 PM *         Specimens:   Specimen (12h ago, onward)    None            Discharge Note    OUTCOME: Patient tolerated treatment/procedure well without complication and is now ready for discharge.    DISPOSITION: Home or Self Care    FINAL DIAGNOSIS:  Ganglion cyst of left foot    FOLLOWUP: In clinic    DISCHARGE INSTRUCTIONS:    Discharge Procedure Orders   Call MD for:  temperature >100.4     Call MD for:  persistent nausea and vomiting or diarrhea     Call MD for:  severe uncontrolled pain     Call MD for:  redness, tenderness, or signs of infection (pain, swelling, redness, odor or green/yellow discharge around incision site)     Call MD for:  difficulty breathing or increased cough     Call MD for:  severe persistent headache     Call MD for:  worsening rash     Call MD for:  persistent dizziness, light-headedness, or visual disturbances     Call MD for:  increased confusion or weakness     Leave dressing on - Keep it clean, dry, and intact until clinic visit     Weight bearing restrictions (specify):   Order Comments: Can WBAT in the boot.  Can remove dressing in 72 hours and shower and cover wound afterwards until clinic visit.

## 2020-06-25 NOTE — PLAN OF CARE
VVS, pt states pain is tolerable, no complaints of nausea/ vomiting, pt ok to transfer to next phase of care

## 2020-06-25 NOTE — INTERVAL H&P NOTE
The patient has been examined and the H&P has been reviewed:    I concur with the findings and no changes have occurred since H&P was written.    Anesthesia/Surgery risks, benefits and alternative options discussed and understood by patient/family.          Active Hospital Problems    Diagnosis  POA    Ganglion cyst of left foot [M67.472]  Yes      Resolved Hospital Problems   No resolved problems to display.

## 2020-06-25 NOTE — TRANSFER OF CARE
Anesthesia Transfer of Care Note    Patient: Joy Crawford    Procedure(s) Performed: Procedure(s) (LRB):  REMOVAL-MASS Complex cyst left ankle and foot (Left)    Patient location: PACU    Anesthesia Type: general    Transport from OR: Transported from OR on room air with adequate spontaneous ventilation    Post pain: adequate analgesia    Post assessment: no apparent anesthetic complications    Post vital signs: stable    Level of consciousness: awake    Nausea/Vomiting: no nausea/vomiting    Complications: none    Transfer of care protocol was followed      Last vitals:   Visit Vitals  /60 (BP Location: Right arm, Patient Position: Lying)   Pulse 82   Temp 36.5 °C (97.7 °F) (Temporal)   Resp 20   Wt 109.8 kg (242 lb)   LMP 07/12/2018 (Approximate)   SpO2 99%   Breastfeeding No   BMI 39.06 kg/m²

## 2020-06-25 NOTE — ANESTHESIA PROCEDURE NOTES
Popliteal single shot    Patient location during procedure: pre-op   Block not for primary anesthetic.  Reason for block: at surgeon's request and post-op pain management   Post-op Pain Location: left foot  Start time: 6/25/2020 10:39 AM  Timeout: 6/25/2020 10:38 AM   End time: 6/25/2020 10:44 AM    Staffing  Authorizing Provider: Ja Maldonado MD  Performing Provider: Tonio Trotter MD    Preanesthetic Checklist  Completed: patient identified, site marked, surgical consent, pre-op evaluation, timeout performed, IV checked, risks and benefits discussed and monitors and equipment checked  Peripheral Block  Patient position: supine  Prep: ChloraPrep  Patient monitoring: heart rate, cardiac monitor, continuous pulse ox, continuous capnometry and frequent blood pressure checks  Block type: popliteal  Laterality: left  Injection technique: single shot  Needle  Needle type: Stimuplex   Needle gauge: 21 G  Needle length: 4 in  Needle localization: anatomical landmarks and ultrasound guidance   -ultrasound image captured on disc.  Assessment  Injection assessment: negative aspiration, negative parasthesia and local visualized surrounding nerve  Paresthesia pain: none  Heart rate change: no  Slow fractionated injection: yes  Additional Notes  VSS.  [facility] RN monitoring vitals throughout procedure.  Patient tolerated procedure well.

## 2020-06-25 NOTE — ANESTHESIA PROCEDURE NOTES
Adductor single shot    Patient location during procedure: pre-op   Block not for primary anesthetic.  Reason for block: at surgeon's request and post-op pain management   Post-op Pain Location: left foot  Start time: 6/25/2020 10:45 AM  Timeout: 6/25/2020 10:38 AM   End time: 6/25/2020 10:47 AM    Staffing  Authorizing Provider: Ja Maldonado MD  Performing Provider: Tonio Trotter MD    Preanesthetic Checklist  Completed: patient identified, site marked, surgical consent, pre-op evaluation, timeout performed, IV checked, risks and benefits discussed and monitors and equipment checked  Peripheral Block  Patient position: supine  Prep: ChloraPrep  Patient monitoring: heart rate, cardiac monitor, continuous pulse ox, continuous capnometry and frequent blood pressure checks  Block type: adductor canal  Laterality: left  Injection technique: single shot  Needle  Needle type: Stimuplex   Needle gauge: 21 G  Needle length: 4 in  Needle localization: anatomical landmarks and ultrasound guidance   -ultrasound image captured on disc.  Assessment  Injection assessment: negative aspiration, negative parasthesia and local visualized surrounding nerve  Paresthesia pain: none  Heart rate change: no  Slow fractionated injection: yes  Additional Notes  VSS.  [facility] RN monitoring vitals throughout procedure.  Patient tolerated procedure well.

## 2020-06-25 NOTE — OP NOTE
Operative report  Date of surgery:  06/25/2020    Preoperative diagnosis:  Complex cyst left foot and ankle     Postoperative diagnosis:  Complex cyst left foot and ankle, probable synovial cyst from extensor tendons    Procedure:  Excision complex cyst left foot and ankle    Anesthesia:  Regional block    Surgeon Dr. Maddox  Assistant Dr. Noel    Complications:  None  Specimens:  Cyst wall material from left foot and ankle was sent to pathology    Estimated blood loss:  None    Indication for procedure:     This is a 55-year-old female a longstanding complex cyst about her left foot and ankle.  She has had previous aspirations with recurrence of the cyst.  She presented to me for surgical treatment.    Procedure detail:          Regional anesthesia was performed in the preop area.  The patient was brought to the operating room and her left leg was prepped draped in a sterile fashion.  A sterile tourniquet was a placed on the left calf.  The left leg was exsanguinated with an Esmarch bandage and the tourniquet was elevated to her 50 mg of mercury.  An incision was made centered over the multilobulated cyst on the anterolateral aspect of the ankle and dorsal lateral aspect of the proximal foot.  The incision carried through skin and subcutaneous tissue.  Dissection was continued down to the cyst wall and distally that cyst wall was violated during the course of dissection.  There was clear yellow fluid within the cyst consistent with synovial fluid and was emanating from the extensor tendons that were visible when the cyst wall was incised.  A retinacular band was draped over the middle the cyst resulting in the multi lobulated appearance clinically.  The retinacular band was released and the cyst wall was dissected from around the extensor tendons all way up and just proximal to the level of the ankle.  The ankle capsule appeared to be intact but the subtalar capsule appeared to be involved as dissection of the  cyst wall distally exposed the subtalar joint.  The cyst wall material was sent to pathology for permanent evaluation.  Once the cyst was excised the wound was well irrigated.  The retinacular band was loosely reapproximated with Vicryl suture.  The subcutaneous tissue was closed 0 Vicryl suture.  The skin incision was closed with a running nylon suture.  A sterile compressive dressing was placed in the patient was returned to the postop holding area in stable condition.

## 2020-06-25 NOTE — ANESTHESIA PREPROCEDURE EVALUATION
06/25/2020  Joy Crawford is a 55 y.o., female.    Active Ambulatory Problems     Diagnosis Date Noted    Anxiety 08/14/2013    Gastroesophageal reflux disease 08/14/2013    Peptic ulcer 08/14/2013    Herpetic ulceration of vulva 08/14/2013    Fatigue 10/11/2017    Exacerbation of asthma 10/11/2017    Non-intractable vomiting with nausea 02/11/2018    Influenza A 02/11/2018    AVELINA I (vulvar intraepithelial neoplasia I) 03/11/2020    Asthma, currently active 05/27/2020    Sleep apnea 05/27/2020    Shortness of breath 05/27/2020    BMI 37.0-37.9, adult 05/27/2020    Ganglion cyst of left foot 06/17/2020     Resolved Ambulatory Problems     Diagnosis Date Noted    Acute sinusitis 10/11/2017     Past Medical History:   Diagnosis Date    Asthma     Depression     Esophageal reflux     H/O mammogram 01/20/2017    Herpes simplex virus (HSV) infection     Hypertension          Anesthesia Evaluation    I have reviewed the Patient Summary Reports.    I have reviewed the Nursing Notes.       Review of Systems  Anesthesia Hx:  Denies Hx of Anesthetic complications    Cardiovascular:   Exercise tolerance: good Hypertension Denies CAD.     Denies Angina.        Pulmonary:   Asthma moderate and asymptomatic Denies Shortness of breath. Sleep Apnea    Renal/:   Denies Chronic Renal Disease.     Hepatic/GI:   PUD, GERD Denies Liver Disease.    Neurological:   Denies CVA. Denies Seizures.    Endocrine:   Denies Diabetes. Denies Hypothyroidism.        Physical Exam  General:  Well nourished    Airway/Jaw/Neck:  Airway Findings: Mallampati: III Improves to II with phonation.  TM Distance: Normal, at least 6 cm  Jaw/Neck Findings:  Neck ROM: Normal ROM       Chest/Lungs:  Chest/Lungs Clear        Mental Status:  Mental Status Findings:  Cooperative, Alert and Oriented         Anesthesia Plan  Type of  Anesthesia, risks & benefits discussed:  Anesthesia Type:  general  Patient's Preference: GA  Intra-op Monitoring Plan: standard ASA monitors  Intra-op Monitoring Plan Comments:   Post Op Pain Control Plan: multimodal analgesia, IV/PO Opiods PRN, per primary service following discharge from PACU and peripheral nerve block  Post Op Pain Control Plan Comments:   Induction:   IV  Beta Blocker:  Patient is not currently on a Beta-Blocker (No further documentation required).       Informed Consent: Patient understands risks and agrees with Anesthesia plan.  Questions answered. Anesthesia consent signed with patient.  ASA Score: 3     Day of Surgery Review of History & Physical:    H&P update referred to the surgeon.     Anesthesia Plan Notes: The patient's PMH was reviewed and PE was performed  Plan for GA        Ready For Surgery From Anesthesia Perspective.

## 2020-06-25 NOTE — DISCHARGE INSTRUCTIONS

## 2020-06-25 NOTE — PLAN OF CARE
Patient is AAO and VSS.  Tolerating PO and states pain is tolerable.  Dressing CDI.  Patient states they are ready for d/c.  IV removed.  Catheter tip intact.  Caregiver at bedside.  Discharge instructions reviewed and copy given to the patient and caregiver.  Questions answered.  Both verbalized understanding. Medication received preoperatively. DME provided prior(boot),  Patient wheeled to car by RN.

## 2020-06-25 NOTE — PLAN OF CARE
Pt's belongings placed in locker #2, 1 bag. Verbal timeout with OR nurse Pradeep. Verified pt's name, , NKDA, anesthesia and surgery consents.

## 2020-06-25 NOTE — ANESTHESIA POSTPROCEDURE EVALUATION
Anesthesia Post Evaluation    Patient: Joy Crawford    Procedure(s) Performed: Procedure(s) (LRB):  REMOVAL-MASS Complex cyst left ankle and foot (Left)    Final Anesthesia Type: general    Patient location during evaluation: PACU  Patient participation: Yes- Able to Participate  Level of consciousness: awake and alert  Post-procedure vital signs: reviewed and stable  Pain management: adequate  Airway patency: patent  BECKY mitigation strategies: Multimodal analgesia  PONV status at discharge: No PONV  Anesthetic complications: no      Cardiovascular status: blood pressure returned to baseline  Respiratory status: unassisted, spontaneous ventilation and room air  Hydration status: euvolemic  Follow-up not needed.          Vitals Value Taken Time   /75 06/25/20 1300   Temp  06/25/20 1420   Pulse 72 06/25/20 1300   Resp 20 06/25/20 1300   SpO2 100 % 06/25/20 1300         Event Time   Out of Recovery 12:52:00         Pain/Osmar Score: Pain Rating Prior to Med Admin: 0 (6/25/2020 10:43 AM)  Pain Rating Post Med Admin: 0 (6/25/2020 10:50 AM)  Osmar Score: 9 (6/25/2020 12:45 PM)

## 2020-06-26 ENCOUNTER — TELEPHONE (OUTPATIENT)
Dept: ORTHOPEDICS | Facility: CLINIC | Age: 55
End: 2020-06-26

## 2020-06-26 VITALS
DIASTOLIC BLOOD PRESSURE: 75 MMHG | OXYGEN SATURATION: 100 % | SYSTOLIC BLOOD PRESSURE: 134 MMHG | BODY MASS INDEX: 39.06 KG/M2 | WEIGHT: 242 LBS | TEMPERATURE: 98 F | HEART RATE: 72 BPM | RESPIRATION RATE: 20 BRPM

## 2020-06-26 VITALS
RESPIRATION RATE: 18 BRPM | HEART RATE: 82 BPM | SYSTOLIC BLOOD PRESSURE: 140 MMHG | TEMPERATURE: 98 F | DIASTOLIC BLOOD PRESSURE: 92 MMHG | OXYGEN SATURATION: 95 %

## 2020-06-26 PROCEDURE — 25000003 PHARM REV CODE 250: Performed by: PHYSICIAN ASSISTANT

## 2020-06-26 PROCEDURE — 96366 THER/PROPH/DIAG IV INF ADDON: CPT

## 2020-06-26 PROCEDURE — 96375 TX/PRO/DX INJ NEW DRUG ADDON: CPT

## 2020-06-26 PROCEDURE — 63600175 PHARM REV CODE 636 W HCPCS: Performed by: PHYSICIAN ASSISTANT

## 2020-06-26 PROCEDURE — 96361 HYDRATE IV INFUSION ADD-ON: CPT | Mod: 59

## 2020-06-26 PROCEDURE — 96365 THER/PROPH/DIAG IV INF INIT: CPT

## 2020-06-26 RX ORDER — MORPHINE SULFATE 15 MG/1
15 TABLET ORAL
Status: COMPLETED | OUTPATIENT
Start: 2020-06-26 | End: 2020-06-26

## 2020-06-26 RX ORDER — ONDANSETRON 2 MG/ML
4 INJECTION INTRAMUSCULAR; INTRAVENOUS
Status: COMPLETED | OUTPATIENT
Start: 2020-06-26 | End: 2020-06-26

## 2020-06-26 RX ORDER — PROCHLORPERAZINE EDISYLATE 5 MG/ML
10 INJECTION INTRAMUSCULAR; INTRAVENOUS
Status: COMPLETED | OUTPATIENT
Start: 2020-06-26 | End: 2020-06-26

## 2020-06-26 RX ADMIN — METOCLOPRAMIDE 10 MG: 5 INJECTION, SOLUTION INTRAMUSCULAR; INTRAVENOUS at 01:06

## 2020-06-26 RX ADMIN — ONDANSETRON 4 MG: 2 INJECTION INTRAMUSCULAR; INTRAVENOUS at 01:06

## 2020-06-26 RX ADMIN — PROCHLORPERAZINE EDISYLATE 10 MG: 5 INJECTION INTRAMUSCULAR; INTRAVENOUS at 02:06

## 2020-06-26 RX ADMIN — MAGNESIUM SULFATE HEPTAHYDRATE 1 G: 500 INJECTION, SOLUTION INTRAMUSCULAR; INTRAVENOUS at 03:06

## 2020-06-26 RX ADMIN — SODIUM CHLORIDE 1000 ML: 0.9 INJECTION, SOLUTION INTRAVENOUS at 01:06

## 2020-06-26 RX ADMIN — MORPHINE SULFATE 15 MG: 15 TABLET ORAL at 01:06

## 2020-06-26 NOTE — TELEPHONE ENCOUNTER
----- Message from Jasson Finley sent at 6/26/2020 12:28 PM CDT -----  Contact: self  Mg  Pt returning call she was in ER last night     Please Call    Contact

## 2020-06-26 NOTE — TELEPHONE ENCOUNTER
Pt c/o HA that worsen when laying down s/p anesthesia.  pt rates HA as severe. Pt states she unable to follow post op care instructions as directed due to HA.  Pt advise per protocol and verbalized understanding.    Reason for Disposition   [1] Severe headache AND [2] after spinal (epidural) anesthesia    Additional Information   Negative: [1] Widespread rash AND [2] bright red, sunburn-like   Negative: Chest pain   Negative: Difficulty breathing   Negative: Surgical incision symptoms and questions   Negative: [1] Discomfort (pain, burning or stinging) when passing urine AND [2] male   Negative: [1] Discomfort (pain, burning or stinging) when passing urine AND [2] female   Negative: Constipation   Negative: New or worsening leg (calf, thigh) pain   Negative: New or worsening leg swelling   Negative: Dizziness is severe, or persists > 24 hours after surgery   Negative: Pain, redness, swelling, or pus at IV Site   Negative: Symptoms arising from use of a urinary catheter (Harper or Coude)   Negative: Cast problems or questions   Negative: Medication question    Protocols used: POST-OP SYMPTOMS AND [id number]

## 2020-06-26 NOTE — ED PROVIDER NOTES
Encounter Date: 6/25/2020       History     Chief Complaint   Patient presents with    Headache     pt had surgery today to remove a cyst on her ankle. C/[facility] HA. [facility]. Denies vomiting, vision changes     HPI   Joy Crawford is a 55 y.o. female with medical history of Asthma, Depression, Anxiety, HTN presenting to the ED with the chief complaint of headache. Headache began after surgery today. She had a complex cyst removed from her L ankle today with Dr. Maldonado. Underwent popliteal and adductor nerve blocks which were not effective and the subsequently general anesthesia. Headache began after she returned home and has been constant for several hours. Describes headache as a throbbing bilateral temporal headache that worsens with lying flat and has relief with standing up. She expresses concern that she is not able to follow her post-operative plan she was given. She has had previous headaches like this that have been related to sinusitis. She has taken Tramadol at home without relief. No blurry vision, photophobia, neck stiffness, speech changes, chest pain, SOB, vomiting, urinary or bowel movement changes.     Review of patient's allergies indicates:  No Known Allergies  Past Medical History:   Diagnosis Date    Anxiety     Asthma     Depression     Esophageal reflux     H/O mammogram 01/20/2017    Normal  (DIS)     Herpes simplex virus (HSV) infection     no out breaks    Hypertension     Peptic ulcer     Sleep apnea     doesnt use cpap     Past Surgical History:   Procedure Laterality Date    ENDOMETRIAL BIOPSY N/A 3/11/2020    Procedure: BIOPSY, ENDOMETRIUM;  Surgeon: José Miguel Leon MD;  Location: [facility] OR;  Service: OB/GYN;  Laterality: N/A;    RHINOPLASTY  2006    SINUS SURGERY  2006    VULVECTOMY N/A 3/11/2020    Procedure: VULVECTOMY;  Surgeon: José Miguel Leon MD;  Location: [facility] OR;  Service: OB/GYN;  Laterality: N/A;     Family History   Problem  Relation Age of Onset    Skin cancer Father     Hypertension Father     Hyperlipidemia Father     Hypertension Mother     Diabetes Mother     Hyperlipidemia Mother     No Known Problems Son     Depression Brother     Breast cancer Neg Hx      Social History     Tobacco Use    Smoking status: Never Smoker    Smokeless tobacco: Never Used   Substance Use Topics    Alcohol use: Yes     Comment: social    Drug use: No     Review of Systems   Constitutional: Negative for chills, diaphoresis and fever.   HENT: Negative for sore throat.    Eyes: Negative for photophobia, pain and visual disturbance.   Respiratory: Negative for shortness of breath.    Cardiovascular: Negative for chest pain.   Gastrointestinal: Negative for abdominal pain, nausea and vomiting.   Genitourinary: Negative for dysuria.   Musculoskeletal: Negative for back pain, neck pain and neck stiffness.   Skin: Negative for rash.   Neurological: Positive for headaches. Negative for speech difficulty, weakness, light-headedness and numbness.   Hematological: Does not bruise/bleed easily.       Physical Exam     Initial Vitals [06/25/20 2103]   BP Pulse Resp Temp SpO2   (!) 162/96 98 17 98.3 °F (36.8 °C) 95 %      MAP       --         Physical Exam    Constitutional: She appears well-developed and well-nourished. She is not diaphoretic. No distress.   Obese female   HENT:   Head: Normocephalic and atraumatic.   Mouth/Throat: Oropharynx is clear and moist. No oropharyngeal exudate.   No tenderness to temporal arteries   Eyes: EOM are normal. Pupils are equal, round, and reactive to light.   Neck: Normal range of motion. Neck supple.   Cardiovascular: Normal rate and regular rhythm.   Pulmonary/Chest: Breath sounds normal. No respiratory distress. She has no wheezes.   Speaking full sentences without difficulty   Abdominal: Soft. There is no abdominal tenderness.   Musculoskeletal: Normal range of motion.      Comments: Post-operative boot to LLE    Neurological: She is alert and oriented to person, place, and time. She has normal strength.   Follows commands appropriately. No dysmetria. Negative pronator drift.    Skin: Skin is warm and dry. No rash noted. No erythema.       ED Course   Procedures  Labs Reviewed - No data to display       Imaging Results    None          Medical Decision Making:   History:   Old Medical Records: I decided to obtain old medical records.  Old Records Summarized: records from clinic visits.       APC / Resident Notes:   55 y.o. female with medical history of Asthma, Depression, Anxiety, HTN presenting to the ED c/o headache after surgery today. Underwent cyst removal from L ankle. She had adductor and popliteal nerve blocks that were not effective and then underwent general anesthesia. She has had headaches like this previously. DDx includes but not limited to migraine, tension headache, sinus headache, cluster headache, anesthesia side effect, post-operative complication. I have considered but do not suspect SAH, temporal arteritis, central venous thrombosis, meningitis. Do not feel imaging warranted at this time.     Patient signed out to my colleague at the end of my shift pending medication completion and reassessment of her pain from her headache. I have discussed the care of this patient with my supervising physician.                               Clinical Impression:       ICD-10-CM ICD-9-CM   1. Nonintractable headache, unspecified chronicity pattern, unspecified headache type  R51 784.0   2. Post-operative state  Z98.890 V45.89   3. Obesity, unspecified classification, unspecified obesity type, unspecified whether serious comorbidity present  E66.9 278.00             ED Disposition Condition    Discharge Stable        ED Prescriptions     None        Follow-up Information    None                                    Roby Rao PA-C  06/26/20 0129

## 2020-06-26 NOTE — TELEPHONE ENCOUNTER
----- Message from London Quevedo MA sent at 6/25/2020  4:59 PM CDT -----  Regarding: FW: self    ----- Message -----  From: Dipti Galdamez  Sent: 6/25/2020   3:54 PM CDT  To: [facility] Staff  Subject: self                                             Pt had surgery today and is experiencing headaches.    Asking for a call back.      Contact info [phone]

## 2020-06-26 NOTE — ED NOTES
Patient identifiers for Joy Crawford 55 y.o. female checked and correct.  Chief Complaint   Patient presents with    Headache     pt had surgery today to remove a cyst on her ankle. [facility] HA[facility]. Denies vomiting, vision changes     Past Medical History:   Diagnosis Date    Anxiety     Asthma     Depression     Esophageal reflux     H/O mammogram 01/20/2017    Normal  (DIS)     Herpes simplex virus (HSV) infection     no out breaks    Hypertension     Peptic ulcer     Sleep apnea     doesnt use cpap     Allergies reported: Review of patient's allergies indicates:  No Known Allergies    Patient has her own walker with her.       LOC: Patient is awake, alert, and aware of environment with an appropriate affect. Patient is oriented x 4 and speaking appropriately. Patient expressing frustrations.   APPEARANCE: Patient resting comfortably and in no acute distress. Patient is clean and well groomed, patient's clothing is properly fastened.  HEENT: Patient wearing mask.   SKIN: The skin is warm and dry. Patient has normal skin turgor and moist mucus membranes. Denies fever and chills.   MUSKULOSKELETAL: Patient is moving all extremities well, no obvious deformities noted. Pulses intact. Patient has a boot to left foot. Operation today at [place] for a cyst removal. Reports foot is still numb from 2 blocks and anesthesia.   RESPIRATORY: Airway is open and patent. Respirations are spontaneous and non-labored with normal effort and rate. Denies SOB.  CARDIAC: Patient has a normal rate and rhythm. 98 on cardiac monitor. No peripheral edema noted. Denies chest pain.   ABDOMEN: No distention noted. Soft and non-tender upon palpation. Reports nausea, denies vomiting.   NEUROLOGICAL: pupils 3mm, PERRL. Facial expression is symmetrical. Hand grasps are equal bilaterally. Normal sensation in all extremities when touched with finger. Reports major headache and feeling dizzy.  "States headache pain was unbearable laying down. She describes, "throbbed." "Felt like it was going to explode." Said she had body sweats.           "

## 2020-06-26 NOTE — PROVIDER PROGRESS NOTES - EMERGENCY DEPT.
Encounter Date: 6/25/2020    ED Physician Progress Notes           I assumed care of this patient from the outgoing physician assistant at sign out.   Patient's headache is completely resolved after aggressive treatment in the ED.  Initially on the patient's headache had not improved at time of sign out.  Patient was requesting has CT.  Head CT was performed other she had a benign neurological exam she did have new onset of severe headaches that was refractory to initial medications.  Head CT without acute abnormalities.  The patient was given magnesium with significant improvement in her headache.  She will be discharged home to follow-up with her PCP.

## 2020-06-30 ENCOUNTER — TELEPHONE (OUTPATIENT)
Dept: ORTHOPEDICS | Facility: CLINIC | Age: 55
End: 2020-06-30

## 2020-06-30 NOTE — TELEPHONE ENCOUNTER
I spoke with pt she stated that she doesn't think any of her stiches broke but she has been bleeding a little. Pt would like to know if how and what to use to clean it.  I informed her to I will send RT a message and I will get back with her. Pt. Verbalized understanding.              ----- Message from Brooke Villagran sent at 6/30/2020  3:06 PM CDT -----  Pt would like to reg receive a call back regarding bleeding by her incision. Please contact the pt to advise    Contact info [phone]

## 2020-07-02 LAB
FINAL PATHOLOGIC DIAGNOSIS: NORMAL
GROSS: NORMAL

## 2020-07-06 ENCOUNTER — TELEPHONE (OUTPATIENT)
Dept: SLEEP MEDICINE | Facility: CLINIC | Age: 55
End: 2020-07-06

## 2020-07-06 ENCOUNTER — TELEPHONE (OUTPATIENT)
Dept: ORTHOPEDICS | Facility: CLINIC | Age: 55
End: 2020-07-06

## 2020-07-06 ENCOUNTER — OFFICE VISIT (OUTPATIENT)
Dept: ORTHOPEDICS | Facility: CLINIC | Age: 55
End: 2020-07-06
Payer: COMMERCIAL

## 2020-07-06 DIAGNOSIS — Z09 FOLLOW-UP EXAMINATION AFTER ORTHOPEDIC SURGERY: ICD-10-CM

## 2020-07-06 DIAGNOSIS — M67.472 GANGLION CYST OF LEFT FOOT: Primary | ICD-10-CM

## 2020-07-06 PROCEDURE — 99999 PR PBB SHADOW E&M-EST. PATIENT-LVL I: ICD-10-PCS | Mod: PBBFAC,,, | Performed by: ORTHOPAEDIC SURGERY

## 2020-07-06 PROCEDURE — 99999 PR PBB SHADOW E&M-EST. PATIENT-LVL I: CPT | Mod: PBBFAC,,, | Performed by: ORTHOPAEDIC SURGERY

## 2020-07-06 PROCEDURE — 99024 PR POST-OP FOLLOW-UP VISIT: ICD-10-PCS | Mod: S$GLB,,, | Performed by: ORTHOPAEDIC SURGERY

## 2020-07-06 PROCEDURE — 99024 POSTOP FOLLOW-UP VISIT: CPT | Mod: S$GLB,,, | Performed by: ORTHOPAEDIC SURGERY

## 2020-07-06 NOTE — TELEPHONE ENCOUNTER
I returned call from patient regarding her machine. Pt stated that she was calling to schedule an appointment to  her CPAP machine. I told pt that instead of giving her the number to call to schedule I'll go ahead and send a message to Danyell.

## 2020-07-06 NOTE — TELEPHONE ENCOUNTER
----- Message from Dipti Galdamez sent at 7/6/2020 12:12 PM CDT -----  Regarding: self  Pt is asking for a call back still having issues with ankle its still bleeding when she wears the boot. Preethi Villa      Contact info [phone]

## 2020-07-06 NOTE — TELEPHONE ENCOUNTER
----- Message from Corrine Fernando sent at 7/6/2020  2:04 PM CDT -----  Regarding: equipment  Type: Patient Call Back    Who called:pt    What is the request in detail:calling in regards to equipment being in. Call pt    Can the clinic reply by [facility]?    Would the patient rather a call back or a response via [facility]? call    Best call back number:[phone] (home)       Additional Information:

## 2020-07-07 NOTE — PROGRESS NOTES
Joy Crawford  Returns today out of concerns of bleeding from her incision.  This is a 55-year-old female who underwent removal of a complex ganglion cyst from her left foot and ankle about 12 days ago.  She called today stating that she was having some continued bleeding from her incision and felt like it might be related to the boot that she was wearing.  She reports that the boot is uncomfortable.    Examination:  I removed her dressing today.  Her incision is dry.  There is no active bleeding.  There is a normal amount of swelling.    Impression:  12 days postop excision cyst left foot and ankle, no active bleeding    Recommendation:  I redressed her incision and I am going to put her into a postop shoe instead of the boot.    Return in 1 week for suture removal

## 2020-07-10 RX ORDER — ALPRAZOLAM 0.5 MG/1
0.5 TABLET ORAL 2 TIMES DAILY PRN
Qty: 30 TABLET | Refills: 0 | Status: SHIPPED | OUTPATIENT
Start: 2020-07-10 | End: 2021-04-21

## 2020-07-13 ENCOUNTER — OFFICE VISIT (OUTPATIENT)
Dept: ORTHOPEDICS | Facility: CLINIC | Age: 55
End: 2020-07-13
Payer: COMMERCIAL

## 2020-07-13 DIAGNOSIS — Z09 FOLLOW-UP EXAMINATION AFTER ORTHOPEDIC SURGERY: Primary | ICD-10-CM

## 2020-07-13 PROCEDURE — 99999 PR PBB SHADOW E&M-EST. PATIENT-LVL III: CPT | Mod: PBBFAC,,, | Performed by: ORTHOPAEDIC SURGERY

## 2020-07-13 PROCEDURE — 99024 POSTOP FOLLOW-UP VISIT: CPT | Mod: S$GLB,,, | Performed by: ORTHOPAEDIC SURGERY

## 2020-07-13 PROCEDURE — 99999 PR PBB SHADOW E&M-EST. PATIENT-LVL III: ICD-10-PCS | Mod: PBBFAC,,, | Performed by: ORTHOPAEDIC SURGERY

## 2020-07-13 PROCEDURE — 99024 PR POST-OP FOLLOW-UP VISIT: ICD-10-PCS | Mod: S$GLB,,, | Performed by: ORTHOPAEDIC SURGERY

## 2020-07-13 NOTE — PROGRESS NOTES
Joy Crawford  Returns today for follow-up.  She is now 2.5 weeks postop from excision of a complex ganglion cyst from her left foot and ankle.  She reports some continued soreness and swelling.    Examination:  There is some mild erythema and swelling about the incision.  I removed her sutures today without any difficulty.  I placed Steri-Strips on the wound and put a new dressing on.    Impression:  2.5 weeks postop excision ganglion cyst left foot and ankle    Recommendation:  She can progress her activity as pain allows.  She can progress her shoe wear as swelling allows.    Follow-up in 4 weeks

## 2020-07-23 ENCOUNTER — PATIENT MESSAGE (OUTPATIENT)
Dept: SLEEP MEDICINE | Facility: CLINIC | Age: 55
End: 2020-07-23

## 2020-07-23 ENCOUNTER — OFFICE VISIT (OUTPATIENT)
Dept: PSYCHIATRY | Facility: CLINIC | Age: 55
End: 2020-07-23
Payer: COMMERCIAL

## 2020-07-23 DIAGNOSIS — F41.9 ANXIETY: ICD-10-CM

## 2020-07-23 DIAGNOSIS — F32.A DEPRESSION, UNSPECIFIED DEPRESSION TYPE: ICD-10-CM

## 2020-07-23 PROCEDURE — 90792 PR PSYCHIATRIC DIAGNOSTIC EVALUATION W/MEDICAL SERVICES: ICD-10-PCS | Mod: 95,,, | Performed by: SPECIALIST

## 2020-07-23 PROCEDURE — 90792 PSYCH DIAG EVAL W/MED SRVCS: CPT | Mod: 95,,, | Performed by: SPECIALIST

## 2020-07-23 NOTE — PROGRESS NOTES
"Outpatient Psychiatry Initial Visit (MD/NP)    7/23/2020    Joy Crawford, a 55 y.o. female, presenting for initial evaluation visit. Met with patient.via telepsychiatry.   Reason for Encounter: Pt is self referred.. Patient complains of anxiety and depression.She has a h/o obstructive sleep apnea and asthma. She has been treated for axneity and derpession by her PCP. Her CC today is of dysphoria and anxiety.     History of Present Illness: Depression  Patient complains of depression. She complains of anhedonia, depressed mood, difficulty concentrating and insomnia. Onset was approximately 6 months ago. Symptoms have been unchanged since that time. Current symptoms include: anhedonia, depressed mood, difficulty concentrating, feelings of worthlessness/guilt and insomnia. Patient denies recurrent thoughts of death, suicidal attempt and suicidal thoughts with specific plan. Family history significant for not reviewed today. Possible organic causes contributing are: sleep disturbance d/t BECKY, asthma contributing to anxiety, general social worry about contractin coronavirus. Risk factors: previous episode of depression. Previous treatment includes individual therapy and medication. She complains of the following side effects from the treatment: anxiety, restlessness, nausea with SNRI and SSRI.    ROS-  No new pains  High anxiety  High worry  Dysphoric mood  Low energy  No Gi ,  issues  No chest pain  No double vision, loss of smell, seizure    Psych Hx  No SA or SI  She has recentlys een SW x 3 or PTX  PCP has been prescribing medications. She has felt anxious "quite some time", dysphoria has been more recent  Currently takes cymbalta 60mg for anxiety and depression. She tried 120 mg but did not like the way she felt.   Also takes vyvanse, precribe by PCP to help with her focusing. Unclear if she really has ADHD- her focus problem could be d/t mood/anxiety symtpoms  Prior meds: zoloft, elxapro, did not tolerate  No " "psychaitric hosptialztaiaons  She drinks ETOH, usuallys socialy but lately more often, 2-4 cups/night. Advised to limits to 0-2 cups.   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    Current Evaluation:     Nutritional Screening: Considering the patient's height and weight, medications, medical history and preferences, should a referral be made to the dietitian? no    Constitutional  Vitals:  Most recent vital signs, dated greater than 90 days prior to this appointment, were not reviewed.    There were no vitals filed for this visit.     General:  unremarkable, age appropriate, casually dressed, neatly groomed     Musculoskeletal  Muscle Strength/Tone:  not examined   Gait & Station:  non-ataxic     Psychiatric  Speech:  no latency; no press, spontaneous   Mood & Affect:  anxious  congruent and appropriate   Thought Process:  normal and logical   Associations:  intact   Thought Content:  normal, no suicidality, no homicidality, delusions, or paranoia   Insight:  has awareness of illness   Judgement: behavior is adequate to circumstances   Orientation:  grossly intact, person, place, situation   Memory: intact for content of interview, grossly intact, not tested   Language: grossly intact, not tested   Attention Span & Concentration:  able to focus, not tested   Fund of Knowledge:  intact and appropriate to age and level of education       Relevant Elements of Neurological Exam: awake alert, ox3, anxious, not agitated, worried, " "cooperative, no tics or tremors    Functioning in Relationships:  Spouse/partner: poor  Peers: none reported  Employers: work worries about safety    Laboratory Data  Admission on 06/25/2020, Discharged on 06/25/2020   Component Date Value Ref Range Status    Final Pathologic Diagnosis 06/25/2020    Final                    Value:SOFT TISSUE, LEFT FOOT, EXCISION:  Benign membranous fibroconnective tissue consistent with ganglion cyst      Gross 06/25/2020    Final                    Value:Container label:  Clinic number/AP number:  [id number] and "left ankle cyst wall".  Specimen is received in formalin in a properly labeled container identified  by the patient's name and consists of several irregular fragments of soft to  moderately firm white and tan tissue that range in size from 0.7 x 0.7 x 0.4  cm to 3.0 x 1.6 x 1.0 cm.  Sectioning reveals no discrete intact cysts,  lesions or masses.  Representative sections are submitted in cassette  ELS--1-A.  Grossed by KRYSTINA Dean ([facility])            Medications  Outpatient Encounter Medications as of 7/23/2020   Medication Sig Dispense Refill    24 HOUR NASAL ALLERGY 55 mcg nasal inhaler SPRAY TWICE IEN QD      albuterol 2.5 mg /3 mL (0.083 %) Nebu 3 mL, albuterol 5 mg/mL Nebu 0.5 mL use as directed      ALPRAZolam (XANAX) 0.5 MG tablet Take 1 tablet (0.5 mg total) by mouth 2 (two) times daily as needed for Anxiety. 30 tablet 0    BREO ELLIPTA 200-25 mcg/dose DsDv diskus inhaler INL 1 PUFF PO QD  0    DULoxetine (CYMBALTA) 30 MG capsule Take 1 capsule (30 mg total) by mouth once daily. In addition to 60 mg daily to equal 90 mg daily. 30 capsule 3    duloxetine (CYMBALTA) 60 MG capsule TK 1 C PO QD  2    ibuprofen (ADVIL,MOTRIN) 800 MG tablet Take 1 tablet (800 mg total) by mouth every 8 (eight) hours as needed for Pain. 15 tablet 0    lisinopril 10 MG tablet TK 1 T PO QD  2    meloxicam (MOBIC) 15 MG tablet Take 1 tablet by mouth once daily.      " senna-docusate 8.6-50 mg (PERICOLACE) 8.6-50 mg per tablet Take 1 tablet by mouth once daily.      traZODone (DESYREL) 150 MG tablet Take 1 tablet (150 mg total) by mouth every evening. 30 tablet 3    VENTOLIN HFA 90 mcg/actuation inhaler INHALE ONE PUFF ORALLY Q 4 TO 6 H PRF SOB  1    VYVANSE 40 mg Cap Take 1 capsule by mouth once daily.       No facility-administered encounter medications on file as of 7/23/2020.            Assessment - Diagnosis - Goals:     Impression: 55 year old female with h/o anxiety and worry, now dysphoric, treated with cymbalta 60 mg, trazodone 150 mg, xanax 0.5 mg, vyvanse 40 mg, still with persistent worry, unhappiness, but no SI/SA, no psychtoic syptoms.   Diagnosis:   Mixed anxeity and derpssive disorder, Obstructive sleep apnea, asthma, (provisional=ADD, unclear exact cause of atentional issues)      ICD-10-CM ICD-9-CM   1. Anxiety  F41.9 300.00   2. Depression, unspecified depression type  F32.9 311       Strengths and Liabilities: Strength: Patient accepts guidance/feedback, Strength: Patient is expressive/articulate., Strength: Patient is intelligent., Strength: Patient is motivated for change., Strength: Patient is physically healthy., Strength: Patient has reasonable judgment., Strength: Patient is stable.    Treatment Goals:  Specify outcomes written in observable, behavioral terms:   Anxiety: reducing negative automatic thoughts  Depression: increasing energy    Treatment Plan/Recommendations:   · Medication Management: Continue current medications.   · PLAN  Plan must be approved by sleep medication doctor and also have buy in by PCP  · Pt is aware and will get back to me on sleep doctor's thoughts she has an upcoing appointmeint  · Add olanzapine 2. 5 mg at bedtime- will augment anxiolytic and antiderpssant effect of cymbalta  · Reassess need for vyvanse, if anxiety and deprssion are optimally treated  · Goal to reduce xanax prn  · If sleep med MD wants to dc trazodone  (according to the pt), olanzapien could help with insomnia.   · Olanzapine will be a shr term medication (3 months) to avoid long term effects  · Monitor movmenets, and metabolic panel  · She is safe to be treated as OP for now  · She is aware to call clinic, RTc or go to ED for new problems, side efects or suicidal thoguhts  · I will wait to hear from the patietn to order her olanzapine or abilify.       Return to Clinic: 6 weeks    Counseling time: 10 minutes  Total time: 50 minutes  Consulting clinician was informed of the encounter and consult note.

## 2020-07-28 ENCOUNTER — OFFICE VISIT (OUTPATIENT)
Dept: INTERNAL MEDICINE | Facility: CLINIC | Age: 55
End: 2020-07-28
Payer: COMMERCIAL

## 2020-07-28 VITALS
DIASTOLIC BLOOD PRESSURE: 70 MMHG | HEIGHT: 65 IN | SYSTOLIC BLOOD PRESSURE: 110 MMHG | WEIGHT: 239 LBS | OXYGEN SATURATION: 98 % | BODY MASS INDEX: 39.82 KG/M2 | HEART RATE: 76 BPM

## 2020-07-28 DIAGNOSIS — G47.30 SLEEP APNEA, UNSPECIFIED TYPE: ICD-10-CM

## 2020-07-28 DIAGNOSIS — J45.909 ASTHMA, CURRENTLY ACTIVE: Primary | ICD-10-CM

## 2020-07-28 DIAGNOSIS — E55.9 VITAMIN D DEFICIENCY DISEASE: ICD-10-CM

## 2020-07-28 DIAGNOSIS — E53.8 VITAMIN B12 DEFICIENCY: ICD-10-CM

## 2020-07-28 DIAGNOSIS — T78.40XD ALLERGIC STATE, SUBSEQUENT ENCOUNTER: ICD-10-CM

## 2020-07-28 PROBLEM — T78.40XA ALLERGY: Status: ACTIVE | Noted: 2020-07-28

## 2020-07-28 PROBLEM — R11.2 NON-INTRACTABLE VOMITING WITH NAUSEA: Status: RESOLVED | Noted: 2018-02-11 | Resolved: 2020-07-28

## 2020-07-28 PROBLEM — J10.1 INFLUENZA A: Status: RESOLVED | Noted: 2018-02-11 | Resolved: 2020-07-28

## 2020-07-28 PROBLEM — R06.02 SHORTNESS OF BREATH: Status: RESOLVED | Noted: 2020-05-27 | Resolved: 2020-07-28

## 2020-07-28 PROCEDURE — 3074F SYST BP LT 130 MM HG: CPT | Mod: CPTII,S$GLB,, | Performed by: INTERNAL MEDICINE

## 2020-07-28 PROCEDURE — 3008F BODY MASS INDEX DOCD: CPT | Mod: CPTII,S$GLB,, | Performed by: INTERNAL MEDICINE

## 2020-07-28 PROCEDURE — 99999 PR PBB SHADOW E&M-EST. PATIENT-LVL II: ICD-10-PCS | Mod: PBBFAC,,, | Performed by: INTERNAL MEDICINE

## 2020-07-28 PROCEDURE — 3078F DIAST BP <80 MM HG: CPT | Mod: CPTII,S$GLB,, | Performed by: INTERNAL MEDICINE

## 2020-07-28 PROCEDURE — 3008F PR BODY MASS INDEX (BMI) DOCUMENTED: ICD-10-PCS | Mod: CPTII,S$GLB,, | Performed by: INTERNAL MEDICINE

## 2020-07-28 PROCEDURE — 99999 PR PBB SHADOW E&M-EST. PATIENT-LVL II: CPT | Mod: PBBFAC,,, | Performed by: INTERNAL MEDICINE

## 2020-07-28 PROCEDURE — 99214 PR OFFICE/OUTPT VISIT, EST, LEVL IV, 30-39 MIN: ICD-10-PCS | Mod: S$GLB,,, | Performed by: INTERNAL MEDICINE

## 2020-07-28 PROCEDURE — 99214 OFFICE O/P EST MOD 30 MIN: CPT | Mod: S$GLB,,, | Performed by: INTERNAL MEDICINE

## 2020-07-28 PROCEDURE — 3074F PR MOST RECENT SYSTOLIC BLOOD PRESSURE < 130 MM HG: ICD-10-PCS | Mod: CPTII,S$GLB,, | Performed by: INTERNAL MEDICINE

## 2020-07-28 PROCEDURE — 3078F PR MOST RECENT DIASTOLIC BLOOD PRESSURE < 80 MM HG: ICD-10-PCS | Mod: CPTII,S$GLB,, | Performed by: INTERNAL MEDICINE

## 2020-07-28 RX ORDER — CHOLECALCIFEROL (VITAMIN D3) 25 MCG
1000 TABLET ORAL DAILY
COMMUNITY
End: 2020-12-03

## 2020-07-28 RX ORDER — LANOLIN ALCOHOL/MO/W.PET/CERES
1000 CREAM (GRAM) TOPICAL DAILY
COMMUNITY
End: 2021-07-12

## 2020-07-28 NOTE — PROGRESS NOTES
Chief Complaint:      HPI: This is a 55 year old woman who presents     She has had asthma since a young child. She was hospitalized as a child. She lived in [place] as a child. She has not been hospitalized for her asthma as an adult.  Last ER visit was 5-6 year ago.  She takes Breo one puff daily.  She takes prednisone when she feels a flare coming.   She has lots of allergies.  She has been breathing well. She saw pulmonary on 5/26/20 and has moderate to severe obstruction.  She has seen allergy clinic on 6/2/20 - she has a very high IGE level and has lots of allergies. She has had allergy shots as a child. She is not covering her pillows and mattress for dust mites which she is severely allergic.    She had a ganglion cyst removed from her left ankle by Dr Maddox on 6/25/20.  The left ankle is much improved. SHe still has foot pain from plantar fasciitis.     She tried to increase Cymbalta from 60 mg to 90 mg daily. She did not like how she felt on Cymbalta 90 mg daily so she is taking 60 mg dialy.  She has established care with DR Mckeon in psychiatry.  She continues to take Vyvance to help her focus.  She may start on ablify at bedtime to help with her mood.  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 SHe is worried about COIVD 19 and her asthma. SHe is taking trazodone 150 mg at bedtime which is helping her sleep    SHe has seen sleep medicine and has been diagnoed with sleep apnea. She is wearing a CPAP machine nighlty for 6-7 hours. She feels more refreshed in the morning when she wakes up.       She takes lisinopril 10 mg once daily for hypertension.      No periods.  She is uptodate on mammogram. Has not had a colonoscopy          Past Medical History:   Diagnosis Date    Anxiety      Asthma      Depression      Esophageal reflux      H/O mammogram 01/20/2017     Normal  (DIS)     Herpes simplex virus (HSV) infection       no out breaks    Hypertension      Peptic ulcer      Sleep  "apnea       doesnt use cpap            Past Surgical History:   Procedure Laterality Date    ENDOMETRIAL BIOPSY N/A 3/11/2020     Procedure: BIOPSY, ENDOMETRIUM;  Surgeon: José Miguel Leon MD;  Location: [facility];  Service: OB/GYN;  Laterality: N/A;    RHINOPLASTY   2006    SINUS SURGERY   2006    VULVECTOMY N/A 3/11/2020     Procedure: VULVECTOMY;  Surgeon: José Miguel Leon MD;  Location: [facility] OR;  Service: OB/GYN;  Laterality: N/A;            Meds and allergies: updated on EPIC     REVIEW OF SYSTEMS: She denies fevers, chills, night sweats, fatigue, visual change, hearing loss, sinus congestion, sore throat, chest pain, nausea, vomiting, diarrhea, dysuria, hematuria, polydipsia, polyuria headaches     Physical exam:  /70   Pulse 76   Ht 5' 5" (1.651 m)   Wt 108.4 kg (238 lb 15.7 oz)   LMP 07/12/2018 (Approximate)   SpO2 98%   BMI 39.77 kg/m²     General: alert, oriented x 3, no apparent distress.  Affect normal  HEENT: Conjunctivae: anicteric,  Neck: supple, no thyroid enlargement, no cervical lymphadenopathy  Resp: effort normal, lungs clear bilaterally  CV: Regular rate and rhythm without murmurs, gallops or rubs, no lower extremity edema,        Assessment/Plan:        Asthma and Allergies- to follow up with allergy clinic.  She is at high risk for complications from COVID - discused safety with covid.   Anxiety and depression - folow up with psychiatru  Sleep apnea - on cpap machine and feeling bnefit   S/p left ankle surgery - doing better.  Vitamin D deficiency - on vitamin D 1000 untis daily  Vitamin B12 deficiency - on vitamin B12 1000 mcg daily  She is to return in 12 weeks, sooner if problems arise    "

## 2020-08-04 ENCOUNTER — TELEPHONE (OUTPATIENT)
Dept: INTERNAL MEDICINE | Facility: CLINIC | Age: 55
End: 2020-08-04

## 2020-08-04 NOTE — TELEPHONE ENCOUNTER
----- Message from Marcos Miller sent at 8/4/2020  3:17 PM CDT -----  Regarding: call back   called in to speak with someone at the office regarding ankle pain she is experiencing. She would like a call back from the office and can be reached at    [phone]

## 2020-08-11 ENCOUNTER — PATIENT OUTREACH (OUTPATIENT)
Dept: ADMINISTRATIVE | Facility: OTHER | Age: 55
End: 2020-08-11

## 2020-08-11 ENCOUNTER — OFFICE VISIT (OUTPATIENT)
Dept: ORTHOPEDICS | Facility: CLINIC | Age: 55
End: 2020-08-11
Payer: COMMERCIAL

## 2020-08-11 DIAGNOSIS — Z09 FOLLOW-UP EXAMINATION AFTER ORTHOPEDIC SURGERY: Primary | ICD-10-CM

## 2020-08-11 DIAGNOSIS — Z12.11 COLON CANCER SCREENING: Primary | ICD-10-CM

## 2020-08-11 DIAGNOSIS — Z12.11 ENCOUNTER FOR FIT (FECAL IMMUNOCHEMICAL TEST) SCREENING: ICD-10-CM

## 2020-08-11 DIAGNOSIS — M67.472 GANGLION CYST OF LEFT FOOT: ICD-10-CM

## 2020-08-11 PROCEDURE — 99024 POSTOP FOLLOW-UP VISIT: CPT | Mod: S$GLB,,, | Performed by: ORTHOPAEDIC SURGERY

## 2020-08-11 PROCEDURE — 99999 PR PBB SHADOW E&M-EST. PATIENT-LVL I: CPT | Mod: PBBFAC,,, | Performed by: ORTHOPAEDIC SURGERY

## 2020-08-11 PROCEDURE — 99024 PR POST-OP FOLLOW-UP VISIT: ICD-10-PCS | Mod: S$GLB,,, | Performed by: ORTHOPAEDIC SURGERY

## 2020-08-11 PROCEDURE — 99999 PR PBB SHADOW E&M-EST. PATIENT-LVL I: ICD-10-PCS | Mod: PBBFAC,,, | Performed by: ORTHOPAEDIC SURGERY

## 2020-08-11 RX ORDER — METHYLPREDNISOLONE 4 MG/1
TABLET ORAL
Qty: 1 PACKAGE | Refills: 0 | Status: SHIPPED | OUTPATIENT
Start: 2020-08-11 | End: 2020-09-16

## 2020-08-11 RX ORDER — NAPROXEN 500 MG/1
500 TABLET ORAL EVERY 12 HOURS PRN
Qty: 60 TABLET | Refills: 0 | Status: SHIPPED | OUTPATIENT
Start: 2020-08-11 | End: 2020-09-11

## 2020-08-12 NOTE — PROGRESS NOTES
Requested updates from Care Everywhere.  Reviewed chart for overdue SHAHANA topics.  Updated Health Maintenance.   Reconciled immunizations in LINKS.  Orders placed: fit kit

## 2020-08-12 NOTE — PROGRESS NOTES
Joy Crawford  Returns today for follow-up.  She is now 6 weeks postop from excision of a complex ganglion cyst from the left foot and ankle.  She reports continued symptoms around the surgical site which are improving but also reports more diffuse pains about her left foot and ankle which were not present prior to surgery.  Some of the symptoms seem to be neurogenic in nature may be related to her swelling.  She has been active on her left foot ankle with work.    Examination:  The left foot reveals a well-healed incision but with some continued surrounding erythema and swelling.  There is mild diffuse swelling of the entire left foot and ankle.  She has good motion of her ankle and subtalar joint with minimal discomfort.  She has good distal pulses.    Impression:  6 weeks postop excision complex ganglion cyst left foot ankle    Recommendation:  I believe the diffuse symptoms she is currently having are related to continued postsurgical healing and swelling.  I reassured her that this should resolve with time.  She can continue to progress her activity and shoe wear as pain and swelling allow.    Follow-up in 6 weeks

## 2020-08-24 ENCOUNTER — OFFICE VISIT (OUTPATIENT)
Dept: URGENT CARE | Facility: CLINIC | Age: 55
End: 2020-08-24
Payer: COMMERCIAL

## 2020-08-24 VITALS
TEMPERATURE: 98 F | SYSTOLIC BLOOD PRESSURE: 121 MMHG | DIASTOLIC BLOOD PRESSURE: 84 MMHG | HEART RATE: 85 BPM | OXYGEN SATURATION: 95 %

## 2020-08-24 DIAGNOSIS — M50.30 DDD (DEGENERATIVE DISC DISEASE), CERVICAL: ICD-10-CM
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
## 2020-08-24 DIAGNOSIS — S16.1XXA NECK STRAIN, INITIAL ENCOUNTER: ICD-10-CM

## 2020-08-24 PROCEDURE — 72040 XR CERVICAL SPINE 2 OR 3 VIEWS: ICD-10-PCS | Mod: S$GLB,,, | Performed by: RADIOLOGY

## 2020-08-24 PROCEDURE — 99214 OFFICE O/P EST MOD 30 MIN: CPT | Mod: 25,S$GLB,, | Performed by: INTERNAL MEDICINE

## 2020-08-24 PROCEDURE — 96372 PR INJECTION,THERAP/PROPH/DIAG2ST, IM OR SUBCUT: ICD-10-PCS | Mod: S$GLB,,, | Performed by: INTERNAL MEDICINE

## 2020-08-24 PROCEDURE — 72100 X-RAY EXAM L-S SPINE 2/3 VWS: CPT | Mod: S$GLB,,, | Performed by: RADIOLOGY

## 2020-08-24 PROCEDURE — 96372 THER/PROPH/DIAG INJ SC/IM: CPT | Mod: S$GLB,,, | Performed by: INTERNAL MEDICINE

## 2020-08-24 PROCEDURE — 99214 PR OFFICE/OUTPT VISIT, EST, LEVL IV, 30-39 MIN: ICD-10-PCS | Mod: 25,S$GLB,, | Performed by: INTERNAL MEDICINE

## 2020-08-24 PROCEDURE — 72040 X-RAY EXAM NECK SPINE 2-3 VW: CPT | Mod: S$GLB,,, | Performed by: RADIOLOGY

## 2020-08-24 PROCEDURE — 72100 XR LUMBAR SPINE 2 OR 3 VIEWS: ICD-10-PCS | Mod: S$GLB,,, | Performed by: RADIOLOGY

## 2020-08-24 RX ORDER — METHOCARBAMOL 500 MG/1
500 TABLET, FILM COATED ORAL 3 TIMES DAILY
Qty: 15 TABLET | Refills: 0 | Status: SHIPPED | OUTPATIENT
Start: 2020-08-24 | End: 2020-08-29

## 2020-08-24 RX ORDER — KETOROLAC TROMETHAMINE 30 MG/ML
30 INJECTION, SOLUTION INTRAMUSCULAR; INTRAVENOUS
Status: COMPLETED | OUTPATIENT
Start: 2020-08-24 | End: 2020-08-24

## 2020-08-24 RX ORDER — DICLOFENAC SODIUM 10 MG/G
2 GEL TOPICAL 3 TIMES DAILY
Qty: 100 G | Refills: 0 | Status: SHIPPED | OUTPATIENT
Start: 2020-08-24 | End: 2022-07-11

## 2020-08-24 RX ADMIN — KETOROLAC TROMETHAMINE 30 MG: 30 INJECTION, SOLUTION INTRAMUSCULAR; INTRAVENOUS at 09:08

## 2020-08-24 NOTE — PATIENT INSTRUCTIONS
Degenerative Disk Disease    Spinal disks are gel-filled cushions between the bones, or vertebrae, of the spine. The disks act like shock absorbers. Over time, the disks may break down. This is called degenerative disk disease.  This condition can affect the neck or back. It is one of the most common causes of low back pain. It is the leading cause of disability in people under age 45 in the [place]. The pain often remains localized to the lower back or neck. Muscle spasm is often present and adds to the pain.  Disk degeneration is a natural part of aging. But it is not painful for most people. It may also occur as a result of repeated minor injuries due to daily activities, sports, or accidents. It may lead to osteoarthritis of the spine. Back pain related to disk disease may come and go. Or it may become chronic and last for months or years. The disk may bulge or rupture. This is called a slipped disk or herniated disk. That can put pressure on a nearby spinal nerve and cause neck or back pain that spreads down one arm or leg.  X-rays or an MRI may help to diagnose this condition. For acute pain, treatment includes anti-inflammatory medicines, muscle relaxants, rest, ice, or heat. Strong prescription pain medicines, called opioids, may be needed for short-term treatment if pain suddenly gets worse. Opioid medicines can be addictive. So they are not advised for long-term pain management. Other types of medicines are preferred. Surgery is generally not used to treat this condition unless there is a complication.    Home care  · For neck pain: Use a comfortable pillow that supports the head and keeps the spine in a neutral position. Your head should not be tilted forward or backward.  · For back pain: Avoid sitting for long periods of time. This puts more stress on the lower back than standing or walking. Starting a regular exercise program to strengthen the supporting muscles of the spine will make it easier  to live with degenerative disk disease.  · Apply an ice pack over the injured area for no more than 15 to 20 minutes. Do this every 3 to 6 hours for the first 24 to 48 hours. To make an ice pack, put ice cubes in a plastic bag that seals at the top. Wrap the bag in a clean, thin towel or cloth. Never put ice or an ice pack directly on the skin. Keep using ice packs to ease pain and swelling as needed. After 48 hours, apply heat (warm shower or warm bath) for 20 minutes several times a day, or switch between ice and heat.   · You may use over-the-counter pain medicine to control pain, unless another pain medicine was prescribed. If you have chronic liver or kidney disease or ever had a stomach ulcer or GI bleeding, talk with your provider before using these medicines.  Follow-up care  Follow up with your healthcare provider, or as directed.  If X-rays, a CT scan or an MRI were done, you will be notified of any new findings that may affect your care.  When to seek medical advice  Contact your healthcare provider right away if any of these occur:  · Increasing back pain  · Your foot drags when you walk, a condition called foot drop  · You have new weakness, numbness, or pain in one or both arms or legs  · Loss of bowel or bladder control  · Numbness or tingling in the buttock or groin area  Date Last Reviewed: 11/23/2015  © 7011-0171 [employer]. [address] [place] [address]. All rights reserved. This information is not intended as a substitute for professional medical care. Always follow your healthcare professional's instructions.      PLEASE READ YOUR DISCHARGE INSTRUCTIONS ENTIRELY AS IT CONTAINS IMPORTANT INFORMATION.      Please drink plenty of fluids and get plenty of rest.  Apply Ice to the area.   You may do gently stretching if tolerable.      You were prescribed Robaxen, a muscle relaxer. Do not drive or operate heavy equipment/machinery while taking these medications. Do not drink  alcohol while taking this medication. Take a half first to see how it affects you, before increasing to full dose that was prescribed.     Take Naproxen once a day for 7 days on a full stomach as needed for pain/swelling. You should take an over the counter antacid with it (Prilosec, Nexium, Pepcid) to protect your stomach. Do not take these medications on an empty stomach or in combination with other NSAIDs as this will increase your risk of bleeding significantly.     Apply Voltaren to area as needed for pain    If you were not prescribed an anti-inflammatory medication, and if you do not have any history of stomach/intestinal ulcers, or kidney disease, or are not taking a blood thinner such as Coumadin, Plavix, Pradaxa, Eloquis, Xarelto or Aspirin, it is OK to take over the counter Ibuprofen or Advil or Motrin or Aleve as directed. Do not take these medications on an empty stomach or in combination with other NSAIDs as this will increase your risk of bleeding significantly.     Over the counter creams like icy hot, salon pas, biofreeze to the area for additional pain relief. Avoid heavy lifting or straining. Good posture. Do not look down at a computer or phone all day. Do not slouch when you drive.    Do not stay in one position too long. When sleeping on your back, place a pillow under knees to reduce tension on back. If sleeping on your side, place pillow between knees to keep spine in better alignment. Wear supportive shoes such as tennis shoes for support of the lower back. You can purchase heating pads and/or ice packs at any pharmacy or on [employer].       If you develop fever, headache, or worsening neck stiffness/pain, please go to the nearest Emergency Department immediately.    If you lose control or sensation of any extremity, please go to the nearest Emergency Department immediately.      Please arrange follow up with your primary care doctor as soon as possible. You must understand that you've received  an Urgent Care treatment only and that you may be released before all of your medical problems are known or treated. You, the patient, will arrange for follow up as instructed. If your symptoms worsen or fail to improve you should go to the Emergency Room.      If you smoke, please stop smoking!!
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
## 2020-08-26 ENCOUNTER — PATIENT MESSAGE (OUTPATIENT)
Dept: INTERNAL MEDICINE | Facility: CLINIC | Age: 55
End: 2020-08-26

## 2020-09-11 ENCOUNTER — OFFICE VISIT (OUTPATIENT)
Dept: ORTHOPEDICS | Facility: CLINIC | Age: 55
End: 2020-09-11
Payer: COMMERCIAL

## 2020-09-11 DIAGNOSIS — Z09 FOLLOW-UP EXAMINATION AFTER ORTHOPEDIC SURGERY: ICD-10-CM

## 2020-09-11 DIAGNOSIS — M79.2 NEUROGENIC PAIN OF FOOT, LEFT: ICD-10-CM

## 2020-09-11 DIAGNOSIS — G89.18 POST-OP PAIN: Primary | ICD-10-CM

## 2020-09-11 PROCEDURE — 99024 PR POST-OP FOLLOW-UP VISIT: ICD-10-PCS | Mod: S$GLB,,, | Performed by: ORTHOPAEDIC SURGERY

## 2020-09-11 PROCEDURE — 99999 PR PBB SHADOW E&M-EST. PATIENT-LVL II: ICD-10-PCS | Mod: PBBFAC,,, | Performed by: ORTHOPAEDIC SURGERY

## 2020-09-11 PROCEDURE — 99999 PR PBB SHADOW E&M-EST. PATIENT-LVL II: CPT | Mod: PBBFAC,,, | Performed by: ORTHOPAEDIC SURGERY

## 2020-09-11 PROCEDURE — 99024 POSTOP FOLLOW-UP VISIT: CPT | Mod: S$GLB,,, | Performed by: ORTHOPAEDIC SURGERY

## 2020-09-11 RX ORDER — GABAPENTIN 300 MG/1
300 CAPSULE ORAL 3 TIMES DAILY
Qty: 90 CAPSULE | Refills: 1 | Status: SHIPPED | OUTPATIENT
Start: 2020-09-11 | End: 2021-04-21

## 2020-09-11 RX ORDER — HYDROCODONE BITARTRATE AND ACETAMINOPHEN 5; 325 MG/1; MG/1
1 TABLET ORAL EVERY 4 HOURS PRN
Qty: 42 TABLET | Refills: 0 | Status: SHIPPED | OUTPATIENT
Start: 2020-09-11 | End: 2020-09-21

## 2020-09-11 NOTE — PROGRESS NOTES
Joy Crawford  Returns today for follow-up.  She is now 10 weeks postop from excision of a complex ganglion cyst from the dorsal lateral aspect of her left foot and ankle.  The multi lobulated cyst was associated with the extensor tendons on the dorsal lateral aspect of the foot as well as possibly the subtalar joint.  Subsequent to surgery she has had significant difficulties related to pain and dysfunction.  She comes in today frustrated and with tears because of continued pain which she describes as burning in nature and occurs both with activity as well as with rest.  She reports this pain occurs laterally down the left foot as well as dorsally across the top of the foot on either side of the incision.  She does not report any fevers or other systemic signs of infection  She is currently only taking nonsteroidal anti-inflammatory medications.      Examination:  There appears to be less swelling than the last time I saw her.  There is some mild erythema around the incision, but the incision is well healed without any drainage.  She has tenderness and hypersensitivity dorsally and laterally about the foot.  She has active motion of her ankle.  She has active extension of her lesser toes with mild discomfort.  She has good function of her peroneus brevis tendon.    Impression:  10 weeks postop excision complex ganglion cyst left foot    Recommendation:  I am not really sure how to explain her current symptoms.  The cyst was associated with the extensor tendons and to some degree the subtalar joint.  I did not encounter any nerve branches during the excision.  I am going to place her in a fracture boot to immobilize her foot.  I am going to prescribe Neurontin for what appears to be neurogenic pain.  I also give prescription for some stronger pain medication to take as needed.    Follow-up in 2 weeks

## 2020-09-16 ENCOUNTER — OFFICE VISIT (OUTPATIENT)
Dept: INTERNAL MEDICINE | Facility: CLINIC | Age: 55
End: 2020-09-16
Payer: COMMERCIAL

## 2020-09-16 ENCOUNTER — PATIENT MESSAGE (OUTPATIENT)
Dept: INTERNAL MEDICINE | Facility: CLINIC | Age: 55
End: 2020-09-16

## 2020-09-16 VITALS
DIASTOLIC BLOOD PRESSURE: 80 MMHG | WEIGHT: 246.69 LBS | SYSTOLIC BLOOD PRESSURE: 100 MMHG | HEART RATE: 95 BPM | BODY MASS INDEX: 41.05 KG/M2 | OXYGEN SATURATION: 95 %

## 2020-09-16 DIAGNOSIS — R10.13 DYSPEPSIA: Primary | ICD-10-CM

## 2020-09-16 PROCEDURE — 99999 PR PBB SHADOW E&M-EST. PATIENT-LVL V: CPT | Mod: PBBFAC,,, | Performed by: NURSE PRACTITIONER

## 2020-09-16 PROCEDURE — 99999 PR PBB SHADOW E&M-EST. PATIENT-LVL V: ICD-10-PCS | Mod: PBBFAC,,, | Performed by: NURSE PRACTITIONER

## 2020-09-16 PROCEDURE — 99214 OFFICE O/P EST MOD 30 MIN: CPT | Mod: S$GLB,,, | Performed by: NURSE PRACTITIONER

## 2020-09-16 PROCEDURE — 1126F PR PAIN SEVERITY QUANTIFIED, NO PAIN PRESENT: ICD-10-PCS | Mod: S$GLB,,, | Performed by: NURSE PRACTITIONER

## 2020-09-16 PROCEDURE — 3074F PR MOST RECENT SYSTOLIC BLOOD PRESSURE < 130 MM HG: ICD-10-PCS | Mod: CPTII,S$GLB,, | Performed by: NURSE PRACTITIONER

## 2020-09-16 PROCEDURE — 3079F DIAST BP 80-89 MM HG: CPT | Mod: CPTII,S$GLB,, | Performed by: NURSE PRACTITIONER

## 2020-09-16 PROCEDURE — 3074F SYST BP LT 130 MM HG: CPT | Mod: CPTII,S$GLB,, | Performed by: NURSE PRACTITIONER

## 2020-09-16 PROCEDURE — 3008F PR BODY MASS INDEX (BMI) DOCUMENTED: ICD-10-PCS | Mod: CPTII,S$GLB,, | Performed by: NURSE PRACTITIONER

## 2020-09-16 PROCEDURE — 3079F PR MOST RECENT DIASTOLIC BLOOD PRESSURE 80-89 MM HG: ICD-10-PCS | Mod: CPTII,S$GLB,, | Performed by: NURSE PRACTITIONER

## 2020-09-16 PROCEDURE — 1126F AMNT PAIN NOTED NONE PRSNT: CPT | Mod: S$GLB,,, | Performed by: NURSE PRACTITIONER

## 2020-09-16 PROCEDURE — 3008F BODY MASS INDEX DOCD: CPT | Mod: CPTII,S$GLB,, | Performed by: NURSE PRACTITIONER

## 2020-09-16 PROCEDURE — 99214 PR OFFICE/OUTPT VISIT, EST, LEVL IV, 30-39 MIN: ICD-10-PCS | Mod: S$GLB,,, | Performed by: NURSE PRACTITIONER

## 2020-09-16 RX ORDER — PANTOPRAZOLE SODIUM 20 MG/1
TABLET, DELAYED RELEASE ORAL
Qty: 30 TABLET | Refills: 0 | Status: SHIPPED | OUTPATIENT
Start: 2020-09-16 | End: 2021-04-21

## 2020-09-16 RX ORDER — ONDANSETRON 8 MG/1
8 TABLET, ORALLY DISINTEGRATING ORAL EVERY 6 HOURS PRN
Qty: 30 TABLET | Refills: 0 | Status: SHIPPED | OUTPATIENT
Start: 2020-09-16 | End: 2020-12-03

## 2020-09-16 RX ORDER — PROMETHAZINE HYDROCHLORIDE 25 MG/1
25 TABLET ORAL EVERY 4 HOURS
Qty: 20 TABLET | Refills: 0 | Status: SHIPPED | OUTPATIENT
Start: 2020-09-16 | End: 2020-12-03
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
## 2020-09-22 ENCOUNTER — TELEPHONE (OUTPATIENT)
Dept: ORTHOPEDICS | Facility: CLINIC | Age: 55
End: 2020-09-22

## 2020-09-22 ENCOUNTER — PATIENT OUTREACH (OUTPATIENT)
Dept: ADMINISTRATIVE | Facility: OTHER | Age: 55
End: 2020-09-22

## 2020-09-22 NOTE — TELEPHONE ENCOUNTER
Spoke to patient in regards to appointment scheduled for 3:00pm on 09/22/2020. Patient stated that the appointment should have been canceled. Also stated that she have an appointment schedule for 3:15 09/25/2020. Stated to patient that I will cancel the appointment for today. Patient stated thank you. Thanks.

## 2020-09-25 ENCOUNTER — OFFICE VISIT (OUTPATIENT)
Dept: ORTHOPEDICS | Facility: CLINIC | Age: 55
End: 2020-09-25
Payer: COMMERCIAL

## 2020-09-25 DIAGNOSIS — Z09 FOLLOW-UP EXAMINATION AFTER ORTHOPEDIC SURGERY: Primary | ICD-10-CM

## 2020-09-25 PROCEDURE — 99024 PR POST-OP FOLLOW-UP VISIT: ICD-10-PCS | Mod: S$GLB,,, | Performed by: ORTHOPAEDIC SURGERY

## 2020-09-25 PROCEDURE — 99999 PR PBB SHADOW E&M-EST. PATIENT-LVL I: CPT | Mod: PBBFAC,,, | Performed by: ORTHOPAEDIC SURGERY

## 2020-09-25 PROCEDURE — 99024 POSTOP FOLLOW-UP VISIT: CPT | Mod: S$GLB,,, | Performed by: ORTHOPAEDIC SURGERY

## 2020-09-25 PROCEDURE — 99999 PR PBB SHADOW E&M-EST. PATIENT-LVL I: ICD-10-PCS | Mod: PBBFAC,,, | Performed by: ORTHOPAEDIC SURGERY

## 2020-09-28 NOTE — PROGRESS NOTES
Joy Crawford  Returns today for follow-up.  She is now 12 weeks postop from excision of a complex ganglion cyst from the dorsal lateral aspect of her left foot ankle.  Her last visit was 2 weeks ago at which time she was having significant neurogenic pain about her left foot ankle as well as some continued pain related to the surgical site.  I prescribed Neurontin and suggested immobilization and foot which she has done.  She returns reports that the bearing pain is significantly improved continues with some lateral hindfoot pain with weight-bearing activities.      Examination:  The left foot incision is well healed at this point.  There is still some mild swelling around the surgical site but no erythema today.  Her dorsal lateral hypersensitivity is resolved.  Her tenderness is somewhat in the region of the sinus tarsi area of her hindfoot possibly consistent with subtalar arthrosis.    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  I suggested that we could consider injecting the joint at some point but I would recommend continuing to give this further time.  I encouraged her to progress her activities.    Follow-up in 4 weeks

## 2020-10-04 ENCOUNTER — PATIENT MESSAGE (OUTPATIENT)
Dept: ORTHOPEDICS | Facility: CLINIC | Age: 55
End: 2020-10-04

## 2020-10-05 ENCOUNTER — PATIENT MESSAGE (OUTPATIENT)
Dept: ADMINISTRATIVE | Facility: HOSPITAL | Age: 55
End: 2020-10-05

## 2020-10-05 NOTE — TELEPHONE ENCOUNTER
No new care gaps identified.  Powered by [employer]. Reference number: [id number]. 10/05/2020 2:31:46 PM   CDT

## 2020-10-06 RX ORDER — TRAZODONE HYDROCHLORIDE 150 MG/1
TABLET ORAL
Qty: 30 TABLET | Refills: 3 | Status: SHIPPED | OUTPATIENT
Start: 2020-10-06 | End: 2021-02-04 | Stop reason: SDUPTHER
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
## 2020-10-23 ENCOUNTER — PATIENT OUTREACH (OUTPATIENT)
Dept: ADMINISTRATIVE | Facility: OTHER | Age: 55
End: 2020-10-23

## 2020-10-23 NOTE — PROGRESS NOTES
Requested updates within Care Everywhere.  Patient's chart was reviewed for overdue SHAHANA topics.  Immunizations reconciled.    Orders placed:n/a  Tasked appts:n/a  Labs Linked:n/a

## 2020-11-04 ENCOUNTER — PATIENT MESSAGE (OUTPATIENT)
Dept: ORTHOPEDICS | Facility: CLINIC | Age: 55
End: 2020-11-04

## 2020-11-30 ENCOUNTER — TELEPHONE (OUTPATIENT)
Dept: OBSTETRICS AND GYNECOLOGY | Facility: CLINIC | Age: 55
End: 2020-11-30

## 2020-11-30 DIAGNOSIS — N95.0 PMB (POSTMENOPAUSAL BLEEDING): Primary | ICD-10-CM

## 2020-11-30 NOTE — TELEPHONE ENCOUNTER
Pt c/o having period-like symptoms such as cramping this morning.  When she went to the bathroom to check, she saw some bright red blood on her underwear but nothing upon wiping.  States she had this same problem last year around the same.  Pt not on hormones.      Scheduled pt for US and appointment with Dr. Leon on 12/3 at 10:30am.

## 2020-12-03 ENCOUNTER — OFFICE VISIT (OUTPATIENT)
Dept: OBSTETRICS AND GYNECOLOGY | Facility: CLINIC | Age: 55
End: 2020-12-03
Attending: OBSTETRICS & GYNECOLOGY
Payer: COMMERCIAL

## 2020-12-03 VITALS — BODY MASS INDEX: 40.77 KG/M2 | HEIGHT: 65 IN | WEIGHT: 244.69 LBS

## 2020-12-03 DIAGNOSIS — Z11.51 ENCOUNTER FOR SCREENING FOR HUMAN PAPILLOMAVIRUS (HPV): ICD-10-CM

## 2020-12-03 DIAGNOSIS — Z12.4 ENCOUNTER FOR PAPANICOLAOU SMEAR FOR CERVICAL CANCER SCREENING: ICD-10-CM

## 2020-12-03 DIAGNOSIS — N95.0 POST-MENOPAUSAL BLEEDING: Primary | ICD-10-CM

## 2020-12-03 PROCEDURE — 58100 BIOPSY OF UTERUS LINING: CPT | Mod: S$GLB,,, | Performed by: OBSTETRICS & GYNECOLOGY

## 2020-12-03 PROCEDURE — 88175 CYTOPATH C/V AUTO FLUID REDO: CPT

## 2020-12-03 PROCEDURE — 88305 TISSUE EXAM BY PATHOLOGIST: CPT | Performed by: PATHOLOGY

## 2020-12-03 PROCEDURE — 87624 HPV HI-RISK TYP POOLED RSLT: CPT

## 2020-12-03 PROCEDURE — 3008F PR BODY MASS INDEX (BMI) DOCUMENTED: ICD-10-PCS | Mod: CPTII,S$GLB,, | Performed by: OBSTETRICS & GYNECOLOGY

## 2020-12-03 PROCEDURE — 1126F AMNT PAIN NOTED NONE PRSNT: CPT | Mod: S$GLB,,, | Performed by: OBSTETRICS & GYNECOLOGY

## 2020-12-03 PROCEDURE — 1126F PR PAIN SEVERITY QUANTIFIED, NO PAIN PRESENT: ICD-10-PCS | Mod: S$GLB,,, | Performed by: OBSTETRICS & GYNECOLOGY

## 2020-12-03 PROCEDURE — 99214 PR OFFICE/OUTPT VISIT, EST, LEVL IV, 30-39 MIN: ICD-10-PCS | Mod: 25,S$GLB,, | Performed by: OBSTETRICS & GYNECOLOGY

## 2020-12-03 PROCEDURE — 99999 PR PBB SHADOW E&M-EST. PATIENT-LVL III: CPT | Mod: PBBFAC,,, | Performed by: OBSTETRICS & GYNECOLOGY

## 2020-12-03 PROCEDURE — 58100 PR BIOPSY OF UTERUS LINING: ICD-10-PCS | Mod: S$GLB,,, | Performed by: OBSTETRICS & GYNECOLOGY

## 2020-12-03 PROCEDURE — 88305 TISSUE EXAM BY PATHOLOGIST: ICD-10-PCS | Mod: 26,,, | Performed by: PATHOLOGY

## 2020-12-03 PROCEDURE — 99214 OFFICE O/P EST MOD 30 MIN: CPT | Mod: 25,S$GLB,, | Performed by: OBSTETRICS & GYNECOLOGY

## 2020-12-03 PROCEDURE — 88305 TISSUE EXAM BY PATHOLOGIST: CPT | Mod: 26,,, | Performed by: PATHOLOGY

## 2020-12-03 PROCEDURE — 3008F BODY MASS INDEX DOCD: CPT | Mod: CPTII,S$GLB,, | Performed by: OBSTETRICS & GYNECOLOGY

## 2020-12-03 PROCEDURE — 99999 PR PBB SHADOW E&M-EST. PATIENT-LVL III: ICD-10-PCS | Mod: PBBFAC,,, | Performed by: OBSTETRICS & GYNECOLOGY

## 2020-12-03 NOTE — PROGRESS NOTES
Subjective:       Patient ID: Joy Crawford is a 55 y.o. female.    Chief Complaint:  Gyn ultrasound (C/o post menopausal bleeding when wiped on Monday )      History of Present Illness  55-year-old with an episode of slight postmenopausal bleeding on Monday when she went to the bathroom and noticed a spot on her underwear. Nothing with wiping and none on a pad bleeding has not occurred since.    She had a similar episode in March of this year.  An endometrial biopsy was performed at that time with very scant tissue obtained mostly endocervical tissue.  Ultrasound at that time showed a 3 mm endometrial stripe.  Today she re-presented for an ultrasound which again showed a normal endometrial cavity and EMS 9mm.  An endometrial biopsy and a Pap smear was performed today.  Again today very scant endometrial tissue was obtained.  Will await path report.    GYN & OB History  Patient's last menstrual period was 2018 (approximate).   Date of Last Pap: 12/3/2020    OB History    Para Term  AB Living   1 1 1     1   SAB TAB Ectopic Multiple Live Births           1      # Outcome Date GA Lbr Balwinder/2nd Weight Sex Delivery Anes PTL Lv   1 Term  40w0d  3.175 kg (7 lb) M Vag-Spont EPI  ENEIDA       Review of Systems  Review of Systems     Objective:     There were no vitals filed for this visit.    Physical Exam:   Constitutional: She is oriented to person, place, and time. She appears well-developed and well-nourished.    HENT:   Head: Normocephalic.       Pulmonary/Chest: Effort normal.          Genitourinary:    Vagina and uterus normal.   There is no rash or tenderness on the right labia. There is no rash or tenderness on the left labia. Uterus is not enlarged and not tender. Cervix is normal. Right adnexum displays no mass and no tenderness. Left adnexum displays no mass and no tenderness. Cervix exhibits no discharge and no friability.        Uterus Size: 7 cm   Musculoskeletal: Normal range of motion.        Neurological: She is alert and oriented to person, place, and time.    Skin: Skin is warm and dry.    Psychiatric: She has a normal mood and affect. Her behavior is normal.     Pap and HPV done  EMB procedure note  After written consent obtained, Speculum placed. Betadine used to clean the cervix. A single tooth tenaculum was placed on the anterior lip of the cervix. EMB pipelle used and scant  tissue obtained.. Single tooth removed with excellent hemostasis. The speculum was removed. The patient tolerated procedure well.    U/s FINDINGS:  Uterus: Measures 6.6 x 3.5 x 4.2 cm.  Endometrial echo complex measures 9 mm, unremarkable.  Appearance is unremarkable.     Right ovary: Measures 2.2 x 2.1 x 1.6 cm.  Appearance is unremarkable.  1.1 cm follicle noted.  Blood flow is present.     Left ovary: Measures 2.5 x 1.6 x 1.3 cm.  Appearance is unremarkable.  Blood flow is present.     Miscellaneous: No free fluid.     Impression:     Unremarkable examination.     Assessment/ Plan:     Orders Placed This Encounter    HPV High Risk Genotypes, PCR    Specimen to Pathology, Ob/Gyn    Liquid-Based Pap Smear, Screening       Joy was seen today for gyn ultrasound.    Diagnoses and all orders for this visit:    Post-menopausal bleeding x 1 episode of scant spotting in bathroom 4 days ago  Today she re-presented for an ultrasound which again showed a normal endometrial cavity and EMS 9mm.  An endometrial biopsy and a Pap smear was performed today.  Again today very scant endometrial tissue was obtained.  Will await path report.    -     Specimen to Pathology, Ob/Gyn    Encounter for Papanicolaou smear for cervical cancer screening  -     Liquid-Based Pap Smear, Screening    Encounter for screening for human papillomavirus (HPV)  -     HPV High Risk Genotypes, PCR        Follow up if symptoms worsen or fail to improve.      Health Maintenance       Date Due Completion Date    Hepatitis C Screening 1965 ---    HIV  Screening 04/05/1981 ---    TETANUS VACCINE 04/05/1984 ---    Pneumococcal Vaccine (Medium Risk) (1 of 1 - PPSV23) 04/05/1985 ---    Shingles Vaccine (1 of 2) 04/05/2016 ---    Colorectal Cancer Screening 04/05/2016 ---    Cervical Cancer Screening 02/06/2020 2/6/2017    Influenza Vaccine (1) 08/01/2020 11/19/2019    Mammogram 06/24/2021 6/24/2019    Lipid Panel 05/27/2025 5/27/2020

## 2020-12-08 LAB
HPV HR 12 DNA SPEC QL NAA+PROBE: NEGATIVE
HPV16 AG SPEC QL: NEGATIVE
HPV18 DNA SPEC QL NAA+PROBE: NEGATIVE

## 2020-12-10 LAB
FINAL PATHOLOGIC DIAGNOSIS: NORMAL
GROSS: NORMAL

## 2020-12-12 NOTE — PROGRESS NOTES
Bola Hamilton,  Just wanted to let you know you biopsy has returned and your uterine/ endometrial biopsy is completely benign!!  No abnormal cells and no cancer cells.  Let me know if you have any other questions or concerns.  Your pap is still pending and I will send it to you when it is done.  Let me know if there is any further bleeding.  Take care ,  Dr Leon

## 2020-12-14 ENCOUNTER — OFFICE VISIT (OUTPATIENT)
Dept: URGENT CARE | Facility: CLINIC | Age: 55
End: 2020-12-14
Payer: COMMERCIAL

## 2020-12-14 VITALS
OXYGEN SATURATION: 98 % | HEIGHT: 65 IN | RESPIRATION RATE: 18 BRPM | BODY MASS INDEX: 39.99 KG/M2 | DIASTOLIC BLOOD PRESSURE: 93 MMHG | TEMPERATURE: 99 F | SYSTOLIC BLOOD PRESSURE: 148 MMHG | HEART RATE: 89 BPM | WEIGHT: 240 LBS

## 2020-12-14 DIAGNOSIS — J02.9 SORE THROAT: Primary | ICD-10-CM

## 2020-12-14 DIAGNOSIS — R09.81 SINUS CONGESTION: ICD-10-CM

## 2020-12-14 LAB
CTP QC/QA: YES
SARS-COV-2 RDRP RESP QL NAA+PROBE: NEGATIVE

## 2020-12-14 PROCEDURE — U0002: ICD-10-PCS | Mod: QW,S$GLB,, | Performed by: FAMILY MEDICINE

## 2020-12-14 PROCEDURE — 3008F PR BODY MASS INDEX (BMI) DOCUMENTED: ICD-10-PCS | Mod: CPTII,S$GLB,, | Performed by: FAMILY MEDICINE

## 2020-12-14 PROCEDURE — U0002 COVID-19 LAB TEST NON-CDC: HCPCS | Mod: QW,S$GLB,, | Performed by: FAMILY MEDICINE

## 2020-12-14 PROCEDURE — 3008F BODY MASS INDEX DOCD: CPT | Mod: CPTII,S$GLB,, | Performed by: FAMILY MEDICINE

## 2020-12-14 PROCEDURE — 99213 OFFICE O/P EST LOW 20 MIN: CPT | Mod: S$GLB,,, | Performed by: FAMILY MEDICINE

## 2020-12-14 PROCEDURE — 99213 PR OFFICE/OUTPT VISIT, EST, LEVL III, 20-29 MIN: ICD-10-PCS | Mod: S$GLB,,, | Performed by: FAMILY MEDICINE

## 2020-12-14 NOTE — PATIENT INSTRUCTIONS
Please return or see your primary care doctor if you develop new or worsening symptoms.     Please arrange follow up with your primary medical clinic as soon as possible. You must understand that you've received an Urgent Care treatment only and that you may be released before all of your medical problems are known or treated. You, the patient, will arrange for follow up as instructed. If your symptoms worsen or fail to improve you should go to the Emergency Room.    Instructions for Patients with Confirmed or Suspected COVID-19    If you are awaiting your test result, you will either be called or it will be released to the patient portal.  If you have any questions about your test, please visit [email or web address] or call our COVID-19 information line at [phone].      Instructions for non-hospitalized or discharged patients with confirmed or suspected COVID-19:       Stay home except to get medical care.    Separate yourself from other people and animals in your home.    Call ahead before visiting your doctor.    Wear a face mask.    Cover your coughs and sneezes.    Clean your hands often.    Avoid sharing personal household items.    Clean all high-touch surfaces every day.    Monitor your symptoms. Seek prompt medical attention if your illness is worsening (e.g., difficulty breathing). Before seeking care, call your healthcare provider.    If you have a medical emergency and must call 911, notify the dispatcher that you have or are being evaluated for COVID-19. If possible, put on a face mask before emergency medical services arrive.    Use the following symptom-based strategy to return to normal activity following a suspected or confirmed case of COVID-19. Continue isolation until:   o At least 3 days (72 hours) have passed since recovery defined as resolution of fever without the use of fever-reducing medications and improvement in respiratory symptoms (e.g. cough, shortness of  breath), and   o At least 10 days have passed since the first positive test.       As one of the next steps, you will receive a call or text from the [place] Department of Health ([facility]) COVID-19 Tracing Team. See the contact information below so you know not to ignore the health departments call. It is important that you contact them back immediately so they can help.     Contact Tracer Number:  [phone]  Caller ID for most carriers: [facility]    What is contact tracing?   Contact tracing is a process that helps identify everyone who has been in close contact with an infected person. Contact tracers let those people know they may have been exposed and guide them on next steps. Confidentiality is important for everyone; no one will be told who may have exposed them to the virus.   Your involvement is important. The more we know about where and how this virus is spreading, the better chance we have at stopping it from spreading further.  What does exposure mean?   Exposure means you have been within 6 feet for more than 15 minutes with a person who has or had COVID-19.  What kind of questions do the contact tracers ask?   A contact tracer will confirm your basic contact information including name, address, phone number, and next of kin, as well as asking about any symptoms you may have had. Theyll also ask you how you think you may have gotten sick, such as places where you may have been exposed to the virus, and people you were with. Those names will never be shared with anyone outside of that call, and will only be used to help trace and stop the spread of the virus.   I have privacy concerns. How will the state use my information?   Your privacy about your health is important. All calls are completed using call centers that use the appropriate health privacy protection measures (HIPAA compliance), meaning that your patient information is safe. No one will ever ask you any questions related to  immigration status. Your health comes first.   Do I have to participate?   You do not have to participate, but we strongly encourage you to. Contact tracing can help us catch and control new outbreaks as theyre developing to keep your friends and family safe.   What if I dont hear from anyone?   If you dont receive a call within 24 hours, you can call the number above right away to inquire about your status. That line is open from 8:00 am - 8:00 p.m., 7 days a week.  Contact tracing saves lives! Together, we have the power to beat this virus and keep our loved ones and neighbors safe.       Instructions for household members, intimate partners and caregivers in a non-healthcare setting of a patient with confirmed or suspected COVID-19:         Close contacts should monitor their health and call their healthcare provider right away if they develop symptoms suggestive of COVID-19 (e.g., fever, cough, shortness of breath).    Stay home except to get medical care. Separate yourself from other people and animals in the home.   Monitor the patients symptoms. If the patient is getting sicker, call his or her healthcare provider. If the patient has a medical emergency and you need to call 911, notify the dispatch personnel that the patient has or is being evaluated for COVID-19.    Wear a facemask when around other people such as sharing a room or vehicle and before entering a healthcare provider's office.   Cover coughs and sneezes with a tissue. Throw used tissues in a lined trash can immediately and wash hands.   Clean hands often with soap and water for at least 20 seconds or with an alcohol-based hand , rubbing hands together until they feel dry. Avoid touching your eyes, nose, and mouth with unwashed hands.   Clean all high-touch; surfaces every day, including counters, tabletops, doorknobs, bathroom fixtures, toilets, phones, keyboards, tablets, bedside tables, etc. Use a household cleaning  spray or wipe according to label instructions.   Avoid sharing personal household items such as dishes, drinking glasses, cups, towels, bedding, etc. After these items are used, they should be washed thoroughly with soap and water.   Continue isolation until:   At least 3 days (72 hours) have passed since recovery defined as resolution of fever without the use of fever-reducing medications and improvement in respiratory symptoms (e.g. cough, shortness of breath), and    At least 10 days have passed since the patients first positive test.    https://www.cdc.gov/coronavirus/2019-ncov/your-health/index.htm

## 2020-12-14 NOTE — PROGRESS NOTES
"Subjective:       Patient ID: Joy Crawford is a 55 y.o. female.    Vitals:  height is 5' 5" (1.651 m) and weight is 108.9 kg (240 lb). Her temperature is 98.8 °F (37.1 °C). Her blood pressure is 148/93 (abnormal) and her pulse is 89. Her respiration is 18 and oxygen saturation is 98%.     Chief Complaint: Sore Throat    Pt c/o sore throat, ear pain, head ache    Sore Throat   This is a new problem. The current episode started in the past 7 days. The problem has been gradually worsening. Neither side of throat is experiencing more pain than the other. There has been no fever. The pain is at a severity of 0/10. The patient is experiencing no pain. Associated symptoms include coughing, headaches and shortness of breath. Pertinent negatives include no congestion, diarrhea or vomiting. She has had no exposure to strep or mono. She has tried acetaminophen for the symptoms. The treatment provided no relief.       Constitution: Positive for fatigue. Negative for chills and fever.   HENT: Positive for sore throat. Negative for congestion.    Neck: Negative for painful lymph nodes.   Cardiovascular: Negative for chest pain and leg swelling.   Eyes: Negative for double vision and blurred vision.   Respiratory: Positive for cough and shortness of breath.    Gastrointestinal: Negative for nausea, vomiting and diarrhea.   Genitourinary: Negative for dysuria, frequency, urgency and history of kidney stones.   Musculoskeletal: Negative for joint pain, joint swelling, muscle cramps and muscle ache.   Skin: Negative for color change, pale, rash and bruising.   Allergic/Immunologic: Negative for seasonal allergies.   Neurological: Positive for headaches. Negative for dizziness, history of vertigo, light-headedness and passing out.   Hematologic/Lymphatic: Negative for swollen lymph nodes.   Psychiatric/Behavioral: Negative for nervous/anxious, sleep disturbance and depression. The patient is not nervous/anxious.        Objective: "      Physical Exam   Constitutional: She is oriented to person, place, and time. She appears well-developed. She is cooperative.  Non-toxic appearance. She does not appear ill. No distress.   HENT:   Head: Normocephalic and atraumatic.   Ears:   Right Ear: Hearing, tympanic membrane, external ear and ear canal normal.   Left Ear: Hearing, tympanic membrane, external ear and ear canal normal.   Nose: Nose normal. No mucosal edema, rhinorrhea or nasal deformity. No epistaxis. Right sinus exhibits no maxillary sinus tenderness and no frontal sinus tenderness. Left sinus exhibits no maxillary sinus tenderness and no frontal sinus tenderness.   Mouth/Throat: Uvula is midline, oropharynx is clear and moist and mucous membranes are normal. No trismus in the jaw. Normal dentition. No uvula swelling. No oropharyngeal exudate, posterior oropharyngeal edema or posterior oropharyngeal erythema.   Eyes: Conjunctivae and lids are normal. No scleral icterus.   Neck: Trachea normal, full passive range of motion without pain and phonation normal. Neck supple. No neck rigidity. No edema and no erythema present.   Cardiovascular: Normal rate, regular rhythm, normal heart sounds and normal pulses.   Pulmonary/Chest: Effort normal and breath sounds normal. No respiratory distress. She has no decreased breath sounds. She has no rhonchi.   Abdominal: Normal appearance.   Musculoskeletal: Normal range of motion.         General: No deformity.   Neurological: She is alert and oriented to person, place, and time. She exhibits normal muscle tone. Coordination normal.   Skin: Skin is warm, dry, intact, not diaphoretic and not pale. Psychiatric: Her speech is normal and behavior is normal. Judgment and thought content normal.   Nursing note and vitals reviewed.        Assessment:       1. Sore throat    2. Sinus congestion        Plan:         Sore throat  -     POCT COVID-19 Rapid Screening    Sinus congestion

## 2020-12-29 ENCOUNTER — PATIENT MESSAGE (OUTPATIENT)
Dept: OBSTETRICS AND GYNECOLOGY | Facility: CLINIC | Age: 55
End: 2020-12-29

## 2021-01-04 ENCOUNTER — PATIENT MESSAGE (OUTPATIENT)
Dept: ADMINISTRATIVE | Facility: HOSPITAL | Age: 56
End: 2021-01-04

## 2021-01-14 LAB
FINAL PATHOLOGIC DIAGNOSIS: NORMAL
Lab: NORMAL

## 2021-01-20 ENCOUNTER — OFFICE VISIT (OUTPATIENT)
Dept: URGENT CARE | Facility: CLINIC | Age: 56
End: 2021-01-20
Payer: COMMERCIAL

## 2021-01-20 ENCOUNTER — PATIENT MESSAGE (OUTPATIENT)
Dept: INTERNAL MEDICINE | Facility: CLINIC | Age: 56
End: 2021-01-20

## 2021-01-20 VITALS
HEART RATE: 87 BPM | SYSTOLIC BLOOD PRESSURE: 123 MMHG | WEIGHT: 240 LBS | OXYGEN SATURATION: 95 % | HEIGHT: 65 IN | BODY MASS INDEX: 39.99 KG/M2 | DIASTOLIC BLOOD PRESSURE: 90 MMHG | RESPIRATION RATE: 16 BRPM | TEMPERATURE: 99 F

## 2021-01-20 DIAGNOSIS — U07.1 COVID-19: Primary | ICD-10-CM

## 2021-01-20 LAB
CTP QC/QA: YES
SARS-COV-2 RDRP RESP QL NAA+PROBE: POSITIVE

## 2021-01-20 PROCEDURE — U0002: ICD-10-PCS | Mod: QW,S$GLB,, | Performed by: NURSE PRACTITIONER

## 2021-01-20 PROCEDURE — 99214 OFFICE O/P EST MOD 30 MIN: CPT | Mod: S$GLB,,, | Performed by: NURSE PRACTITIONER

## 2021-01-20 PROCEDURE — 3008F BODY MASS INDEX DOCD: CPT | Mod: CPTII,S$GLB,, | Performed by: NURSE PRACTITIONER

## 2021-01-20 PROCEDURE — 3008F PR BODY MASS INDEX (BMI) DOCUMENTED: ICD-10-PCS | Mod: CPTII,S$GLB,, | Performed by: NURSE PRACTITIONER

## 2021-01-20 PROCEDURE — 99214 PR OFFICE/OUTPT VISIT, EST, LEVL IV, 30-39 MIN: ICD-10-PCS | Mod: S$GLB,,, | Performed by: NURSE PRACTITIONER

## 2021-01-20 PROCEDURE — U0002 COVID-19 LAB TEST NON-CDC: HCPCS | Mod: QW,S$GLB,, | Performed by: NURSE PRACTITIONER

## 2021-01-20 RX ORDER — PROMETHAZINE HYDROCHLORIDE AND DEXTROMETHORPHAN HYDROBROMIDE 6.25; 15 MG/5ML; MG/5ML
5 SYRUP ORAL NIGHTLY PRN
Qty: 118 ML | Refills: 0 | Status: SHIPPED | OUTPATIENT
Start: 2021-01-20 | End: 2021-01-30

## 2021-01-21 ENCOUNTER — TELEPHONE (OUTPATIENT)
Dept: ADMINISTRATIVE | Facility: CLINIC | Age: 56
End: 2021-01-21

## 2021-01-22 ENCOUNTER — PATIENT MESSAGE (OUTPATIENT)
Dept: INTERNAL MEDICINE | Facility: CLINIC | Age: 56
End: 2021-01-22

## 2021-01-22 ENCOUNTER — PATIENT MESSAGE (OUTPATIENT)
Dept: ADMINISTRATIVE | Facility: OTHER | Age: 56
End: 2021-01-22

## 2021-01-23 ENCOUNTER — NURSE TRIAGE (OUTPATIENT)
Dept: ADMINISTRATIVE | Facility: CLINIC | Age: 56
End: 2021-01-23

## 2021-01-23 ENCOUNTER — PATIENT MESSAGE (OUTPATIENT)
Dept: ADMINISTRATIVE | Facility: OTHER | Age: 56
End: 2021-01-23

## 2021-01-24 ENCOUNTER — PATIENT MESSAGE (OUTPATIENT)
Dept: ADMINISTRATIVE | Facility: OTHER | Age: 56
End: 2021-01-24

## 2021-01-24 ENCOUNTER — NURSE TRIAGE (OUTPATIENT)
Dept: ADMINISTRATIVE | Facility: CLINIC | Age: 56
End: 2021-01-24

## 2021-01-24 ENCOUNTER — TELEPHONE (OUTPATIENT)
Dept: ADMINISTRATIVE | Facility: CLINIC | Age: 56
End: 2021-01-24

## 2021-01-24 RX ORDER — DULOXETIN HYDROCHLORIDE 60 MG/1
60 CAPSULE, DELAYED RELEASE ORAL DAILY
Qty: 30 CAPSULE | Refills: 2 | Status: SHIPPED | OUTPATIENT
Start: 2021-01-24 | End: 2021-04-21 | Stop reason: SDUPTHER

## 2021-01-24 RX ORDER — DOXYCYCLINE HYCLATE 100 MG
100 TABLET ORAL 2 TIMES DAILY
Qty: 20 TABLET | Refills: 0 | Status: SHIPPED | OUTPATIENT
Start: 2021-01-24 | End: 2021-02-03

## 2021-01-24 RX ORDER — DULOXETIN HYDROCHLORIDE 30 MG/1
30 CAPSULE, DELAYED RELEASE ORAL DAILY
Qty: 30 CAPSULE | Refills: 3 | Status: SHIPPED | OUTPATIENT
Start: 2021-01-24 | End: 2021-04-21

## 2021-01-25 ENCOUNTER — PATIENT MESSAGE (OUTPATIENT)
Dept: ADMINISTRATIVE | Facility: OTHER | Age: 56
End: 2021-01-25

## 2021-01-26 ENCOUNTER — PATIENT MESSAGE (OUTPATIENT)
Dept: ADMINISTRATIVE | Facility: OTHER | Age: 56
End: 2021-01-26

## 2021-01-27 ENCOUNTER — PATIENT MESSAGE (OUTPATIENT)
Dept: ADMINISTRATIVE | Facility: OTHER | Age: 56
End: 2021-01-27

## 2021-01-27 ENCOUNTER — PATIENT MESSAGE (OUTPATIENT)
Dept: INTERNAL MEDICINE | Facility: CLINIC | Age: 56
End: 2021-01-27

## 2021-01-28 ENCOUNTER — PATIENT MESSAGE (OUTPATIENT)
Dept: ADMINISTRATIVE | Facility: OTHER | Age: 56
End: 2021-01-28

## 2021-01-28 ENCOUNTER — PATIENT MESSAGE (OUTPATIENT)
Dept: ADMINISTRATIVE | Facility: CLINIC | Age: 56
End: 2021-01-28

## 2021-01-29 ENCOUNTER — PATIENT MESSAGE (OUTPATIENT)
Dept: ADMINISTRATIVE | Facility: CLINIC | Age: 56
End: 2021-01-29

## 2021-01-29 ENCOUNTER — PATIENT MESSAGE (OUTPATIENT)
Dept: ADMINISTRATIVE | Facility: OTHER | Age: 56
End: 2021-01-29

## 2021-01-30 ENCOUNTER — PATIENT MESSAGE (OUTPATIENT)
Dept: ADMINISTRATIVE | Facility: CLINIC | Age: 56
End: 2021-01-30

## 2021-01-30 ENCOUNTER — PATIENT MESSAGE (OUTPATIENT)
Dept: ADMINISTRATIVE | Facility: OTHER | Age: 56
End: 2021-01-30

## 2021-01-31 ENCOUNTER — PATIENT MESSAGE (OUTPATIENT)
Dept: ADMINISTRATIVE | Facility: OTHER | Age: 56
End: 2021-01-31

## 2021-02-01 ENCOUNTER — TELEPHONE (OUTPATIENT)
Dept: ADMINISTRATIVE | Facility: CLINIC | Age: 56
End: 2021-02-01

## 2021-02-01 ENCOUNTER — PATIENT MESSAGE (OUTPATIENT)
Dept: ADMINISTRATIVE | Facility: OTHER | Age: 56
End: 2021-02-01

## 2021-02-02 ENCOUNTER — PATIENT MESSAGE (OUTPATIENT)
Dept: ADMINISTRATIVE | Facility: OTHER | Age: 56
End: 2021-02-02

## 2021-02-02 ENCOUNTER — PATIENT MESSAGE (OUTPATIENT)
Dept: ADMINISTRATIVE | Facility: CLINIC | Age: 56
End: 2021-02-02

## 2021-02-03 ENCOUNTER — PATIENT MESSAGE (OUTPATIENT)
Dept: ADMINISTRATIVE | Facility: CLINIC | Age: 56
End: 2021-02-03

## 2021-02-04 RX ORDER — TRAZODONE HYDROCHLORIDE 150 MG/1
TABLET ORAL
Qty: 30 TABLET | Refills: 3 | Status: SHIPPED | OUTPATIENT
Start: 2021-02-04 | End: 2021-06-14

## 2021-04-01 ENCOUNTER — OFFICE VISIT (OUTPATIENT)
Dept: URGENT CARE | Facility: CLINIC | Age: 56
End: 2021-04-01
Payer: COMMERCIAL

## 2021-04-01 VITALS
DIASTOLIC BLOOD PRESSURE: 92 MMHG | RESPIRATION RATE: 12 BRPM | SYSTOLIC BLOOD PRESSURE: 142 MMHG | TEMPERATURE: 98 F | OXYGEN SATURATION: 98 % | HEART RATE: 99 BPM

## 2021-04-01 DIAGNOSIS — J02.9 SORE THROAT: ICD-10-CM

## 2021-04-01 DIAGNOSIS — R05.9 COUGH: ICD-10-CM

## 2021-04-01 DIAGNOSIS — H60.502 ACUTE OTITIS EXTERNA OF LEFT EAR, UNSPECIFIED TYPE: Primary | ICD-10-CM

## 2021-04-01 LAB
CTP QC/QA: YES
S PYO RRNA THROAT QL PROBE: NEGATIVE

## 2021-04-01 PROCEDURE — 99213 OFFICE O/P EST LOW 20 MIN: CPT | Mod: 25,S$GLB,, | Performed by: NURSE PRACTITIONER

## 2021-04-01 PROCEDURE — 99213 PR OFFICE/OUTPT VISIT, EST, LEVL III, 20-29 MIN: ICD-10-PCS | Mod: 25,S$GLB,, | Performed by: NURSE PRACTITIONER

## 2021-04-01 PROCEDURE — 87880 POCT RAPID STREP A: ICD-10-PCS | Mod: QW,S$GLB,, | Performed by: NURSE PRACTITIONER

## 2021-04-01 PROCEDURE — 87880 STREP A ASSAY W/OPTIC: CPT | Mod: QW,S$GLB,, | Performed by: NURSE PRACTITIONER

## 2021-04-01 RX ORDER — NEOMYCIN SULFATE, POLYMYXIN B SULFATE AND HYDROCORTISONE 10; 3.5; 1 MG/ML; MG/ML; [USP'U]/ML
4 SUSPENSION/ DROPS AURICULAR (OTIC) 4 TIMES DAILY
Qty: 11 ML | Refills: 0 | Status: SHIPPED | OUTPATIENT
Start: 2021-04-01 | End: 2021-04-11

## 2021-04-05 ENCOUNTER — PATIENT MESSAGE (OUTPATIENT)
Dept: ADMINISTRATIVE | Facility: HOSPITAL | Age: 56
End: 2021-04-05

## 2021-04-16 ENCOUNTER — PATIENT MESSAGE (OUTPATIENT)
Dept: INTERNAL MEDICINE | Facility: CLINIC | Age: 56
End: 2021-04-16

## 2021-04-16 RX ORDER — LISINOPRIL 10 MG/1
TABLET ORAL
Qty: 90 TABLET | Refills: 2 | Status: SHIPPED | OUTPATIENT
Start: 2021-04-16 | End: 2021-10-22

## 2021-04-16 RX ORDER — LISINOPRIL 10 MG/1
TABLET ORAL
Refills: 2 | Status: CANCELLED | OUTPATIENT
Start: 2021-04-16

## 2021-04-19 ENCOUNTER — PATIENT MESSAGE (OUTPATIENT)
Dept: PSYCHIATRY | Facility: CLINIC | Age: 56
End: 2021-04-19

## 2021-04-19 ENCOUNTER — PATIENT MESSAGE (OUTPATIENT)
Dept: INTERNAL MEDICINE | Facility: CLINIC | Age: 56
End: 2021-04-19

## 2021-04-20 ENCOUNTER — TELEPHONE (OUTPATIENT)
Dept: INTERNAL MEDICINE | Facility: CLINIC | Age: 56
End: 2021-04-20

## 2021-04-20 ENCOUNTER — PATIENT MESSAGE (OUTPATIENT)
Dept: INTERNAL MEDICINE | Facility: CLINIC | Age: 56
End: 2021-04-20

## 2021-04-21 ENCOUNTER — OFFICE VISIT (OUTPATIENT)
Dept: PSYCHIATRY | Facility: CLINIC | Age: 56
End: 2021-04-21
Payer: COMMERCIAL

## 2021-04-21 DIAGNOSIS — F41.9 ANXIETY: Primary | ICD-10-CM

## 2021-04-21 DIAGNOSIS — F43.23 ADJUSTMENT DISORDER WITH MIXED ANXIETY AND DEPRESSED MOOD: ICD-10-CM

## 2021-04-21 DIAGNOSIS — F33.41 RECURRENT MAJOR DEPRESSIVE DISORDER, IN PARTIAL REMISSION: ICD-10-CM

## 2021-04-21 PROCEDURE — 90792 PR PSYCHIATRIC DIAGNOSTIC EVALUATION W/MEDICAL SERVICES: ICD-10-PCS | Mod: 95,,, | Performed by: NURSE PRACTITIONER

## 2021-04-21 PROCEDURE — 90792 PSYCH DIAG EVAL W/MED SRVCS: CPT | Mod: 95,,, | Performed by: NURSE PRACTITIONER

## 2021-04-21 RX ORDER — DULOXETIN HYDROCHLORIDE 60 MG/1
60 CAPSULE, DELAYED RELEASE ORAL DAILY
Qty: 30 CAPSULE | Refills: 2 | Status: SHIPPED | OUTPATIENT
Start: 2021-04-21 | End: 2021-07-12 | Stop reason: SDUPTHER

## 2021-04-21 RX ORDER — ARIPIPRAZOLE 5 MG/1
5 TABLET ORAL DAILY
Qty: 30 TABLET | Refills: 2 | Status: SHIPPED | OUTPATIENT
Start: 2021-04-21 | End: 2021-07-12

## 2021-05-06 ENCOUNTER — PATIENT MESSAGE (OUTPATIENT)
Dept: ORTHOPEDICS | Facility: CLINIC | Age: 56
End: 2021-05-06

## 2021-05-10 ENCOUNTER — TELEPHONE (OUTPATIENT)
Dept: ORTHOPEDICS | Facility: CLINIC | Age: 56
End: 2021-05-10

## 2021-05-10 ENCOUNTER — HOSPITAL ENCOUNTER (OUTPATIENT)
Dept: RADIOLOGY | Facility: HOSPITAL | Age: 56
Discharge: HOME | End: 2021-05-10
Attending: NURSE PRACTITIONER
Payer: COMMERCIAL

## 2021-05-10 ENCOUNTER — PATIENT OUTREACH (OUTPATIENT)
Dept: ADMINISTRATIVE | Facility: OTHER | Age: 56
End: 2021-05-10

## 2021-05-10 ENCOUNTER — OFFICE VISIT (OUTPATIENT)
Dept: ORTHOPEDICS | Facility: CLINIC | Age: 56
End: 2021-05-10
Payer: COMMERCIAL

## 2021-05-10 VITALS
WEIGHT: 248 LBS | DIASTOLIC BLOOD PRESSURE: 97 MMHG | SYSTOLIC BLOOD PRESSURE: 141 MMHG | HEART RATE: 88 BPM | BODY MASS INDEX: 41.32 KG/M2 | HEIGHT: 65 IN

## 2021-05-10 DIAGNOSIS — M79.672 PAIN IN BOTH FEET: ICD-10-CM

## 2021-05-10 DIAGNOSIS — M77.30 CALCANEAL SPUR, UNSPECIFIED LATERALITY: Primary | ICD-10-CM

## 2021-05-10 DIAGNOSIS — Z12.31 ENCOUNTER FOR SCREENING MAMMOGRAM FOR MALIGNANT NEOPLASM OF BREAST: Primary | ICD-10-CM

## 2021-05-10 DIAGNOSIS — M79.672 PAIN IN BOTH FEET: Primary | ICD-10-CM

## 2021-05-10 DIAGNOSIS — M79.671 PAIN IN BOTH FEET: Primary | ICD-10-CM

## 2021-05-10 DIAGNOSIS — M79.671 PAIN IN BOTH FEET: ICD-10-CM

## 2021-05-10 DIAGNOSIS — M72.2 PLANTAR FASCIITIS, BILATERAL: ICD-10-CM

## 2021-05-10 PROCEDURE — 1125F PR PAIN SEVERITY QUANTIFIED, PAIN PRESENT: ICD-10-PCS | Mod: S$GLB,,, | Performed by: NURSE PRACTITIONER

## 2021-05-10 PROCEDURE — 99214 PR OFFICE/OUTPT VISIT, EST, LEVL IV, 30-39 MIN: ICD-10-PCS | Mod: S$GLB,,, | Performed by: NURSE PRACTITIONER

## 2021-05-10 PROCEDURE — 99999 PR PBB SHADOW E&M-EST. PATIENT-LVL III: ICD-10-PCS | Mod: PBBFAC,,, | Performed by: NURSE PRACTITIONER

## 2021-05-10 PROCEDURE — 99214 OFFICE O/P EST MOD 30 MIN: CPT | Mod: S$GLB,,, | Performed by: NURSE PRACTITIONER

## 2021-05-10 PROCEDURE — 73630 X-RAY EXAM OF FOOT: CPT | Mod: 26,,, | Performed by: RADIOLOGY

## 2021-05-10 PROCEDURE — 3008F PR BODY MASS INDEX (BMI) DOCUMENTED: ICD-10-PCS | Mod: CPTII,S$GLB,, | Performed by: NURSE PRACTITIONER

## 2021-05-10 PROCEDURE — 73630 X-RAY EXAM OF FOOT: CPT | Mod: TC,50

## 2021-05-10 PROCEDURE — 1125F AMNT PAIN NOTED PAIN PRSNT: CPT | Mod: S$GLB,,, | Performed by: NURSE PRACTITIONER

## 2021-05-10 PROCEDURE — 3008F BODY MASS INDEX DOCD: CPT | Mod: CPTII,S$GLB,, | Performed by: NURSE PRACTITIONER

## 2021-05-10 PROCEDURE — 99999 PR PBB SHADOW E&M-EST. PATIENT-LVL III: CPT | Mod: PBBFAC,,, | Performed by: NURSE PRACTITIONER

## 2021-05-10 PROCEDURE — 73630 XR FOOT COMPLETE 3 VIEW BILATERAL: ICD-10-PCS | Mod: 26,,, | Performed by: RADIOLOGY

## 2021-05-11 ENCOUNTER — PATIENT MESSAGE (OUTPATIENT)
Dept: ORTHOPEDICS | Facility: CLINIC | Age: 56
End: 2021-05-11

## 2021-06-01 DIAGNOSIS — Z00.00 ROUTINE GENERAL MEDICAL EXAMINATION AT A HEALTH CARE FACILITY: Primary | ICD-10-CM

## 2021-06-01 RX ORDER — ALPRAZOLAM 0.25 MG/1
0.25 TABLET ORAL NIGHTLY PRN
Qty: 15 TABLET | Refills: 0 | Status: SHIPPED | OUTPATIENT
Start: 2021-06-01 | End: 2021-08-16 | Stop reason: SDUPTHER

## 2021-06-12 DIAGNOSIS — G47.30 SLEEP APNEA, UNSPECIFIED TYPE: Primary | ICD-10-CM

## 2021-06-15 RX ORDER — TRAZODONE HYDROCHLORIDE 150 MG/1
TABLET ORAL
Qty: 90 TABLET | Refills: 0 | Status: SHIPPED | OUTPATIENT
Start: 2021-06-15 | End: 2021-07-12 | Stop reason: SDUPTHER

## 2021-07-06 ENCOUNTER — PATIENT MESSAGE (OUTPATIENT)
Dept: ADMINISTRATIVE | Facility: HOSPITAL | Age: 56
End: 2021-07-06

## 2021-07-12 ENCOUNTER — PATIENT MESSAGE (OUTPATIENT)
Dept: PSYCHIATRY | Facility: CLINIC | Age: 56
End: 2021-07-12

## 2021-07-12 ENCOUNTER — OFFICE VISIT (OUTPATIENT)
Dept: PSYCHIATRY | Facility: CLINIC | Age: 56
End: 2021-07-12
Payer: COMMERCIAL

## 2021-07-12 DIAGNOSIS — F90.0 ADHD (ATTENTION DEFICIT HYPERACTIVITY DISORDER), INATTENTIVE TYPE: Primary | ICD-10-CM

## 2021-07-12 DIAGNOSIS — F43.23 ADJUSTMENT DISORDER WITH MIXED ANXIETY AND DEPRESSED MOOD: ICD-10-CM

## 2021-07-12 DIAGNOSIS — F33.41 RECURRENT MAJOR DEPRESSIVE DISORDER, IN PARTIAL REMISSION: ICD-10-CM

## 2021-07-12 PROCEDURE — 99214 PR OFFICE/OUTPT VISIT, EST, LEVL IV, 30-39 MIN: ICD-10-PCS | Mod: 95,,, | Performed by: NURSE PRACTITIONER

## 2021-07-12 PROCEDURE — 99214 OFFICE O/P EST MOD 30 MIN: CPT | Mod: 95,,, | Performed by: NURSE PRACTITIONER

## 2021-07-12 RX ORDER — LISDEXAMFETAMINE DIMESYLATE 40 MG/1
40 CAPSULE ORAL DAILY
Qty: 30 CAPSULE | Refills: 0 | Status: SHIPPED | OUTPATIENT
Start: 2021-07-12 | End: 2022-05-30

## 2021-07-12 RX ORDER — DULOXETIN HYDROCHLORIDE 60 MG/1
60 CAPSULE, DELAYED RELEASE ORAL DAILY
Qty: 30 CAPSULE | Refills: 2 | Status: SHIPPED | OUTPATIENT
Start: 2021-07-12 | End: 2021-10-26 | Stop reason: SDUPTHER

## 2021-07-12 RX ORDER — TRAZODONE HYDROCHLORIDE 150 MG/1
TABLET ORAL
Qty: 90 TABLET | Refills: 0 | Status: SHIPPED | OUTPATIENT
Start: 2021-07-12 | End: 2021-10-26 | Stop reason: SDUPTHER

## 2021-07-19 ENCOUNTER — PATIENT MESSAGE (OUTPATIENT)
Dept: PSYCHIATRY | Facility: CLINIC | Age: 56
End: 2021-07-19

## 2021-07-30 ENCOUNTER — TELEPHONE (OUTPATIENT)
Dept: INTERNAL MEDICINE | Facility: CLINIC | Age: 56
End: 2021-07-30

## 2021-07-30 RX ORDER — ALBUTEROL SULFATE 90 UG/1
2 AEROSOL, METERED RESPIRATORY (INHALATION) EVERY 4 HOURS PRN
Qty: 18 G | Refills: 1 | Status: SHIPPED | OUTPATIENT
Start: 2021-07-30 | End: 2021-09-30

## 2021-08-16 RX ORDER — ALPRAZOLAM 0.25 MG/1
0.25 TABLET ORAL NIGHTLY PRN
Qty: 5 TABLET | Refills: 0 | Status: SHIPPED | OUTPATIENT
Start: 2021-08-16 | End: 2022-05-30

## 2021-08-17 ENCOUNTER — TELEPHONE (OUTPATIENT)
Dept: PSYCHIATRY | Facility: CLINIC | Age: 56
End: 2021-08-17

## 2021-08-17 DIAGNOSIS — F41.9 ANXIETY: Primary | ICD-10-CM

## 2021-08-17 RX ORDER — GABAPENTIN 300 MG/1
300 CAPSULE ORAL 3 TIMES DAILY
Qty: 90 CAPSULE | Refills: 2 | Status: SHIPPED | OUTPATIENT
Start: 2021-08-17 | End: 2022-07-11

## 2021-09-30 RX ORDER — ALBUTEROL SULFATE 90 UG/1
AEROSOL, METERED RESPIRATORY (INHALATION)
Qty: 54 G | Refills: 0 | Status: SHIPPED | OUTPATIENT
Start: 2021-09-30 | End: 2022-01-30

## 2021-10-04 ENCOUNTER — PATIENT MESSAGE (OUTPATIENT)
Dept: ADMINISTRATIVE | Facility: HOSPITAL | Age: 56
End: 2021-10-04

## 2021-10-21 ENCOUNTER — PATIENT MESSAGE (OUTPATIENT)
Dept: PSYCHIATRY | Facility: CLINIC | Age: 56
End: 2021-10-21

## 2021-10-21 RX ORDER — ALPRAZOLAM 0.25 MG/1
0.25 TABLET ORAL NIGHTLY PRN
Qty: 5 TABLET | Refills: 0 | Status: CANCELLED | OUTPATIENT
Start: 2021-10-21

## 2021-10-21 RX ORDER — BUSPIRONE HYDROCHLORIDE 5 MG/1
5 TABLET ORAL 2 TIMES DAILY
Qty: 30 TABLET | Refills: 0 | Status: SHIPPED | OUTPATIENT
Start: 2021-10-21 | End: 2021-10-21 | Stop reason: SDUPTHER

## 2021-10-21 RX ORDER — BUSPIRONE HYDROCHLORIDE 5 MG/1
5 TABLET ORAL 2 TIMES DAILY
Qty: 30 TABLET | Refills: 0 | Status: SHIPPED | OUTPATIENT
Start: 2021-10-21 | End: 2021-10-26 | Stop reason: SDUPTHER

## 2021-10-25 ENCOUNTER — PATIENT MESSAGE (OUTPATIENT)
Dept: PSYCHIATRY | Facility: CLINIC | Age: 56
End: 2021-10-25
Payer: COMMERCIAL

## 2021-10-25 RX ORDER — LISINOPRIL 10 MG/1
10 TABLET ORAL DAILY
Qty: 90 TABLET | Refills: 4 | Status: SHIPPED | OUTPATIENT
Start: 2021-10-25 | End: 2022-08-05 | Stop reason: SDUPTHER

## 2021-10-26 ENCOUNTER — TELEPHONE (OUTPATIENT)
Dept: PSYCHIATRY | Facility: CLINIC | Age: 56
End: 2021-10-26
Payer: COMMERCIAL

## 2021-10-26 ENCOUNTER — OFFICE VISIT (OUTPATIENT)
Dept: PSYCHIATRY | Facility: CLINIC | Age: 56
End: 2021-10-26
Payer: COMMERCIAL

## 2021-10-26 DIAGNOSIS — F43.23 ADJUSTMENT DISORDER WITH MIXED ANXIETY AND DEPRESSED MOOD: Primary | ICD-10-CM

## 2021-10-26 DIAGNOSIS — F33.41 RECURRENT MAJOR DEPRESSIVE DISORDER, IN PARTIAL REMISSION: ICD-10-CM

## 2021-10-26 PROCEDURE — 4010F ACE/ARB THERAPY RXD/TAKEN: CPT | Mod: CPTII,95,, | Performed by: NURSE PRACTITIONER

## 2021-10-26 PROCEDURE — 1159F PR MEDICATION LIST DOCUMENTED IN MEDICAL RECORD: ICD-10-PCS | Mod: CPTII,95,, | Performed by: NURSE PRACTITIONER

## 2021-10-26 PROCEDURE — 99214 PR OFFICE/OUTPT VISIT, EST, LEVL IV, 30-39 MIN: ICD-10-PCS | Mod: 95,,, | Performed by: NURSE PRACTITIONER

## 2021-10-26 PROCEDURE — 99214 OFFICE O/P EST MOD 30 MIN: CPT | Mod: 95,,, | Performed by: NURSE PRACTITIONER

## 2021-10-26 PROCEDURE — 1160F PR REVIEW ALL MEDS BY PRESCRIBER/CLIN PHARMACIST DOCUMENTED: ICD-10-PCS | Mod: CPTII,95,, | Performed by: NURSE PRACTITIONER

## 2021-10-26 PROCEDURE — 4010F PR ACE/ARB THEARPY RXD/TAKEN: ICD-10-PCS | Mod: CPTII,95,, | Performed by: NURSE PRACTITIONER

## 2021-10-26 PROCEDURE — 1159F MED LIST DOCD IN RCRD: CPT | Mod: CPTII,95,, | Performed by: NURSE PRACTITIONER

## 2021-10-26 PROCEDURE — 1160F RVW MEDS BY RX/DR IN RCRD: CPT | Mod: CPTII,95,, | Performed by: NURSE PRACTITIONER

## 2021-10-26 RX ORDER — TRAZODONE HYDROCHLORIDE 150 MG/1
TABLET ORAL
Qty: 30 TABLET | Refills: 2 | Status: SHIPPED | OUTPATIENT
Start: 2021-10-26 | End: 2022-03-29

## 2021-10-26 RX ORDER — BUSPIRONE HYDROCHLORIDE 5 MG/1
5 TABLET ORAL 2 TIMES DAILY
Qty: 60 TABLET | Refills: 2 | Status: SHIPPED | OUTPATIENT
Start: 2021-10-26 | End: 2021-12-10 | Stop reason: SDUPTHER

## 2021-10-26 RX ORDER — DULOXETIN HYDROCHLORIDE 60 MG/1
60 CAPSULE, DELAYED RELEASE ORAL DAILY
Qty: 30 CAPSULE | Refills: 2 | Status: SHIPPED | OUTPATIENT
Start: 2021-10-26 | End: 2021-11-01 | Stop reason: SDUPTHER

## 2021-10-26 RX ORDER — BUPROPION HYDROCHLORIDE 150 MG/1
150 TABLET ORAL DAILY
Qty: 30 TABLET | Refills: 2 | Status: SHIPPED | OUTPATIENT
Start: 2021-10-26 | End: 2022-01-24

## 2021-11-01 DIAGNOSIS — F43.23 ADJUSTMENT DISORDER WITH MIXED ANXIETY AND DEPRESSED MOOD: ICD-10-CM

## 2021-11-01 DIAGNOSIS — F33.41 RECURRENT MAJOR DEPRESSIVE DISORDER, IN PARTIAL REMISSION: ICD-10-CM

## 2021-11-01 RX ORDER — DULOXETIN HYDROCHLORIDE 60 MG/1
60 CAPSULE, DELAYED RELEASE ORAL DAILY
Qty: 30 CAPSULE | Refills: 2 | Status: SHIPPED | OUTPATIENT
Start: 2021-11-01 | End: 2022-05-02

## 2021-12-01 ENCOUNTER — PATIENT MESSAGE (OUTPATIENT)
Dept: PSYCHIATRY | Facility: CLINIC | Age: 56
End: 2021-12-01
Payer: COMMERCIAL

## 2021-12-02 RX ORDER — ZALEPLON 10 MG/1
10 CAPSULE ORAL NIGHTLY
Qty: 30 CAPSULE | Refills: 0 | Status: SHIPPED | OUTPATIENT
Start: 2021-12-02 | End: 2022-01-01

## 2021-12-09 ENCOUNTER — OFFICE VISIT (OUTPATIENT)
Dept: PSYCHIATRY | Facility: CLINIC | Age: 56
End: 2021-12-09
Payer: COMMERCIAL

## 2021-12-09 DIAGNOSIS — F41.1 GAD (GENERALIZED ANXIETY DISORDER): Primary | ICD-10-CM

## 2021-12-09 DIAGNOSIS — F33.1 MAJOR DEPRESSIVE DISORDER, RECURRENT EPISODE, MODERATE: ICD-10-CM

## 2021-12-09 PROCEDURE — 90791 PSYCH DIAGNOSTIC EVALUATION: CPT | Mod: S$GLB,,, | Performed by: SOCIAL WORKER

## 2021-12-09 PROCEDURE — 90791 PR PSYCHIATRIC DIAGNOSTIC EVALUATION: ICD-10-PCS | Mod: S$GLB,,, | Performed by: SOCIAL WORKER

## 2021-12-09 PROCEDURE — 4010F PR ACE/ARB THEARPY RXD/TAKEN: ICD-10-PCS | Mod: CPTII,S$GLB,, | Performed by: SOCIAL WORKER

## 2021-12-09 PROCEDURE — 4010F ACE/ARB THERAPY RXD/TAKEN: CPT | Mod: CPTII,S$GLB,, | Performed by: SOCIAL WORKER

## 2021-12-10 ENCOUNTER — TELEPHONE (OUTPATIENT)
Dept: PSYCHIATRY | Facility: CLINIC | Age: 56
End: 2021-12-10
Payer: COMMERCIAL

## 2021-12-10 DIAGNOSIS — F33.41 RECURRENT MAJOR DEPRESSIVE DISORDER, IN PARTIAL REMISSION: ICD-10-CM

## 2021-12-10 DIAGNOSIS — F43.23 ADJUSTMENT DISORDER WITH MIXED ANXIETY AND DEPRESSED MOOD: ICD-10-CM

## 2021-12-10 RX ORDER — BUSPIRONE HYDROCHLORIDE 10 MG/1
10 TABLET ORAL 3 TIMES DAILY
Qty: 90 TABLET | Refills: 2 | Status: SHIPPED | OUTPATIENT
Start: 2021-12-10 | End: 2022-05-30 | Stop reason: SDUPTHER

## 2021-12-10 RX ORDER — CLONAZEPAM 0.5 MG/1
0.5 TABLET ORAL DAILY PRN
Qty: 30 TABLET | Refills: 0 | Status: SHIPPED | OUTPATIENT
Start: 2021-12-10 | End: 2022-05-30

## 2021-12-29 ENCOUNTER — PATIENT MESSAGE (OUTPATIENT)
Dept: INTERNAL MEDICINE | Facility: CLINIC | Age: 56
End: 2021-12-29
Payer: COMMERCIAL

## 2021-12-30 ENCOUNTER — PATIENT MESSAGE (OUTPATIENT)
Dept: PSYCHIATRY | Facility: CLINIC | Age: 56
End: 2021-12-30
Payer: COMMERCIAL

## 2022-01-19 ENCOUNTER — PATIENT MESSAGE (OUTPATIENT)
Dept: ADMINISTRATIVE | Facility: HOSPITAL | Age: 57
End: 2022-01-19
Payer: COMMERCIAL

## 2022-01-30 RX ORDER — ALBUTEROL SULFATE 90 UG/1
AEROSOL, METERED RESPIRATORY (INHALATION)
Qty: 54 G | Refills: 0 | Status: SHIPPED | OUTPATIENT
Start: 2022-01-30 | End: 2022-05-02 | Stop reason: SDUPTHER

## 2022-02-08 ENCOUNTER — OFFICE VISIT (OUTPATIENT)
Dept: PSYCHIATRY | Facility: CLINIC | Age: 57
End: 2022-02-08
Payer: COMMERCIAL

## 2022-02-08 DIAGNOSIS — F33.41 RECURRENT MAJOR DEPRESSIVE DISORDER, IN PARTIAL REMISSION: Primary | ICD-10-CM

## 2022-02-08 DIAGNOSIS — F41.1 GAD (GENERALIZED ANXIETY DISORDER): ICD-10-CM

## 2022-02-08 PROCEDURE — 4010F ACE/ARB THERAPY RXD/TAKEN: CPT | Mod: CPTII,S$GLB,, | Performed by: SOCIAL WORKER

## 2022-02-08 PROCEDURE — 90834 PSYTX W PT 45 MINUTES: CPT | Mod: S$GLB,,, | Performed by: SOCIAL WORKER

## 2022-02-08 PROCEDURE — 4010F PR ACE/ARB THEARPY RXD/TAKEN: ICD-10-PCS | Mod: CPTII,S$GLB,, | Performed by: SOCIAL WORKER

## 2022-02-08 PROCEDURE — 90834 PR PSYCHOTHERAPY W/PATIENT, 45 MIN: ICD-10-PCS | Mod: S$GLB,,, | Performed by: SOCIAL WORKER
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
## 2022-02-10 NOTE — TELEPHONE ENCOUNTER
Pt does not want to wait until 03/24/20 for her procedure. She is a little freaked out and wants this done ASAP. Pt said you also didn't want her waiting and 03/24 will be over 4 weeks. Can you please look at your schedule and let me know if you can even do earlier.   
Try March 11 in the [place]ftenoon  paines case  
Unknown

## 2022-03-08 ENCOUNTER — OFFICE VISIT (OUTPATIENT)
Dept: PSYCHIATRY | Facility: CLINIC | Age: 57
End: 2022-03-08
Payer: COMMERCIAL

## 2022-03-08 DIAGNOSIS — F33.41 RECURRENT MAJOR DEPRESSIVE DISORDER, IN PARTIAL REMISSION: Primary | ICD-10-CM

## 2022-03-08 DIAGNOSIS — F41.1 GAD (GENERALIZED ANXIETY DISORDER): ICD-10-CM

## 2022-03-08 PROCEDURE — 90834 PSYTX W PT 45 MINUTES: CPT | Mod: S$GLB,,, | Performed by: SOCIAL WORKER

## 2022-03-08 PROCEDURE — 4010F PR ACE/ARB THEARPY RXD/TAKEN: ICD-10-PCS | Mod: CPTII,S$GLB,, | Performed by: SOCIAL WORKER

## 2022-03-08 PROCEDURE — 4010F ACE/ARB THERAPY RXD/TAKEN: CPT | Mod: CPTII,S$GLB,, | Performed by: SOCIAL WORKER

## 2022-03-08 PROCEDURE — 90834 PR PSYCHOTHERAPY W/PATIENT, 45 MIN: ICD-10-PCS | Mod: S$GLB,,, | Performed by: SOCIAL WORKER
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
## 2022-03-16 ENCOUNTER — PATIENT MESSAGE (OUTPATIENT)
Dept: ADMINISTRATIVE | Facility: HOSPITAL | Age: 57
End: 2022-03-16
Payer: COMMERCIAL

## 2022-03-30 ENCOUNTER — PATIENT MESSAGE (OUTPATIENT)
Dept: PSYCHIATRY | Facility: CLINIC | Age: 57
End: 2022-03-30
Payer: COMMERCIAL

## 2022-04-08 ENCOUNTER — PATIENT MESSAGE (OUTPATIENT)
Dept: PSYCHIATRY | Facility: CLINIC | Age: 57
End: 2022-04-08
Payer: COMMERCIAL

## 2022-04-16 ENCOUNTER — OFFICE VISIT (OUTPATIENT)
Dept: URGENT CARE | Facility: CLINIC | Age: 57
End: 2022-04-16
Payer: COMMERCIAL

## 2022-04-16 VITALS
OXYGEN SATURATION: 95 % | HEIGHT: 65 IN | HEART RATE: 82 BPM | WEIGHT: 254 LBS | TEMPERATURE: 98 F | SYSTOLIC BLOOD PRESSURE: 132 MMHG | RESPIRATION RATE: 18 BRPM | DIASTOLIC BLOOD PRESSURE: 91 MMHG | BODY MASS INDEX: 42.32 KG/M2

## 2022-04-16 DIAGNOSIS — J98.01 BRONCHOSPASM: ICD-10-CM

## 2022-04-16 DIAGNOSIS — J45.901 MILD ASTHMA WITH EXACERBATION, UNSPECIFIED WHETHER PERSISTENT: Primary | ICD-10-CM

## 2022-04-16 DIAGNOSIS — R06.2 WHEEZING: ICD-10-CM

## 2022-04-16 DIAGNOSIS — R09.81 SINUS CONGESTION: ICD-10-CM

## 2022-04-16 DIAGNOSIS — R05.9 COUGH: ICD-10-CM

## 2022-04-16 PROCEDURE — 1159F MED LIST DOCD IN RCRD: CPT | Mod: CPTII,S$GLB,, | Performed by: PHYSICIAN ASSISTANT

## 2022-04-16 PROCEDURE — 4010F ACE/ARB THERAPY RXD/TAKEN: CPT | Mod: CPTII,S$GLB,, | Performed by: PHYSICIAN ASSISTANT

## 2022-04-16 PROCEDURE — 1160F PR REVIEW ALL MEDS BY PRESCRIBER/CLIN PHARMACIST DOCUMENTED: ICD-10-PCS | Mod: CPTII,S$GLB,, | Performed by: PHYSICIAN ASSISTANT

## 2022-04-16 PROCEDURE — 3008F BODY MASS INDEX DOCD: CPT | Mod: CPTII,S$GLB,, | Performed by: PHYSICIAN ASSISTANT

## 2022-04-16 PROCEDURE — 94640 PR INHAL RX, AIRWAY OBST/DX SPUTUM INDUCT: ICD-10-PCS | Mod: 59,S$GLB,, | Performed by: PHYSICIAN ASSISTANT

## 2022-04-16 PROCEDURE — 3080F PR MOST RECENT DIASTOLIC BLOOD PRESSURE >= 90 MM HG: ICD-10-PCS | Mod: CPTII,S$GLB,, | Performed by: PHYSICIAN ASSISTANT

## 2022-04-16 PROCEDURE — 3075F PR MOST RECENT SYSTOLIC BLOOD PRESS GE 130-139MM HG: ICD-10-PCS | Mod: CPTII,S$GLB,, | Performed by: PHYSICIAN ASSISTANT

## 2022-04-16 PROCEDURE — 1160F RVW MEDS BY RX/DR IN RCRD: CPT | Mod: CPTII,S$GLB,, | Performed by: PHYSICIAN ASSISTANT

## 2022-04-16 PROCEDURE — 99205 OFFICE O/P NEW HI 60 MIN: CPT | Mod: 25,S$GLB,, | Performed by: PHYSICIAN ASSISTANT

## 2022-04-16 PROCEDURE — 4010F PR ACE/ARB THEARPY RXD/TAKEN: ICD-10-PCS | Mod: CPTII,S$GLB,, | Performed by: PHYSICIAN ASSISTANT

## 2022-04-16 PROCEDURE — 96372 PR INJECTION,THERAP/PROPH/DIAG2ST, IM OR SUBCUT: ICD-10-PCS | Mod: 59,S$GLB,, | Performed by: PHYSICIAN ASSISTANT

## 2022-04-16 PROCEDURE — 94640 AIRWAY INHALATION TREATMENT: CPT | Mod: S$GLB,,, | Performed by: PHYSICIAN ASSISTANT

## 2022-04-16 PROCEDURE — 99205 PR OFFICE/OUTPT VISIT, NEW, LEVL V, 60-74 MIN: ICD-10-PCS | Mod: 25,S$GLB,, | Performed by: PHYSICIAN ASSISTANT

## 2022-04-16 PROCEDURE — 3075F SYST BP GE 130 - 139MM HG: CPT | Mod: CPTII,S$GLB,, | Performed by: PHYSICIAN ASSISTANT

## 2022-04-16 PROCEDURE — 96372 THER/PROPH/DIAG INJ SC/IM: CPT | Mod: 59,S$GLB,, | Performed by: PHYSICIAN ASSISTANT

## 2022-04-16 PROCEDURE — 3080F DIAST BP >= 90 MM HG: CPT | Mod: CPTII,S$GLB,, | Performed by: PHYSICIAN ASSISTANT

## 2022-04-16 PROCEDURE — 3008F PR BODY MASS INDEX (BMI) DOCUMENTED: ICD-10-PCS | Mod: CPTII,S$GLB,, | Performed by: PHYSICIAN ASSISTANT

## 2022-04-16 PROCEDURE — 1159F PR MEDICATION LIST DOCUMENTED IN MEDICAL RECORD: ICD-10-PCS | Mod: CPTII,S$GLB,, | Performed by: PHYSICIAN ASSISTANT

## 2022-04-16 RX ORDER — ALBUTEROL SULFATE 0.83 MG/ML
2.5 SOLUTION RESPIRATORY (INHALATION) EVERY 6 HOURS PRN
Qty: 90 EACH | Refills: 3 | Status: SHIPPED | OUTPATIENT
Start: 2022-04-16 | End: 2022-05-30

## 2022-04-16 RX ORDER — IPRATROPIUM BROMIDE 0.5 MG/2.5ML
0.5 SOLUTION RESPIRATORY (INHALATION)
Status: COMPLETED | OUTPATIENT
Start: 2022-04-16 | End: 2022-04-16

## 2022-04-16 RX ORDER — BENZONATATE 100 MG/1
100 CAPSULE ORAL 3 TIMES DAILY PRN
Qty: 21 CAPSULE | Refills: 0 | Status: SHIPPED | OUTPATIENT
Start: 2022-04-16 | End: 2022-04-26

## 2022-04-16 RX ORDER — FLUTICASONE PROPIONATE 50 MCG
1 SPRAY, SUSPENSION (ML) NASAL DAILY
Qty: 16 G | Refills: 3 | Status: SHIPPED | OUTPATIENT
Start: 2022-04-16 | End: 2022-07-11

## 2022-04-16 RX ORDER — PREDNISONE 20 MG/1
20 TABLET ORAL DAILY
Qty: 4 TABLET | Refills: 0 | Status: SHIPPED | OUTPATIENT
Start: 2022-04-16 | End: 2022-05-30

## 2022-04-16 RX ORDER — CROMOLYN SODIUM 5.2 MG/ML
1 SPRAY, METERED NASAL 4 TIMES DAILY
Qty: 26 ML | Refills: 1 | Status: SHIPPED | OUTPATIENT
Start: 2022-04-16 | End: 2022-07-11

## 2022-04-16 RX ORDER — ALBUTEROL SULFATE 0.83 MG/ML
2.5 SOLUTION RESPIRATORY (INHALATION)
Status: COMPLETED | OUTPATIENT
Start: 2022-04-16 | End: 2022-04-16

## 2022-04-16 RX ADMIN — ALBUTEROL SULFATE 2.5 MG: 0.83 SOLUTION RESPIRATORY (INHALATION) at 01:04

## 2022-04-16 RX ADMIN — IPRATROPIUM BROMIDE 0.5 MG: 0.5 SOLUTION RESPIRATORY (INHALATION) at 01:04

## 2022-04-16 NOTE — PROGRESS NOTES
"Subjective:       Patient ID: Joy Crawford is a 57 y.o. female.    Vitals:  height is 5' 5" (1.651 m) and weight is 115.2 kg (254 lb). Her temperature is 98.4 °F (36.9 °C). Her blood pressure is 132/91 (abnormal) and her pulse is 82. Her respiration is 18 and oxygen saturation is 95%.     Chief Complaint: URI    Pt states that she is having cough/mucus and congestion for some time now. Pt states that she had a sinus infection a couple weeks back and doesn't think it went completely away .  PATIENT STATES SHE CONTINUES TO COUGH DESPITE USING HER HOME MDI    URI   This is a new problem. The current episode started in the past 7 days. The problem has been unchanged. There has been no fever. Associated symptoms include congestion and coughing. She has tried nothing for the symptoms.       HENT: Positive for congestion.    Respiratory: Positive for cough and sputum production.        Objective:      Physical Exam   Constitutional: She is oriented to person, place, and time. She appears well-developed. She is cooperative.  Non-toxic appearance. She does not appear ill. No distress.   HENT:   Head: Normocephalic and atraumatic.   Ears:   Right Ear: Hearing, tympanic membrane, external ear and ear canal normal.   Left Ear: Hearing, tympanic membrane, external ear and ear canal normal.   Nose: Nose normal. No mucosal edema, rhinorrhea or nasal deformity. No epistaxis. Right sinus exhibits no maxillary sinus tenderness and no frontal sinus tenderness. Left sinus exhibits no maxillary sinus tenderness and no frontal sinus tenderness.   Mouth/Throat: Uvula is midline, oropharynx is clear and moist and mucous membranes are normal. No trismus in the jaw. Normal dentition. No uvula swelling. No oropharyngeal exudate, posterior oropharyngeal edema or posterior oropharyngeal erythema.   Eyes: Conjunctivae and lids are normal. No scleral icterus.   Neck: Trachea normal and phonation normal. Neck supple. No edema present. No " erythema present. No neck rigidity present.   Cardiovascular: Normal rate, regular rhythm, normal heart sounds and normal pulses.   Pulmonary/Chest: Effort normal. No stridor. No respiratory distress. She has no decreased breath sounds. She has wheezes. She has no rhonchi.    Comments: VERY GOOD AIR MOVEMENT THROUGHOUT ALL LUNG FIELDS WITH SLIGHTLY TIGHT WITH OCCASIONAL WHEEZING    Abdominal: Normal appearance.   Musculoskeletal: Normal range of motion.         General: No deformity. Normal range of motion.   Neurological: She is alert and oriented to person, place, and time. She exhibits normal muscle tone. Coordination normal.   Skin: Skin is warm, dry, intact, not diaphoretic, not pale and no rash. Capillary refill takes less than 2 seconds.   Psychiatric: Her speech is normal and behavior is normal. Judgment and thought content normal.   Nursing note and vitals reviewed.    Following nebulizer treatment much better air movement no wheezing.      Assessment:       1. Mild asthma with exacerbation, unspecified whether persistent    2. Bronchospasm    3. Wheezing    4. Sinus congestion    5. Cough          Plan:         Mild asthma with exacerbation, unspecified whether persistent  -     albuterol nebulizer solution 2.5 mg  -     ipratropium 0.02 % nebulizer solution 0.5 mg  -     Discontinue: methylPREDNISolone sod suc(PF) injection 125 mg  -     methylPREDNISolone sod suc(PF) injection 125 mg  -     cromolyn (NASALCHROM) 5.2 mg/spray (4 %) nasal spray; 1 spray by Nasal route 4 (four) times daily.  Dispense: 26 mL; Refill: 1  -     albuterol (PROVENTIL) 2.5 mg /3 mL (0.083 %) nebulizer solution; Take 3 mLs (2.5 mg total) by nebulization every 6 (six) hours as needed for Wheezing. Rescue  Dispense: 90 each; Refill: 3  -     predniSONE (DELTASONE) 20 MG tablet; Take 1 tablet (20 mg total) by mouth once daily.  Dispense: 4 tablet; Refill: 0    Bronchospasm  -     albuterol nebulizer solution 2.5 mg  -     ipratropium  0.02 % nebulizer solution 0.5 mg  -     Discontinue: methylPREDNISolone sod suc(PF) injection 125 mg  -     methylPREDNISolone sod suc(PF) injection 125 mg  -     albuterol (PROVENTIL) 2.5 mg /3 mL (0.083 %) nebulizer solution; Take 3 mLs (2.5 mg total) by nebulization every 6 (six) hours as needed for Wheezing. Rescue  Dispense: 90 each; Refill: 3  -     predniSONE (DELTASONE) 20 MG tablet; Take 1 tablet (20 mg total) by mouth once daily.  Dispense: 4 tablet; Refill: 0    Wheezing  -     albuterol nebulizer solution 2.5 mg  -     ipratropium 0.02 % nebulizer solution 0.5 mg  -     Discontinue: methylPREDNISolone sod suc(PF) injection 125 mg  -     methylPREDNISolone sod suc(PF) injection 125 mg  -     albuterol (PROVENTIL) 2.5 mg /3 mL (0.083 %) nebulizer solution; Take 3 mLs (2.5 mg total) by nebulization every 6 (six) hours as needed for Wheezing. Rescue  Dispense: 90 each; Refill: 3  -     predniSONE (DELTASONE) 20 MG tablet; Take 1 tablet (20 mg total) by mouth once daily.  Dispense: 4 tablet; Refill: 0    Sinus congestion  -     cromolyn (NASALCHROM) 5.2 mg/spray (4 %) nasal spray; 1 spray by Nasal route 4 (four) times daily.  Dispense: 26 mL; Refill: 1  -     fluticasone propionate (FLONASE) 50 mcg/actuation nasal spray; 1 spray (50 mcg total) by Each Nostril route once daily.  Dispense: 16 g; Refill: 3    Cough  -     benzonatate (TESSALON) 100 MG capsule; Take 1 capsule (100 mg total) by mouth 3 (three) times daily as needed.  Dispense: 21 capsule; Refill: 0    Follow up if symptoms worsen or fail to improve, for F/U with PCP or ED. There are no Patient Instructions on file for this visit.

## 2022-05-02 ENCOUNTER — PATIENT MESSAGE (OUTPATIENT)
Dept: PSYCHIATRY | Facility: CLINIC | Age: 57
End: 2022-05-02
Payer: COMMERCIAL

## 2022-05-02 ENCOUNTER — PATIENT MESSAGE (OUTPATIENT)
Dept: INTERNAL MEDICINE | Facility: CLINIC | Age: 57
End: 2022-05-02
Payer: COMMERCIAL

## 2022-05-02 DIAGNOSIS — F43.23 ADJUSTMENT DISORDER WITH MIXED ANXIETY AND DEPRESSED MOOD: ICD-10-CM

## 2022-05-02 DIAGNOSIS — F33.41 RECURRENT MAJOR DEPRESSIVE DISORDER, IN PARTIAL REMISSION: ICD-10-CM

## 2022-05-02 RX ORDER — FLUTICASONE FUROATE AND VILANTEROL TRIFENATATE 200; 25 UG/1; UG/1
POWDER RESPIRATORY (INHALATION)
Qty: 60 EACH | Refills: 5 | Status: SHIPPED | OUTPATIENT
Start: 2022-05-02 | End: 2022-06-29 | Stop reason: SDUPTHER

## 2022-05-02 RX ORDER — DULOXETIN HYDROCHLORIDE 60 MG/1
60 CAPSULE, DELAYED RELEASE ORAL DAILY
Qty: 30 CAPSULE | Refills: 0 | Status: SHIPPED | OUTPATIENT
Start: 2022-05-02 | End: 2022-05-30 | Stop reason: SDUPTHER

## 2022-05-02 RX ORDER — ALBUTEROL SULFATE 90 UG/1
AEROSOL, METERED RESPIRATORY (INHALATION)
Qty: 54 G | Refills: 0 | Status: SHIPPED | OUTPATIENT
Start: 2022-05-02 | End: 2022-06-20

## 2022-05-02 NOTE — TELEPHONE ENCOUNTER
Care Due:                  Date            Visit Type   Department     Provider  --------------------------------------------------------------------------------                                MYCHART                              FOLLOWUP/OF  [facility] INTERNAL  Last Visit: 07-      FICE VISIT   MEDICINE       EVAN PALAFOX                              MYCHART                              FOLLOWUP/OF  [facility] INTERNAL  Next Visit: 08-      FICE VISIT   MEDICINE       EVAN PALAFOX                                                            Last  Test          Frequency    Reason                     Performed    Due Date  --------------------------------------------------------------------------------    Office Visit  12 months..  lisinopriL...............  07- 07-    CMP.........  12 months..  lisinopriL...............  05- 05-    Powered by [employer]. Reference number: [id number].   5/02/2022 3:53:05 PM CDT

## 2022-05-03 RX ORDER — ALBUTEROL SULFATE 90 UG/1
AEROSOL, METERED RESPIRATORY (INHALATION)
Qty: 33.5 G | OUTPATIENT
Start: 2022-05-03

## 2022-05-03 NOTE — TELEPHONE ENCOUNTER
Quick DC. Request already responded to by other means (e.g. phone or fax)   Refill Authorization Note   Joy Ty  is requesting a refill authorization.  Brief Assessment and Rationale for Refill:  Quick Discontinue  Medication Therapy Plan:       Medication Reconciliation Completed:  No      Comments:     Note composed:7:16 AM 05/03/2022

## 2022-05-03 NOTE — TELEPHONE ENCOUNTER
No new care gaps identified.  Powered by [employer]. Reference number: [id number].   5/03/2022 7:05:51 AM CDT

## 2022-05-27 ENCOUNTER — PATIENT MESSAGE (OUTPATIENT)
Dept: ORTHOPEDICS | Facility: CLINIC | Age: 57
End: 2022-05-27
Payer: COMMERCIAL

## 2022-05-30 ENCOUNTER — PATIENT MESSAGE (OUTPATIENT)
Dept: PSYCHIATRY | Facility: CLINIC | Age: 57
End: 2022-05-30
Payer: COMMERCIAL

## 2022-05-30 ENCOUNTER — OFFICE VISIT (OUTPATIENT)
Dept: PSYCHIATRY | Facility: CLINIC | Age: 57
End: 2022-05-30
Payer: COMMERCIAL

## 2022-05-30 DIAGNOSIS — F33.41 RECURRENT MAJOR DEPRESSIVE DISORDER, IN PARTIAL REMISSION: ICD-10-CM

## 2022-05-30 DIAGNOSIS — F43.23 ADJUSTMENT DISORDER WITH MIXED ANXIETY AND DEPRESSED MOOD: ICD-10-CM

## 2022-05-30 PROCEDURE — 1160F PR REVIEW ALL MEDS BY PRESCRIBER/CLIN PHARMACIST DOCUMENTED: ICD-10-PCS | Mod: CPTII,95,, | Performed by: NURSE PRACTITIONER

## 2022-05-30 PROCEDURE — 1159F MED LIST DOCD IN RCRD: CPT | Mod: CPTII,95,, | Performed by: NURSE PRACTITIONER

## 2022-05-30 PROCEDURE — 1160F RVW MEDS BY RX/DR IN RCRD: CPT | Mod: CPTII,95,, | Performed by: NURSE PRACTITIONER

## 2022-05-30 PROCEDURE — 4010F ACE/ARB THERAPY RXD/TAKEN: CPT | Mod: CPTII,95,, | Performed by: NURSE PRACTITIONER

## 2022-05-30 PROCEDURE — 99214 OFFICE O/P EST MOD 30 MIN: CPT | Mod: 95,,, | Performed by: NURSE PRACTITIONER

## 2022-05-30 PROCEDURE — 1159F PR MEDICATION LIST DOCUMENTED IN MEDICAL RECORD: ICD-10-PCS | Mod: CPTII,95,, | Performed by: NURSE PRACTITIONER

## 2022-05-30 PROCEDURE — 4010F PR ACE/ARB THEARPY RXD/TAKEN: ICD-10-PCS | Mod: CPTII,95,, | Performed by: NURSE PRACTITIONER

## 2022-05-30 PROCEDURE — 99214 PR OFFICE/OUTPT VISIT, EST, LEVL IV, 30-39 MIN: ICD-10-PCS | Mod: 95,,, | Performed by: NURSE PRACTITIONER

## 2022-05-30 RX ORDER — BUSPIRONE HYDROCHLORIDE 10 MG/1
10 TABLET ORAL 2 TIMES DAILY
Qty: 180 TABLET | Refills: 1 | Status: SHIPPED | OUTPATIENT
Start: 2022-05-30 | End: 2022-12-19 | Stop reason: SDUPTHER

## 2022-05-30 RX ORDER — DULOXETIN HYDROCHLORIDE 60 MG/1
60 CAPSULE, DELAYED RELEASE ORAL DAILY
Qty: 90 CAPSULE | Refills: 1 | Status: SHIPPED | OUTPATIENT
Start: 2022-05-30 | End: 2022-11-28

## 2022-05-30 RX ORDER — TRAZODONE HYDROCHLORIDE 150 MG/1
150 TABLET ORAL NIGHTLY
Qty: 90 TABLET | Refills: 1 | Status: SHIPPED | OUTPATIENT
Start: 2022-05-30 | End: 2022-12-07

## 2022-05-30 RX ORDER — BUPROPION HYDROCHLORIDE 100 MG/1
100 TABLET, EXTENDED RELEASE ORAL DAILY
Qty: 90 TABLET | Refills: 0 | Status: SHIPPED | OUTPATIENT
Start: 2022-05-30 | End: 2022-09-08 | Stop reason: SDUPTHER
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
## 2022-05-31 ENCOUNTER — HOSPITAL ENCOUNTER (OUTPATIENT)
Dept: RADIOLOGY | Facility: HOSPITAL | Age: 57
Discharge: HOME | End: 2022-05-31
Attending: ORTHOPAEDIC SURGERY
Payer: COMMERCIAL

## 2022-05-31 ENCOUNTER — OFFICE VISIT (OUTPATIENT)
Dept: ORTHOPEDICS | Facility: CLINIC | Age: 57
End: 2022-05-31
Payer: COMMERCIAL

## 2022-05-31 VITALS — HEIGHT: 65 IN | WEIGHT: 254 LBS | BODY MASS INDEX: 42.32 KG/M2

## 2022-05-31 DIAGNOSIS — R52 PAIN: Primary | ICD-10-CM

## 2022-05-31 DIAGNOSIS — M19.072 ARTHRITIS OF LEFT SUBTALAR JOINT: Primary | ICD-10-CM

## 2022-05-31 DIAGNOSIS — R52 PAIN: ICD-10-CM

## 2022-05-31 PROCEDURE — 4010F PR ACE/ARB THEARPY RXD/TAKEN: ICD-10-PCS | Mod: CPTII,S$GLB,, | Performed by: ORTHOPAEDIC SURGERY

## 2022-05-31 PROCEDURE — 1159F PR MEDICATION LIST DOCUMENTED IN MEDICAL RECORD: ICD-10-PCS | Mod: CPTII,S$GLB,, | Performed by: ORTHOPAEDIC SURGERY

## 2022-05-31 PROCEDURE — 73560 X-RAY EXAM OF KNEE 1 OR 2: CPT | Mod: 26,LT,, | Performed by: RADIOLOGY

## 2022-05-31 PROCEDURE — 3008F BODY MASS INDEX DOCD: CPT | Mod: CPTII,S$GLB,, | Performed by: ORTHOPAEDIC SURGERY

## 2022-05-31 PROCEDURE — 99999 PR PBB SHADOW E&M-EST. PATIENT-LVL III: CPT | Mod: PBBFAC,,, | Performed by: ORTHOPAEDIC SURGERY

## 2022-05-31 PROCEDURE — 99213 OFFICE O/P EST LOW 20 MIN: CPT | Mod: 25,S$GLB,, | Performed by: ORTHOPAEDIC SURGERY

## 2022-05-31 PROCEDURE — 20605 DRAIN/INJ JOINT/BURSA W/O US: CPT | Mod: LT,S$GLB,, | Performed by: ORTHOPAEDIC SURGERY

## 2022-05-31 PROCEDURE — 99999 PR PBB SHADOW E&M-EST. PATIENT-LVL III: ICD-10-PCS | Mod: PBBFAC,,, | Performed by: ORTHOPAEDIC SURGERY

## 2022-05-31 PROCEDURE — 1160F PR REVIEW ALL MEDS BY PRESCRIBER/CLIN PHARMACIST DOCUMENTED: ICD-10-PCS | Mod: CPTII,S$GLB,, | Performed by: ORTHOPAEDIC SURGERY

## 2022-05-31 PROCEDURE — 20605 PR DRAIN/INJECT INTERMEDIATE JOINT/BURSA: ICD-10-PCS | Mod: LT,S$GLB,, | Performed by: ORTHOPAEDIC SURGERY

## 2022-05-31 PROCEDURE — 1160F RVW MEDS BY RX/DR IN RCRD: CPT | Mod: CPTII,S$GLB,, | Performed by: ORTHOPAEDIC SURGERY

## 2022-05-31 PROCEDURE — 1159F MED LIST DOCD IN RCRD: CPT | Mod: CPTII,S$GLB,, | Performed by: ORTHOPAEDIC SURGERY

## 2022-05-31 PROCEDURE — 73562 XR KNEE ORTHO RIGHT: ICD-10-PCS | Mod: 26,RT,, | Performed by: RADIOLOGY

## 2022-05-31 PROCEDURE — 73560 XR KNEE ORTHO RIGHT: ICD-10-PCS | Mod: 26,LT,, | Performed by: RADIOLOGY

## 2022-05-31 PROCEDURE — 99213 PR OFFICE/OUTPT VISIT, EST, LEVL III, 20-29 MIN: ICD-10-PCS | Mod: 25,S$GLB,, | Performed by: ORTHOPAEDIC SURGERY

## 2022-05-31 PROCEDURE — 3008F PR BODY MASS INDEX (BMI) DOCUMENTED: ICD-10-PCS | Mod: CPTII,S$GLB,, | Performed by: ORTHOPAEDIC SURGERY

## 2022-05-31 PROCEDURE — 4010F ACE/ARB THERAPY RXD/TAKEN: CPT | Mod: CPTII,S$GLB,, | Performed by: ORTHOPAEDIC SURGERY

## 2022-05-31 PROCEDURE — 73560 X-RAY EXAM OF KNEE 1 OR 2: CPT | Mod: TC,LT

## 2022-05-31 PROCEDURE — 73562 X-RAY EXAM OF KNEE 3: CPT | Mod: 26,RT,, | Performed by: RADIOLOGY

## 2022-05-31 RX ORDER — METHYLPREDNISOLONE ACETATE 40 MG/ML
40 INJECTION, SUSPENSION INTRA-ARTICULAR; INTRALESIONAL; INTRAMUSCULAR; SOFT TISSUE
Status: COMPLETED | OUTPATIENT
Start: 2022-05-31 | End: 2022-05-31

## 2022-05-31 RX ADMIN — METHYLPREDNISOLONE ACETATE 40 MG: 40 INJECTION, SUSPENSION INTRA-ARTICULAR; INTRALESIONAL; INTRAMUSCULAR; SOFT TISSUE at 03:05

## 2022-05-31 NOTE — PROGRESS NOTES
Joy Crawford  Returns today for follow-up.  This is a 57-year-old female  who I last saw in September 2020. At that time she was about 12 weeks postop from a complex ganglion cyst excision the dorsal lateral aspect of her left foot which was noted to be emanating not only from the extensor tendons but also the subtalar joint.  Subsequently she has done okay but has had some continued subtalar symptoms.  She reports since I saw her that she had an L4-5 lumbar fusion last Fall and is doing well with regards to her back.  She states that about a week or 2 ago she was reaching up to a shelf and pushed off on her right knee and felt a pop and subsequently has had anterolateral knee pain and as a result her left foot pain has worsened over the last couple of weeks.  She reports that her right knee is bothering her more than her left foot and she was hoping I can see her for her knee.  She does report some feeling of instability but mainly reports anterolateral knee pain pointing to the lateral joint line area.  She reports pain in left foot in the sinus tarsi area consistent with subtalar arthrosis.    Examination:  She walks in today with an antalgic gait.  Standing inspection reveals mild genu valgus.  She has plantigrade alignment of her feet.  She has well-healed incisions about the left hindfoot.  She is tender in the sinus tarsi area of her left hindfoot but has functional motion.  She may have some mild recurrence of her cyst.  The right knee is tender over the lateral joint line.  She has functional motion of the knee.  I do not appreciate any gross instability.    Imaging:  I ordered reviewed x-rays of the knees.  Her joint spaces are well maintained.  There appears to be some calcification on the lateral menisci of both knees.    Impression:  1. Left subtalar arthrosis                       2. Right knee sprain, possible lateral meniscal injury     Recommendation:  I offered a corticosteroid  injection of her left subtalar joint for pain relief which she agreed to.  After verbal consent sterile prep I injected the left subtalar joint with 2 cc lidocaine and 40 mg of methylprednisolone.  With regards to her knee, I dispensed a knee brace that she can use and I made a referral to Sports Medicine for further evaluation and possible treatment.    Follow-up as needed

## 2022-06-01 ENCOUNTER — PATIENT MESSAGE (OUTPATIENT)
Dept: ADMINISTRATIVE | Facility: HOSPITAL | Age: 57
End: 2022-06-01
Payer: COMMERCIAL

## 2022-06-08 ENCOUNTER — OFFICE VISIT (OUTPATIENT)
Dept: SPORTS MEDICINE | Facility: CLINIC | Age: 57
End: 2022-06-08
Payer: COMMERCIAL

## 2022-06-08 VITALS
BODY MASS INDEX: 41.82 KG/M2 | SYSTOLIC BLOOD PRESSURE: 126 MMHG | WEIGHT: 251 LBS | HEART RATE: 79 BPM | DIASTOLIC BLOOD PRESSURE: 91 MMHG | HEIGHT: 65 IN

## 2022-06-08 DIAGNOSIS — S83.281A ACUTE LATERAL MENISCUS TEAR OF RIGHT KNEE, INITIAL ENCOUNTER: Primary | ICD-10-CM

## 2022-06-08 PROCEDURE — 3074F PR MOST RECENT SYSTOLIC BLOOD PRESSURE < 130 MM HG: ICD-10-PCS | Mod: CPTII,S$GLB,, | Performed by: ORTHOPAEDIC SURGERY

## 2022-06-08 PROCEDURE — 3074F SYST BP LT 130 MM HG: CPT | Mod: CPTII,S$GLB,, | Performed by: ORTHOPAEDIC SURGERY

## 2022-06-08 PROCEDURE — 1159F MED LIST DOCD IN RCRD: CPT | Mod: CPTII,S$GLB,, | Performed by: ORTHOPAEDIC SURGERY

## 2022-06-08 PROCEDURE — 99999 PR PBB SHADOW E&M-EST. PATIENT-LVL III: ICD-10-PCS | Mod: PBBFAC,,, | Performed by: ORTHOPAEDIC SURGERY

## 2022-06-08 PROCEDURE — 3080F PR MOST RECENT DIASTOLIC BLOOD PRESSURE >= 90 MM HG: ICD-10-PCS | Mod: CPTII,S$GLB,, | Performed by: ORTHOPAEDIC SURGERY

## 2022-06-08 PROCEDURE — 99203 PR OFFICE/OUTPT VISIT, NEW, LEVL III, 30-44 MIN: ICD-10-PCS | Mod: S$GLB,,, | Performed by: ORTHOPAEDIC SURGERY

## 2022-06-08 PROCEDURE — 99203 OFFICE O/P NEW LOW 30 MIN: CPT | Mod: S$GLB,,, | Performed by: ORTHOPAEDIC SURGERY

## 2022-06-08 PROCEDURE — 3008F BODY MASS INDEX DOCD: CPT | Mod: CPTII,S$GLB,, | Performed by: ORTHOPAEDIC SURGERY

## 2022-06-08 PROCEDURE — 1159F PR MEDICATION LIST DOCUMENTED IN MEDICAL RECORD: ICD-10-PCS | Mod: CPTII,S$GLB,, | Performed by: ORTHOPAEDIC SURGERY

## 2022-06-08 PROCEDURE — 3008F PR BODY MASS INDEX (BMI) DOCUMENTED: ICD-10-PCS | Mod: CPTII,S$GLB,, | Performed by: ORTHOPAEDIC SURGERY

## 2022-06-08 PROCEDURE — 3080F DIAST BP >= 90 MM HG: CPT | Mod: CPTII,S$GLB,, | Performed by: ORTHOPAEDIC SURGERY

## 2022-06-08 PROCEDURE — 4010F PR ACE/ARB THEARPY RXD/TAKEN: ICD-10-PCS | Mod: CPTII,S$GLB,, | Performed by: ORTHOPAEDIC SURGERY

## 2022-06-08 PROCEDURE — 99999 PR PBB SHADOW E&M-EST. PATIENT-LVL III: CPT | Mod: PBBFAC,,, | Performed by: ORTHOPAEDIC SURGERY

## 2022-06-08 PROCEDURE — 4010F ACE/ARB THERAPY RXD/TAKEN: CPT | Mod: CPTII,S$GLB,, | Performed by: ORTHOPAEDIC SURGERY

## 2022-06-08 NOTE — PROGRESS NOTES
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      + mechanical symptoms, no instability    Is affecting ADLs.      SANE 20    Review of Systems   Constitution: Negative. Negative for chills, fever and night sweats.   HENT: Negative for congestion and headaches.    Eyes: Negative for blurred vision, left vision loss and right vision loss.   Cardiovascular: Negative for chest pain and syncope.   Respiratory: Negative for cough and shortness of breath.    Endocrine: Negative for polydipsia, polyphagia and polyuria.   Hematologic/Lymphatic: Negative for bleeding problem. Does not bruise/bleed easily.   Skin: Negative for dry skin, itching and rash.   Musculoskeletal: Negative for falls. Positive for knee pain and muscle weakness.   Gastrointestinal: Negative for abdominal pain and bowel incontinence.   Genitourinary: Negative for bladder incontinence and nocturia.   Neurological: Negative for disturbances in coordination, loss of balance and seizures.   Psychiatric/Behavioral: Negative for depression. The patient does not have insomnia.    Allergic/Immunologic: Negative for hives and persistent infections.     PAST MEDICAL HISTORY:   Past Medical History:   Diagnosis Date    Anxiety     Asthma     Depression     Esophageal reflux     H/O mammogram 01/20/2017    Normal  (DIS)     Herpes simplex virus (HSV) infection     no out breaks    Hypertension     Peptic ulcer     Sleep apnea     doesnt use cpap     PAST SURGICAL HISTORY:   Past Surgical History:   Procedure Laterality Date    CYST REMOVAL  6/25/2020    Procedure: EXCISION, CYST;  Surgeon: Gallo Maddox MD;  [facility];  Service: Orthopedics;;  COMPLEXT CYST LEFT  ANKLE AND FOOT    ENDOMETRIAL BIOPSY N/A 3/11/2020    Procedure: BIOPSY, ENDOMETRIUM;  Surgeon: José Miguel Leon MD;  Location: [facility] OR;  Service: OB/GYN;  Laterality:  N/A;    foot ankle sx Left 06/25/2020    RHINOPLASTY  2006    SINUS SURGERY  2006    VULVECTOMY N/A 3/11/2020    Procedure: VULVECTOMY;  Surgeon: José Miguel Leon MD;  Location: [facility];  Service: OB/GYN;  Laterality: N/A;     FAMILY HISTORY:   Family History   Problem Relation Age of Onset    Skin cancer Father     Hypertension Father     Hyperlipidemia Father     Hypertension Mother     Diabetes Mother     Hyperlipidemia Mother     No Known Problems Son     Depression Brother     Breast cancer Neg Hx      SOCIAL HISTORY:   Social History     Socioeconomic History    Marital status: Single   Tobacco Use    Smoking status: Never Smoker    Smokeless tobacco: Never Used   Substance and Sexual Activity    Alcohol use: Yes     Comment: social    Drug use: No    Sexual activity: Not Currently     Partners: Male     Birth control/protection: Post-menopausal   Social History Narrative    , one son (20 years old).  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        MEDICATIONS:   Current Outpatient Medications:     albuterol (PROVENTIL/VENTOLIN HFA) 90 mcg/actuation inhaler, INHALE 2 PUFFS BY MOUTH EVERY 4 HOURS AS NEEDED FOR WHEEZING OR RESCUE, Disp: 54 g, Rfl: 0    buPROPion (WELLBUTRIN SR) 100 MG TBSR 12 hr tablet, Take 1 tablet (100 mg total) by mouth once daily., Disp: 90 tablet, Rfl: 0    busPIRone (BUSPAR) 10 MG tablet, Take 1 tablet (10 mg total) by mouth 2 (two) times daily., Disp: 180 tablet, Rfl: 1    cromolyn (NASALCHROM) 5.2 mg/spray (4 %) nasal spray, 1 spray by Nasal route 4 (four) times daily., Disp: 26 mL, Rfl: 1    diclofenac sodium (VOLTAREN) 1 % Gel, Apply 2 g topically 3 (three) times daily., Disp: 100 g, Rfl: 0    DULoxetine (CYMBALTA) 60 MG capsule, Take 1 capsule (60 mg total) by mouth once daily., Disp: 90 capsule, Rfl: 1    fluticasone furoate-vilanteroL (BREO ELLIPTA) 200-25 mcg/dose DsDv diskus inhaler, INHALE 1 PUFF BY MOUTH DAILY, Disp: 60 each, Rfl:  "5    fluticasone propionate (FLONASE) 50 mcg/actuation nasal spray, 1 spray (50 mcg total) by Each Nostril route once daily., Disp: 16 g, Rfl: 3    gabapentin (NEURONTIN) 300 MG capsule, Take 1 capsule (300 mg total) by mouth 3 (three) times daily., Disp: 90 capsule, Rfl: 2    lisinopriL 10 MG tablet, Take 1 tablet (10 mg total) by mouth once daily., Disp: 90 tablet, Rfl: 4    traZODone (DESYREL) 150 MG tablet, Take 1 tablet (150 mg total) by mouth nightly., Disp: 90 tablet, Rfl: 1  ALLERGIES: Review of patient's allergies indicates:  No Known Allergies    VITAL SIGNS: BP (!) 126/91   Pulse 79   Ht 5' 5" (1.651 m)   Wt 113.9 kg (251 lb)   LMP 07/12/2018 (Approximate)   BMI 41.77 kg/m²      PHYSICAL EXAMINATION  VITAL SIGNS: BP (!) 126/91   Pulse 79   Ht 5' 5" (1.651 m)   Wt 113.9 kg (251 lb)   LMP 07/12/2018 (Approximate)   BMI 41.77 kg/m²    General:  The patient is alert and oriented x 3.  Mood is pleasant.  Observation of ears, eyes and nose reveal no gross abnormalities.  HEENT: NCAT, sclera nonicteric  Lungs: Respirations are equal and unlabored.    Right KNEE EXAMINATION     OBSERVATION / INSPECTION   Gait:   Nonantalgic   Alignment:  Neutral   Scars:   None   Muscle atrophy: Mild  Effusion:  None   Warmth:  None   Discoloration:   none     TENDERNESS / CREPITUS (T / C):          T / C      T / C   Patella   - / -   Lateral joint line   + / -    Peripatellar medial  -  Medial joint line    - / -    Peripatellar lateral -  Medial plica   - / -    Patellar tendon -   Popliteal fossa  - / -    Quad tendon   -   Gastrocnemius   -   Prepatellar Bursa - / -   Quadricep   -   Tibial tubercle  -  Thigh/hamstring  -   Pes anserine/HS -  Fibula    -   ITB   - / -  Tibia     -   Tib/fib joint  - / -  LCL    -     MFC   - / -   MCL: Proximal  -    LFC   - / -    Distal   -          ROM: (* = pain)  PASSIVE   ACTIVE    Left :   5 / 0 / 145   5 / 0 / 145     Right :    5 / 0 / 145   5 / 0 / " 145    PATELLOFEMORAL EXAMINATION:  See above noted areas of tenderness.   Patella position    Subluxation / dislocation: Centered           Sup. / Inf;   Normal   Crepitus (PF):    Absent   Patellar Mobility:       Medial-lateral:   Normal    Superior-inferior:  Normal    Inferior tilt   Normal    Patellar tendon:  Normal   Lateral tilt:    Normal   J-sign:     None   Patellofemoral grind:   No pain       MENISCAL SIGNS:     Pain on terminal extension:  +  Pain on terminal flexion:  +  Solomons maneuver:  + for pain  Squat     + posterior joint pain    LIGAMENT EXAMINATION:  ACL / Lachman:  normal (-1 to 2mm)    PCL-Post.  drawer: normal 0 to 2mm  MCL- Valgus:  normal 0 to 2mm  LCL- Varus:  normal 0 to 2mm  Pivot shift: normal (Equal)   Dial Test: difference c/w other side   At 30° flexion: normal (< 5°)    At 90° flexion: normal (< 5°)   Reverse Pivot Shift:   normal (Equal)     STRENGTH: (* = with pain) PAINFUL SIDE   Quadricep   5/5   Hamstrin/5    EXTREMITY NEURO-VASCULAR EXAMINATION:   Sensation:  Grossly intact to light touch all dermatomal regions.   Motor Function:  Fully intact motor function at hip, knee, foot and ankle    DTRs;  quadriceps and  achilles 2+.  No clonus and downgoing Babinski.    Vascular status:  DP and PT pulses 2+, brisk capillary refill, symmetric.     Other Findings:       X-rays:  including standing, weight bearing AP and flexion bilateral knees, lateral and merchant views ordered and images reviewed by me show:  No fracture, dislocation     ASSESSMENT:    Right Knee  Probable Meniscus tear  lateral       PLAN:   MRI Left knee  Hold out of sports until MRI  All questions were answered, pt will contact us for questions or concerns in the interim.

## 2022-06-15 ENCOUNTER — HOSPITAL ENCOUNTER (OUTPATIENT)
Dept: RADIOLOGY | Facility: HOSPITAL | Age: 57
Discharge: HOME | End: 2022-06-15
Attending: ORTHOPAEDIC SURGERY
Payer: COMMERCIAL

## 2022-06-15 ENCOUNTER — PATIENT MESSAGE (OUTPATIENT)
Dept: SPORTS MEDICINE | Facility: CLINIC | Age: 57
End: 2022-06-15
Payer: COMMERCIAL

## 2022-06-15 DIAGNOSIS — S83.281A ACUTE LATERAL MENISCUS TEAR OF RIGHT KNEE, INITIAL ENCOUNTER: ICD-10-CM

## 2022-06-15 PROCEDURE — 73721 MRI JNT OF LWR EXTRE W/O DYE: CPT | Mod: TC,RT

## 2022-06-15 PROCEDURE — 73721 MRI KNEE WITHOUT CONTRAST RIGHT: ICD-10-PCS | Mod: 26,RT,, | Performed by: RADIOLOGY

## 2022-06-15 PROCEDURE — 73721 MRI JNT OF LWR EXTRE W/O DYE: CPT | Mod: 26,RT,, | Performed by: RADIOLOGY

## 2022-06-20 ENCOUNTER — PATIENT MESSAGE (OUTPATIENT)
Dept: SPORTS MEDICINE | Facility: CLINIC | Age: 57
End: 2022-06-20
Payer: COMMERCIAL

## 2022-06-20 RX ORDER — ALBUTEROL SULFATE 90 UG/1
AEROSOL, METERED RESPIRATORY (INHALATION)
Qty: 20.1 G | Refills: 3 | Status: SHIPPED | OUTPATIENT
Start: 2022-06-20 | End: 2023-05-18 | Stop reason: SDUPTHER

## 2022-06-20 NOTE — TELEPHONE ENCOUNTER
Care Due:                  Date            Visit Type   Department     Provider  --------------------------------------------------------------------------------                                MYCHART                              FOLLOWUP/OF  [facility] INTERNAL  Last Visit: 07-      FICE VISIT   MEDICINE       EVAN PALAFOX                              MYCHART                              FOLLOWUP/OF  [facility] INTERNAL  [facility]: [facility]       EVAN PALAFOX                                                            Last  Test          Frequency    Reason                     Performed    Due Date  --------------------------------------------------------------------------------    CMP.........  12 months..  lisinopriL...............  Not Found    Overdue    [employer] Embedded Care Gaps. Reference number: [id number]. 6/20/2022   3:46:42 AM CDT

## 2022-06-20 NOTE — TELEPHONE ENCOUNTER
Refill Routing Note   Medication(s) are not appropriate for processing by [facility] for the following reason(s):      - Patient has not been seen in over 15 months by PCP    [facility] action(s):  Defer          Medication reconciliation completed: No     Appointments  past 12m or future 3m with PCP    Date Provider   Last Visit   7/28/2020 Jennifer Ramos MD   Next Visit   8/5/2022 Jennifer Ramos MD   ED visits in past 90 days: 0        Note composed:1:33 PM 06/20/2022

## 2022-06-21 ENCOUNTER — OFFICE VISIT (OUTPATIENT)
Dept: PSYCHIATRY | Facility: CLINIC | Age: 57
End: 2022-06-21
Payer: COMMERCIAL

## 2022-06-21 ENCOUNTER — PATIENT MESSAGE (OUTPATIENT)
Dept: SPORTS MEDICINE | Facility: CLINIC | Age: 57
End: 2022-06-21
Payer: COMMERCIAL

## 2022-06-21 ENCOUNTER — TELEPHONE (OUTPATIENT)
Dept: SPORTS MEDICINE | Facility: CLINIC | Age: 57
End: 2022-06-21
Payer: COMMERCIAL

## 2022-06-21 DIAGNOSIS — F41.1 GAD (GENERALIZED ANXIETY DISORDER): ICD-10-CM

## 2022-06-21 DIAGNOSIS — F33.41 RECURRENT MAJOR DEPRESSIVE DISORDER, IN PARTIAL REMISSION: Primary | ICD-10-CM

## 2022-06-21 PROCEDURE — 4010F ACE/ARB THERAPY RXD/TAKEN: CPT | Mod: CPTII,S$GLB,, | Performed by: SOCIAL WORKER

## 2022-06-21 PROCEDURE — 90837 PSYTX W PT 60 MINUTES: CPT | Mod: S$GLB,,, | Performed by: SOCIAL WORKER

## 2022-06-21 PROCEDURE — 4010F PR ACE/ARB THEARPY RXD/TAKEN: ICD-10-PCS | Mod: CPTII,S$GLB,, | Performed by: SOCIAL WORKER

## 2022-06-21 PROCEDURE — 90837 PR PSYCHOTHERAPY W/PATIENT, 60 MIN: ICD-10-PCS | Mod: S$GLB,,, | Performed by: SOCIAL WORKER

## 2022-06-21 NOTE — TELEPHONE ENCOUNTER
----- Message from Radha Maldonado MA sent at 6/21/2022  4:14 PM CDT -----    ----- Message -----  From: Corrine King  Sent: 6/21/2022   3:43 PM CDT  To: Paula Bergeron Staff    Type:  Patient Returning Call    Who Called: pt    Who Left Message for Patient: Lauren    Does the patient know what this is regarding?:yes    Would the patient rather a call back or a response via [facility]? call    Best Call Back Number:[phone] (home)       Additional Information:          March 15, 2022       Heladio Beatty MD  [address] [facility] [address]  Via Fax: [phone]      Patient: Jodie Oneill   YOB: 2004   Date of Visit: 3/15/2022       Dear Dr. Beatty:    Thank you for referring Jodie Oneill to me for evaluation. Below are my notes for this visit with her.    If you have questions, please do not hesitate to call me. I look forward to following your patient along with you.      Sincerely,        Liz Nuñez MD        CC: No Recipients  Liz Nuñez MD  3/15/2022 10:36 AM  Signed  PEDIATRIC NEUROLOGY   NEW CONSULT NOTE      Patient: Jodie Oneill Date of Service: 2019   : 2004 MRN: [id number]     SUBJECTIVE:   March 15, 2022  Done with cognitive therapy     Headaches are ok, none reported except with some triggers   Topamax - no side effects reported     Sleep is ok  School is good     2021  Interim was hospitalized for headaches for 2 days   Mother had tried Tylenol, Caffeine, Ibuprofen without relief   MRI was repeated   ER got IV cocktail   Home on Topamax 50 mg at bedtime only   No side effects reported     Sleep is ok  School is ok     2021  No seizures reported in the interim   No side effects with Oxcarbazepine 600 mg bid    Occasional headaches good with Tylenol    Sleep is ok  School is good     Dec 14, 2020  No headaches are seizures reported   Has been seizure free since 2019    Remote learning is ok  Sleep is good besides some dreams   Screen time - eyes ?     Aug 12, 2020  No spells reported   Has been good with the current dose of Oxcarbazepine without reportable side effects   Last seizure     Sleep - snoring and mouth breathing   Recommended to start with CPAP     2020  Has remained good   Last seizure   Headaches have been under control as well    2019   Interim MRI brain in August - throat mass increased in size ( from 2019)  No symptoms reported in that regard    Did see ENT, Hematology - mother reports as being ok     No headaches and no seizures (last seizure Jan 2019 )  Sleep - occasional snoring and no apneas       August 8, 2019  Headaches -sides, sharp, pressure, nausea without vomiting, no photo or phonophobia, few days - some help with Tylenol and motrin   No seizures reported   Has been stable on Oxcarbazepine   Interim EEG remains abnormal     May 20, 2019   No seizures reported   No side effects with Oxcarbazepine   School is fine   Sleep is good     HISTORY OF PRESENT ILLNESS:  Jodie Oneill is a 14 year old female who presents today as hospital f/u  NMDA receptor encephalitis  Seizures  - on Oxcarbazepine   Headaches     EEG was reported normal  MRI - findings ? 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   • famotidine (PEPCID) 20 MG tablet Take 20 mg by mouth 2 times daily.   • levothyroxine (SYNTHROID, LEVOTHROID) 25 MCG tablet Take 25 mcg by mouth daily. 1 tab QOD   • levothyroxine (SYNTHROID, LEVOTHROID) 75 MCG tablet Take 75 mcg by mouth daily. 1/2 tab QOD   • melatonin 3 MG Take by mouth nightly. 1 tab QHS   • OXcarbazepine (TRILEPTAL) 300 MG/5ML suspension Take by mouth 2 times daily. 6.7 ml BID   • PREDNISONE PO    • DIAZepam (DIASTAT ACUDIAL) 20 MG rectal gel 20 mg prn for seizure for 5 minutes (Patient taking differently: 20 mg prn for seizure for  2 minutes)     No current facility-administered medications for this visit.        ALLERGIES:  ALLERGIES:  No Known Allergies    PAST SURGICAL HISTORY:  History reviewed. No pertinent surgical history.    FAMILY HISTORY:  Family History   Problem Relation Age of Onset   • Depression Mother    • Hypertension Mother    • Diabetes Paternal Grandmother    • Neuropathy Maternal Grandfather    • Diabetes Maternal Grandfather    • Diabetes Paternal Grandfather        SOCIAL HISTORY:  Social History     Tobacco Use   • Smoking status: Never Smoker   • Smokeless tobacco: Never Used   Substance Use Topics   • Alcohol use: Not on file   • Drug use: Not on file       Review of Systems   Constitutional: Negative for activity change, appetite change and fever.   HENT: Negative for congestion, ear discharge, facial swelling, tinnitus and trouble swallowing.    Eyes: Negative for redness and visual disturbance.   Respiratory: Negative for apnea and shortness of breath.    Gastrointestinal: Negative for abdominal distention, diarrhea and vomiting.   Endocrine:        Normal stature   Genitourinary: Negative for hematuria.   Musculoskeletal: Negative for gait problem, myalgias and neck stiffness.   Skin: Negative for color change, pallor and rash.   Allergic/Immunologic: Negative for food allergies.   Neurological: Negative for dizziness, tremors, seizures, syncope, facial asymmetry, speech difficulty, weakness, light-headedness, numbness and headaches.   Psychiatric/Behavioral: Negative for agitation, behavioral problems, decreased concentration and sleep disturbance. The patient is not nervous/anxious and is not hyperactive.          OBJECTIVE:     Physical Exam   Constitutional: She appears well-developed.   HENT:   Head: Atraumatic.   Right Ear: External ear normal.   Left Ear: External ear normal.   Nose: Nose normal.   Mouth/Throat: Oropharynx is clear and moist. No oropharyngeal exudate.   Eyes: Conjunctivae and EOM are  normal. Pupils are equal, round, and reactive to light. Right eye exhibits no discharge. Left eye exhibits no discharge.   Neck: Neck supple.   Cardiovascular:   No murmur heard.  Pulmonary/Chest: Breath sounds normal.   Abdominal: Soft.   Musculoskeletal: Normal range of motion. She exhibits no edema, tenderness or deformity.        Neurological:     Awake and interactive  Symmetric face   EOMI, IMSAEL   Tone and dtr's intact   FNF - intact   Gait - unremarkable      Skin: Skin is warm. No rash noted.   Psychiatric: She has a normal mood and affect.   Nursing note and vitals reviewed.      Visit Vitals  /72 ([place] [facility] [place]: Sitting, Cuff Size: Large Adult)   Pulse 90   Ht 5' 5.55\" (1.665 m)   Wt (!) 103 kg (227 lb 1.2 oz)   BMI 37.15 kg/m²       DIAGNOSTIC STUDIES:   LAB RESULTS:    Lab Results Reviewed and Diagnostic Data Reviewed    Assessment AND PLAN:   March 15, 2022  Has remained stable   Doing fine     Options discussed   May taper Topamax to 25 mg at bedtime for a week then discontinued    Symptomatic headache management was discussed     Nov 16, 2021  Interim hospitalization for headaches   Topamax was restarted     Previously was on it in April for a month     Symptomatic management of headaches was also discussed     May 18, 2021  Interim was hospitalized at [facility] then transferred to [facility]   Received 2 days of IVIG ( no Rituximab )    Has been seizure free and off of Oxcarbazepine since Feb of 2021 after an EEG with irregular sharps     Headaches have been under control as well on Topamax 50 mg at bedtime   This is to be continued as well for now     Sleep is better after removal of tonsils   Not using CPAP now     Feb 16, 2021  Has remained stable on Oxcarbazepine at the current dose   Now about 2 years seizure free   Interim EEG in Jan of 2021 - was mildly abnormal   Results discussed   Options reviewed   Started on a trial of Oxcarbazepine wean   Risk of  seizures reviewed besides the use of Nayzilam    Sleep - encouraged to use the CPAP   Weight loss was reviewed again   Is also scheduled to undergo removal of t/a with ENT     Feb 12, 2020  Has remained stable   On Oxcarbazepine at the current without reportable side effects   Just about a year since the last seizure in Jan of 2019   Headaches were not reported     Nov 11, 2019   Interim history reviewed   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    Instructions provided as documented in the AVS.    Patient Discussion:.   I have counseled the family extensively regarding the nature of the patient's difficulties, course, prognosis, plan, recommendations and outcome. The family will keep me informed of the patient's progress.     The patient and mother indicated understanding of the diagnosis and agreed with the plan of care.    Liz Nuñez MD

## 2022-06-21 NOTE — TELEPHONE ENCOUNTER
I called the patient and discussed her results as previously documented. She will require PCP clearance and will reach out to pick a surgery date
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
## 2022-06-21 NOTE — TELEPHONE ENCOUNTER
I called the patient twice with no answer. I left a voicemail encouraging her to return the call to discuss her mri results and plan of care that are documented below.       MRI reviewed personally by me:  Shows Right knee medial and lateral meniscal tear, chondromalacia.     ASSESSMENT:    Right Knee Meniscus tear.      she would benefit from knee arthroscopy, possible plica excision, possible chondroplasty with possible meniscectomy given the above.     PLAN: We have discussed the surgery and recovery of arthroscopic knee surgery. she understands that there may be limited weightbearing up to several weeks after surgery depending on procedures that are performed at the time of surgery.    The spectrum of treatment options were discussed with the patient, including nonoperative and operative options.  After thorough discussion, the patient has elected to undergo surgical treatment to include:  right   a. Knee arthroscopic medial and lateral meniscectomy      b. Knee arthroscopic possible plica excision   c. Knee arthroscopic chondroplasty    The details of the surgical procedure were explained, including the location of probable incisions and a description of likely hardware and/or grafts to be used.  The patient understands the likely convalescence after surgery.  Also, we have thoroughly discussed the risks, benefits and alternatives to surgery, including, but not limited to, the risk of infection, joint stiffness, blood clot (including DVT and/or pulmonary embolus), neurologic and vascular injury.  It was explained that, if tissue has been repaired or reconstructed, there is a chance of failure, which may require further management.        Patient has chondral damage to knee.  Therefore, I can offer no guarantee whatsoever to the results of a knee arthroscopic surgery.  I believe that arthroscopic surgery will benefit the patient.  I explained this in detail today and patient acknowledged understanding and wishes  to proceed.

## 2022-06-23 ENCOUNTER — PATIENT MESSAGE (OUTPATIENT)
Dept: INTERNAL MEDICINE | Facility: CLINIC | Age: 57
End: 2022-06-23
Payer: COMMERCIAL

## 2022-06-23 DIAGNOSIS — M94.261 CHONDROMALACIA OF RIGHT KNEE: ICD-10-CM

## 2022-06-23 DIAGNOSIS — S83.241A ACUTE MEDIAL MENISCUS TEAR OF RIGHT KNEE, INITIAL ENCOUNTER: ICD-10-CM

## 2022-06-23 DIAGNOSIS — M67.50 PLICA SYNDROME: ICD-10-CM

## 2022-06-23 DIAGNOSIS — S83.281A ACUTE LATERAL MENISCUS TEAR OF RIGHT KNEE, INITIAL ENCOUNTER: Primary | ICD-10-CM

## 2022-06-24 ENCOUNTER — PATIENT MESSAGE (OUTPATIENT)
Dept: SURGERY | Facility: HOSPITAL | Age: 57
End: 2022-06-24
Payer: COMMERCIAL

## 2022-06-27 ENCOUNTER — PATIENT MESSAGE (OUTPATIENT)
Dept: INTERNAL MEDICINE | Facility: CLINIC | Age: 57
End: 2022-06-27
Payer: COMMERCIAL

## 2022-06-27 ENCOUNTER — TELEPHONE (OUTPATIENT)
Dept: SPORTS MEDICINE | Facility: CLINIC | Age: 57
End: 2022-06-27
Payer: COMMERCIAL

## 2022-06-27 NOTE — TELEPHONE ENCOUNTER
Called patient to inform her that Pre-op appointment is no longer at the [facility]& has been scheduled in [place] for 9:30am.

## 2022-06-29 ENCOUNTER — PATIENT MESSAGE (OUTPATIENT)
Dept: SURGERY | Facility: HOSPITAL | Age: 57
End: 2022-06-29
Payer: COMMERCIAL

## 2022-06-29 ENCOUNTER — HOSPITAL ENCOUNTER (OUTPATIENT)
Dept: RADIOLOGY | Facility: HOSPITAL | Age: 57
Discharge: HOME | End: 2022-06-29
Attending: INTERNAL MEDICINE
Payer: COMMERCIAL

## 2022-06-29 ENCOUNTER — PATIENT MESSAGE (OUTPATIENT)
Dept: PREADMISSION TESTING | Facility: HOSPITAL | Age: 57
End: 2022-06-29
Payer: COMMERCIAL

## 2022-06-29 ENCOUNTER — OFFICE VISIT (OUTPATIENT)
Dept: INTERNAL MEDICINE | Facility: CLINIC | Age: 57
End: 2022-06-29
Payer: COMMERCIAL

## 2022-06-29 VITALS
WEIGHT: 248.88 LBS | SYSTOLIC BLOOD PRESSURE: 129 MMHG | DIASTOLIC BLOOD PRESSURE: 75 MMHG | HEIGHT: 65 IN | BODY MASS INDEX: 41.47 KG/M2 | HEART RATE: 91 BPM | OXYGEN SATURATION: 98 %

## 2022-06-29 DIAGNOSIS — Z01.810 PREOP CARDIOVASCULAR EXAM: ICD-10-CM

## 2022-06-29 DIAGNOSIS — J45.909 ASTHMA, CURRENTLY ACTIVE: ICD-10-CM

## 2022-06-29 DIAGNOSIS — S89.91XA INJURY OF RIGHT KNEE, INITIAL ENCOUNTER: ICD-10-CM

## 2022-06-29 DIAGNOSIS — G47.30 SLEEP APNEA, UNSPECIFIED TYPE: ICD-10-CM

## 2022-06-29 DIAGNOSIS — Z01.810 PREOP CARDIOVASCULAR EXAM: Primary | ICD-10-CM

## 2022-06-29 PROCEDURE — 3008F BODY MASS INDEX DOCD: CPT | Mod: CPTII,S$GLB,, | Performed by: INTERNAL MEDICINE

## 2022-06-29 PROCEDURE — 3078F PR MOST RECENT DIASTOLIC BLOOD PRESSURE < 80 MM HG: ICD-10-PCS | Mod: CPTII,S$GLB,, | Performed by: INTERNAL MEDICINE

## 2022-06-29 PROCEDURE — 99214 OFFICE O/P EST MOD 30 MIN: CPT | Mod: S$GLB,,, | Performed by: INTERNAL MEDICINE

## 2022-06-29 PROCEDURE — 1160F RVW MEDS BY RX/DR IN RCRD: CPT | Mod: CPTII,S$GLB,, | Performed by: INTERNAL MEDICINE

## 2022-06-29 PROCEDURE — 3008F PR BODY MASS INDEX (BMI) DOCUMENTED: ICD-10-PCS | Mod: CPTII,S$GLB,, | Performed by: INTERNAL MEDICINE

## 2022-06-29 PROCEDURE — 3074F PR MOST RECENT SYSTOLIC BLOOD PRESSURE < 130 MM HG: ICD-10-PCS | Mod: CPTII,S$GLB,, | Performed by: INTERNAL MEDICINE

## 2022-06-29 PROCEDURE — 4010F PR ACE/ARB THEARPY RXD/TAKEN: ICD-10-PCS | Mod: CPTII,S$GLB,, | Performed by: INTERNAL MEDICINE

## 2022-06-29 PROCEDURE — 99999 PR PBB SHADOW E&M-EST. PATIENT-LVL V: ICD-10-PCS | Mod: PBBFAC,,, | Performed by: INTERNAL MEDICINE

## 2022-06-29 PROCEDURE — 4010F ACE/ARB THERAPY RXD/TAKEN: CPT | Mod: CPTII,S$GLB,, | Performed by: INTERNAL MEDICINE

## 2022-06-29 PROCEDURE — 3074F SYST BP LT 130 MM HG: CPT | Mod: CPTII,S$GLB,, | Performed by: INTERNAL MEDICINE

## 2022-06-29 PROCEDURE — 71046 XR CHEST PA AND LATERAL: ICD-10-PCS | Mod: 26,,, | Performed by: RADIOLOGY

## 2022-06-29 PROCEDURE — 1160F PR REVIEW ALL MEDS BY PRESCRIBER/CLIN PHARMACIST DOCUMENTED: ICD-10-PCS | Mod: CPTII,S$GLB,, | Performed by: INTERNAL MEDICINE

## 2022-06-29 PROCEDURE — 71046 X-RAY EXAM CHEST 2 VIEWS: CPT | Mod: 26,,, | Performed by: RADIOLOGY

## 2022-06-29 PROCEDURE — 71046 X-RAY EXAM CHEST 2 VIEWS: CPT | Mod: TC

## 2022-06-29 PROCEDURE — 1159F PR MEDICATION LIST DOCUMENTED IN MEDICAL RECORD: ICD-10-PCS | Mod: CPTII,S$GLB,, | Performed by: INTERNAL MEDICINE

## 2022-06-29 PROCEDURE — 99999 PR PBB SHADOW E&M-EST. PATIENT-LVL V: CPT | Mod: PBBFAC,,, | Performed by: INTERNAL MEDICINE

## 2022-06-29 PROCEDURE — 3078F DIAST BP <80 MM HG: CPT | Mod: CPTII,S$GLB,, | Performed by: INTERNAL MEDICINE

## 2022-06-29 PROCEDURE — 99214 PR OFFICE/OUTPT VISIT, EST, LEVL IV, 30-39 MIN: ICD-10-PCS | Mod: S$GLB,,, | Performed by: INTERNAL MEDICINE

## 2022-06-29 PROCEDURE — 1159F MED LIST DOCD IN RCRD: CPT | Mod: CPTII,S$GLB,, | Performed by: INTERNAL MEDICINE

## 2022-06-29 RX ORDER — FLUTICASONE FUROATE AND VILANTEROL 200; 25 UG/1; UG/1
POWDER RESPIRATORY (INHALATION)
Qty: 60 EACH | Refills: 5 | Status: SHIPPED | OUTPATIENT
Start: 2022-06-29 | End: 2022-10-03 | Stop reason: SDUPTHER

## 2022-06-29 NOTE — ANESTHESIA PAT ROS NOTE
06/29/2022  Joy Crawford is a 57 y.o., female.  All information is gathered per Chart review via Epic system only.  Pre-op Assessment          Review of Systems  Anesthesia Hx:  No problems with previous Anesthesia   Denies Personal Hx of Anesthesia complications.   Social:  Non-Smoker, Social Alcohol Use    Cardiovascular:   Hypertension    Pulmonary:   Asthma Sleep Apnea ~no CPAP  ~uses inhaler prn   Hepatic/GI:   PUD, GERD    Psych:   anxiety depression         Planned Procedure: Procedure(s) (LRB):  ARTHROSCOPY, KNEE, WITH MENISCECTOMY (Right)  ARTHROSCOPY, KNEE, WITH CHONDROPLASTY (Right)  SYNOVECTOMY, KNEE (Right)  Requested Anesthesia Type:General  Surgeon: Elyssa Mitchell MD  Service: Orthopedics  Known or anticipated Date of Surgery:7/7/2022    Previous anesthesia records:LMA General, MAC and No problems    PCP cleared  5/2020 TTE  Summary  · Normal left ventricular systolic function. The estimated ejection fraction is 60%.  · Normal LV diastolic function.  · Normal right ventricular systolic function.  · The estimated PA systolic pressure is 22 mmHg.  · Normal central venous pressure (3 mmHg).     Vent. Rate : 080 BPM     Atrial Rate : 080 BPM      P-R Int : 148 ms          QRS Dur : 086 ms       QT Int : 376 ms       P-R-T Axes : 065 028 043 degrees      QTc Int : 433 ms   Normal sinus rhythm   Normal ECG   When compared with ECG of 13-APR-2009 14:39,   No significant change was found    5'5  251#  vaccinated

## 2022-06-29 NOTE — PROGRESS NOTES
Subjective:       Patient ID: Joy Crawford is a 57 y.o. female.    Chief Complaint: Pre-op Exam    HPI: New to me, here for preop.   Right knee arthroscopic surgery planned next week. Has instructions for holding NSAIDS 1 week and Lisinopril AM of surgery.  She says she has a history of active stable asthma.  She had surgery outside of our institution about 8 months ago.  She will try to get the EKG as she says was normal.  After the visit she sent me a message that it is planned for general anesthesia.  She has not had labs here in 2 years so I would like to update some blood work and a chest x-ray.  Her previous EKG here, was normal, and she says her EKG from September or October was normal so she will send me a copy of that.  She denies any chest pains or shortness of breath other than when her asthma flares up she may have to use her inhaler.  No cough.  No prior problems with anesthesia.  No abnormal bleeding bruising or clotting    Review of Systems   Constitutional: Negative for appetite change, chills and fever.   HENT: Negative for nosebleeds and sore throat.    Eyes: Negative for pain and visual disturbance.   Respiratory: Negative for cough, shortness of breath and wheezing.    Cardiovascular: Negative for chest pain and leg swelling.   Gastrointestinal: Negative for abdominal pain, constipation and diarrhea.   Endocrine: Negative for polyuria.   Genitourinary: Negative for difficulty urinating, hematuria and vaginal bleeding.   Musculoskeletal: Positive for arthralgias. Negative for back pain, gait problem and neck pain.   Integumentary:  Negative for pallor and rash.   Neurological: Negative for tremors, seizures and headaches.   Hematological: Does not bruise/bleed easily.   Psychiatric/Behavioral: Negative for dysphoric mood. The patient is not nervous/anxious.            Past Medical History:   Diagnosis Date    Anxiety     Asthma     Depression     Esophageal reflux     H/O mammogram  01/20/2017    Normal  (DIS)     Herpes simplex virus (HSV) infection     no out breaks    Hypertension     Peptic ulcer     Sleep apnea     doesnt use cpap     Past Surgical History:   Procedure Laterality Date    CYST REMOVAL  6/25/2020    Procedure: EXCISION, CYST;  Surgeon: Gallo Maddox MD;  Location: [facility] OR;  Service: Orthopedics;;  COMPLEXT CYST LEFT  ANKLE AND FOOT    ENDOMETRIAL BIOPSY N/A 3/11/2020    Procedure: BIOPSY, ENDOMETRIUM;  Surgeon: José Miguel Leon MD;  Location: [facility] OR;  Service: OB/GYN;  Laterality: N/A;    foot ankle sx Left 06/25/2020    RHINOPLASTY  2006    SINUS SURGERY  2006    VULVECTOMY N/A 3/11/2020    Procedure: VULVECTOMY;  Surgeon: José Miguel Leon MD;  Location: [facility] OR;  Service: OB/GYN;  Laterality: N/A;      Patient Active Problem List   Diagnosis    Anxiety    Gastroesophageal reflux disease    Peptic ulcer    Herpetic ulceration of vulva    Fatigue    Exacerbation of asthma    AVELINA I (vulvar intraepithelial neoplasia I)    Asthma, currently active    Sleep apnea    BMI 37.0-37.9, adult    Ganglion cyst of left foot    Allergy    Vitamin B12 deficiency    Vitamin D deficiency disease    Adjustment disorder with mixed anxiety and depressed mood    Recurrent major depressive disorder, in partial remission    ADHD (attention deficit hyperactivity disorder), inattentive type        Objective:      Physical Exam  Constitutional:       General: She is not in acute distress.     Appearance: She is well-developed. She is obese.   HENT:      Head: Normocephalic and atraumatic.      Right Ear: Tympanic membrane, ear canal and external ear normal.      Left Ear: Tympanic membrane, ear canal and external ear normal.      Mouth/Throat:      Pharynx: No oropharyngeal exudate or posterior oropharyngeal erythema.   Eyes:      General: No scleral icterus.     Conjunctiva/sclera: Conjunctivae normal.      Pupils: Pupils are equal, round, and reactive to light.   Neck:       Thyroid: No thyromegaly.   Cardiovascular:      Rate and Rhythm: Normal rate and regular rhythm.      Pulses: Normal pulses.      Heart sounds: No murmur heard.  Pulmonary:      Effort: Pulmonary effort is normal.      Breath sounds: Normal breath sounds. No wheezing.   Abdominal:      General: Bowel sounds are normal. There is no distension.      Palpations: Abdomen is soft.      Tenderness: There is no abdominal tenderness.   Musculoskeletal:         General: Tenderness (right knee) present.      Cervical back: Normal range of motion and neck supple.      Right lower leg: No edema.      Left lower leg: No edema.   Lymphadenopathy:      Cervical: No cervical adenopathy.   Skin:     Coloration: Skin is not jaundiced.      Findings: No rash.   Neurological:      General: No focal deficit present.      Mental Status: She is alert and oriented to person, place, and time.   Psychiatric:         Mood and Affect: Mood normal.         Behavior: Behavior normal.         Assessment:       Problem List Items Addressed This Visit        Pulmonary    Asthma, currently active    Relevant Orders    X-Ray Chest PA And Lateral       Other    Sleep apnea    Relevant Orders    X-Ray Chest PA And Lateral      Other Visit Diagnoses     Preop cardiovascular exam    -  Primary    Relevant Orders    Basic Metabolic Panel    CBC Auto Differential    X-Ray Chest PA And Lateral    Injury of right knee, initial encounter              Plan:         Joy was seen today for pre-op exam.    Diagnoses and all orders for this visit:    Preop cardiovascular exam  -     Basic Metabolic Panel; Future  -     CBC Auto Differential; Future  -     X-Ray Chest PA And Lateral; Future    Sleep apnea, unspecified type  -     X-Ray Chest PA And Lateral; Future    Asthma, currently active  -     X-Ray Chest PA And Lateral; Future    Injury of right knee, initial encounter    Other orders  -     fluticasone furoate-vilanteroL (BREO ELLIPTA) 200-25 mcg/dose  "DsDv diskus inhaler; INHALE 1 PUFF BY MOUTH DAILY             Clinically she seems very stable.  We will review chest x-ray and labs.  Would like to see external EKG although last 1 here was very normal as well then give final report of clearance        Portions of this note may have been created with voice recognition software. Occasional "wrong-word" or "sound-a-like" substitutions may have occurred due to the inherent limitations of voice recognition software. Please, read the note carefully and recognize, using context, where substitutions have occurred.  "

## 2022-06-30 ENCOUNTER — PATIENT MESSAGE (OUTPATIENT)
Dept: INTERNAL MEDICINE | Facility: CLINIC | Age: 57
End: 2022-06-30
Payer: COMMERCIAL

## 2022-07-01 NOTE — TELEPHONE ENCOUNTER
EKG, CXR and labs are stable. Pt is cleared for upcoming Orthopedic surgery.   Note forwarded to surgeon.     Copy of EKG sent to [facility]

## 2022-07-05 ENCOUNTER — PATIENT MESSAGE (OUTPATIENT)
Dept: SPORTS MEDICINE | Facility: CLINIC | Age: 57
End: 2022-07-05
Payer: COMMERCIAL

## 2022-07-06 ENCOUNTER — TELEPHONE (OUTPATIENT)
Dept: SPORTS MEDICINE | Facility: CLINIC | Age: 57
End: 2022-07-06
Payer: COMMERCIAL

## 2022-07-06 ENCOUNTER — PATIENT MESSAGE (OUTPATIENT)
Dept: PSYCHIATRY | Facility: CLINIC | Age: 57
End: 2022-07-06
Payer: COMMERCIAL

## 2022-07-06 ENCOUNTER — OFFICE VISIT (OUTPATIENT)
Dept: SPORTS MEDICINE | Facility: CLINIC | Age: 57
End: 2022-07-06
Payer: COMMERCIAL

## 2022-07-06 VITALS
HEIGHT: 65 IN | BODY MASS INDEX: 40.82 KG/M2 | WEIGHT: 245 LBS | TEMPERATURE: 98 F | DIASTOLIC BLOOD PRESSURE: 79 MMHG | SYSTOLIC BLOOD PRESSURE: 115 MMHG | HEART RATE: 84 BPM

## 2022-07-06 DIAGNOSIS — S83.241A ACUTE MEDIAL MENISCUS TEAR OF RIGHT KNEE, INITIAL ENCOUNTER: ICD-10-CM

## 2022-07-06 DIAGNOSIS — S83.281A ACUTE LATERAL MENISCUS TEAR OF RIGHT KNEE, INITIAL ENCOUNTER: Primary | ICD-10-CM

## 2022-07-06 PROCEDURE — 3078F DIAST BP <80 MM HG: CPT | Mod: CPTII,S$GLB,, | Performed by: PHYSICIAN ASSISTANT

## 2022-07-06 PROCEDURE — 4010F PR ACE/ARB THEARPY RXD/TAKEN: ICD-10-PCS | Mod: CPTII,S$GLB,, | Performed by: PHYSICIAN ASSISTANT

## 2022-07-06 PROCEDURE — 99999 PR PBB SHADOW E&M-EST. PATIENT-LVL IV: CPT | Mod: PBBFAC,,, | Performed by: PHYSICIAN ASSISTANT

## 2022-07-06 PROCEDURE — 1159F PR MEDICATION LIST DOCUMENTED IN MEDICAL RECORD: ICD-10-PCS | Mod: CPTII,S$GLB,, | Performed by: PHYSICIAN ASSISTANT

## 2022-07-06 PROCEDURE — 1160F RVW MEDS BY RX/DR IN RCRD: CPT | Mod: CPTII,S$GLB,, | Performed by: PHYSICIAN ASSISTANT

## 2022-07-06 PROCEDURE — 99499 UNLISTED E&M SERVICE: CPT | Mod: S$GLB,,, | Performed by: PHYSICIAN ASSISTANT

## 2022-07-06 PROCEDURE — 3078F PR MOST RECENT DIASTOLIC BLOOD PRESSURE < 80 MM HG: ICD-10-PCS | Mod: CPTII,S$GLB,, | Performed by: PHYSICIAN ASSISTANT

## 2022-07-06 PROCEDURE — 1160F PR REVIEW ALL MEDS BY PRESCRIBER/CLIN PHARMACIST DOCUMENTED: ICD-10-PCS | Mod: CPTII,S$GLB,, | Performed by: PHYSICIAN ASSISTANT

## 2022-07-06 PROCEDURE — 1159F MED LIST DOCD IN RCRD: CPT | Mod: CPTII,S$GLB,, | Performed by: PHYSICIAN ASSISTANT

## 2022-07-06 PROCEDURE — 4010F ACE/ARB THERAPY RXD/TAKEN: CPT | Mod: CPTII,S$GLB,, | Performed by: PHYSICIAN ASSISTANT

## 2022-07-06 PROCEDURE — 3074F PR MOST RECENT SYSTOLIC BLOOD PRESSURE < 130 MM HG: ICD-10-PCS | Mod: CPTII,S$GLB,, | Performed by: PHYSICIAN ASSISTANT

## 2022-07-06 PROCEDURE — 3008F BODY MASS INDEX DOCD: CPT | Mod: CPTII,S$GLB,, | Performed by: PHYSICIAN ASSISTANT

## 2022-07-06 PROCEDURE — 3074F SYST BP LT 130 MM HG: CPT | Mod: CPTII,S$GLB,, | Performed by: PHYSICIAN ASSISTANT

## 2022-07-06 PROCEDURE — 99499 NO LOS: ICD-10-PCS | Mod: S$GLB,,, | Performed by: PHYSICIAN ASSISTANT

## 2022-07-06 PROCEDURE — 3008F PR BODY MASS INDEX (BMI) DOCUMENTED: ICD-10-PCS | Mod: CPTII,S$GLB,, | Performed by: PHYSICIAN ASSISTANT

## 2022-07-06 PROCEDURE — 99999 PR PBB SHADOW E&M-EST. PATIENT-LVL IV: ICD-10-PCS | Mod: PBBFAC,,, | Performed by: PHYSICIAN ASSISTANT

## 2022-07-06 RX ORDER — TRAMADOL HYDROCHLORIDE 50 MG/1
50-100 TABLET ORAL EVERY 6 HOURS PRN
Qty: 21 TABLET | Refills: 0 | Status: SHIPPED | OUTPATIENT
Start: 2022-07-06 | End: 2022-08-05

## 2022-07-06 RX ORDER — ASPIRIN 81 MG/1
81 TABLET ORAL DAILY
Qty: 28 TABLET | Refills: 0 | Status: SHIPPED | OUTPATIENT
Start: 2022-07-06 | End: 2022-10-24

## 2022-07-06 RX ORDER — PROMETHAZINE HYDROCHLORIDE 25 MG/1
25 TABLET ORAL EVERY 6 HOURS PRN
Qty: 8 TABLET | Refills: 0 | Status: SHIPPED | OUTPATIENT
Start: 2022-07-06 | End: 2022-08-05

## 2022-07-06 RX ORDER — SODIUM CHLORIDE 9 MG/ML
INJECTION, SOLUTION INTRAVENOUS CONTINUOUS
Status: CANCELLED | OUTPATIENT
Start: 2022-07-06

## 2022-07-06 RX ORDER — HYDROCODONE BITARTRATE AND ACETAMINOPHEN 10; 325 MG/1; MG/1
TABLET ORAL
Qty: 15 TABLET | Refills: 0 | Status: SHIPPED | OUTPATIENT
Start: 2022-07-06 | End: 2022-08-05

## 2022-07-06 NOTE — H&P (VIEW-ONLY)
Joy Crawford  is here for a completion of her perioperative paperwork. she  Is scheduled to undergo     right              a. Knee arthroscopic medial and lateral meniscectomy                 b. Knee arthroscopic possible plica excision              c. Knee arthroscopic chondroplasty    on 7/7/22.      She is a healthy individual and does need clearance for this procedure which she has received from her PCP.     PAST MEDICAL HISTORY:   Past Medical History:   Diagnosis Date    Anxiety     Asthma     Depression     Esophageal reflux     H/O mammogram 01/20/2017    Normal  (DIS)     Herpes simplex virus (HSV) infection     no out breaks    Hypertension     Peptic ulcer     Sleep apnea     doesnt use cpap     PAST SURGICAL HISTORY:   Past Surgical History:   Procedure Laterality Date    CYST REMOVAL  6/25/2020    Procedure: EXCISION, CYST;  Surgeon: Gallo Maddox MD;  Location: [facility] OR;  Service: Orthopedics;;  COMPLEXT CYST LEFT  ANKLE AND FOOT    ENDOMETRIAL BIOPSY N/A 3/11/2020    Procedure: BIOPSY, ENDOMETRIUM;  Surgeon: José Miguel Leon MD;  Location: [facility];  Service: OB/GYN;  Laterality: N/A;    foot ankle sx Left 06/25/2020    RHINOPLASTY  2006    SINUS SURGERY  2006    VULVECTOMY N/A 3/11/2020    Procedure: VULVECTOMY;  Surgeon: José Miguel Leon MD;  Location: [facility];  Service: OB/GYN;  Laterality: N/A;     FAMILY HISTORY:   Family History   Problem Relation Age of Onset    Skin cancer Father     Hypertension Father     Hyperlipidemia Father     Hypertension Mother     Diabetes Mother     Hyperlipidemia Mother     No Known Problems Son     Depression Brother     Breast cancer Neg Hx      SOCIAL HISTORY:   Social History     Socioeconomic History    Marital status: Single   Tobacco Use    Smoking status: Never Smoker    Smokeless tobacco: Never Used   Substance and Sexual Activity    Alcohol use: Yes     Comment: social    Drug use: No    Sexual activity: Not Currently      Partners: Male     Birth control/protection: Post-menopausal   Social History Narrative    , one son (20 years old).  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        MEDICATIONS:   Current Outpatient Medications:     albuterol (PROVENTIL/VENTOLIN HFA) 90 mcg/actuation inhaler, INHALE 2 PUFFS BY MOUTH EVERY 4 HOURS AS NEEDED FOR WHEEZING OR RESCUE, Disp: 20.1 g, Rfl: 3    buPROPion (WELLBUTRIN SR) 100 MG TBSR 12 hr tablet, Take 1 tablet (100 mg total) by mouth once daily., Disp: 90 tablet, Rfl: 0    busPIRone (BUSPAR) 10 MG tablet, Take 1 tablet (10 mg total) by mouth 2 (two) times daily., Disp: 180 tablet, Rfl: 1    cromolyn (NASALCHROM) 5.2 mg/spray (4 %) nasal spray, 1 spray by Nasal route 4 (four) times daily., Disp: 26 mL, Rfl: 1    diclofenac sodium (VOLTAREN) 1 % Gel, Apply 2 g topically 3 (three) times daily., Disp: 100 g, Rfl: 0    DULoxetine (CYMBALTA) 60 MG capsule, Take 1 capsule (60 mg total) by mouth once daily., Disp: 90 capsule, Rfl: 1    fluticasone furoate-vilanteroL (BREO ELLIPTA) 200-25 mcg/dose DsDv diskus inhaler, INHALE 1 PUFF BY MOUTH DAILY, Disp: 60 each, Rfl: 5    fluticasone propionate (FLONASE) 50 mcg/actuation nasal spray, 1 spray (50 mcg total) by Each Nostril route once daily., Disp: 16 g, Rfl: 3    gabapentin (NEURONTIN) 300 MG capsule, Take 1 capsule (300 mg total) by mouth 3 (three) times daily., Disp: 90 capsule, Rfl: 2    lisinopriL 10 MG tablet, Take 1 tablet (10 mg total) by mouth once daily., Disp: 90 tablet, Rfl: 4    traZODone (DESYREL) 150 MG tablet, Take 1 tablet (150 mg total) by mouth nightly., Disp: 90 tablet, Rfl: 1    aspirin (ECOTRIN) 81 MG EC tablet, Take 1 tablet (81 mg total) by mouth once daily. For 4 weeks starting the day after surgery., Disp: 28 tablet, Rfl: 0    HYDROcodone-acetaminophen (NORCO)  mg per tablet, Take 1 tablet by mouth every 4-6 hours for pain., Disp: 15 tablet, Rfl: 0    promethazine (PHENERGAN) 25 MG  "tablet, Take 1 tablet (25 mg total) by mouth every 6 (six) hours as needed for Nausea., Disp: 8 tablet, Rfl: 0    traMADoL (ULTRAM) 50 mg tablet, Take 1-2 tablets ( mg total) by mouth every 6 (six) hours as needed for Pain., Disp: 21 tablet, Rfl: 0  ALLERGIES: Review of patient's allergies indicates:  No Known Allergies    VITAL SIGNS: /79   Pulse 84   Temp 98.2 °F (36.8 °C)   Ht 5' 5" (1.651 m)   Wt 111.1 kg (245 lb)   LMP 07/12/2018 (Approximate)   BMI 40.77 kg/m²      Risks, indications and benefits of the surgical procedure were discussed with the patient. All questions with regard to surgery, rehab, expected return to functional activities, activities of daily living and recreational endeavors were answered to her satisfaction.    It was explained to the patient that there may be an increase in surgical risks if the patient has certain co-morbidities such as but not limited to: Obesity, Cardiovascular issues (CHF, CAD, Arrhythmias), chronic pulmonary issues, previous or current neurovascular/neurological issues, previous strokes, diabetes mellitus, previous wound healing issues, previous wound or skin infections, PVD, clotting disorders, if the patient uses chronic steroids, if the patient takes or has immune compromising medications or diseases, or has previously or currently used tobacco products.     The patient verbalized that he/she does not have any additional clotting, bleeding, or blood disorders, other than what is list in her chart on today's review.     Then a brief history and physical exam were performed.    Review of Systems   Constitution: Negative. Negative for chills, fever and night sweats.   HENT: Negative for congestion and headaches.    Eyes: Negative for blurred vision, left vision loss and right vision loss.   Cardiovascular: Negative for chest pain and syncope.   Respiratory: Negative for cough and shortness of breath.    Endocrine: Negative for polydipsia, polyphagia " and polyuria.   Hematologic/Lymphatic: Negative for bleeding problem. Does not bruise/bleed easily.   Skin: Negative for dry skin, itching and rash.   Musculoskeletal: Negative for falls and muscle weakness.   Gastrointestinal: Negative for abdominal pain and bowel incontinence.   Genitourinary: Negative for bladder incontinence and nocturia.   Neurological: Negative for disturbances in coordination, loss of balance and seizures.   Psychiatric/Behavioral: Negative for depression. The patient does not have insomnia.    Allergic/Immunologic: Negative for hives and persistent infections.     PHYSICAL EXAM:  GEN: A&Ox3, WD WN NAD  HEENT: WNL  CHEST: CTAB, no W/R/R  HEART: RRR, no M/R/G  ABD: Soft, NT ND, BS x4 QUADS  MS; See Epic  NEURO: CN II-XII intact       The surgical consent was then reviewed with the patient, who agreed with all the contents of the consent form and it was signed. she was then given the surgery packet to bring with her to surgery center for the anesthesia portion of her perioperative paperwork (if needed)   For all physicians except for Dr. Early, we will email and possibly fax the consent forms and booking sheets to [facility].    The patient was given the opportunity to ask questions about the surgical plan and consent form, and once no other questions were asked, I proceeded with the pre-op appointment.    PHYSICAL THERAPY:  She was also instructed regarding physical therapy and will begin on  POD1. She was given a copy of the original prescription to schedule. Another copy of this prescription was also faxed to [facility].    POST OP CARE:instructions were reviewed including care of the wound and dressing after surgery and when she can shower. Patient was told not sleep or lay on there surgical extremity following surgery as this could cause repair damage, tissue damage, or nerve injury.    An extensive amount of time was spent on discussion of the following information based  on what type of surgery the patient was having. Patient expressed understanding of the material below:    Shoulder surgery or upper extremity surgery requiring post-op sling:  It was explained to the patient that they should remove their arm from the sling approximately 6 times per day to do full elbow ROM (flexion and extension) and full supination and pronation of the elbow for approximately 5 minutes at a time to help prevent elbow stiffness, nerve pain or problems, or nerve injury. They were told to contact us if they begin having numbness and tingling of there surgical extremity that persists longer then 1 day without relief.     Extremity surgery requiring a splint:   It was explained to patient on how to properly elevated position there extremity to prevent pressure ulcers from occurring. I made sure that the patient understood that that surgical site may be numb following surgery and prevent them from feeling pressure pain that they would normal feel if a pressure injury was occurring. Pressure ulcers and there causes were discussed with the patient today.     Post-operative splint:  It was explain to the patient that they can contact us at anytime if they feel that there is a problem with their splint or under their splint that needs evaluation. If there is concern, questions, or discomfort with the splint then they can present to either our clinic or the [facility] ED for removal, evaluation, and replacement of the splint.    CRUTCHES OR WALKER: It was explained to the patient that if they are having a lower extremity surgery that they will require either a walker or crutches to ambulate safely with after surgery. It was explained that a cane or other assistive devices are not sufficient to safely ambulate with after surgery. I explained to the patient that I will place an order for them to receive either crutches or a walker after surgery to go home with. It was explained that if they have  crutches or a walker at home already, that they are REQUIRED to bring them to the hospital on the day of surgery. It was explained that if they do not have them at the hospital on the day of surgery that they WILL be provided a new pair or crutches or a walker to go home with to ensure ambulation will be safe if the patient needs to stop somewhere on the way home.      PAIN MANAGEMENT: Joy Crawford was also given a pain management regime, which includes the option of a TENS unit given to her by [facility] along with the education required for its use. She was also instructed regarding the Polar ice unit that will be in place after surgery and her postoperative pain medications.     PAIN MEDICATION:  Norco 10/325mg 1 po q 4-6 hours prn pain  Ultram 50 mg Take 1-2 p.o. q.6 hours p.r.n. breakthrough pain,   Phenergan 25 mg one p.o. q.6 hours p.r.n. nausea and vomiting.    DVT prophylaxis was discussed with the patient today including risk factors for developing DVTs and history of DVTs. The patient was asked if any specific recommendations were given from the doctor/s that did pre-operative surgical clearance. The patient was then given an education sheet about DVTs and PE with warning signs and symptoms of both and steps to take if they suspect either of these.    This along with the Modified Caprini risk assessment model for VTE in general surgical patients was used to determine the patient's DVT risk.     From: El KAUR, Costa DA, Chandni CASTAÑEDA, et al. Prevention of VTE in nonorthopedic surgical patients: antithrombotic therapy and prevention of thrombosis, 9th ed: American College of Chest Physicians evidence-based clinical practical guidelines. Chest 2012; 141:e227S. Copyright © 2012. Reproduced with permission from the American College of Chest Physicians.    The below listed DVT prophylaxis regimen along with bilateral DAVIDSON compression stockings will be used post-op. Length of treatment has been determined to be  10-42 days post-op by the above noted Caprini assessment model.     The patient was instructed to buy and take:  Aspirin 81mg QD x 4 weeks for DVT prophylaxis starting on the morning after surgery.  Patient will also use bilateral TEDs on lower extremities, SCDs during surgery, and early ambulation post-op. If the patient was previously taking 81mg baby aspirin, they were told to not take it starting 5 days prior to surgery and to restart the 81mg aspirin after surgery.       Patient was also told to buy over the counter Prilosec medication if needed and take it once daily for GI protection as long as they are taking NSAIDs or Aspirin.    Patient denies history of seizures.     She states that she has taken aspirin prior and this medication does not trigger asthma attacks for her. She will stop the medication if this occurs.     The patient was told that narcotic pain medications may make them drowsy and instructions were given to not sign legal documents, drive or operate heavy machinery, cars, or equipment while under the influence of narcotic medications. The patient was told and understands that narcotic pain medications should only be used as needed to control pain and that other options of pain control include TENs unit and ice packs/unit.     As there were no other questions to be asked, she was given my business card along with Elyssa Mitchell MD business card if she has any questions or concerns prior to surgery or in the postop period.

## 2022-07-06 NOTE — H&P
Joy Crawford  is here for a completion of her perioperative paperwork. she  Is scheduled to undergo     right              a. Knee arthroscopic medial and lateral meniscectomy                 b. Knee arthroscopic possible plica excision              c. Knee arthroscopic chondroplasty    on 7/7/22.      She is a healthy individual and does need clearance for this procedure which she has received from her PCP.     PAST MEDICAL HISTORY:   Past Medical History:   Diagnosis Date    Anxiety     Asthma     Depression     Esophageal reflux     H/O mammogram 01/20/2017    Normal  (DIS)     Herpes simplex virus (HSV) infection     no out breaks    Hypertension     Peptic ulcer     Sleep apnea     doesnt use cpap     PAST SURGICAL HISTORY:   Past Surgical History:   Procedure Laterality Date    CYST REMOVAL  6/25/2020    Procedure: EXCISION, CYST;  Surgeon: Gallo Maddox MD;  Location: [facility] OR;  Service: Orthopedics;;  COMPLEXT CYST LEFT  ANKLE AND FOOT    ENDOMETRIAL BIOPSY N/A 3/11/2020    Procedure: BIOPSY, ENDOMETRIUM;  Surgeon: José Miguel Leon MD;  Location: [facility];  Service: OB/GYN;  Laterality: N/A;    foot ankle sx Left 06/25/2020    RHINOPLASTY  2006    SINUS SURGERY  2006    VULVECTOMY N/A 3/11/2020    Procedure: VULVECTOMY;  Surgeon: José Miguel Leon MD;  Location: [facility];  Service: OB/GYN;  Laterality: N/A;     FAMILY HISTORY:   Family History   Problem Relation Age of Onset    Skin cancer Father     Hypertension Father     Hyperlipidemia Father     Hypertension Mother     Diabetes Mother     Hyperlipidemia Mother     No Known Problems Son     Depression Brother     Breast cancer Neg Hx      SOCIAL HISTORY:   Social History     Socioeconomic History    Marital status: Single   Tobacco Use    Smoking status: Never Smoker    Smokeless tobacco: Never Used   Substance and Sexual Activity    Alcohol use: Yes     Comment: social    Drug use: No    Sexual activity: Not Currently      Partners: Male     Birth control/protection: Post-menopausal   Social History Narrative    , one son (20 years old).  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        MEDICATIONS:   Current Outpatient Medications:     albuterol (PROVENTIL/VENTOLIN HFA) 90 mcg/actuation inhaler, INHALE 2 PUFFS BY MOUTH EVERY 4 HOURS AS NEEDED FOR WHEEZING OR RESCUE, Disp: 20.1 g, Rfl: 3    buPROPion (WELLBUTRIN SR) 100 MG TBSR 12 hr tablet, Take 1 tablet (100 mg total) by mouth once daily., Disp: 90 tablet, Rfl: 0    busPIRone (BUSPAR) 10 MG tablet, Take 1 tablet (10 mg total) by mouth 2 (two) times daily., Disp: 180 tablet, Rfl: 1    cromolyn (NASALCHROM) 5.2 mg/spray (4 %) nasal spray, 1 spray by Nasal route 4 (four) times daily., Disp: 26 mL, Rfl: 1    diclofenac sodium (VOLTAREN) 1 % Gel, Apply 2 g topically 3 (three) times daily., Disp: 100 g, Rfl: 0    DULoxetine (CYMBALTA) 60 MG capsule, Take 1 capsule (60 mg total) by mouth once daily., Disp: 90 capsule, Rfl: 1    fluticasone furoate-vilanteroL (BREO ELLIPTA) 200-25 mcg/dose DsDv diskus inhaler, INHALE 1 PUFF BY MOUTH DAILY, Disp: 60 each, Rfl: 5    fluticasone propionate (FLONASE) 50 mcg/actuation nasal spray, 1 spray (50 mcg total) by Each Nostril route once daily., Disp: 16 g, Rfl: 3    gabapentin (NEURONTIN) 300 MG capsule, Take 1 capsule (300 mg total) by mouth 3 (three) times daily., Disp: 90 capsule, Rfl: 2    lisinopriL 10 MG tablet, Take 1 tablet (10 mg total) by mouth once daily., Disp: 90 tablet, Rfl: 4    traZODone (DESYREL) 150 MG tablet, Take 1 tablet (150 mg total) by mouth nightly., Disp: 90 tablet, Rfl: 1    aspirin (ECOTRIN) 81 MG EC tablet, Take 1 tablet (81 mg total) by mouth once daily. For 4 weeks starting the day after surgery., Disp: 28 tablet, Rfl: 0    HYDROcodone-acetaminophen (NORCO)  mg per tablet, Take 1 tablet by mouth every 4-6 hours for pain., Disp: 15 tablet, Rfl: 0    promethazine (PHENERGAN) 25 MG  "tablet, Take 1 tablet (25 mg total) by mouth every 6 (six) hours as needed for Nausea., Disp: 8 tablet, Rfl: 0    traMADoL (ULTRAM) 50 mg tablet, Take 1-2 tablets ( mg total) by mouth every 6 (six) hours as needed for Pain., Disp: 21 tablet, Rfl: 0  ALLERGIES: Review of patient's allergies indicates:  No Known Allergies    VITAL SIGNS: /79   Pulse 84   Temp 98.2 °F (36.8 °C)   Ht 5' 5" (1.651 m)   Wt 111.1 kg (245 lb)   LMP 07/12/2018 (Approximate)   BMI 40.77 kg/m²      Risks, indications and benefits of the surgical procedure were discussed with the patient. All questions with regard to surgery, rehab, expected return to functional activities, activities of daily living and recreational endeavors were answered to her satisfaction.    It was explained to the patient that there may be an increase in surgical risks if the patient has certain co-morbidities such as but not limited to: Obesity, Cardiovascular issues (CHF, CAD, Arrhythmias), chronic pulmonary issues, previous or current neurovascular/neurological issues, previous strokes, diabetes mellitus, previous wound healing issues, previous wound or skin infections, PVD, clotting disorders, if the patient uses chronic steroids, if the patient takes or has immune compromising medications or diseases, or has previously or currently used tobacco products.     The patient verbalized that he/she does not have any additional clotting, bleeding, or blood disorders, other than what is list in her chart on today's review.     Then a brief history and physical exam were performed.    Review of Systems   Constitution: Negative. Negative for chills, fever and night sweats.   HENT: Negative for congestion and headaches.    Eyes: Negative for blurred vision, left vision loss and right vision loss.   Cardiovascular: Negative for chest pain and syncope.   Respiratory: Negative for cough and shortness of breath.    Endocrine: Negative for polydipsia, polyphagia " and polyuria.   Hematologic/Lymphatic: Negative for bleeding problem. Does not bruise/bleed easily.   Skin: Negative for dry skin, itching and rash.   Musculoskeletal: Negative for falls and muscle weakness.   Gastrointestinal: Negative for abdominal pain and bowel incontinence.   Genitourinary: Negative for bladder incontinence and nocturia.   Neurological: Negative for disturbances in coordination, loss of balance and seizures.   Psychiatric/Behavioral: Negative for depression. The patient does not have insomnia.    Allergic/Immunologic: Negative for hives and persistent infections.     PHYSICAL EXAM:  GEN: A&Ox3, WD WN NAD  HEENT: WNL  CHEST: CTAB, no W/R/R  HEART: RRR, no M/R/G  ABD: Soft, NT ND, BS x4 QUADS  MS; See Epic  NEURO: CN II-XII intact       The surgical consent was then reviewed with the patient, who agreed with all the contents of the consent form and it was signed. she was then given the surgery packet to bring with her to surgery center for the anesthesia portion of her perioperative paperwork (if needed)   For all physicians except for Dr. Early, we will email and possibly fax the consent forms and booking sheets to [facility].    The patient was given the opportunity to ask questions about the surgical plan and consent form, and once no other questions were asked, I proceeded with the pre-op appointment.    PHYSICAL THERAPY:  She was also instructed regarding physical therapy and will begin on  POD1. She was given a copy of the original prescription to schedule. Another copy of this prescription was also faxed to [facility].    POST OP CARE:instructions were reviewed including care of the wound and dressing after surgery and when she can shower. Patient was told not sleep or lay on there surgical extremity following surgery as this could cause repair damage, tissue damage, or nerve injury.    An extensive amount of time was spent on discussion of the following information based  on what type of surgery the patient was having. Patient expressed understanding of the material below:    Shoulder surgery or upper extremity surgery requiring post-op sling:  It was explained to the patient that they should remove their arm from the sling approximately 6 times per day to do full elbow ROM (flexion and extension) and full supination and pronation of the elbow for approximately 5 minutes at a time to help prevent elbow stiffness, nerve pain or problems, or nerve injury. They were told to contact us if they begin having numbness and tingling of there surgical extremity that persists longer then 1 day without relief.     Extremity surgery requiring a splint:   It was explained to patient on how to properly elevated position there extremity to prevent pressure ulcers from occurring. I made sure that the patient understood that that surgical site may be numb following surgery and prevent them from feeling pressure pain that they would normal feel if a pressure injury was occurring. Pressure ulcers and there causes were discussed with the patient today.     Post-operative splint:  It was explain to the patient that they can contact us at anytime if they feel that there is a problem with their splint or under their splint that needs evaluation. If there is concern, questions, or discomfort with the splint then they can present to either our clinic or the [facility] ED for removal, evaluation, and replacement of the splint.    CRUTCHES OR WALKER: It was explained to the patient that if they are having a lower extremity surgery that they will require either a walker or crutches to ambulate safely with after surgery. It was explained that a cane or other assistive devices are not sufficient to safely ambulate with after surgery. I explained to the patient that I will place an order for them to receive either crutches or a walker after surgery to go home with. It was explained that if they have  crutches or a walker at home already, that they are REQUIRED to bring them to the hospital on the day of surgery. It was explained that if they do not have them at the hospital on the day of surgery that they WILL be provided a new pair or crutches or a walker to go home with to ensure ambulation will be safe if the patient needs to stop somewhere on the way home.      PAIN MANAGEMENT: Joy Crawford was also given a pain management regime, which includes the option of a TENS unit given to her by [facility] along with the education required for its use. She was also instructed regarding the Polar ice unit that will be in place after surgery and her postoperative pain medications.     PAIN MEDICATION:  Norco 10/325mg 1 po q 4-6 hours prn pain  Ultram 50 mg Take 1-2 p.o. q.6 hours p.r.n. breakthrough pain,   Phenergan 25 mg one p.o. q.6 hours p.r.n. nausea and vomiting.    DVT prophylaxis was discussed with the patient today including risk factors for developing DVTs and history of DVTs. The patient was asked if any specific recommendations were given from the doctor/s that did pre-operative surgical clearance. The patient was then given an education sheet about DVTs and PE with warning signs and symptoms of both and steps to take if they suspect either of these.    This along with the Modified Caprini risk assessment model for VTE in general surgical patients was used to determine the patient's DVT risk.     From: El KAUR, Costa DA, Chandni CASTAÑEDA, et al. Prevention of VTE in nonorthopedic surgical patients: antithrombotic therapy and prevention of thrombosis, 9th ed: American College of Chest Physicians evidence-based clinical practical guidelines. Chest 2012; 141:e227S. Copyright © 2012. Reproduced with permission from the American College of Chest Physicians.    The below listed DVT prophylaxis regimen along with bilateral DAVIDSON compression stockings will be used post-op. Length of treatment has been determined to be  10-42 days post-op by the above noted Caprini assessment model.     The patient was instructed to buy and take:  Aspirin 81mg QD x 4 weeks for DVT prophylaxis starting on the morning after surgery.  Patient will also use bilateral TEDs on lower extremities, SCDs during surgery, and early ambulation post-op. If the patient was previously taking 81mg baby aspirin, they were told to not take it starting 5 days prior to surgery and to restart the 81mg aspirin after surgery.       Patient was also told to buy over the counter Prilosec medication if needed and take it once daily for GI protection as long as they are taking NSAIDs or Aspirin.    Patient denies history of seizures.     She states that she has taken aspirin prior and this medication does not trigger asthma attacks for her. She will stop the medication if this occurs.     The patient was told that narcotic pain medications may make them drowsy and instructions were given to not sign legal documents, drive or operate heavy machinery, cars, or equipment while under the influence of narcotic medications. The patient was told and understands that narcotic pain medications should only be used as needed to control pain and that other options of pain control include TENs unit and ice packs/unit.     As there were no other questions to be asked, she was given my business card along with Elyssa Mitchell MD business card if she has any questions or concerns prior to surgery or in the postop period.

## 2022-07-07 ENCOUNTER — PATIENT MESSAGE (OUTPATIENT)
Dept: SURGERY | Facility: HOSPITAL | Age: 57
End: 2022-07-07

## 2022-07-07 ENCOUNTER — ANESTHESIA EVENT (OUTPATIENT)
Dept: SURGERY | Facility: HOSPITAL | Age: 57
End: 2022-07-07
Payer: COMMERCIAL

## 2022-07-07 ENCOUNTER — HOSPITAL ENCOUNTER (OUTPATIENT)
Facility: HOSPITAL | Age: 57
Discharge: HOME | End: 2022-07-07
Attending: ORTHOPAEDIC SURGERY | Admitting: ORTHOPAEDIC SURGERY
Payer: COMMERCIAL

## 2022-07-07 ENCOUNTER — TELEPHONE (OUTPATIENT)
Dept: SPORTS MEDICINE | Facility: CLINIC | Age: 57
End: 2022-07-07
Payer: COMMERCIAL

## 2022-07-07 ENCOUNTER — ANESTHESIA (OUTPATIENT)
Dept: SURGERY | Facility: HOSPITAL | Age: 57
End: 2022-07-07
Payer: COMMERCIAL

## 2022-07-07 DIAGNOSIS — S83.281A ACUTE LATERAL MENISCUS TEAR OF RIGHT KNEE, INITIAL ENCOUNTER: ICD-10-CM

## 2022-07-07 DIAGNOSIS — S83.241A ACUTE MEDIAL MENISCUS TEAR OF RIGHT KNEE, INITIAL ENCOUNTER: ICD-10-CM

## 2022-07-07 PROCEDURE — D9220A PRA ANESTHESIA: Mod: ANES,,, | Performed by: ANESTHESIOLOGY

## 2022-07-07 PROCEDURE — 27201423 OPTIME MED/SURG SUP & DEVICES STERILE SUPPLY: Performed by: ORTHOPAEDIC SURGERY

## 2022-07-07 PROCEDURE — 71000033 HC RECOVERY, INTIAL HOUR: Performed by: ORTHOPAEDIC SURGERY

## 2022-07-07 PROCEDURE — 29880 ARTHRS KNE SRG MNISECTMY M&L: CPT | Mod: RT,,, | Performed by: ORTHOPAEDIC SURGERY

## 2022-07-07 PROCEDURE — 29880 ARTHRS KNE SRG MNISECTMY M&L: CPT | Mod: AS,RT,, | Performed by: PHYSICIAN ASSISTANT

## 2022-07-07 PROCEDURE — 99900035 HC TECH TIME PER 15 MIN (STAT)

## 2022-07-07 PROCEDURE — 25000003 PHARM REV CODE 250: Performed by: NURSE ANESTHETIST, CERTIFIED REGISTERED

## 2022-07-07 PROCEDURE — 25000003 PHARM REV CODE 250: Performed by: PHYSICIAN ASSISTANT

## 2022-07-07 PROCEDURE — 63600175 PHARM REV CODE 636 W HCPCS: Performed by: PHYSICIAN ASSISTANT

## 2022-07-07 PROCEDURE — D9220A PRA ANESTHESIA: ICD-10-PCS | Mod: CRNA,,, | Performed by: NURSE ANESTHETIST, CERTIFIED REGISTERED

## 2022-07-07 PROCEDURE — 25000003 PHARM REV CODE 250: Performed by: ORTHOPAEDIC SURGERY

## 2022-07-07 PROCEDURE — 36000710: Performed by: ORTHOPAEDIC SURGERY

## 2022-07-07 PROCEDURE — 71000015 HC POSTOP RECOV 1ST HR: Performed by: ORTHOPAEDIC SURGERY

## 2022-07-07 PROCEDURE — 37000009 HC ANESTHESIA EA ADD 15 MINS: Performed by: ORTHOPAEDIC SURGERY

## 2022-07-07 PROCEDURE — D9220A PRA ANESTHESIA: ICD-10-PCS | Mod: ANES,,, | Performed by: ANESTHESIOLOGY

## 2022-07-07 PROCEDURE — 63600175 PHARM REV CODE 636 W HCPCS: Performed by: ORTHOPAEDIC SURGERY

## 2022-07-07 PROCEDURE — 94761 N-INVAS EAR/PLS OXIMETRY MLT: CPT

## 2022-07-07 PROCEDURE — 29880 PR KNEE SCOPE MED/LAT MENISCECTOMY: ICD-10-PCS | Mod: AS,RT,, | Performed by: PHYSICIAN ASSISTANT

## 2022-07-07 PROCEDURE — 29880 PR KNEE SCOPE MED/LAT MENISCECTOMY: ICD-10-PCS | Mod: RT,,, | Performed by: ORTHOPAEDIC SURGERY

## 2022-07-07 PROCEDURE — 36000711: Performed by: ORTHOPAEDIC SURGERY

## 2022-07-07 PROCEDURE — 37000008 HC ANESTHESIA 1ST 15 MINUTES: Performed by: ORTHOPAEDIC SURGERY

## 2022-07-07 PROCEDURE — 63600175 PHARM REV CODE 636 W HCPCS: Performed by: NURSE ANESTHETIST, CERTIFIED REGISTERED

## 2022-07-07 PROCEDURE — D9220A PRA ANESTHESIA: Mod: CRNA,,, | Performed by: NURSE ANESTHETIST, CERTIFIED REGISTERED

## 2022-07-07 RX ORDER — SODIUM CHLORIDE 9 MG/ML
INJECTION, SOLUTION INTRAVENOUS CONTINUOUS
Status: DISCONTINUED | OUTPATIENT
Start: 2022-07-07 | End: 2022-07-07 | Stop reason: HOSPADM

## 2022-07-07 RX ORDER — CEFAZOLIN SODIUM 2 G/50ML
2 SOLUTION INTRAVENOUS
Status: COMPLETED | OUTPATIENT
Start: 2022-07-07 | End: 2022-07-07

## 2022-07-07 RX ORDER — TRAMADOL HYDROCHLORIDE 50 MG/1
100 TABLET ORAL EVERY 6 HOURS PRN
Status: DISCONTINUED | OUTPATIENT
Start: 2022-07-07 | End: 2022-07-07 | Stop reason: HOSPADM

## 2022-07-07 RX ORDER — ONDANSETRON 2 MG/ML
4 INJECTION INTRAMUSCULAR; INTRAVENOUS DAILY PRN
Status: DISCONTINUED | OUTPATIENT
Start: 2022-07-07 | End: 2022-07-07 | Stop reason: HOSPADM

## 2022-07-07 RX ORDER — PROMETHAZINE HYDROCHLORIDE 25 MG/1
25 TABLET ORAL EVERY 6 HOURS PRN
Status: DISCONTINUED | OUTPATIENT
Start: 2022-07-07 | End: 2022-07-07 | Stop reason: HOSPADM

## 2022-07-07 RX ORDER — KETOROLAC TROMETHAMINE 30 MG/ML
INJECTION, SOLUTION INTRAMUSCULAR; INTRAVENOUS
Status: DISCONTINUED | OUTPATIENT
Start: 2022-07-07 | End: 2022-07-07 | Stop reason: HOSPADM

## 2022-07-07 RX ORDER — ONDANSETRON 2 MG/ML
4 INJECTION INTRAMUSCULAR; INTRAVENOUS EVERY 12 HOURS PRN
Status: DISCONTINUED | OUTPATIENT
Start: 2022-07-07 | End: 2022-07-07 | Stop reason: HOSPADM

## 2022-07-07 RX ORDER — KETAMINE HYDROCHLORIDE 100 MG/ML
INJECTION, SOLUTION INTRAMUSCULAR; INTRAVENOUS
Status: DISCONTINUED | OUTPATIENT
Start: 2022-07-07 | End: 2022-07-07 | Stop reason: HOSPADM

## 2022-07-07 RX ORDER — CARBOXYMETHYLCELLULOSE SODIUM 10 MG/ML
GEL OPHTHALMIC
Status: DISCONTINUED | OUTPATIENT
Start: 2022-07-07 | End: 2022-07-07

## 2022-07-07 RX ORDER — HALOPERIDOL 5 MG/ML
0.5 INJECTION INTRAMUSCULAR EVERY 10 MIN PRN
Status: DISCONTINUED | OUTPATIENT
Start: 2022-07-07 | End: 2022-07-07 | Stop reason: HOSPADM

## 2022-07-07 RX ORDER — ROCURONIUM BROMIDE 10 MG/ML
INJECTION, SOLUTION INTRAVENOUS
Status: DISCONTINUED | OUTPATIENT
Start: 2022-07-07 | End: 2022-07-07

## 2022-07-07 RX ORDER — FENTANYL CITRATE 50 UG/ML
25 INJECTION, SOLUTION INTRAMUSCULAR; INTRAVENOUS EVERY 5 MIN PRN
Status: DISCONTINUED | OUTPATIENT
Start: 2022-07-07 | End: 2022-07-07 | Stop reason: HOSPADM

## 2022-07-07 RX ORDER — MIDAZOLAM HYDROCHLORIDE 1 MG/ML
INJECTION INTRAMUSCULAR; INTRAVENOUS
Status: DISCONTINUED | OUTPATIENT
Start: 2022-07-07 | End: 2022-07-07

## 2022-07-07 RX ORDER — MORPHINE SULFATE 2 MG/ML
2 INJECTION, SOLUTION INTRAMUSCULAR; INTRAVENOUS EVERY 10 MIN PRN
Status: DISCONTINUED | OUTPATIENT
Start: 2022-07-07 | End: 2022-07-07 | Stop reason: HOSPADM

## 2022-07-07 RX ORDER — SODIUM CHLORIDE 0.9 % (FLUSH) 0.9 %
10 SYRINGE (ML) INJECTION
Status: DISCONTINUED | OUTPATIENT
Start: 2022-07-07 | End: 2022-07-07 | Stop reason: HOSPADM

## 2022-07-07 RX ORDER — EPINEPHRINE 1 MG/ML
INJECTION, SOLUTION INTRACARDIAC; INTRAMUSCULAR; INTRAVENOUS; SUBCUTANEOUS
Status: DISCONTINUED | OUTPATIENT
Start: 2022-07-07 | End: 2022-07-07 | Stop reason: HOSPADM

## 2022-07-07 RX ORDER — FAMOTIDINE 10 MG/ML
INJECTION INTRAVENOUS
Status: DISCONTINUED | OUTPATIENT
Start: 2022-07-07 | End: 2022-07-07

## 2022-07-07 RX ORDER — PROPOFOL 10 MG/ML
VIAL (ML) INTRAVENOUS
Status: DISCONTINUED | OUTPATIENT
Start: 2022-07-07 | End: 2022-07-07

## 2022-07-07 RX ORDER — DEXAMETHASONE SODIUM PHOSPHATE 4 MG/ML
INJECTION, SOLUTION INTRA-ARTICULAR; INTRALESIONAL; INTRAMUSCULAR; INTRAVENOUS; SOFT TISSUE
Status: DISCONTINUED | OUTPATIENT
Start: 2022-07-07 | End: 2022-07-07

## 2022-07-07 RX ORDER — IBUPROFEN 200 MG
400 TABLET ORAL EVERY 6 HOURS PRN
COMMUNITY
End: 2022-08-05

## 2022-07-07 RX ORDER — LIDOCAINE HYDROCHLORIDE 20 MG/ML
INJECTION INTRAVENOUS
Status: DISCONTINUED | OUTPATIENT
Start: 2022-07-07 | End: 2022-07-07

## 2022-07-07 RX ORDER — ROPIVACAINE HYDROCHLORIDE 5 MG/ML
INJECTION, SOLUTION EPIDURAL; INFILTRATION; PERINEURAL
Status: DISCONTINUED | OUTPATIENT
Start: 2022-07-07 | End: 2022-07-07 | Stop reason: HOSPADM

## 2022-07-07 RX ORDER — KETAMINE HCL IN 0.9 % NACL 50 MG/5 ML
SYRINGE (ML) INTRAVENOUS
Status: DISCONTINUED | OUTPATIENT
Start: 2022-07-07 | End: 2022-07-07

## 2022-07-07 RX ORDER — OXYCODONE HYDROCHLORIDE 5 MG/1
5 TABLET ORAL
Status: DISCONTINUED | OUTPATIENT
Start: 2022-07-07 | End: 2022-07-07 | Stop reason: HOSPADM

## 2022-07-07 RX ORDER — OXYCODONE HYDROCHLORIDE 5 MG/1
10 TABLET ORAL EVERY 4 HOURS PRN
Status: DISCONTINUED | OUTPATIENT
Start: 2022-07-07 | End: 2022-07-07 | Stop reason: HOSPADM

## 2022-07-07 RX ORDER — FENTANYL CITRATE 50 UG/ML
INJECTION, SOLUTION INTRAMUSCULAR; INTRAVENOUS
Status: DISCONTINUED | OUTPATIENT
Start: 2022-07-07 | End: 2022-07-07

## 2022-07-07 RX ORDER — NEOSTIGMINE METHYLSULFATE 1 MG/ML
INJECTION, SOLUTION INTRAVENOUS
Status: DISCONTINUED | OUTPATIENT
Start: 2022-07-07 | End: 2022-07-07

## 2022-07-07 RX ORDER — ONDANSETRON 2 MG/ML
INJECTION INTRAMUSCULAR; INTRAVENOUS
Status: DISCONTINUED | OUTPATIENT
Start: 2022-07-07 | End: 2022-07-07

## 2022-07-07 RX ADMIN — DEXAMETHASONE SODIUM PHOSPHATE 8 MG: 4 INJECTION, SOLUTION INTRAMUSCULAR; INTRAVENOUS at 07:07

## 2022-07-07 RX ADMIN — SUGAMMADEX 200 MG: 100 INJECTION, SOLUTION INTRAVENOUS at 08:07

## 2022-07-07 RX ADMIN — LIDOCAINE HYDROCHLORIDE 100 MG: 20 INJECTION, SOLUTION INTRAVENOUS at 07:07

## 2022-07-07 RX ADMIN — Medication 25 MG: at 07:07

## 2022-07-07 RX ADMIN — ROCURONIUM BROMIDE 50 MG: 10 INJECTION, SOLUTION INTRAVENOUS at 07:07

## 2022-07-07 RX ADMIN — MIDAZOLAM HYDROCHLORIDE 2 MG: 1 INJECTION, SOLUTION INTRAMUSCULAR; INTRAVENOUS at 06:07

## 2022-07-07 RX ADMIN — GLYCOPYRROLATE 0.6 MG: 0.2 INJECTION, SOLUTION INTRAMUSCULAR; INTRAVITREAL at 07:07

## 2022-07-07 RX ADMIN — SODIUM CHLORIDE, SODIUM GLUCONATE, SODIUM ACETATE, POTASSIUM CHLORIDE, MAGNESIUM CHLORIDE, SODIUM PHOSPHATE, DIBASIC, AND POTASSIUM PHOSPHATE: .53; .5; .37; .037; .03; .012; .00082 INJECTION, SOLUTION INTRAVENOUS at 07:07

## 2022-07-07 RX ADMIN — FAMOTIDINE 20 MG: 10 INJECTION, SOLUTION INTRAVENOUS at 06:07

## 2022-07-07 RX ADMIN — ONDANSETRON 4 MG: 2 INJECTION INTRAMUSCULAR; INTRAVENOUS at 07:07

## 2022-07-07 RX ADMIN — CEFAZOLIN SODIUM 2 G: 2 SOLUTION INTRAVENOUS at 07:07

## 2022-07-07 RX ADMIN — FENTANYL CITRATE 100 MCG: 50 INJECTION, SOLUTION INTRAMUSCULAR; INTRAVENOUS at 07:07

## 2022-07-07 RX ADMIN — SODIUM CHLORIDE: 0.9 INJECTION, SOLUTION INTRAVENOUS at 06:07

## 2022-07-07 RX ADMIN — NEOSTIGMINE METHYLSULFATE 5 MG: 1 INJECTION INTRAVENOUS at 07:07

## 2022-07-07 RX ADMIN — PROPOFOL 200 MG: 10 INJECTION, EMULSION INTRAVENOUS at 07:07

## 2022-07-07 RX ADMIN — CARBOXYMETHYLCELLULOSE SODIUM 4 DROP: 10 GEL OPHTHALMIC at 07:07

## 2022-07-07 NOTE — DISCHARGE INSTRUCTIONS
[employer] CARE CUBE COLD THERAPY SYSTEM    The Polar Care Cube Cold Therapy System is simple and reliable. It is easy to use, compact design makes it great for home use. With the addition of ice and water, you will enjoy 6-8 hours of effortless cold therapy. Proper use requires an insulation barrier between the pad and the patient's skin.  Instructions on how to use the Polar Care Cube Cold Therapy System Below:

## 2022-07-07 NOTE — OPERATIVE NOTE ADDENDUM
Certification of Assistant at Surgery       Surgery Date: 7/7/2022     Participating Surgeons:  Surgeon(s) and Role:     * Elyssa Mitchell MD - Primary    LINDA Marinelli PA-C - 1st Assistant    Procedures:  Procedure(s) (LRB):  ARTHROSCOPY, KNEE, MEDIAL AND LATERAL MENISCECTOMY - POLAR CARE (Right)  ARTHROSCOPY, KNEE, WITH CHONDROPLASTY (Right)  PARTIAL SYNOVECTOMY, KNEE (Right)    Assistant Surgeon's Certification of Necessity:  I understand that section 1842 (b) (6) (d) of the Social Security Act generally prohibits Medicare Part B reasonable charge payment for the services of assistants at surgery in teaching hospitals when qualified residents are available to furnish such services. I certify that the services for which payment is claimed were medically necessary, and that no qualified resident was available to perform the services. I further understand that these services are subject to post-payment review by the Medicare carrier.        Romero Marinelli PA-C    07/07/2022  8:05 AM

## 2022-07-07 NOTE — TRANSFER OF CARE
"Anesthesia Transfer of Care Note    Patient: Joy Crawford    Procedure(s) Performed: Procedure(s) (LRB):  ARTHROSCOPY, KNEE, MEDIAL AND LATERAL MENISCECTOMY - POLAR CARE (Right)  ARTHROSCOPY, KNEE, WITH CHONDROPLASTY (Right)  PARTIAL SYNOVECTOMY, KNEE (Right)  DEBRIDEMENT, KNEE    Patient location: PACU    Anesthesia Type: MAC    Transport from OR: Transported from OR on 6-10 L/min O2 by face mask with adequate spontaneous ventilation    Post pain: adequate analgesia    Post assessment: no apparent anesthetic complications and tolerated procedure well    Post vital signs: stable    Level of consciousness: responds to stimulation    Nausea/Vomiting: no nausea/vomiting    Complications: none    Transfer of care protocol was followed      Last vitals:   Visit Vitals  /74   Pulse 84   Temp 36.6 °C (97.9 °F) (Oral)   Resp 18   Ht 5' 5" (1.651 m)   Wt 111.1 kg (245 lb)   LMP 07/12/2018 (Approximate)   SpO2 96%   Breastfeeding No   BMI 40.77 kg/m²     "

## 2022-07-07 NOTE — PLAN OF CARE
Pre op complete. Patient resting comfortably. Call light in reach. Son to bedside shortly, belongings in locker.Patient's son is her ride home. Patient needs crutches for discharge/home use.
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
## 2022-07-07 NOTE — TELEPHONE ENCOUNTER
Spoke to Miley Sy at [facility] who will reach out to patient to schedule PT for tomorrow.

## 2022-07-07 NOTE — PLAN OF CARE
Patient ready to be discharged. VSS and in no distress.    Instructions given, patient verbalizes understanding. Pain assessed and addressed. Tolerates liquids by mouth with no nausea and vomiting.     Ordered DME provided to patient. Verbal and written instructions were given to the patient and a competent caregiver on proper usage. Also educated on the risk of falling if DME is not used/not used properly. All parties verbalized understanding.    Medications delivered to bedside

## 2022-07-07 NOTE — ANESTHESIA PROCEDURE NOTES
Intubation    Date/Time: 7/7/2022 7:09 AM  Performed by: Sara Hutchinson CRNA  Authorized by: Arcelia Law MD     Intubation:     Induction:  Intravenous    Intubated:  Postinduction    Mask Ventilation:  Easy mask    Attempts:  1    Attempted By:  CRNA (Jasmin)    Method of Intubation:  Video laryngoscopy    Blade:  Gutierrez 3    Laryngeal View Grade: Grade I - full view of cords      Difficult Airway Encountered?: No      Complications:  None    Airway Device:  Oral endotracheal tube    Airway Device Size:  7.5    Style/Cuff Inflation:  Cuffed    Inflation Amount (mL):  4    Tube secured:  21    Secured at:  The lips    Placement Verified By:  Capnometry    Complicating Factors:  None    Findings Post-Intubation:  BS equal bilateral and atraumatic/condition of teeth unchanged

## 2022-07-07 NOTE — DISCHARGE SUMMARY
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.261]    Post-op Diagnosis:  Post-Op Diagnosis Codes:     * Acute lateral meniscus tear of right knee, initial encounter [S83.281A]     * Acute medial meniscus tear of right knee, initial encounter [S83.241A]     * Plica syndrome [M67.50]     * Chondromalacia of right knee [M94.261]    Procedure(s) (LRB):  ARTHROSCOPY, KNEE, MEDIAL AND LATERAL MENISCECTOMY - POLAR CARE (Right)  ARTHROSCOPY, KNEE, WITH CHONDROPLASTY (Right)  PARTIAL SYNOVECTOMY, KNEE (Right)    Anesthesia: General    Operative Findings: Right knee arthroscopy    Estimated Blood Loss: minimal          Specimens:   Specimen (24h ago, onward)            None            Discharge Note    OUTCOME: Patient tolerated treatment/procedure well without complication and is now ready for discharge.    DISPOSITION: Home or Self Care    FINAL DIAGNOSIS:  Right knee meniscus tear and OA    FOLLOWUP: In clinic    DISCHARGE INSTRUCTIONS:    Discharge Procedure Orders   Diet general     Call MD for:  temperature >100.4     Call MD for:  persistent nausea and vomiting     Call MD for:  severe uncontrolled pain     Call MD for:  difficulty breathing, headache or visual disturbances     Call MD for:  redness, tenderness, or signs of infection (pain, swelling, redness, odor or green/yellow discharge around incision site)     Call MD for:  hives     Call MD for:  persistent dizziness or light-headedness     Ice to affected area   Order Comments: using barrier between ice and skin (specify duration&frequency)     No driving, operating heavy equipment or  signing legal documents while taking pain medication     Remove dressing in 72 hours     Shower on day dressing removed (No bath)     Weight bearing as tolerated

## 2022-07-07 NOTE — OP NOTE
DATE OF PROCEDURE: 07/07/2022    SURGEON:  Elyssa Mitchell M.D    ASSISTANT: LINDA Marinelli PA-C  The use of an assistant was medically necessary for positioning, skin retraction, and assistance with this procedure. The procedure could not be performed without the use of an assistant.   There was no qualified resident/fellow available for assistance with this procedure.    PREOPERATIVE DIAGNOSES:   right  1. knee medial and lateral meniscus tear   2. knee plica.   3. knee possible chondromalacia  4. knee synovitis  5. Knee adhesions    POSTOPERATIVE DIAGNOSES:   right  1. knee medial and lateral meniscus tear   2. knee plica.   3. knee chondromalacia  4. knee synovitis.   5. Knee adhesions    PROCEDURE PERFORMED:   right  1. knee arthroscopic chondroplasty (CPT 46923)  2. knee arthroscopic medial and lateral (CPT 07830) meniscectomy   3. knee arthroscopic partial synovectomy/debridement (CPT 14733).   4. knee arthroscopic plica excision(CPT 71841).    5. Knee arthroscopic lysis of adhesions (CPT 87934)    ANESTHESIA: General with local 0.5% ropivicaine 30ml (5mg/ml), 60 mg ketamine, 60mg toradol (2ml toradol (30mg/ml))    BLOOD LOSS: Minimal.     DRAINS: None.     TOURNIQUET TIME: None.     COMPLICATIONS: None.     CONDITION ON TRANSFER: The patient was extubated and moved to the recovery room in stable condition, with compartments soft and capillary refill less than a   second in all digits.     BRIEF INDICATION OF MEDICAL NECESSITY: The patient is a 57 y.o. year-old female who has history and physical examination findings consistent with the above. Nonoperative versus operative options were discussed. The risks and benefits were discussed with the patient. The patient acknowledged understanding and wished to proceed with operative intervention. Informed consent was obtained prior to the procedure. Details of the surgical procedure were explained, including incisions and probable rehabilitation course. The patient  understands the likely length of convalescence after surgery; and we have explained the risks, benefits, and alternatives of surgery. Reasonable expectations and potential complications were discussed and acknowledged, including but not limited to infection, bleeding, blood clots, (DVT and/or PE), nerve injury, retear, instability, continued pain and stiffness. It was also explained that there was a chance of failure which may require further management. The patient agreed and understood and wished to proceed.     EXAMINATION UNDER ANESTHESIA of the OPERATIVE right KNEE: ROM 0-135 degrees, negative Lachman, negative pivot shift, stable to varus-valgus stress testing, negative effusion.     EXAMINATION UNDER ANESTHESIA of the NON-OPERATED left KNEE: ROM 0-135 degrees, negative Lachman, negative pivot shift, stable to varus-valgus stress testing, negative effusion.     PROCEDURE IN DETAIL: The correct operative site was marked by the operative surgeon.  The patient was then taken to the operating room and placed supine on the operating room table. General anesthesia was administered by the anesthesia team. All pressure points were carefully padded and checked. Preoperative antibiotics were administered. A well-padded tourniquet was placed high on the operative thigh. Examination was begun with the above findings. The non-operative leg was then placed a well-padded well-leg murray, in a comfortable position. The operative leg was placed in an arthroscopic leg murray at the level of the tourniquet. The operative leg was prepped and draped in the usual sterile fashion. After prepping and draping, the appropriate landmarks were noted on the skin.  2cc skin and sub-cutaneous tissue was infilttrated with local anesthetic mixture superolaterally at needle insufflation site. The knee was insufflated supero-laterally with saline. 9cc skin wheal and sub-cutaneous tissue and fat pad was infilttrated with local anesthetic mixture  at both portal sites; mid-lateral followed by infero-medial portals were created, and a systematic examination of the joint revealed the following:    There was no evidence of any suprapatellar pouch adhesions or compartmentalization.  There was no evidence of any loose bodies in the medial or lateral gutters.       In the patellofemoral compartment, there was chondral damage to:  Patella 30 x 40 mm grade 4  Chondroplasty was performed using arthroscopic shaver.    There was chondral damage to:  Medial femoral condyle 15 x 20 mm grade 3  Chondroplasty was performed using arthroscopic shaver.    There was chondral damage to:  Lateral femoral condyle 20 x 20 mm grade 3  Lateral tibial plateau 10 x 10 mm grade 4  Chondroplasty was performed using arthroscopic shaver.    In the medial compartment there was no evidence of meniscal instability.   Posterior horn medial meniscus tear,  complex was debrided with arthroscopic shaver and biter.  50% was debrided over an area of 2.5 cm.  Root and hoop fibers remained intact.    Attention was then turned to the notch. The ACL and PCL were probed carefully and found to be stable.     There was a hypertrophic infrapatellar plica.  This was debrided using arthroscopic biter and shaver.    There was some scar about the ACL.  This was debrided in the standard fashion and lysis of adhesions was performed.    In the lateral compartment there was no evidence of meniscal instability.   Anterior horn lateral meniscus tear,  complex was debrided with arthroscopic shaver and biter.  25% was debrided over an area of 2 cm.  Root and hoop fibers remained intact.    Synovitis was debrided in the knee as needed to the areas of concern in medial and lateral compartments.      The knee and incisions were then copiously irrigated and fluid was extravasated using suction.  The arthroscopic portal incisions were closed using inverted 4-0 Monocryl suture.  5cc skin and sub-cutaneous tissue and around  portals were infilttrated with local anesthetic mixture at both portal sites.  Steri-Strips were placed with Mastisol. Sterile TENS unit pads were placed which were medically necessary for pain relief. Wounds were dressed with Xeroform, 4x4s, and cast padding. DAVIDSON hose was placed on the operative leg to match that of the DAVIDSON hose placed preoperatively on the non-operative leg. Iceman was secured.  The patient was extubated and moved to the recovery room in stable condition with compartments soft and capillary refill less than a second in all digits.     POSTOPERATIVE PLAN: We will be following the arthroscopic partial meniscectomy guidelines with emphasis on patellar mobility.  This was discussed this with the patient's family after surgery.

## 2022-07-08 ENCOUNTER — CLINICAL SUPPORT (OUTPATIENT)
Dept: REHABILITATION | Facility: HOSPITAL | Age: 57
End: 2022-07-08
Attending: PHYSICIAN ASSISTANT
Payer: COMMERCIAL

## 2022-07-08 VITALS
SYSTOLIC BLOOD PRESSURE: 121 MMHG | HEIGHT: 65 IN | OXYGEN SATURATION: 96 % | WEIGHT: 245 LBS | BODY MASS INDEX: 40.82 KG/M2 | DIASTOLIC BLOOD PRESSURE: 80 MMHG | HEART RATE: 91 BPM | RESPIRATION RATE: 18 BRPM | TEMPERATURE: 97 F

## 2022-07-08 DIAGNOSIS — M25.60 DECREASED RANGE OF MOTION: ICD-10-CM

## 2022-07-08 DIAGNOSIS — M25.561 ACUTE PAIN OF RIGHT KNEE: ICD-10-CM

## 2022-07-08 DIAGNOSIS — S83.281A ACUTE LATERAL MENISCUS TEAR OF RIGHT KNEE, INITIAL ENCOUNTER: ICD-10-CM

## 2022-07-08 DIAGNOSIS — S83.241A ACUTE MEDIAL MENISCUS TEAR OF RIGHT KNEE, INITIAL ENCOUNTER: ICD-10-CM

## 2022-07-08 DIAGNOSIS — R53.1 DECREASED STRENGTH: ICD-10-CM

## 2022-07-08 PROCEDURE — 97161 PT EVAL LOW COMPLEX 20 MIN: CPT | Mod: PN

## 2022-07-08 PROCEDURE — 97110 THERAPEUTIC EXERCISES: CPT | Mod: PN

## 2022-07-08 NOTE — ANESTHESIA POSTPROCEDURE EVALUATION
Anesthesia Post Evaluation    Patient: Joy Crawford    Procedure(s) Performed: Procedure(s) (LRB):  ARTHROSCOPY, KNEE, MEDIAL AND LATERAL MENISCECTOMY - POLAR CARE (Right)  ARTHROSCOPY, KNEE, WITH CHONDROPLASTY (Right)  PARTIAL SYNOVECTOMY, KNEE (Right)  DEBRIDEMENT, KNEE  EXCISION, PLICA, KNEE, ARTHROSCOPIC (Right)  [id number] (Right)    Final Anesthesia Type: general      Patient location during evaluation: PACU  Patient participation: Yes- Able to Participate  Level of consciousness: awake and alert and oriented  Post-procedure vital signs: reviewed and stable  Pain management: adequate  Airway patency: patent    PONV status at discharge: No PONV  Anesthetic complications: no      Cardiovascular status: hemodynamically stable  Respiratory status: nasal cannula  Hydration status: euvolemic  Follow-up not needed.          Vitals Value Taken Time   /80 07/07/22 0831   Temp 36.3 °C (97.4 °F) 07/07/22 0810   Pulse 73 07/07/22 0903   Resp 18 07/07/22 0903   SpO2 96 % 07/07/22 0903   Vitals shown include unvalidated device data.      Event Time   Out of Recovery 08:45:00         Pain/Osmar Score: Osmar Score: 10 (7/7/2022  8:30 AM)

## 2022-07-08 NOTE — PLAN OF CARE
[facility]  Physical Therapy Initial Evaluation    Name: Joy [facility] Number: [id number]    Therapy Diagnosis:   Encounter Diagnoses   Name Primary?    Acute lateral meniscus tear of right knee, initial encounter     Acute medial meniscus tear of right knee, initial encounter     Acute pain of right knee     Decreased range of motion     Decreased strength      Physician: Romero Marinelli III, *    Physician Orders: PT Eval and Treat   Medical Diagnosis from Referral:   S83.281A (ICD-10-CM) - Acute lateral meniscus tear of right knee, initial encounter   S83.241A (ICD-10-CM) - Acute medial meniscus tear of right knee, initial encounter     Evaluation Date: 7/8/2022  Authorization Period Expiration: 7/6/2023  Plan of Care Expiration: 7/8/2022 to 9/2/2022  Visit # / Visits authorized: 1/ 1  FOTO#:1/5    Time In: 11:10am  Time Out: 12:00am  Total Billable Time: 50 minutes (1LCE, 1TE)    Precautions: Standard  Right DOS 7/7/2022  1. knee arthroscopic chondroplasty (CPT 71409)  2. knee arthroscopic medial and lateral (CPT 01434) meniscectomy   3. knee arthroscopic partial synovectomy/debridement (CPT 40892).   4. knee arthroscopic plica excision(CPT 06681).    5. Knee arthroscopic lysis of adhesions (CPT 43227)  Subjective     Date of onset: POD 1    History of current condition - Joy reports: Prior to surgery performed yesterday pt had started having issues with her right knee at the end of May. Pt was stepping up on a chair and pulled all her wait up. She reports that it did not hurt that day, but it hurt the next day and was sharp pain. 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 She is currently staying with her parents. She reports that the pain radiated down her leg and started pulling from behind. Pt denies issues with left lower extremity.     Of note pt had a lumbar fusion Sept 21, 2021 and left foot surgery before that.      Medical History:    Past Medical History:   Diagnosis Date    Anxiety     Asthma     Depression     Esophageal reflux     H/O mammogram 01/20/2017    Normal  (DIS)     Herpes simplex virus (HSV) infection     no out breaks    Hypertension     Peptic ulcer     Sleep apnea     doesnt use cpap       Surgical History:   Joy Crawford  has a past surgical history that includes Rhinoplasty (2006); Vulvectomy (N/A, 03/11/2020); Endometrial biopsy (N/A, 03/11/2020); Sinus surgery (2006); Cyst Removal (06/25/2020); foot ankle sx (Left, 06/25/2020); Spinal fusion (09/21/2021); Knee arthroscopy w/ meniscectomy (Right, 7/7/2022); Arthroscopic chondroplasty of knee joint (Right, 7/7/2022); Synovectomy of knee (Right, 7/7/2022); Knee debridement (7/7/2022); and Knee arthroscopy w/ plica excision (Right, 7/7/2022).    Medications:   Joy has a current medication list which includes the following prescription(s): albuterol, aspirin, bupropion, buspirone, cromolyn, diclofenac sodium, duloxetine, fluticasone furoate-vilanterol, fluticasone propionate, gabapentin, hydrocodone-acetaminophen, ibuprofen, lisinopril, promethazine, tramadol, and trazodone.    Allergies:   Review of patient's allergies indicates:  No Known Allergies     Imaging, MRI studies: Impression:     Tear of the lateral and medial meniscus as detailed above noting involvement at the lateral meniscus anterior root attachment.  Suspected 5 mm displaced flap medially at the lateral meniscus.     Tricompartmental chondral degenerative change.  Of note, high-grade full-thickness loss of the patellar ridge with mild subchondral edema.  Full-thickness fissure with nondisplaced delaminating component of the lateral tibial plateau with subjacent intraosseous ganglion formation.     Peritendinous fluid with mild intramuscular edema of the distal semimembranosus suggesting spectrum of strain.     Knee joint effusion and additional findings as above    Prior Therapy: Pt had PT  previously for her upper and lower back.   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   Current Level of Function: currently needs help getting dressed.     Pain:  Current 0/10, worst 9/10, best 0/10   Location: right knee   Description: Aching, Throbbing and Sharp  Aggravating Factors: Standing and Walking  Easing Factors: pain medication, ice and elevation    Pts goals: Pt would like to be able to walk well enough so that she can start an exercise program to get her weight off.     Objective     Observation: crutches are not fitted properly. Pt wearing compression garments, knee wrap, no current signs of infection  Posture:  Sits c rounded shoulders, post pelvic tilt   Gait: step to pattern c bilateral axillary crutches, lack of terminal knee extension, lack of proper knee flexion, partial wbing.   Palpation: no tenderness in the gastroc   Sensation: intact to light touch on toes  DTRs: NT  Myotomes: NT  Dermatomes: NT    Range of Motion/Strength:       Knee Right Left Pain/Dysfunction with Movement   AROM/PROM      flexion  105/107  127/133    extension  -3/-1  2/4 Lacking hypeexten on right      active range of motion: within normal limits     L/E Strength w/ MicroFET Muscle Shahana Dynamometer Right Left Pain/Dysfunction with Movement   (approx 4 sec hold w/ max contraction)   Hip Flexion 8.9 kg  12.6 kg     Quads 10.5 kg  19.2 kg     Hip Abd NT kg  NT kg     Hamstrings 9.7 kg  12.6 kg         Joint Mobility:   - Patella: swelling present, unable to fully assess  - Tibiofemoral: Good motion for this stage of recovery       CMS Impairment/Limitation/Restriction for FOTO Survey    Therapist reviewed FOTO scores for Joy Crawford on 7/8/2022.   FOTO documents entered into [employer] - see Media section.    Limitation Score: 60%  Predicted Score:38%  LEFS - 23.7       TREATMENT     Treatment Time In: 11:45  Treatment Time  Out: 12:00  Total Treatment time separate from Evaluation: 15 minutes    Joy received therapeutic exercises to develop strength, ROM and flexibility for 15 minutes including:  Heel Prop - 2x3min   Heel slides - 2x10  Quad set towel under knee - 2x20       Home Exercises and Patient Education Provided    Education provided:   - Pt educated on POC  - Pt educated on HEP  - Pt educated on anatomy and physiology of current condition as it relates to signs and symptoms    Written Home Exercises Provided: yes.  Exercises were reviewed and Joy was able to demonstrate them prior to the end of the session.  Joy demonstrated good  understanding of the education provided.     See EMR under Patient Instructions for exercises provided 7/8/2022.    Assessment     Joy is a 57 y.o. female referred to outpatient Physical Therapy with a medical diagnosis of s/p right med/lat meniscectomy c plica removal and lysis of adhesion POD 1. Pt presents to PT today lacking slight terminal knee extension compared to contralateral lower extremity. Knee flexion range of motion limited but within a good range for the stage of recovery. Pt demonstrates a good quad set but does require some verbal cuing and may be due to lack of pain from taking pain medication prior to therapy. Crutches were adjusted to assist pt with ambulation but she was instructed that she can DC crutches over the next couple of days. Pt demonstrate decreased strength in bilateral lower extremity r>L.      Pt will benefit from skilled outpatient Physical Therapy to address the deficits stated above and in the chart below, provide pt/family education, and to maximize pt's level of independence.   Pt prognosis is Good.       Plan of care discussed with patient: Yes  Pt's spiritual, cultural and educational needs considered and pt agreeable to plan of care and goals as stated below:     Anticipated Barriers for therapy: anxiety      Medical Necessity is demonstrated by the  following  History  Co-morbidities and personal factors that may impact the plan of care Co-morbidities:   anxiety, depression, high BMI, HTN and prior lumbar surgery    Personal Factors:   no deficits     high   Examination  Body Structures and Functions, activity limitations and participation restrictions that may impact the plan of care Body Regions:   back  lower extremities    Body Systems:    ROM  strength  balance  gait  motor control    Participation Restrictions:   none    Activity limitations:   Learning and applying knowledge  no deficits    General Tasks and Commands  no deficits    Communication  no deficits    Mobility  lifting and carrying objects  walking  driving (bike, car, motorcycle)    Self care  washing oneself (bathing, drying, washing hands)  dressing    Domestic Life  doing house work (cleaning house, washing dishes, laundry)    Interactions/Relationships  no deficits    Life Areas  employment    Community and Social Life  community life  recreation and leisure         high   Clinical Presentation stable and uncomplicated low   Decision Making/ Complexity Score: low         Goals:  Short Term Goals (4 Weeks):  1. Pt will be compliant with initial HEP to supplement PT in restoring pain free function.  2. Pt will improve knee extension to equal contralateral lower extremity and knee flexion to 120 in order to improve gait  3. Pt will reduce worst pain to 5/10 in order to start ambulating properly  4. Pt will improve strength by 5kg in all directions in order to improve independence c ADLs    Long Term Goals (8 Weeks):  1. Pt will improve FOTO score to </= 38% limited to decrease perceived limitation with mobility.   2. Pt will improve knee extension to equal contralateral lower extremity and knee flexion to 130 in order to improve gait  3. Pt will reduce worst pain to 1/10 in order to start ambulating properly  4. Pt will be independent c final home exercise program in order to DC to home  program.     Plan     Plan of care Certification: 7/8/2022 to 9/2/2022.    Outpatient Physical Therapy 2 times weekly for 8 weeks to include the following interventions: Electrical Stimulation PRN, Gait Training, Manual Therapy, Moist Heat/ Ice, Neuromuscular Re-ed, Patient Education, Therapeutic Activities and Therapeutic Exercise. All other modalities PRN. Pt will be treated in conjunction c PTA prn. Dry Needling PRN.    Meron Araujo, PT, DPT, OCS, Cert. DN

## 2022-07-11 ENCOUNTER — OFFICE VISIT (OUTPATIENT)
Dept: PSYCHIATRY | Facility: CLINIC | Age: 57
End: 2022-07-11
Payer: COMMERCIAL

## 2022-07-11 DIAGNOSIS — F43.23 ADJUSTMENT DISORDER WITH MIXED ANXIETY AND DEPRESSED MOOD: ICD-10-CM

## 2022-07-11 DIAGNOSIS — F90.0 ADHD (ATTENTION DEFICIT HYPERACTIVITY DISORDER), INATTENTIVE TYPE: Primary | ICD-10-CM

## 2022-07-11 DIAGNOSIS — F33.41 RECURRENT MAJOR DEPRESSIVE DISORDER, IN PARTIAL REMISSION: ICD-10-CM

## 2022-07-11 PROCEDURE — 4010F PR ACE/ARB THEARPY RXD/TAKEN: ICD-10-PCS | Mod: CPTII,95,, | Performed by: NURSE PRACTITIONER

## 2022-07-11 PROCEDURE — 4010F ACE/ARB THERAPY RXD/TAKEN: CPT | Mod: CPTII,95,, | Performed by: NURSE PRACTITIONER

## 2022-07-11 PROCEDURE — 1159F PR MEDICATION LIST DOCUMENTED IN MEDICAL RECORD: ICD-10-PCS | Mod: CPTII,95,, | Performed by: NURSE PRACTITIONER

## 2022-07-11 PROCEDURE — 99214 OFFICE O/P EST MOD 30 MIN: CPT | Mod: 95,,, | Performed by: NURSE PRACTITIONER

## 2022-07-11 PROCEDURE — 99214 PR OFFICE/OUTPT VISIT, EST, LEVL IV, 30-39 MIN: ICD-10-PCS | Mod: 95,,, | Performed by: NURSE PRACTITIONER

## 2022-07-11 PROCEDURE — 1160F RVW MEDS BY RX/DR IN RCRD: CPT | Mod: CPTII,95,, | Performed by: NURSE PRACTITIONER

## 2022-07-11 PROCEDURE — 1160F PR REVIEW ALL MEDS BY PRESCRIBER/CLIN PHARMACIST DOCUMENTED: ICD-10-PCS | Mod: CPTII,95,, | Performed by: NURSE PRACTITIONER

## 2022-07-11 PROCEDURE — 1159F MED LIST DOCD IN RCRD: CPT | Mod: CPTII,95,, | Performed by: NURSE PRACTITIONER

## 2022-07-11 RX ORDER — LISDEXAMFETAMINE DIMESYLATE 30 MG/1
30 CAPSULE ORAL EVERY MORNING
Qty: 30 CAPSULE | Refills: 0 | Status: SHIPPED | OUTPATIENT
Start: 2022-07-11 | End: 2022-07-23 | Stop reason: SDUPTHER

## 2022-07-12 ENCOUNTER — CLINICAL SUPPORT (OUTPATIENT)
Dept: REHABILITATION | Facility: HOSPITAL | Age: 57
End: 2022-07-12
Payer: COMMERCIAL

## 2022-07-12 DIAGNOSIS — M25.561 ACUTE PAIN OF RIGHT KNEE: Primary | ICD-10-CM

## 2022-07-12 DIAGNOSIS — R53.1 DECREASED STRENGTH: ICD-10-CM

## 2022-07-12 DIAGNOSIS — M25.60 DECREASED RANGE OF MOTION: ICD-10-CM

## 2022-07-12 PROCEDURE — 97140 MANUAL THERAPY 1/> REGIONS: CPT | Mod: PN

## 2022-07-12 PROCEDURE — 97110 THERAPEUTIC EXERCISES: CPT | Mod: PN

## 2022-07-12 NOTE — PROGRESS NOTES
"[facility]   Physical Therapy Treatment Note     Name: Joy Caputo [facility] Number: [id number]    Therapy Diagnosis:   Encounter Diagnoses   Name Primary?    Acute pain of right knee Yes    Decreased range of motion     Decreased strength      Physician: Romero Marinelli III, *    Visit Date: 7/12/2022    Physician Orders: PT Eval and Treat   Medical Diagnosis from Referral:   S83.281A (ICD-10-CM) - Acute lateral meniscus tear of right knee, initial encounter   S83.241A (ICD-10-CM) - Acute medial meniscus tear of right knee, initial encounter      Evaluation Date: 7/8/2022  Authorization Period Expiration: 7/6/2023  Plan of Care Expiration: 7/8/2022 to 9/2/2022  Visit # / Visits authorized: 2/20  FOTO#:1/5    PTA Visit #: 0/5     Time In: 11:00 am  Time Out: 12:00 pm  Total Billable Time: 55 minutes    SUBJECTIVE     Pt reports: pain is well controlled, some medial knee soreness today.  She was compliant with home exercise program.  Response to previous treatment: soreness  Functional change: first followup    Pain: 2/10  Location: right knee      OBJECTIVE     Objective Measures updated at progress report unless specified.     7/12/22: R Knee Ext ROM: 1 deg active, 3 deg passive    Treatment     Joy received the treatments listed below:      therapeutic exercises to develop strength, endurance, ROM and flexibility for 45 minutes including:  - Recumbent Bike 6 min for increased tissue extensibility  - Heel Prop w/ strap for ER; 5 min; 3# above and below knee  - Heel slides 20x5"  - quad set w/ 1/2 foam under knee; 20 x w/ 5" hold; 5 min  - SLR 2x10x3"  - SAQ 3x10x5"  - VMO LAQ 3x10x5"      manual therapy techniques: Joint mobilizations were applied to the: R knee for 10 minutes, including:  - patellar mobs in all directions; Gr 2-3  - fat pad mobs   - knee ext mobs Gr 3-4    neuromuscular re-education activities to improve: n/a for 0 minutes. The following activities were " included:    cold pack for 10 minutes to R knee.        Patient Education and Home Exercises     Home Exercises Provided and Patient Education Provided     Education provided:   - HEP, POC    Written Home Exercises Provided: Patient instructed to cont prior HEP. Exercises were reviewed and Joy was able to demonstrate them prior to the end of the session.  Joy demonstrated good  understanding of the education provided. See EMR under Patient Instructions for exercises provided during therapy sessions    ASSESSMENT     Joy presents today for her first follow up since initial evaluation. Initiated dynamic LE strengthening and ROM exercises with good tolerance and no adverse effects. Improved quad set after cueing and therex completion. Pt able to achieve 1 deg hyperextension AROM, and 3 deg passive at the end of session. Continue to progress as tolerated.     Joy Is progressing well towards her goals.   Pt prognosis is Good.     Pt will continue to benefit from skilled outpatient physical therapy to address the deficits listed in the problem list box on initial evaluation, provide pt/family education and to maximize pt's level of independence in the home and community environment.     Pt's spiritual, cultural and educational needs considered and pt agreeable to plan of care and goals.     Anticipated barriers to physical therapy: anxiety    Goals:  Short Term Goals (4 Weeks):  1. Pt will be compliant with initial HEP to supplement PT in restoring pain free function.  2. Pt will improve knee extension to equal contralateral lower extremity and knee flexion to 120 in order to improve gait  3. Pt will reduce worst pain to 5/10 in order to start ambulating properly  4. Pt will improve strength by 5kg in all directions in order to improve independence c ADLs     Long Term Goals (8 Weeks):  1. Pt will improve FOTO score to </= 38% limited to decrease perceived limitation with mobility.   2. Pt will improve knee  extension to equal contralateral lower extremity and knee flexion to 130 in order to improve gait  3. Pt will reduce worst pain to 1/10 in order to start ambulating properly  4. Pt will be independent c final home exercise program in order to DC to home program.     PLAN     Plan of care Certification: 7/8/2022 to 9/2/2022.     Outpatient Physical Therapy 2 times weekly for 8 weeks to include the following interventions: Electrical Stimulation PRN, Gait Training, Manual Therapy, Moist Heat/ Ice, Neuromuscular Re-ed, Patient Education, Therapeutic Activities and Therapeutic Exercise. All other modalities PRN. Pt will be treated in conjunction c PTA prn. Dry Needling PRN.    Erwin Woodward, PT

## 2022-07-13 ENCOUNTER — PATIENT MESSAGE (OUTPATIENT)
Dept: ADMINISTRATIVE | Facility: HOSPITAL | Age: 57
End: 2022-07-13
Payer: COMMERCIAL

## 2022-07-14 ENCOUNTER — CLINICAL SUPPORT (OUTPATIENT)
Dept: REHABILITATION | Facility: HOSPITAL | Age: 57
End: 2022-07-14
Payer: COMMERCIAL

## 2022-07-14 DIAGNOSIS — M25.561 ACUTE PAIN OF RIGHT KNEE: Primary | ICD-10-CM

## 2022-07-14 DIAGNOSIS — R53.1 DECREASED STRENGTH: ICD-10-CM

## 2022-07-14 DIAGNOSIS — M25.60 DECREASED RANGE OF MOTION: ICD-10-CM

## 2022-07-14 PROCEDURE — 97140 MANUAL THERAPY 1/> REGIONS: CPT | Mod: PN

## 2022-07-14 PROCEDURE — 97110 THERAPEUTIC EXERCISES: CPT | Mod: PN

## 2022-07-14 NOTE — PROGRESS NOTES
"[facility]   Physical Therapy Treatment Note     Name: Joy Caputo [facility] Number: [id number]    Therapy Diagnosis:   Encounter Diagnoses   Name Primary?    Acute pain of right knee Yes    Decreased range of motion     Decreased strength      Physician: Romero Marinelli III, *    Visit Date: 7/14/2022    Physician Orders: PT Eval and Treat   Medical Diagnosis from Referral:   S83.281A (ICD-10-CM) - Acute lateral meniscus tear of right knee, initial encounter   S83.241A (ICD-10-CM) - Acute medial meniscus tear of right knee, initial encounter      Evaluation Date: 7/8/2022  Authorization Period Expiration: 7/6/2023  Plan of Care Expiration: 7/8/2022 to 9/2/2022  Visit # / Visits authorized: 2/20 +eval  FOTO#:3/5    PTA Visit #: 0/5     Time In: 10:45 am  Time Out: 11:45 am  Total Billable Time: 60 minutes (3TE, 1MT)    SUBJECTIVE     Pt reports: Pt denies any knee pain today. States "I though I would be sore after last visit, but I wasn't"  She was compliant with home exercise program.  Response to previous treatment: soreness  Functional change: walking more    Pain: 0/10  Location: right knee      OBJECTIVE     Objective Measures updated at progress report unless specified.     7/14/2022:  active range of motion right knee - 3-0-130    Treatment     Joy received the treatments listed below:      therapeutic exercises to develop strength, endurance, ROM and flexibility for 50 minutes including:.    Bridging - 3x10  SLR 2x10x3"  Heel Prop w/ strap for ER; 5 min; 3# above and below knee   quad set w/ towel under knee; 2x20 x w/ 5" hold  Sidelying hip abd 3x10 bilateral   Clamshell 3x10 bilateral   long arc quad red theraband 3x10   Long arc quad iso hold 6h15urs  Recumbent bike lv2 5min      SAQ 3x10x5"  Heel slides 20x5"    manual therapy techniques: Joint mobilizations were applied to the: R knee for 10 minutes, including:  patellar mobs in all directions; Gr 2-3  fat pad mobs   - " knee ext mobs Gr 3-4    neuromuscular re-education activities to improve: n/a for 0 minutes. The following activities were included:    cold pack for 00 minutes to R knee.        Patient Education and Home Exercises     Home Exercises Provided and Patient Education Provided     Education provided:   - HEP, POC    Written Home Exercises Provided: Patient instructed to cont prior HEP. Exercises were reviewed and Joy was able to demonstrate them prior to the end of the session.  Joy demonstrated good  understanding of the education provided. See EMR under Patient Instructions for exercises provided during therapy sessions    ASSESSMENT     Pt presents today with no swelling and no knee pain. Pt has made drastic improvements in knee range of motion. Pt educated on continuing heel prop and heel slides at home. Pt progressed today as quad contraction is good and maintained. Pt provided with red theraband to perform quad exercises at home. Pt to be progressed next visit in strength.     Joy Is progressing well towards her goals.   Pt prognosis is Good.     Pt will continue to benefit from skilled outpatient physical therapy to address the deficits listed in the problem list box on initial evaluation, provide pt/family education and to maximize pt's level of independence in the home and community environment.     Pt's spiritual, cultural and educational needs considered and pt agreeable to plan of care and goals.     Anticipated barriers to physical therapy: anxiety    Goals:  Short Term Goals (4 Weeks):  1. Pt will be compliant with initial HEP to supplement PT in restoring pain free function.  2. Pt will improve knee extension to equal contralateral lower extremity and knee flexion to 120 in order to improve gait  3. Pt will reduce worst pain to 5/10 in order to start ambulating properly  4. Pt will improve strength by 5kg in all directions in order to improve independence c ADLs     Long Term Goals (8 Weeks):  1.  Pt will improve FOTO score to </= 38% limited to decrease perceived limitation with mobility.   2. Pt will improve knee extension to equal contralateral lower extremity and knee flexion to 130 in order to improve gait  3. Pt will reduce worst pain to 1/10 in order to start ambulating properly  4. Pt will be independent c final home exercise program in order to DC to home program.     PLAN     Plan of care Certification: 7/8/2022 to 9/2/2022.     Outpatient Physical Therapy 2 times weekly for 8 weeks to include the following interventions: Electrical Stimulation PRN, Gait Training, Manual Therapy, Moist Heat/ Ice, Neuromuscular Re-ed, Patient Education, Therapeutic Activities and Therapeutic Exercise. All other modalities PRN. Pt will be treated in conjunction c PTA prn. Dry Needling PRN.    Meron Araujo, PT

## 2022-07-15 ENCOUNTER — PATIENT MESSAGE (OUTPATIENT)
Dept: INTERNAL MEDICINE | Facility: CLINIC | Age: 57
End: 2022-07-15
Payer: COMMERCIAL

## 2022-07-15 DIAGNOSIS — E66.9 OBESITY, UNSPECIFIED CLASSIFICATION, UNSPECIFIED OBESITY TYPE, UNSPECIFIED WHETHER SERIOUS COMORBIDITY PRESENT: Primary | ICD-10-CM

## 2022-07-18 ENCOUNTER — PATIENT MESSAGE (OUTPATIENT)
Dept: OBSTETRICS AND GYNECOLOGY | Facility: CLINIC | Age: 57
End: 2022-07-18
Payer: COMMERCIAL

## 2022-07-18 ENCOUNTER — PATIENT MESSAGE (OUTPATIENT)
Dept: REHABILITATION | Facility: HOSPITAL | Age: 57
End: 2022-07-18
Payer: COMMERCIAL

## 2022-07-21 ENCOUNTER — CLINICAL SUPPORT (OUTPATIENT)
Dept: REHABILITATION | Facility: HOSPITAL | Age: 57
End: 2022-07-21
Attending: PHYSICIAN ASSISTANT
Payer: COMMERCIAL

## 2022-07-21 DIAGNOSIS — M25.60 DECREASED RANGE OF MOTION: ICD-10-CM

## 2022-07-21 DIAGNOSIS — R53.1 DECREASED STRENGTH: ICD-10-CM

## 2022-07-21 DIAGNOSIS — M25.561 ACUTE PAIN OF RIGHT KNEE: Primary | ICD-10-CM

## 2022-07-21 PROCEDURE — 97112 NEUROMUSCULAR REEDUCATION: CPT | Mod: PN

## 2022-07-21 PROCEDURE — 97140 MANUAL THERAPY 1/> REGIONS: CPT | Mod: PN

## 2022-07-21 NOTE — PROGRESS NOTES
"[facility]   Physical Therapy Treatment Note     Name: Joy Caputo [facility] Number: [id number]    Therapy Diagnosis:   No diagnosis found.  Physician: Romero Marinelli III, *    Visit Date: 7/21/2022    Physician Orders: PT Eval and Treat   Medical Diagnosis from Referral:   S83.281A (ICD-10-CM) - Acute lateral meniscus tear of right knee, initial encounter   S83.241A (ICD-10-CM) - Acute medial meniscus tear of right knee, initial encounter      Evaluation Date: 7/8/2022  Authorization Period Expiration: 7/6/2023  Plan of Care Expiration: 7/8/2022 to 9/2/2022  Visit # / Visits authorized: 3/20 +eval  FOTO#:4/5    PTA Visit #: 0/5     Time In: 1:45pm  Time Out: 2:35 am  Total Billable Time: 50 minutes (1NMR, 2MT)+ ice    SUBJECTIVE     Pt reports: Pt reports that her knee is really hurting today its why she cancelled her appt. She further states she went to the beach on Sun/Mon and walked on the beach for a while. She was feeling so good, she though that would be ok. She started feeling pain and felt swollen Tues and its worse today.   She was compliant with home exercise program.  Response to previous treatment: soreness  Functional change: in more pain since walking on the beach    Pain: 5/10  Location: right knee      OBJECTIVE     Objective Measures updated at progress report unless specified.     7/21/2022:  active range of motion right knee - 1-0-118 - reduced motion compared to last measurement  Post manual knee range of motion 3-0-128    Palpation: edema along Proximal patella med/lat and near incision sites.     Treatment     Joy received the treatments listed below:      Joy participated in neuromuscular re-education activities to improve: Kinesthetic, Sense and Proprioception for 20 minutes. The following activities were included:  Quad set c towel under knee - 2x20 5"  Straight leg raise c quad set AA required due to discomfor - 2x10      therapeutic exercises to develop " "strength, endurance, ROM and flexibility for 3 minutes including:  Heel prop - 3min     Bridging - 3x10  SLR 2x10x3"  Heel Prop w/ strap for ER; 5 min; 3# above and below knee   quad set w/ towel under knee; 2x20 x w/ 5" hold  Sidelying hip abd 3x10 bilateral   Clamshell 3x10 bilateral   long arc quad red theraband 3x10   Long arc quad iso hold 6x32djd  Recumbent bike lv2 5min      SAQ 3x10x5"  Heel slides 20x5"    manual therapy techniques: Joint mobilizations were applied to the: R knee for 30 minutes, including:  patellar mobs in all directions; Gr 2-3  fat pad mobs   Knee flexion gapping for knee range of motion  Seated Tibiofemoral distraction sustained  Seated tibiofemoral distraction grade 2 mobilization.       cold pack for 10 minutes to R knee. - elevated on wedge        Patient Education and Home Exercises     Home Exercises Provided and Patient Education Provided     Education provided:   - Pt educated on avoiding over aggravating the knee with activities such as walking on the beach  - Pt educated on proper icing and elevation of the knee to reduce swelling.     Written Home Exercises Provided: Patient instructed to cont prior HEP. Exercises were reviewed and Joy was able to demonstrate them prior to the end of the session.  Joy demonstrated good  understanding of the education provided. See EMR under Patient Instructions for exercises provided during therapy sessions    ASSESSMENT     Pt presents to PT today with increased edema and pain after over utilizing her lower extremity. Pt educated on what to continue to avoid as healing continues. Pt originally had reduced range of motion. After manual treatment range of motion improved drastically and pt was able to almost return to previous range of motion. Pt pain level reduced at end of visit and she was ambulating without abnormal mechanics. Quad set was not as strong as previous visits 2/2 pain. PT to continue to monitor knee and referring provider " will be notified.     Joy Is progressing well towards her goals.   Pt prognosis is Good.     Pt will continue to benefit from skilled outpatient physical therapy to address the deficits listed in the problem list box on initial evaluation, provide pt/family education and to maximize pt's level of independence in the home and community environment.     Pt's spiritual, cultural and educational needs considered and pt agreeable to plan of care and goals.     Anticipated barriers to physical therapy: anxiety    Goals:  Short Term Goals (4 Weeks):  1. Pt will be compliant with initial HEP to supplement PT in restoring pain free function.  2. Pt will improve knee extension to equal contralateral lower extremity and knee flexion to 120 in order to improve gait  3. Pt will reduce worst pain to 5/10 in order to start ambulating properly  4. Pt will improve strength by 5kg in all directions in order to improve independence c ADLs     Long Term Goals (8 Weeks):  1. Pt will improve FOTO score to </= 38% limited to decrease perceived limitation with mobility.   2. Pt will improve knee extension to equal contralateral lower extremity and knee flexion to 130 in order to improve gait  3. Pt will reduce worst pain to 1/10 in order to start ambulating properly  4. Pt will be independent c final home exercise program in order to DC to home program.     PLAN     Plan of care Certification: 7/8/2022 to 9/2/2022.     Outpatient Physical Therapy 2 times weekly for 8 weeks to include the following interventions: Electrical Stimulation PRN, Gait Training, Manual Therapy, Moist Heat/ Ice, Neuromuscular Re-ed, Patient Education, Therapeutic Activities and Therapeutic Exercise. All other modalities PRN. Pt will be treated in conjunction c PTA prn. Dry Needling PRN.    Meron Araujo, PT

## 2022-07-22 ENCOUNTER — PATIENT MESSAGE (OUTPATIENT)
Dept: PSYCHIATRY | Facility: CLINIC | Age: 57
End: 2022-07-22
Payer: COMMERCIAL

## 2022-07-23 DIAGNOSIS — F90.0 ADHD (ATTENTION DEFICIT HYPERACTIVITY DISORDER), INATTENTIVE TYPE: ICD-10-CM

## 2022-07-23 RX ORDER — LISDEXAMFETAMINE DIMESYLATE 40 MG/1
40 CAPSULE ORAL EVERY MORNING
Qty: 30 CAPSULE | Refills: 0 | Status: SHIPPED | OUTPATIENT
Start: 2022-07-23 | End: 2022-09-21 | Stop reason: SDUPTHER

## 2022-07-25 ENCOUNTER — OFFICE VISIT (OUTPATIENT)
Dept: SPORTS MEDICINE | Facility: CLINIC | Age: 57
End: 2022-07-25
Payer: COMMERCIAL

## 2022-07-25 VITALS
SYSTOLIC BLOOD PRESSURE: 129 MMHG | DIASTOLIC BLOOD PRESSURE: 83 MMHG | WEIGHT: 243 LBS | HEIGHT: 65 IN | HEART RATE: 86 BPM | BODY MASS INDEX: 40.48 KG/M2

## 2022-07-25 DIAGNOSIS — Z98.890 S/P ARTHROSCOPIC SURGERY OF RIGHT KNEE: Primary | ICD-10-CM

## 2022-07-25 PROCEDURE — 99999 PR PBB SHADOW E&M-EST. PATIENT-LVL IV: ICD-10-PCS | Mod: PBBFAC,,, | Performed by: PHYSICIAN ASSISTANT

## 2022-07-25 PROCEDURE — 3074F PR MOST RECENT SYSTOLIC BLOOD PRESSURE < 130 MM HG: ICD-10-PCS | Mod: CPTII,S$GLB,, | Performed by: PHYSICIAN ASSISTANT

## 2022-07-25 PROCEDURE — 3079F PR MOST RECENT DIASTOLIC BLOOD PRESSURE 80-89 MM HG: ICD-10-PCS | Mod: CPTII,S$GLB,, | Performed by: PHYSICIAN ASSISTANT

## 2022-07-25 PROCEDURE — 99999 PR PBB SHADOW E&M-EST. PATIENT-LVL IV: CPT | Mod: PBBFAC,,, | Performed by: PHYSICIAN ASSISTANT

## 2022-07-25 PROCEDURE — 3079F DIAST BP 80-89 MM HG: CPT | Mod: CPTII,S$GLB,, | Performed by: PHYSICIAN ASSISTANT

## 2022-07-25 PROCEDURE — 3008F PR BODY MASS INDEX (BMI) DOCUMENTED: ICD-10-PCS | Mod: CPTII,S$GLB,, | Performed by: PHYSICIAN ASSISTANT

## 2022-07-25 PROCEDURE — 1160F RVW MEDS BY RX/DR IN RCRD: CPT | Mod: CPTII,S$GLB,, | Performed by: PHYSICIAN ASSISTANT

## 2022-07-25 PROCEDURE — 99024 PR POST-OP FOLLOW-UP VISIT: ICD-10-PCS | Mod: S$GLB,,, | Performed by: PHYSICIAN ASSISTANT

## 2022-07-25 PROCEDURE — 3074F SYST BP LT 130 MM HG: CPT | Mod: CPTII,S$GLB,, | Performed by: PHYSICIAN ASSISTANT

## 2022-07-25 PROCEDURE — 4010F ACE/ARB THERAPY RXD/TAKEN: CPT | Mod: CPTII,S$GLB,, | Performed by: PHYSICIAN ASSISTANT

## 2022-07-25 PROCEDURE — 3008F BODY MASS INDEX DOCD: CPT | Mod: CPTII,S$GLB,, | Performed by: PHYSICIAN ASSISTANT

## 2022-07-25 PROCEDURE — 1160F PR REVIEW ALL MEDS BY PRESCRIBER/CLIN PHARMACIST DOCUMENTED: ICD-10-PCS | Mod: CPTII,S$GLB,, | Performed by: PHYSICIAN ASSISTANT

## 2022-07-25 PROCEDURE — 1159F MED LIST DOCD IN RCRD: CPT | Mod: CPTII,S$GLB,, | Performed by: PHYSICIAN ASSISTANT

## 2022-07-25 PROCEDURE — 4010F PR ACE/ARB THEARPY RXD/TAKEN: ICD-10-PCS | Mod: CPTII,S$GLB,, | Performed by: PHYSICIAN ASSISTANT

## 2022-07-25 PROCEDURE — 99024 POSTOP FOLLOW-UP VISIT: CPT | Mod: S$GLB,,, | Performed by: PHYSICIAN ASSISTANT

## 2022-07-25 PROCEDURE — 1159F PR MEDICATION LIST DOCUMENTED IN MEDICAL RECORD: ICD-10-PCS | Mod: CPTII,S$GLB,, | Performed by: PHYSICIAN ASSISTANT

## 2022-07-26 ENCOUNTER — PATIENT MESSAGE (OUTPATIENT)
Dept: PSYCHIATRY | Facility: CLINIC | Age: 57
End: 2022-07-26
Payer: COMMERCIAL

## 2022-07-28 ENCOUNTER — OFFICE VISIT (OUTPATIENT)
Dept: OBSTETRICS AND GYNECOLOGY | Facility: CLINIC | Age: 57
End: 2022-07-28
Attending: OBSTETRICS & GYNECOLOGY
Payer: COMMERCIAL

## 2022-07-28 VITALS — BODY MASS INDEX: 41.51 KG/M2 | HEIGHT: 65 IN | WEIGHT: 249.13 LBS

## 2022-07-28 DIAGNOSIS — D23.9 SKIN PAPILLOMA: Primary | ICD-10-CM

## 2022-07-28 PROCEDURE — 15839 EXC EXCESSIVE SKN OTHER AREA: CPT | Mod: 51,S$GLB,, | Performed by: OBSTETRICS & GYNECOLOGY

## 2022-07-28 PROCEDURE — 88305 TISSUE EXAM BY PATHOLOGIST: CPT | Mod: 26,,, | Performed by: PATHOLOGY

## 2022-07-28 PROCEDURE — 4010F PR ACE/ARB THEARPY RXD/TAKEN: ICD-10-PCS | Mod: CPTII,S$GLB,, | Performed by: OBSTETRICS & GYNECOLOGY

## 2022-07-28 PROCEDURE — 56606 BIOPSY OF VULVA/PERINEUM: CPT | Mod: S$GLB,,, | Performed by: OBSTETRICS & GYNECOLOGY

## 2022-07-28 PROCEDURE — 99213 OFFICE O/P EST LOW 20 MIN: CPT | Mod: 25,S$GLB,, | Performed by: OBSTETRICS & GYNECOLOGY

## 2022-07-28 PROCEDURE — 56606 BIOPSY (GYNECOLOGICAL): ICD-10-PCS | Mod: S$GLB,,, | Performed by: OBSTETRICS & GYNECOLOGY

## 2022-07-28 PROCEDURE — 56605 BIOPSY OF VULVA/PERINEUM: CPT | Mod: S$GLB,,, | Performed by: OBSTETRICS & GYNECOLOGY

## 2022-07-28 PROCEDURE — 88305 TISSUE EXAM BY PATHOLOGIST: ICD-10-PCS | Mod: 26,,, | Performed by: PATHOLOGY

## 2022-07-28 PROCEDURE — 88342 CHG IMMUNOCYTOCHEMISTRY: ICD-10-PCS | Mod: 26,,, | Performed by: PATHOLOGY

## 2022-07-28 PROCEDURE — 3008F BODY MASS INDEX DOCD: CPT | Mod: CPTII,S$GLB,, | Performed by: OBSTETRICS & GYNECOLOGY

## 2022-07-28 PROCEDURE — 99999 PR PBB SHADOW E&M-EST. PATIENT-LVL IV: CPT | Mod: PBBFAC,,, | Performed by: OBSTETRICS & GYNECOLOGY

## 2022-07-28 PROCEDURE — 99999 PR PBB SHADOW E&M-EST. PATIENT-LVL IV: ICD-10-PCS | Mod: PBBFAC,,, | Performed by: OBSTETRICS & GYNECOLOGY

## 2022-07-28 PROCEDURE — 4010F ACE/ARB THERAPY RXD/TAKEN: CPT | Mod: CPTII,S$GLB,, | Performed by: OBSTETRICS & GYNECOLOGY

## 2022-07-28 PROCEDURE — 88342 IMHCHEM/IMCYTCHM 1ST ANTB: CPT | Mod: 26,,, | Performed by: PATHOLOGY

## 2022-07-28 PROCEDURE — 88342 IMHCHEM/IMCYTCHM 1ST ANTB: CPT | Mod: 59 | Performed by: PATHOLOGY

## 2022-07-28 PROCEDURE — 88305 TISSUE EXAM BY PATHOLOGIST: CPT | Mod: 59 | Performed by: PATHOLOGY

## 2022-07-28 PROCEDURE — 99213 PR OFFICE/OUTPT VISIT, EST, LEVL III, 20-29 MIN: ICD-10-PCS | Mod: 25,S$GLB,, | Performed by: OBSTETRICS & GYNECOLOGY

## 2022-07-28 PROCEDURE — 3008F PR BODY MASS INDEX (BMI) DOCUMENTED: ICD-10-PCS | Mod: CPTII,S$GLB,, | Performed by: OBSTETRICS & GYNECOLOGY

## 2022-07-28 PROCEDURE — 56605 BIOPSY (GYNECOLOGICAL): ICD-10-PCS | Mod: S$GLB,,, | Performed by: OBSTETRICS & GYNECOLOGY

## 2022-07-28 PROCEDURE — 15839 PR EXCISE EXCESS SKIN TISSUE,OTHER: ICD-10-PCS | Mod: 51,S$GLB,, | Performed by: OBSTETRICS & GYNECOLOGY

## 2022-07-28 RX ORDER — GABAPENTIN 300 MG/1
1 CAPSULE ORAL
COMMUNITY
Start: 2021-08-17 | End: 2022-08-05

## 2022-07-28 RX ORDER — PROMETHAZINE HYDROCHLORIDE AND CODEINE PHOSPHATE 6.25; 1 MG/5ML; MG/5ML
SOLUTION ORAL
COMMUNITY
Start: 2022-04-06 | End: 2022-08-05

## 2022-07-28 RX ORDER — PROMETHAZINE HYDROCHLORIDE AND DEXTROMETHORPHAN HYDROBROMIDE 6.25; 15 MG/5ML; MG/5ML
5 SYRUP ORAL EVERY 6 HOURS PRN
COMMUNITY
Start: 2022-04-06 | End: 2022-08-05

## 2022-07-28 RX ORDER — TIZANIDINE 4 MG/1
4 TABLET ORAL
COMMUNITY
Start: 2021-08-10 | End: 2022-08-05

## 2022-07-28 RX ORDER — AZITHROMYCIN 250 MG/1
250 TABLET, FILM COATED ORAL
COMMUNITY
Start: 2022-03-01 | End: 2022-08-05

## 2022-07-28 RX ORDER — PENICILLIN V POTASSIUM 500 MG/1
500 TABLET, FILM COATED ORAL 4 TIMES DAILY
COMMUNITY
Start: 2022-05-27 | End: 2022-08-05

## 2022-07-28 RX ORDER — ACETAMINOPHEN AND CODEINE PHOSPHATE 300; 30 MG/1; MG/1
1 TABLET ORAL EVERY 4 HOURS PRN
COMMUNITY
Start: 2022-05-27 | End: 2022-08-05

## 2022-07-28 RX ORDER — AMOXICILLIN AND CLAVULANATE POTASSIUM 875; 125 MG/1; MG/1
1 TABLET, FILM COATED ORAL 2 TIMES DAILY
COMMUNITY
Start: 2022-04-06 | End: 2022-08-05

## 2022-07-28 NOTE — PROGRESS NOTES
CC: right knee pain    History of present illness: Pt is here today for post-operative followup of knee arthroscopy.  she is doing well.  We have reviewed her findings and discussed plan of care and future treatment options.      Doing well.   Doing PT and HEP.  Having some intermittent knee swelling.      DATE OF PROCEDURE: 07/07/2022  SURGEON:  Elyssa Mitchell M.D  PROCEDURE PERFORMED:   right  1. knee arthroscopic chondroplasty (CPT 83757)  2. knee arthroscopic medial and lateral (CPT 29874) meniscectomy   3. knee arthroscopic partial synovectomy/debridement (CPT 15770).   4. knee arthroscopic plica excision(CPT 19159).    5. Knee arthroscopic lysis of adhesions (CPT 00603)    In the patellofemoral compartment, there was chondral damage to:  Patella 30 x 40 mm grade 4  Chondroplasty was performed using arthroscopic shaver.     There was chondral damage to:  Medial femoral condyle 15 x 20 mm grade 3  Chondroplasty was performed using arthroscopic shaver.     There was chondral damage to:  Lateral femoral condyle 20 x 20 mm grade 3  Lateral tibial plateau 10 x 10 mm grade 4  Chondroplasty was performed using arthroscopic shaver.                                                                               PHYSICAL EXAMINATION:     Incision sites healed well  No evidence of any erythema, infection or induration  Range of motion 0-125 degrees  Minimal effusion  2+ DP pulse  No swelling, no calf tenderness  - Polly's sign  Negative medial joint line tenderness  Moderate quad atrophy                                                                                 ASSESSMENT:                                                                                                                                               1. Status post above, doing well.                                                                                                                               PLAN:                                                                                                                                                      1. Continue with PT  2. Emphasized quad function.  3. I have discussed return to activity in detail.  4.Patient will see us back at 6 week post-op travis.                                    5. All questions were answered and patient should contact us if she  has any questions or concerns in the interim.

## 2022-08-02 ENCOUNTER — CLINICAL SUPPORT (OUTPATIENT)
Dept: REHABILITATION | Facility: HOSPITAL | Age: 57
End: 2022-08-02
Attending: PHYSICIAN ASSISTANT
Payer: COMMERCIAL

## 2022-08-02 DIAGNOSIS — M25.60 DECREASED RANGE OF MOTION: ICD-10-CM

## 2022-08-02 DIAGNOSIS — R53.1 DECREASED STRENGTH: ICD-10-CM

## 2022-08-02 DIAGNOSIS — M25.561 ACUTE PAIN OF RIGHT KNEE: Primary | ICD-10-CM

## 2022-08-02 PROCEDURE — 97140 MANUAL THERAPY 1/> REGIONS: CPT | Mod: PN,CQ

## 2022-08-02 NOTE — PROGRESS NOTES
Subjective:       Patient ID: Joy Crawford is a 57 y.o. female.    Chief Complaint:  Vaginal Pain (Vaginal lumps /States this happened a few years ago and had bx done. Knows there is one close to anus and a few on labia. Does not know if this is just scar tissue or if they have grown back )      History of Present Illness  C/o 2 vulvar bumps one on left labia and one near anus for the last 4 months      GYN & OB History  Patient's last menstrual period was 2018 (approximate).   Date of Last Pap: 2021    OB History    Para Term  AB Living   1 1 1     1   SAB IAB Ectopic Multiple Live Births           1      # Outcome Date GA Lbr Balwinder/2nd Weight Sex Delivery Anes PTL Lv   1 Term  40w0d  3.175 kg (7 lb) M Vag-Spont EPI  ENEIDA         Objective:     There were no vitals filed for this visit.  EXT GENITALIA:  2 small 5mm papillomas one on left labia and second near anus  See procedure NOTE       Assessment/ Plan:     Orders Placed This Encounter    Biopsy (Gynecological)    Specimen to Pathology, Ob/Gyn    Specimen to Pathology, Ob/Gyn       Joy was seen today for vaginal pain.    Diagnoses and all orders for this visit:    Skin papilloma  -     Specimen to Pathology, Ob/Gyn  -     Specimen to Pathology, Ob/Gyn  -     Biopsy (Gynecological)        Follow up in about 2 weeks (around 2022), or if symptoms worsen or fail to improve.      Health Maintenance       Date Due Completion Date    Hepatitis C Screening Never done ---    Pneumococcal Vaccines (Age 0-64) (1 - PCV) Never done ---    HIV Screening Never done ---    TETANUS VACCINE Never done ---    Colorectal Cancer Screening Never done ---    Shingles Vaccine (1 of 2) Never done ---    Mammogram 2020    Cervical Cancer Screening 2021 12/3/2020    COVID-19 Vaccine (3 - Booster for Pfizer series) 2021    Influenza Vaccine (1) 2022    Lipid Panel 2025

## 2022-08-02 NOTE — PROCEDURES
Biopsy (Gynecological)    Date/Time: 7/28/2022 11:15 AM  Performed by: José Miguel Leon MD  Authorized by: José Miguel Leon MD     Consent Done?:  Yes (Verbal)   Patient was prepped and draped in the normal sterile fashion.  Local anesthesia used?: Yes    Anesthesia:  Local infiltration  Local anesthetic:  Lidocaine 1% without epinephrine    Biopsy Location:  Vulva  Vulva:     # of lesions:  2

## 2022-08-02 NOTE — PROGRESS NOTES
"[facility]   Physical Therapy Treatment Note     Name: Joy Caputo [facility] Number: [id number]    Therapy Diagnosis:   Encounter Diagnoses   Name Primary?    Acute pain of right knee Yes    Decreased range of motion     Decreased strength      Physician: Romero Marinelli III, *    Visit Date: 8/2/2022    Physician Orders: PT Eval and Treat   Medical Diagnosis from Referral:   S83.281A (ICD-10-CM) - Acute lateral meniscus tear of right knee, initial encounter   S83.241A (ICD-10-CM) - Acute medial meniscus tear of right knee, initial encounter      Evaluation Date: 7/8/2022  Authorization Period Expiration: 7/6/2023  Plan of Care Expiration: 7/8/2022 to 9/2/2022  Visit # / Visits authorized: 3/20 +eval  FOTO#:4/5    PTA Visit #: 0/5     Time In: 3:45pm  Time Out: 4:30 am  Total Billable Time: 50 minutes (1NMR, 2MT)+ ice    SUBJECTIVE     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 Pt agreeable to PT session  She was compliant with home exercise program.  Response to previous treatment: soreness  Functional change: in more pain since walking on the beach    Pain: 7/10 throbbing  Location: right knee (anterior, subpatellar aspect)     OBJECTIVE     Objective Measures updated at progress report unless specified.     7/21/2022:  active range of motion right knee - 1-0-118 - reduced motion compared to last measurement  Post manual knee range of motion 3-0-128    Palpation: edema along Proximal patella med/lat and near incision sites.     Treatment     Joy received the treatments listed below:      Joy participated in neuromuscular re-education activities to improve: Kinesthetic, Sense and Proprioception for 00 minutes. The following activities were included:  Quad set c towel under knee - 2x20 5"  Straight leg raise c quad set AA required due to discomfor - 2x10      therapeutic exercises to develop strength, endurance, ROM and " "flexibility for 00 minutes including:  Heel prop - 3min     Bridging - 3x10  SLR 2x10x3"  Heel Prop w/ strap for ER; 5 min; 3# above and below knee   quad set w/ towel under knee; 2x20 x w/ 5" hold  Sidelying hip abd 3x10 bilateral   Clamshell 3x10 bilateral   long arc quad red theraband 3x10   Long arc quad iso hold [id number]  Recumbent bike lv2 5min      SAQ 3x10x5"  Heel slides 20x5"    manual therapy techniques: Joint mobilizations were applied to the: R knee for 30 minutes, including:  patellar mobs in all directions; Gr 2-3  fat pad mobs   STM to Hamstrings (medial/ lateral including mm belly splaying)  STM to quadriceps including mm belly splaying  STM to distal IT band   Knee flexion gapping for knee range of motion  Seated Tibiofemoral distraction sustained (not performed)  Seated tibiofemoral distraction grade 2 mobilization. (not performed)      cold pack for 10 minutes to R knee. - elevated on wedge        Patient Education and Home Exercises     Home Exercises Provided and Patient Education Provided     Education provided:   - Pt educated on avoiding over aggravating the knee with activities such as walking on the beach  - Pt educated on proper icing and elevation of the knee to reduce swelling.     Written Home Exercises Provided: Patient instructed to cont prior HEP. Exercises were reviewed and Joy was able to demonstrate them prior to the end of the session.  Joy demonstrated good  understanding of the education provided. See EMR under Patient Instructions for exercises provided during therapy sessions    ASSESSMENT     Pt completed session with reports of 0/10 pain in R knee. Successful pain relief today. Session focused on manual therapy and stretching mainly to reduce pain and promote tissue pliability. No adverse reactions to todays session.     Joy Is progressing well towards her goals.   Pt prognosis is Good.     Pt will continue to benefit from skilled outpatient physical therapy to " address the deficits listed in the problem list box on initial evaluation, provide pt/family education and to maximize pt's level of independence in the home and community environment.     Pt's spiritual, cultural and educational needs considered and pt agreeable to plan of care and goals.     Anticipated barriers to physical therapy: anxiety    Goals:  Short Term Goals (4 Weeks):  1. Pt will be compliant with initial HEP to supplement PT in restoring pain free function.  2. Pt will improve knee extension to equal contralateral lower extremity and knee flexion to 120 in order to improve gait  3. Pt will reduce worst pain to 5/10 in order to start ambulating properly  4. Pt will improve strength by 5kg in all directions in order to improve independence c ADLs     Long Term Goals (8 Weeks):  1. Pt will improve FOTO score to </= 38% limited to decrease perceived limitation with mobility.   2. Pt will improve knee extension to equal contralateral lower extremity and knee flexion to 130 in order to improve gait  3. Pt will reduce worst pain to 1/10 in order to start ambulating properly  4. Pt will be independent c final home exercise program in order to DC to home program.     PLAN   RESUME THEREX as per logged above.  MONITOR PT's pain, PT response.  Plan of care Certification: 7/8/2022 to 9/2/2022.     Outpatient Physical Therapy 2 times weekly for 8 weeks to include the following interventions: Electrical Stimulation PRN, Gait Training, Manual Therapy, Moist Heat/ Ice, Neuromuscular Re-ed, Patient Education, Therapeutic Activities and Therapeutic Exercise. All other modalities PRN. Pt will be treated in conjunction c PTA prn. Dry Needling PRN.    Levi Hicks PTA

## 2022-08-05 ENCOUNTER — LAB VISIT (OUTPATIENT)
Dept: LAB | Facility: HOSPITAL | Age: 57
End: 2022-08-05
Attending: INTERNAL MEDICINE
Payer: COMMERCIAL

## 2022-08-05 ENCOUNTER — OFFICE VISIT (OUTPATIENT)
Dept: INTERNAL MEDICINE | Facility: CLINIC | Age: 57
End: 2022-08-05
Payer: COMMERCIAL

## 2022-08-05 VITALS
WEIGHT: 246.25 LBS | HEIGHT: 65 IN | SYSTOLIC BLOOD PRESSURE: 118 MMHG | BODY MASS INDEX: 41.03 KG/M2 | OXYGEN SATURATION: 97 % | DIASTOLIC BLOOD PRESSURE: 80 MMHG | HEART RATE: 105 BPM

## 2022-08-05 DIAGNOSIS — Z12.11 SCREENING FOR MALIGNANT NEOPLASM OF COLON: ICD-10-CM

## 2022-08-05 DIAGNOSIS — Z12.31 SCREENING MAMMOGRAM FOR BREAST CANCER: ICD-10-CM

## 2022-08-05 DIAGNOSIS — Z00.00 ROUTINE GENERAL MEDICAL EXAMINATION AT A HEALTH CARE FACILITY: ICD-10-CM

## 2022-08-05 DIAGNOSIS — T78.40XA ALLERGY, INITIAL ENCOUNTER: Primary | ICD-10-CM

## 2022-08-05 DIAGNOSIS — Z78.0 MENOPAUSE: ICD-10-CM

## 2022-08-05 LAB
25(OH)D3+25(OH)D2 SERPL-MCNC: 32 NG/ML (ref 30–96)
ALBUMIN SERPL BCP-MCNC: 4.6 G/DL (ref 3.5–5.2)
ALP SERPL-CCNC: 88 U/L (ref 55–135)
ALT SERPL W/O P-5'-P-CCNC: 25 U/L (ref 10–44)
ANION GAP SERPL CALC-SCNC: 12 MMOL/L (ref 8–16)
AST SERPL-CCNC: 23 U/L (ref 10–40)
BASOPHILS # BLD AUTO: 0.06 K/UL (ref 0–0.2)
BASOPHILS NFR BLD: 0.7 % (ref 0–1.9)
BILIRUB SERPL-MCNC: 0.3 MG/DL (ref 0.1–1)
BUN SERPL-MCNC: 18 MG/DL (ref 6–20)
CALCIUM SERPL-MCNC: 10.4 MG/DL (ref 8.7–10.5)
CHLORIDE SERPL-SCNC: 106 MMOL/L (ref 95–110)
CHOLEST SERPL-MCNC: 196 MG/DL (ref 120–199)
CHOLEST/HDLC SERPL: 3.2 {RATIO} (ref 2–5)
CO2 SERPL-SCNC: 24 MMOL/L (ref 23–29)
CREAT SERPL-MCNC: 0.9 MG/DL (ref 0.5–1.4)
DIFFERENTIAL METHOD: ABNORMAL
EOSINOPHIL # BLD AUTO: 0.4 K/UL (ref 0–0.5)
EOSINOPHIL NFR BLD: 5 % (ref 0–8)
ERYTHROCYTE [DISTWIDTH] IN BLOOD BY AUTOMATED COUNT: 14.3 % (ref 11.5–14.5)
EST. GFR  (NO RACE VARIABLE): >60 ML/MIN/1.73 M^2
ESTIMATED AVG GLUCOSE: 103 MG/DL (ref 68–131)
GLUCOSE SERPL-MCNC: 86 MG/DL (ref 70–110)
HBA1C MFR BLD: 5.2 % (ref 4–5.6)
HCT VFR BLD AUTO: 42.5 % (ref 37–48.5)
HDLC SERPL-MCNC: 62 MG/DL (ref 40–75)
HDLC SERPL: 31.6 % (ref 20–50)
HGB BLD-MCNC: 13.2 G/DL (ref 12–16)
IMM GRANULOCYTES # BLD AUTO: 0.02 K/UL (ref 0–0.04)
IMM GRANULOCYTES NFR BLD AUTO: 0.2 % (ref 0–0.5)
INSULIN COLLECTION INTERVAL: NORMAL
INSULIN SERPL-ACNC: 11.3 UU/ML
LDLC SERPL CALC-MCNC: 102.6 MG/DL (ref 63–159)
LYMPHOCYTES # BLD AUTO: 1.7 K/UL (ref 1–4.8)
LYMPHOCYTES NFR BLD: 21.1 % (ref 18–48)
MCH RBC QN AUTO: 29.7 PG (ref 27–31)
MCHC RBC AUTO-ENTMCNC: 31.1 G/DL (ref 32–36)
MCV RBC AUTO: 96 FL (ref 82–98)
MONOCYTES # BLD AUTO: 0.7 K/UL (ref 0.3–1)
MONOCYTES NFR BLD: 8.5 % (ref 4–15)
NEUTROPHILS # BLD AUTO: 5.2 K/UL (ref 1.8–7.7)
NEUTROPHILS NFR BLD: 64.5 % (ref 38–73)
NONHDLC SERPL-MCNC: 134 MG/DL
NRBC BLD-RTO: 0 /100 WBC
PLATELET # BLD AUTO: 277 K/UL (ref 150–450)
PMV BLD AUTO: 10 FL (ref 9.2–12.9)
POTASSIUM SERPL-SCNC: 4.4 MMOL/L (ref 3.5–5.1)
PROT SERPL-MCNC: 7.8 G/DL (ref 6–8.4)
RBC # BLD AUTO: 4.45 M/UL (ref 4–5.4)
SODIUM SERPL-SCNC: 142 MMOL/L (ref 136–145)
TRIGL SERPL-MCNC: 157 MG/DL (ref 30–150)
TSH SERPL DL<=0.005 MIU/L-ACNC: 0.75 UIU/ML (ref 0.4–4)
VIT B12 SERPL-MCNC: 296 PG/ML (ref 210–950)
WBC # BLD AUTO: 8.01 K/UL (ref 3.9–12.7)

## 2022-08-05 PROCEDURE — 3074F PR MOST RECENT SYSTOLIC BLOOD PRESSURE < 130 MM HG: ICD-10-PCS | Mod: CPTII,S$GLB,, | Performed by: INTERNAL MEDICINE

## 2022-08-05 PROCEDURE — 1159F PR MEDICATION LIST DOCUMENTED IN MEDICAL RECORD: ICD-10-PCS | Mod: CPTII,S$GLB,, | Performed by: INTERNAL MEDICINE

## 2022-08-05 PROCEDURE — 99999 PR PBB SHADOW E&M-EST. PATIENT-LVL IV: CPT | Mod: PBBFAC,,, | Performed by: INTERNAL MEDICINE

## 2022-08-05 PROCEDURE — 3079F DIAST BP 80-89 MM HG: CPT | Mod: CPTII,S$GLB,, | Performed by: INTERNAL MEDICINE

## 2022-08-05 PROCEDURE — 82306 VITAMIN D 25 HYDROXY: CPT | Performed by: INTERNAL MEDICINE

## 2022-08-05 PROCEDURE — 99396 PREV VISIT EST AGE 40-64: CPT | Mod: S$GLB,,, | Performed by: INTERNAL MEDICINE

## 2022-08-05 PROCEDURE — 85025 COMPLETE CBC W/AUTO DIFF WBC: CPT | Performed by: INTERNAL MEDICINE

## 2022-08-05 PROCEDURE — 99999 PR PBB SHADOW E&M-EST. PATIENT-LVL IV: ICD-10-PCS | Mod: PBBFAC,,, | Performed by: INTERNAL MEDICINE

## 2022-08-05 PROCEDURE — 83036 HEMOGLOBIN GLYCOSYLATED A1C: CPT | Performed by: INTERNAL MEDICINE

## 2022-08-05 PROCEDURE — 84443 ASSAY THYROID STIM HORMONE: CPT | Performed by: INTERNAL MEDICINE

## 2022-08-05 PROCEDURE — 3008F PR BODY MASS INDEX (BMI) DOCUMENTED: ICD-10-PCS | Mod: CPTII,S$GLB,, | Performed by: INTERNAL MEDICINE

## 2022-08-05 PROCEDURE — 3079F PR MOST RECENT DIASTOLIC BLOOD PRESSURE 80-89 MM HG: ICD-10-PCS | Mod: CPTII,S$GLB,, | Performed by: INTERNAL MEDICINE

## 2022-08-05 PROCEDURE — 4010F ACE/ARB THERAPY RXD/TAKEN: CPT | Mod: CPTII,S$GLB,, | Performed by: INTERNAL MEDICINE

## 2022-08-05 PROCEDURE — 4010F PR ACE/ARB THEARPY RXD/TAKEN: ICD-10-PCS | Mod: CPTII,S$GLB,, | Performed by: INTERNAL MEDICINE

## 2022-08-05 PROCEDURE — 36415 COLL VENOUS BLD VENIPUNCTURE: CPT | Performed by: INTERNAL MEDICINE

## 2022-08-05 PROCEDURE — 99396 PR PREVENTIVE VISIT,EST,40-64: ICD-10-PCS | Mod: S$GLB,,, | Performed by: INTERNAL MEDICINE

## 2022-08-05 PROCEDURE — 3074F SYST BP LT 130 MM HG: CPT | Mod: CPTII,S$GLB,, | Performed by: INTERNAL MEDICINE

## 2022-08-05 PROCEDURE — 3008F BODY MASS INDEX DOCD: CPT | Mod: CPTII,S$GLB,, | Performed by: INTERNAL MEDICINE

## 2022-08-05 PROCEDURE — 80061 LIPID PANEL: CPT | Performed by: INTERNAL MEDICINE

## 2022-08-05 PROCEDURE — 80053 COMPREHEN METABOLIC PANEL: CPT | Performed by: INTERNAL MEDICINE

## 2022-08-05 PROCEDURE — 83525 ASSAY OF INSULIN: CPT | Performed by: INTERNAL MEDICINE

## 2022-08-05 PROCEDURE — 1159F MED LIST DOCD IN RCRD: CPT | Mod: CPTII,S$GLB,, | Performed by: INTERNAL MEDICINE

## 2022-08-05 PROCEDURE — 82607 VITAMIN B-12: CPT | Performed by: INTERNAL MEDICINE

## 2022-08-05 RX ORDER — LISINOPRIL 10 MG/1
10 TABLET ORAL DAILY
Qty: 90 TABLET | Refills: 4 | Status: SHIPPED | OUTPATIENT
Start: 2022-08-05 | End: 2022-11-13 | Stop reason: SDUPTHER

## 2022-08-05 RX ORDER — METFORMIN HYDROCHLORIDE 500 MG/1
TABLET, EXTENDED RELEASE ORAL
Qty: 180 TABLET | Refills: 3 | Status: SHIPPED | OUTPATIENT
Start: 2022-08-05 | End: 2022-10-24

## 2022-08-05 NOTE — PATIENT INSTRUCTIONS
To schedule colonoscopy, call [phone].        Metformin  mg once daily for 7 days at largest meal then 2 tablets daily with largest meal

## 2022-08-07 ENCOUNTER — PATIENT MESSAGE (OUTPATIENT)
Dept: INTERNAL MEDICINE | Facility: CLINIC | Age: 57
End: 2022-08-07
Payer: COMMERCIAL

## 2022-08-08 LAB
FINAL PATHOLOGIC DIAGNOSIS: NORMAL
FINAL PATHOLOGIC DIAGNOSIS: NORMAL
Lab: NORMAL
Lab: NORMAL

## 2022-08-08 NOTE — PROGRESS NOTES
Joy,   Good news. Your biopsy is back and no cancer or severe dysplasia.  Looks like a benign papilloma that is now removed and all gone. I do want to follow you back up in 4 months and just look at the skin down there again. So be sure to call the office and schedule a follow up in Nov or Dec so we can follow you closely.   Dr Leon

## 2022-08-09 ENCOUNTER — CLINICAL SUPPORT (OUTPATIENT)
Dept: REHABILITATION | Facility: HOSPITAL | Age: 57
End: 2022-08-09
Attending: PHYSICIAN ASSISTANT
Payer: COMMERCIAL

## 2022-08-09 DIAGNOSIS — M25.561 ACUTE PAIN OF RIGHT KNEE: Primary | ICD-10-CM

## 2022-08-09 DIAGNOSIS — R53.1 DECREASED STRENGTH: ICD-10-CM

## 2022-08-09 DIAGNOSIS — M25.60 DECREASED RANGE OF MOTION: ICD-10-CM

## 2022-08-09 PROCEDURE — 97110 THERAPEUTIC EXERCISES: CPT | Mod: PN

## 2022-08-09 PROCEDURE — 97112 NEUROMUSCULAR REEDUCATION: CPT | Mod: PN

## 2022-08-09 PROCEDURE — 97140 MANUAL THERAPY 1/> REGIONS: CPT | Mod: PN

## 2022-08-09 PROCEDURE — 97116 GAIT TRAINING THERAPY: CPT | Mod: PN

## 2022-08-09 NOTE — PROGRESS NOTES
"[facility]   Physical Therapy Treatment Note     Name: Joy Caputo [facility] Number: [id number]    Therapy Diagnosis:   Encounter Diagnoses   Name Primary?    Acute pain of right knee Yes    Decreased range of motion     Decreased strength      Physician: Romero Marinelli III, *    Visit Date: 8/9/2022    Physician Orders: PT Eval and Treat   Medical Diagnosis from Referral:   S83.281A (ICD-10-CM) - Acute lateral meniscus tear of right knee, initial encounter   S83.241A (ICD-10-CM) - Acute medial meniscus tear of right knee, initial encounter      Evaluation Date: 7/8/2022  Authorization Period Expiration: 7/6/2023  Plan of Care Expiration: 7/8/2022 to 9/2/2022  Visit # / Visits authorized: 5/20 +eval  FOTO#:5/5 NEXT    PTA Visit #: 0/5     Time In: 0345 pm  Time Out: 0500 pm  Total Treatment Time: 75 minutes  Total Billable Time: 65 minutes (1 TE, 1 NMR, 2 MT, 1 GT) + 10 min ice unsupervised    SUBJECTIVE     Pt reports: "no way I can do the bike, my knee is killing me after school today"  She was compliant with home exercise program.  Response to previous treatment: soreness  Functional change: in more pain since walking on the beach    Pain: 8/10 throbbing/sharp  Location: right knee (anterior, subpatellar aspect)     OBJECTIVE     Objective Measures updated at progress report unless specified.     7/21/2022:  active range of motion right knee - 1-0-118 - reduced motion compared to last measurement  Post manual knee range of motion 3-0-128    Palpation: edema along Proximal patella med/lat and near incision sites.     Treatment     Joy received the treatments listed below:      neuromuscular re-education activities to improve: Kinesthetic, Sense and Proprioception for 10 minutes. The following activities were included:  Quad set w/ 1/2 foam under knee - 3 x 10 w/ 5" hold  Quad set w/ 1/2 foam under heel - 2 x 10 w/ 5" hold    Not Today:   Straight leg raise c quad set AA required " "due to discomfor - 2x10    therapeutic exercises to develop strength, endurance, ROM and flexibility for 10  minutes including:  NuStep Lv 3.5 for 10 min for increased tissue extensibility and CV endurance    Not Today:   Heel prop - 3min   Bridging - 3x10  SLR 2x10x3"  Heel Prop w/ strap for ER; 5 min; 3# above and below knee   quad set w/ towel under knee; 2x20 x w/ 5" hold  Sidelying hip abd 3x10 bilateral   Clamshell 3x10 bilateral    long arc quad red theraband 3x10   Long arc quad iso hold [id number]  Recumbent bike lv2 5min  SAQ 3x10x5"  Heel slides 20x5"    manual therapy techniques: Joint mobilizations were applied to the: R knee for 25 minutes, including:  patellar mobs in all directions; Gr 2-3  fat pad mobs   Seated Tibiofemoral distraction sustained   Prox fibular mobs Gr 3-4   Tibial ER w/ ext mob Gr 2-3    Not Today:   STM to Hamstrings (medial/ lateral including mm belly splaying)  STM to quadriceps including mm belly splaying  STM to distal IT band   Knee flexion gapping for knee range of motion  Seated tibiofemoral distraction grade 2 mobilization. (not performed)    GAIT TRAINING to improve functional mobility and safety for 15 minutes.   Fwd/bkwd walking w/ quad set in //; 10 min-- heavy cues for active not passive TKE    cold pack for 10 minutes to R knee.         Patient Education and Home Exercises     Home Exercises Provided and Patient Education Provided     Education provided:   - resting at work and avoiding flexed knee gait.    Written Home Exercises Provided: Patient instructed to cont prior HEP. Exercises were reviewed and Joy was able to demonstrate them prior to the end of the session.  Joy demonstrated good  understanding of the education provided. 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 She ambulated with a flexed knee gait and is consistently " getting pain related to fat pad impingement and tibofibular hypomobility. She responded well to manual activities and had notable decrease in pain. She struggles to maintain end range knee extension strength. She is able to perform good quality quad set when leg is resting on the table, but when place into her full hyperextension she struggled greatly to maintain quad set for more than 3 seconds. She will benefit continued manual interventions in addition to added focus on open and closed chain knee extension strengthening.      Joy Is progressing well towards her goals.   Pt prognosis is Good.     Pt will continue to benefit from skilled outpatient physical therapy to address the deficits listed in the problem list box on initial evaluation, provide pt/family education and to maximize pt's level of independence in the home and community environment.     Pt's spiritual, cultural and educational needs considered and pt agreeable to plan of care and goals.     Anticipated barriers to physical therapy: anxiety    Goals:  Short Term Goals (4 Weeks):  1. Pt will be compliant with initial HEP to supplement PT in restoring pain free function.  2. Pt will improve knee extension to equal contralateral lower extremity and knee flexion to 120 in order to improve gait  3. Pt will reduce worst pain to 5/10 in order to start ambulating properly  4. Pt will improve strength by 5kg in all directions in order to improve independence c ADLs     Long Term Goals (8 Weeks):  1. Pt will improve FOTO score to </= 38% limited to decrease perceived limitation with mobility.   2. Pt will improve knee extension to equal contralateral lower extremity and knee flexion to 130 in order to improve gait  3. Pt will reduce worst pain to 1/10 in order to start ambulating properly  4. Pt will be independent c final home exercise program in order to DC to home program.     PLAN   RESUME THEREX as per logged above.  MONITOR PT's pain, PT  response.  Plan of care Certification: 7/8/2022 to 9/2/2022.     Outpatient Physical Therapy 2 times weekly for 8 weeks to include the following interventions: Electrical Stimulation PRN, Gait Training, Manual Therapy, Moist Heat/ Ice, Neuromuscular Re-ed, Patient Education, Therapeutic Activities and Therapeutic Exercise. All other modalities PRN. Pt will be treated in conjunction c PTA prn. Dry Needling PRN.    Angelina Portillo, PT, DPT

## 2022-08-11 ENCOUNTER — CLINICAL SUPPORT (OUTPATIENT)
Dept: REHABILITATION | Facility: HOSPITAL | Age: 57
End: 2022-08-11
Attending: PHYSICIAN ASSISTANT
Payer: COMMERCIAL

## 2022-08-11 DIAGNOSIS — M25.561 ACUTE PAIN OF RIGHT KNEE: Primary | ICD-10-CM

## 2022-08-11 DIAGNOSIS — R53.1 DECREASED STRENGTH: ICD-10-CM

## 2022-08-11 DIAGNOSIS — M25.60 DECREASED RANGE OF MOTION: ICD-10-CM

## 2022-08-11 PROCEDURE — 97110 THERAPEUTIC EXERCISES: CPT | Mod: PN

## 2022-08-11 PROCEDURE — 97112 NEUROMUSCULAR REEDUCATION: CPT | Mod: PN

## 2022-08-11 PROCEDURE — 97140 MANUAL THERAPY 1/> REGIONS: CPT | Mod: PN

## 2022-08-11 NOTE — PROGRESS NOTES
"[facility]   Physical Therapy Treatment Note     Name: Joy Caputo [facility] Number: [id number]    Therapy Diagnosis:   Encounter Diagnoses   Name Primary?    Acute pain of right knee Yes    Decreased range of motion     Decreased strength      Physician: Romero Marinelli III, *    Visit Date: 8/11/2022    Physician Orders: PT Eval and Treat   Medical Diagnosis from Referral:   S83.281A (ICD-10-CM) - Acute lateral meniscus tear of right knee, initial encounter   S83.241A (ICD-10-CM) - Acute medial meniscus tear of right knee, initial encounter      Evaluation Date: 7/8/2022  Authorization Period Expiration: 7/6/2023  Plan of Care Expiration: 7/8/2022 to 9/2/2022  Visit # / Visits authorized: 6/20 +eval  FOTO#:5/5 NEXT    PTA Visit #: 0/5     Time In: 0347 pm  Time Out: 0445 pm  Total Treatment Time: 53 minutes  Total Billable Time: 53 minutes (2 MT, 1 TE, 1 NMR)    SUBJECTIVE     Pt reports: she felt good after she left therapy on Tuesday but was in increased pain by that night and it 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  Response to previous treatment: soreness  Functional change: in more pain since walking on the beach    Pain: 6/10 throbbing/sharp  Location: right knee (anterior, subpatellar aspect)     OBJECTIVE     Objective Measures updated at progress report unless specified.     7/21/2022:  active range of motion right knee - 1-0-118 - reduced motion compared to last measurement  Post manual knee range of motion 3-0-128    Palpation: edema along Proximal patella med/lat and near incision sites.     Treatment     Joy received the treatments listed below:      neuromuscular re-education activities to improve: Kinesthetic, Sense and Proprioception for 13 minutes. The following activities were included:  Quad set w/ 1/2 foam under knee - 3 x 10 w/ 5" hold  Quad set w/ 1/2 foam under heel - 2 x 10 w/ 5" hold  LAQ 3 x 10    Not " "Today:   Straight leg raise c quad set AA required due to discomfor - 2x10    therapeutic exercises to develop strength, endurance, ROM and flexibility for 10  minutes including:  NuStep Lv 4.0 for 10 min for increased tissue extensibility and CV endurance    SLR 2x10x3"- not performed today, resume next session     Not Today:   Heel prop - 3min   Bridging - 3x10  Heel Prop w/ strap for ER; 5 min; 3# above and below knee   quad set w/ towel under knee; 2x20 x w/ 5" hold  Sidelying hip abd 3x10 bilateral   Clamshell 3x10 bilateral    long arc quad red theraband 3x10   Long arc quad iso hold [id number]  Recumbent bike lv2 5min  SAQ 3x10x5"  Heel slides 20x5"    manual therapy techniques: Joint mobilizations were applied to the: R knee for 30 minutes, including:  patellar mobs in all directions; Gr 2-3  fat pad mobs   Seated Tibiofemoral distraction sustained   Prox fibular mobs Gr 3-4   Tibial ER w/ ext mob Gr 2-3    Not Today:   STM to Hamstrings (medial/ lateral including mm belly splaying)  STM to quadriceps including mm belly splaying  STM to distal IT band   Knee flexion gapping for knee range of motion  Seated tibiofemoral distraction grade 2 mobilization. (not performed)    GAIT TRAINING to improve functional mobility and safety for 00 minutes.   Fwd/bkwd walking w/ quad set in //; 10 min-- heavy cues for active not passive TKE    cold pack for 10 minutes to R knee.         Patient Education and Home Exercises     Home Exercises Provided and Patient Education Provided     Education provided:   - resting at work and avoiding flexed knee gait.    Written Home Exercises Provided: Patient instructed to cont prior HEP. Exercises were reviewed and Joy was able to demonstrate them prior to the end of the session.  Joy demonstrated good  understanding of the education provided. See EMR under Patient Instructions for exercises provided during therapy sessions    ASSESSMENT   Joy presented with increased knee pain " similar to Tuesday's presentation. She again ambulated with a flexed knee gait into clinic but was able to improve this by end of session. She responded well to manual interventions but pain is recreated consistently at 60 deg of flex. She showed improvement in quad set today but had to be reminded to not only activate her quad but to complete the full exercise. She will benefit continued quad strengthening as well as load management at work.     Joy Is progressing well towards her goals.   Pt prognosis is Good.     Pt will continue to benefit from skilled outpatient physical therapy to address the deficits listed in the problem list box on initial evaluation, provide pt/family education and to maximize pt's level of independence in the home and community environment.     Pt's spiritual, cultural and educational needs considered and pt agreeable to plan of care and goals.     Anticipated barriers to physical therapy: anxiety    Goals:  Short Term Goals (4 Weeks):  1. Pt will be compliant with initial HEP to supplement PT in restoring pain free function.  2. Pt will improve knee extension to equal contralateral lower extremity and knee flexion to 120 in order to improve gait  3. Pt will reduce worst pain to 5/10 in order to start ambulating properly  4. Pt will improve strength by 5kg in all directions in order to improve independence c ADLs     Long Term Goals (8 Weeks):  1. Pt will improve FOTO score to </= 38% limited to decrease perceived limitation with mobility.   2. Pt will improve knee extension to equal contralateral lower extremity and knee flexion to 130 in order to improve gait  3. Pt will reduce worst pain to 1/10 in order to start ambulating properly  4. Pt will be independent c final home exercise program in order to DC to home program.     PLAN   RESUME THEREX as per logged above.  MONITOR PT's pain, PT response.  Plan of care Certification: 7/8/2022 to 9/2/2022.     Outpatient Physical Therapy 2  times weekly for 8 weeks to include the following interventions: Electrical Stimulation PRN, Gait Training, Manual Therapy, Moist Heat/ Ice, Neuromuscular Re-ed, Patient Education, Therapeutic Activities and Therapeutic Exercise. All other modalities PRN. Pt will be treated in conjunction c PTA prn. Dry Needling PRN.    Angelina Portillo, PT, DPT

## 2022-08-15 ENCOUNTER — TELEPHONE (OUTPATIENT)
Dept: REHABILITATION | Facility: HOSPITAL | Age: 57
End: 2022-08-15
Payer: COMMERCIAL

## 2022-08-15 NOTE — TELEPHONE ENCOUNTER
Left message with patient about changing the time for her appointments dated 8/18 and 8/25. Left clinic number for a call back.

## 2022-08-22 ENCOUNTER — HOSPITAL ENCOUNTER (OUTPATIENT)
Dept: RADIOLOGY | Facility: CLINIC | Age: 57
Discharge: HOME | End: 2022-08-22
Attending: INTERNAL MEDICINE
Payer: COMMERCIAL

## 2022-08-22 DIAGNOSIS — Z78.0 MENOPAUSE: ICD-10-CM

## 2022-08-22 PROCEDURE — 77080 DXA BONE DENSITY AXIAL: CPT | Mod: 26,,, | Performed by: INTERNAL MEDICINE

## 2022-08-22 PROCEDURE — 77080 DEXA BONE DENSITY SPINE HIP: ICD-10-PCS | Mod: 26,,, | Performed by: INTERNAL MEDICINE

## 2022-08-22 PROCEDURE — 77080 DXA BONE DENSITY AXIAL: CPT | Mod: TC

## 2022-08-23 ENCOUNTER — OFFICE VISIT (OUTPATIENT)
Dept: PSYCHIATRY | Facility: CLINIC | Age: 57
End: 2022-08-23
Payer: COMMERCIAL

## 2022-08-23 DIAGNOSIS — F41.1 GAD (GENERALIZED ANXIETY DISORDER): ICD-10-CM

## 2022-08-23 DIAGNOSIS — F33.41 RECURRENT MAJOR DEPRESSIVE DISORDER, IN PARTIAL REMISSION: Primary | ICD-10-CM

## 2022-08-23 PROCEDURE — 90832 PR PSYCHOTHERAPY W/PATIENT, 30 MIN: ICD-10-PCS | Mod: S$GLB,,, | Performed by: SOCIAL WORKER

## 2022-08-23 PROCEDURE — 4010F ACE/ARB THERAPY RXD/TAKEN: CPT | Mod: CPTII,S$GLB,, | Performed by: SOCIAL WORKER

## 2022-08-23 PROCEDURE — 90832 PSYTX W PT 30 MINUTES: CPT | Mod: S$GLB,,, | Performed by: SOCIAL WORKER

## 2022-08-23 PROCEDURE — 3044F PR MOST RECENT HEMOGLOBIN A1C LEVEL <7.0%: ICD-10-PCS | Mod: CPTII,S$GLB,, | Performed by: SOCIAL WORKER

## 2022-08-23 PROCEDURE — 3044F HG A1C LEVEL LT 7.0%: CPT | Mod: CPTII,S$GLB,, | Performed by: SOCIAL WORKER

## 2022-08-23 PROCEDURE — 4010F PR ACE/ARB THEARPY RXD/TAKEN: ICD-10-PCS | Mod: CPTII,S$GLB,, | Performed by: SOCIAL WORKER
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
## 2022-08-25 ENCOUNTER — CLINICAL SUPPORT (OUTPATIENT)
Dept: REHABILITATION | Facility: HOSPITAL | Age: 57
End: 2022-08-25
Attending: PHYSICIAN ASSISTANT
Payer: COMMERCIAL

## 2022-08-25 DIAGNOSIS — M25.60 DECREASED RANGE OF MOTION: ICD-10-CM

## 2022-08-25 DIAGNOSIS — R53.1 DECREASED STRENGTH: ICD-10-CM

## 2022-08-25 DIAGNOSIS — M25.561 ACUTE PAIN OF RIGHT KNEE: Primary | ICD-10-CM

## 2022-08-25 PROCEDURE — 97110 THERAPEUTIC EXERCISES: CPT | Mod: PN

## 2022-08-25 PROCEDURE — 97140 MANUAL THERAPY 1/> REGIONS: CPT | Mod: PN

## 2022-08-25 NOTE — PATIENT INSTRUCTIONS
Hamstring Stretch (Seated)    Sit on a raised flat surface where you can prop your affected leg up on it such as a treatment table, couch or bed.       While keeping your knee straight to slightly bent, slowly lean forward and reach your hands towards your foot until a gentle stretch is felt along the back of your knee/thigh. Hold for 15 seconds and then return to starting position and repeat 3 times on each leg, twice daily.     Calf Stretch    While in a seated position, place a towel around the ball of your foot and pull your ankle back until a stretch is felt on your calf area.     Keep your knee in a straightened position during the stretch. Hold 15 seconds, 3 times, twice daily.     Heel Prop    While seated, prop your foot up on another chair and allow gravity to stretch your knee towards a more straightened position. Hold for 3 minutes at a time x 2. Slowly building your way up to holding for 10 minutes at a time. Perform 5-6 times daily.      Strengthening: Quadriceps Set    Tighten muscles on top of thighs by pushing knees down into surface. Roll a small towel roll and place it under your knee. Hold 5-10 seconds.  Repeat 10 times right leg per set. Do 3 sets per session for a total of 30 repetitions. Do 4-5 sessions per day.    Short Arc Quads    Place a ball or rolled up towel under your knee and slowly straighten your knee as you lift your foot. Lower back down and repeat.    Straight Leg Raise     While lying on your back, flex your foot towards your nose, squeeze your quad and raise up your leg with a straight knee, KEEPING YOUR KNEE AS STRAIGHT AS POSSIBLE.  Keep the opposite knee bent with the foot planted on the ground.  Repeat 5 times left leg per set. Do 5 sets per session for a total of 25 times. Slowly working your way up to performing 10 sets, 3 times, for a total of 30 repetitions. Do 2 sessions per day.    Heel Slides     Start in a seated position wearing socks or placing a small hand towel  "under your heel.      Next, loop a belt, towel or bed sheet around your heel and pull your knee into a bend position as your foot slides towards your buttock. Hold a gentle stretch and then return leg to original position. Perform for 3 minutes at a time, working your way up to 5 minutes at a time. Perform 4-5 times daily.        Gluteal Bridges      While lying on your back with knees bent, tighten your lower abdominal muscles, squeeze your buttocks and then raise your buttocks off the floor/bed as creating a "Bridge" with your body. Hold and then lower yourself and repeat.    Long Arc Quads    Seated on chair or bed, with upright posture.  Squeeze through thigh and extend knee straightening leg.  Hold for 1 second and then slowly lower leg back to starting position. keep thigh rotated slightly out throughout exercise.  Repeat all repetitions on one leg and then repeat on other leg.     To add difficulty use a cuff weight around ankle or wear a heavy shoe.     Sit to Stands    Start by scooting close to the front of the chair.  Then lean forward and place your hands on your thighs. Rise up to standing using your hands for support.     Sit back down using your hands for support on your thighs and then repeat.   "

## 2022-08-25 NOTE — PROGRESS NOTES
[facility]   Physical Therapy Treatment Note     Name: Joy Caputo [facility] Number: [id number]    Therapy Diagnosis:   Encounter Diagnoses   Name Primary?    Acute pain of right knee Yes    Decreased range of motion     Decreased strength      Physician: Romero Marinelli III, *    Visit Date: 8/25/2022    Physician Orders: PT Eval and Treat   Medical Diagnosis from Referral:   S83.281A (ICD-10-CM) - Acute lateral meniscus tear of right knee, initial encounter   S83.241A (ICD-10-CM) - Acute medial meniscus tear of right knee, initial encounter      Evaluation Date: 7/8/2022  Authorization Period Expiration: 7/6/2023  Plan of Care Expiration: 7/8/2022 to 9/2/2022  Visit # / Visits authorized: 7/20 (+Evaluation)  FOTO#:5/5 NEXT SESSION    PTA Visit #: 0/5     Time In: 5:35 PM  Time Out: 6:30 PM  Total Treatment Time: 55 minutes  Total Billable Time: 55 minutes     Precautions:  Right DOS 7/7/2022  1. knee arthroscopic chondroplasty (CPT 73934)  2. knee arthroscopic medial and lateral (CPT 89606) meniscectomy   3. knee arthroscopic partial synovectomy/debridement (CPT 97323).   4. knee arthroscopic plica excision(CPT 09818).    5. Knee arthroscopic lysis of adhesions (CPT 67279)    SUBJECTIVE     Pt reports: Scheduling conflicts prevented her from participating in therapy over the last few weeks. States overall she feels as though her endurance has gotten better, but she's still experiencing pain and swelling throughout the day.   She was compliant with home exercise program.  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    Pain: 4/10   Location: right anteromedial knee     OBJECTIVE     8/25/2022:    Improvements bolded below:     active range of motion right knee - 3-0-118  Post manual knee range of motion 3-0-125    Dynamometer - NEXT SESSION  L/E Strength w/ MicroFET Muscle Shahana Dynamometer Right Left Pain/Dysfunction  "with Movement   (approx 4 sec hold w/ max contraction)   Hip Flexion 8.9 kg  12.6 kg      Quads 10.5 kg  19.2 kg      Hip Abd NT kg  NT kg      Hamstrings 9.7 kg  12.6 kg         RLE MMT's:   Hip Flexion: 4/5   Hip IR: 4-/5   Hip ER: 4/5   Knee Flexion: 4/5   Knee Extension: 4/5    Treatment     Joy received the treatments listed below:      Manual therapy techniques: Joint mobilizations were applied to the: R knee for 10 minutes, including:  Infrapatellar fat pad mobilizations in extension (towel under knee)  Grade III/IV patellar mobilizations in all directions  Instructions on self-mobilizations      Neuromuscular re-education activities to improve: Kinesthetic, Sense and Proprioception for 3 minutes. The following activities were included:  Quad sets: 10x10" holds, towel under ankle      Therapeutic exercises to develop strength, endurance, ROM and flexibility for 42 minutes including:  Reassessment (see above)  Supine SLR's: 2x10, 3" holds  SAQ's: 20x, 3" holds, 3# ankle weight  LAQ's: 10x, 5" holds, towel under knee  Heel Slides: 3 minutes, 5" holds in flexion, 5" quad set in extension  DL Gluteal Bridges: 2x10, 3" holds, lateral fat pad pain, improved with mobs  Sit to Stands: 15x, high/low  Recumbent Bike: 5 minutes, Level 1.0  Step-ups: NEXT      Patient Education and Home Exercises     Home Exercises Provided and Patient Education Provided     Education provided:   - resting at work and avoiding flexed knee gait.    Written Home Exercises Provided: Patient instructed to cont prior HEP. Exercises were reviewed and Joy was able to demonstrate them prior to the end of the session.  Joy demonstrated good  understanding of the education provided. See EMR under Patient Instructions for exercises provided during therapy sessions    ASSESSMENT   Joy presented to therapy for the first time in ~3 weeks. Reassessed patient today with strength and ROM. Patient continues to have deficits with quad and hamstring " strength and slight flexion ROM deficits. Re-established care this session with focus on improving ROM and quad strength. Tolerated session fairly. Fair quad activation noted with OKC exercises, but slight anterior knee pain with CKC exercises. Will continue to re-establish care with focus on improving quad, gluteal, and hamstring strength as well as ROM.      Joy Is progressing well towards her goals.   Pt prognosis is Good.     Pt will continue to benefit from skilled outpatient physical therapy to address the deficits listed in the problem list box on initial evaluation, provide pt/family education and to maximize pt's level of independence in the home and community environment.     Pt's spiritual, cultural and educational needs considered and pt agreeable to plan of care and goals.     Anticipated barriers to physical therapy: anxiety    Goals:  Short Term Goals (4 Weeks):  1. Pt will be compliant with initial HEP to supplement PT in restoring pain free function.  2. Pt will improve knee extension to equal contralateral lower extremity and knee flexion to 120 in order to improve gait  3. Pt will reduce worst pain to 5/10 in order to start ambulating properly  4. Pt will improve strength by 5kg in all directions in order to improve independence c ADLs     Long Term Goals (8 Weeks):  1. Pt will improve FOTO score to </= 38% limited to decrease perceived limitation with mobility.   2. Pt will improve knee extension to equal contralateral lower extremity and knee flexion to 130 in order to improve gait  3. Pt will reduce worst pain to 1/10 in order to start ambulating properly  4. Pt will be independent c final home exercise program in order to DC to home program.     PLAN   RESUME THEREX as per logged above.  MONITOR PT's pain, PT response.    Plan of care Certification: 7/8/2022 to 9/2/2022.     Outpatient Physical Therapy 2 times weekly for 8 weeks to include the following interventions: Electrical Stimulation  PRN, Gait Training, Manual Therapy, Moist Heat/ Ice, Neuromuscular Re-ed, Patient Education, Therapeutic Activities and Therapeutic Exercise. All other modalities PRN. Pt will be treated in conjunction c PTA prn. Dry Needling PRN.    Mesha Jaquez, PT, DPT

## 2022-08-26 DIAGNOSIS — Z12.11 SCREENING FOR COLORECTAL CANCER: Primary | ICD-10-CM

## 2022-08-26 DIAGNOSIS — Z12.12 SCREENING FOR COLORECTAL CANCER: Primary | ICD-10-CM

## 2022-09-03 ENCOUNTER — PATIENT MESSAGE (OUTPATIENT)
Dept: INTERNAL MEDICINE | Facility: CLINIC | Age: 57
End: 2022-09-03
Payer: COMMERCIAL

## 2022-09-03 ENCOUNTER — PATIENT MESSAGE (OUTPATIENT)
Dept: PSYCHIATRY | Facility: CLINIC | Age: 57
End: 2022-09-03
Payer: COMMERCIAL

## 2022-09-03 DIAGNOSIS — E11.9 TYPE 2 DIABETES MELLITUS WITHOUT COMPLICATION, WITHOUT LONG-TERM CURRENT USE OF INSULIN: Primary | ICD-10-CM

## 2022-09-05 ENCOUNTER — PATIENT MESSAGE (OUTPATIENT)
Dept: INTERNAL MEDICINE | Facility: CLINIC | Age: 57
End: 2022-09-05
Payer: COMMERCIAL

## 2022-09-05 RX ORDER — DULAGLUTIDE 0.75 MG/.5ML
0.75 INJECTION, SOLUTION SUBCUTANEOUS
Qty: 4 PEN | Refills: 3 | Status: SHIPPED | OUTPATIENT
Start: 2022-09-05 | End: 2023-06-07

## 2022-09-07 ENCOUNTER — PATIENT MESSAGE (OUTPATIENT)
Dept: INTERNAL MEDICINE | Facility: CLINIC | Age: 57
End: 2022-09-07
Payer: COMMERCIAL

## 2022-09-08 ENCOUNTER — TELEPHONE (OUTPATIENT)
Dept: REHABILITATION | Facility: HOSPITAL | Age: 57
End: 2022-09-08
Payer: COMMERCIAL

## 2022-09-08 DIAGNOSIS — F33.41 RECURRENT MAJOR DEPRESSIVE DISORDER, IN PARTIAL REMISSION: ICD-10-CM

## 2022-09-08 DIAGNOSIS — M25.561 ACUTE PAIN OF RIGHT KNEE: Primary | ICD-10-CM

## 2022-09-08 DIAGNOSIS — F43.23 ADJUSTMENT DISORDER WITH MIXED ANXIETY AND DEPRESSED MOOD: ICD-10-CM

## 2022-09-08 DIAGNOSIS — M25.60 DECREASED RANGE OF MOTION: ICD-10-CM

## 2022-09-08 DIAGNOSIS — R53.1 DECREASED STRENGTH: ICD-10-CM

## 2022-09-08 RX ORDER — BUPROPION HYDROCHLORIDE 150 MG/1
150 TABLET, EXTENDED RELEASE ORAL DAILY
Qty: 90 TABLET | Refills: 0 | Status: SHIPPED | OUTPATIENT
Start: 2022-09-08 | End: 2022-12-19 | Stop reason: SDUPTHER

## 2022-09-08 NOTE — TELEPHONE ENCOUNTER
Left message with patient about missed appointments. Advised patient to call clinic back if scheduled times no longer work. Informed of next scheduled appointment date and time.

## 2022-09-21 ENCOUNTER — PATIENT MESSAGE (OUTPATIENT)
Dept: PSYCHIATRY | Facility: CLINIC | Age: 57
End: 2022-09-21
Payer: COMMERCIAL

## 2022-09-21 DIAGNOSIS — F90.0 ADHD (ATTENTION DEFICIT HYPERACTIVITY DISORDER), INATTENTIVE TYPE: ICD-10-CM

## 2022-09-21 RX ORDER — LISDEXAMFETAMINE DIMESYLATE 40 MG/1
40 CAPSULE ORAL EVERY MORNING
Qty: 30 CAPSULE | Refills: 0 | Status: SHIPPED | OUTPATIENT
Start: 2022-09-21 | End: 2022-11-09 | Stop reason: SDUPTHER

## 2022-09-24 ENCOUNTER — PATIENT MESSAGE (OUTPATIENT)
Dept: INTERNAL MEDICINE | Facility: CLINIC | Age: 57
End: 2022-09-24
Payer: COMMERCIAL

## 2022-10-03 ENCOUNTER — OFFICE VISIT (OUTPATIENT)
Dept: ALLERGY | Facility: CLINIC | Age: 57
End: 2022-10-03
Payer: COMMERCIAL

## 2022-10-03 VITALS — HEART RATE: 86 BPM | OXYGEN SATURATION: 94 % | BODY MASS INDEX: 39.34 KG/M2 | WEIGHT: 236.13 LBS | HEIGHT: 65 IN

## 2022-10-03 DIAGNOSIS — J45.40 MODERATE PERSISTENT ASTHMA, UNSPECIFIED WHETHER COMPLICATED: Primary | ICD-10-CM

## 2022-10-03 DIAGNOSIS — Z91.018 HX OF FOOD ALLERGY: ICD-10-CM

## 2022-10-03 DIAGNOSIS — T78.40XA ALLERGY, INITIAL ENCOUNTER: ICD-10-CM

## 2022-10-03 PROCEDURE — 90471 PNEUMOCOCCAL POLYSACCHARIDE VACCINE 23-VALENT =>2YO SQ IM: ICD-10-PCS | Mod: S$GLB,,, | Performed by: ALLERGY & IMMUNOLOGY

## 2022-10-03 PROCEDURE — 90471 IMMUNIZATION ADMIN: CPT | Mod: S$GLB,,, | Performed by: ALLERGY & IMMUNOLOGY

## 2022-10-03 PROCEDURE — 3044F HG A1C LEVEL LT 7.0%: CPT | Mod: CPTII,S$GLB,, | Performed by: ALLERGY & IMMUNOLOGY

## 2022-10-03 PROCEDURE — 3044F PR MOST RECENT HEMOGLOBIN A1C LEVEL <7.0%: ICD-10-PCS | Mod: CPTII,S$GLB,, | Performed by: ALLERGY & IMMUNOLOGY

## 2022-10-03 PROCEDURE — 3008F BODY MASS INDEX DOCD: CPT | Mod: CPTII,S$GLB,, | Performed by: ALLERGY & IMMUNOLOGY

## 2022-10-03 PROCEDURE — 99999 PR PBB SHADOW E&M-EST. PATIENT-LVL IV: CPT | Mod: PBBFAC,,, | Performed by: ALLERGY & IMMUNOLOGY

## 2022-10-03 PROCEDURE — 1159F PR MEDICATION LIST DOCUMENTED IN MEDICAL RECORD: ICD-10-PCS | Mod: CPTII,S$GLB,, | Performed by: ALLERGY & IMMUNOLOGY

## 2022-10-03 PROCEDURE — 99214 PR OFFICE/OUTPT VISIT, EST, LEVL IV, 30-39 MIN: ICD-10-PCS | Mod: 25,S$GLB,, | Performed by: ALLERGY & IMMUNOLOGY

## 2022-10-03 PROCEDURE — 90732 PPSV23 VACC 2 YRS+ SUBQ/IM: CPT | Mod: S$GLB,,, | Performed by: ALLERGY & IMMUNOLOGY

## 2022-10-03 PROCEDURE — 99999 PR PBB SHADOW E&M-EST. PATIENT-LVL IV: ICD-10-PCS | Mod: PBBFAC,,, | Performed by: ALLERGY & IMMUNOLOGY

## 2022-10-03 PROCEDURE — 4010F PR ACE/ARB THEARPY RXD/TAKEN: ICD-10-PCS | Mod: CPTII,S$GLB,, | Performed by: ALLERGY & IMMUNOLOGY

## 2022-10-03 PROCEDURE — 90732 PNEUMOCOCCAL POLYSACCHARIDE VACCINE 23-VALENT =>2YO SQ IM: ICD-10-PCS | Mod: S$GLB,,, | Performed by: ALLERGY & IMMUNOLOGY

## 2022-10-03 PROCEDURE — 1159F MED LIST DOCD IN RCRD: CPT | Mod: CPTII,S$GLB,, | Performed by: ALLERGY & IMMUNOLOGY

## 2022-10-03 PROCEDURE — 99214 OFFICE O/P EST MOD 30 MIN: CPT | Mod: 25,S$GLB,, | Performed by: ALLERGY & IMMUNOLOGY

## 2022-10-03 PROCEDURE — 4010F ACE/ARB THERAPY RXD/TAKEN: CPT | Mod: CPTII,S$GLB,, | Performed by: ALLERGY & IMMUNOLOGY

## 2022-10-03 PROCEDURE — 3008F PR BODY MASS INDEX (BMI) DOCUMENTED: ICD-10-PCS | Mod: CPTII,S$GLB,, | Performed by: ALLERGY & IMMUNOLOGY

## 2022-10-03 RX ORDER — FLUTICASONE FUROATE AND VILANTEROL 200; 25 UG/1; UG/1
POWDER RESPIRATORY (INHALATION)
Qty: 60 EACH | Refills: 6 | Status: SHIPPED | OUTPATIENT
Start: 2022-10-03 | End: 2023-09-18

## 2022-10-03 RX ORDER — AZELASTINE 1 MG/ML
1 SPRAY, METERED NASAL 2 TIMES DAILY
Qty: 30 ML | Refills: 6 | Status: SHIPPED | OUTPATIENT
Start: 2022-10-03 | End: 2023-10-09

## 2022-10-03 NOTE — PROGRESS NOTES
IM PPSV 23 injection administered as ordered. Patient tolerated well without difficulty. Vis Provided to patient.

## 2022-10-03 NOTE — PROGRESS NOTES
ALLERGY & IMMUNOLOGY CLINIC       6/2/20 w Dr. Tavera.   New to me     HISTORY OF PRESENT ILLNESS     Patient ID: Joy Crawford is a 57 y.o. female    HPI:     Pt w hx asthma and allergic rhinitis. Her main concern today is possible new onset peanut allergy after suspected reaction to peanut in Jan '22.  She recalls at the time that about 15 min after eating a peanut butter and jelly sandwich she noted a strange sensation in her cheeks. This feel was self-limited, subsiding over minutes without treatment. About 30 min after strange sensation of cheeks resolved, she felt like something was in the back of her throat. She looked in the mirror and her throat appeared swollen so she went to the ER. There was no assoc wheeze. No assoc urticaria or angioedema.  She doesn't recall illness at the time. She was recovering from back surgery. May have been taking aleve, tylenol, pain meds at the time.    Reviewing ER note, BP was elevated. No hypotension. Exam notable for very mild swelling of posterior pharynx. Was speaking comfortably in long sentences. No wheeze, urticaria, or angioedema noted. She appeared anxious.  Sx's improved w steroids, benadryl, valium. Was fine the next day.    While she tried to avoid nuts after this episode she did eat a whole peanut-containing granola bar a few months ago w/o any concern of allergic reaction.  She tolerates tree nuts.    Also reports distant hx of tongue swelling, self-limited, w kiwi ingestion. Has been avoiding since.    Regarding asthma, it is worse w weather changes and URIs. Has had recent increased albuterol use, daily, since had URI a few weeks ago, now improving.  Reports frequent URIs in recent years, about every 3 months. Tends to get steroids at urgent care more for URI sx's rather than for wheeze.  In absence of URI, albuterol use is rare. Can go weeks w/o  Finds routine Breo helpful.    Has not been using routine nasal steroid or nasal antihistamines for  AR.        Review of Systems   Constitutional:  Negative for chills and fever.   HENT:  Positive for congestion. Negative for ear discharge, ear pain and sore throat.    Eyes:  Negative for discharge and redness.   Respiratory:  Negative for cough and wheezing.    Cardiovascular:  Negative for chest pain.   Gastrointestinal:  Negative for diarrhea, nausea and vomiting.   Skin:  Negative for itching and rash.   Neurological:  Negative for dizziness and seizures.       Past Medical History:   Diagnosis Date    Anxiety     Asthma     Depression     Esophageal reflux     H/O mammogram 01/20/2017    Normal  (DIS)     Herpes simplex virus (HSV) infection     no out breaks    Hypertension     Peptic ulcer     Sleep apnea     doesnt use cpap       Physical Exam  Constitutional:       Appearance: She is well-developed.   HENT:      Head: Normocephalic and atraumatic.      Right Ear: External ear normal.      Left Ear: External ear normal.   Eyes:      General: No scleral icterus.        Right eye: No discharge.         Left eye: No discharge.      Conjunctiva/sclera: Conjunctivae normal.   Neck:      Thyroid: No thyromegaly.   Cardiovascular:      Rate and Rhythm: Regular rhythm.      Heart sounds: Normal heart sounds.   Pulmonary:      Effort: Pulmonary effort is normal. No respiratory distress.      Breath sounds: Normal breath sounds. No stridor. No wheezing.   Abdominal:      General: Bowel sounds are normal. There is no distension.      Palpations: Abdomen is soft.      Tenderness: There is no abdominal tenderness.   Musculoskeletal:         General: Normal range of motion.      Cervical back: Neck supple.   Lymphadenopathy:      Cervical: No cervical adenopathy.   Skin:     General: Skin is warm.      Findings: No erythema or rash. Rash is not urticarial.   Neurological:      Mental Status: She is alert and oriented to person, place, and time.   Psychiatric:         Behavior: Behavior normal.         Thought Content:  Thought content normal.          LABORATORY STUDIES     Component      Latest Ref Rng & Units 2020   WBC      3.90 - 12.70 K/uL 5.98   RBC      4.00 - 5.40 M/uL 4.78   Hemoglobin      12.0 - 16.0 g/dL 14.5   Hematocrit      37.0 - 48.5 % 47.2   MCV      82 - 98 fL 99 (H)   MCH      27.0 - 31.0 pg 30.3   MCHC      32.0 - 36.0 g/dL 30.7 (L)   RDW      11.5 - 14.5 % 13.5   Platelets      150 - 350 K/uL 234   MPV      9.2 - 12.9 fL 9.7   Immature Granulocytes      0.0 - 0.5 % 0.2   Gran # (ANC)      1.8 - 7.7 K/uL 3.2   Immature Grans (Abs)      0.00 - 0.04 K/uL 0.01   Lymph #      1.0 - 4.8 K/uL 1.8   Mono #      0.3 - 1.0 K/uL 0.6   Eos #      0.0 - 0.5 K/uL 0.4   Baso #      0.00 - 0.20 K/uL 0.04   nRBC      0 /100 WBC 0   Gran%      38.0 - 73.0 % 52.6   Lymph%      18.0 - 48.0 % 29.6   Mono%      4.0 - 15.0 % 9.9   Eosinophil%      0.0 - 8.0 % 7.0   Basophil%      0.0 - 1.9 % 0.7   Differential Method       Automated   IgE      0 - 100 IU/mL 753 (H)      PULMONARY FUNCTION   PFTs: Pulmonary function tests: FEV1: 1.74  (64 % predicted), FVC:  2.73 (79 % predicted), FEV1/FVC:  64, T.46 (87 % predicted), DLCO: 21.3 (88 % predicted)  Post BD:  FEV1: 1.78  (65 % predicted), FVC:  2.86 (83 % predicted), FEV1/FVC:  62       Latest Reference Range & Units 20 09:00 20 15:22   A. fumigatus Class   CLASS 3   Altern. alternata Class   CLASS 0/1   Alternaria alternata <0.10 kU/L  0.32 (H)   Aspergillus Fumigatus IgE <0.10 kU/L  9.23 (H)   BERMUDA GRASS <0.10 kU/L  3.11 (H)   [place] Grass Class   CLASS 2   Cat Dander <0.10 kU/L  16.30 (H)   Cat Epithelium Class   CLASS 3   [place] Class   CLASS 0   [place] IgE <0.10 kU/L  <0.10   Cockroach, IgE <0.10 kU/L  -   0.13 (H)  CLASS 0/1   D. farinae <0.10 kU/L  53.80 (H)   D. farinae Class   CLASS 5   D. pteronyssinus Class   CLASS 4   Dog Dander, IgE <0.10 kU/L  36.50 (H)   Dog Dander Class   CLASS 4   English Plantain Class   CLASS 0/1   Marshelder Class   CLASS 0/1    Marshelder IgE <0.10 kU/L  0.12 (H)   Mite Dust Pteronyssinus IgE <0.10 kU/L  48.80 (H)   [place], Class   CLASS 0/1   Pecan [place]y Tree <0.10 kU/L  0.73 (H)   Pecan, Class   CLASS 2   Plantain <0.10 kU/L  0.15 (H)   Ragweed, Western IgE <0.10 kU/L  0.15 (H)   Ragweed, Western, Class   CLASS 0/1   Scooter Grass <0.10 kU/L  3.33 (H)   Scooter Grass Class   CLASS 2   [place](Quercus alba) IgE <0.10 kU/L  0.24 (H)   IgE 0 - 100 IU/mL 753 (H)    (H): Data is abnormally high         ASSESSMENT & PLAN     Joy Crawford is a 57 y.o. female with     Hx food allergy. Given she recently tolerated granola bar w PN, lower suspicion PN allergy. Will check PN and kiwi IgE    Asthma   Spirometry   Cont breo   Prn albuterol   Pneumovax  AR   Flonase and astelin each 2 sen twice daily

## 2022-10-09 ENCOUNTER — PATIENT MESSAGE (OUTPATIENT)
Dept: INTERNAL MEDICINE | Facility: CLINIC | Age: 57
End: 2022-10-09
Payer: COMMERCIAL

## 2022-10-09 RX ORDER — ONDANSETRON 4 MG/1
4 TABLET, FILM COATED ORAL EVERY 8 HOURS PRN
Qty: 30 TABLET | Refills: 0 | Status: SHIPPED | OUTPATIENT
Start: 2022-10-09 | End: 2022-12-27

## 2022-10-10 ENCOUNTER — PATIENT MESSAGE (OUTPATIENT)
Dept: ADMINISTRATIVE | Facility: HOSPITAL | Age: 57
End: 2022-10-10
Payer: COMMERCIAL

## 2022-10-10 ENCOUNTER — TELEPHONE (OUTPATIENT)
Dept: ORTHOPEDICS | Facility: CLINIC | Age: 57
End: 2022-10-10

## 2022-10-10 ENCOUNTER — OFFICE VISIT (OUTPATIENT)
Dept: ORTHOPEDICS | Facility: CLINIC | Age: 57
End: 2022-10-10
Payer: COMMERCIAL

## 2022-10-10 VITALS — WEIGHT: 236.13 LBS | HEIGHT: 65 IN | BODY MASS INDEX: 39.34 KG/M2

## 2022-10-10 DIAGNOSIS — M19.072 ARTHRITIS OF LEFT SUBTALAR JOINT: Primary | ICD-10-CM

## 2022-10-10 PROCEDURE — 3044F HG A1C LEVEL LT 7.0%: CPT | Mod: CPTII,S$GLB,, | Performed by: ORTHOPAEDIC SURGERY

## 2022-10-10 PROCEDURE — 20605 DRAIN/INJ JOINT/BURSA W/O US: CPT | Mod: LT,S$GLB,, | Performed by: ORTHOPAEDIC SURGERY

## 2022-10-10 PROCEDURE — 3008F BODY MASS INDEX DOCD: CPT | Mod: CPTII,S$GLB,, | Performed by: ORTHOPAEDIC SURGERY

## 2022-10-10 PROCEDURE — 4010F PR ACE/ARB THEARPY RXD/TAKEN: ICD-10-PCS | Mod: CPTII,S$GLB,, | Performed by: ORTHOPAEDIC SURGERY

## 2022-10-10 PROCEDURE — 4010F ACE/ARB THERAPY RXD/TAKEN: CPT | Mod: CPTII,S$GLB,, | Performed by: ORTHOPAEDIC SURGERY

## 2022-10-10 PROCEDURE — 99999 PR PBB SHADOW E&M-EST. PATIENT-LVL III: ICD-10-PCS | Mod: PBBFAC,,, | Performed by: ORTHOPAEDIC SURGERY

## 2022-10-10 PROCEDURE — 99213 PR OFFICE/OUTPT VISIT, EST, LEVL III, 20-29 MIN: ICD-10-PCS | Mod: 25,S$GLB,, | Performed by: ORTHOPAEDIC SURGERY

## 2022-10-10 PROCEDURE — 1159F MED LIST DOCD IN RCRD: CPT | Mod: CPTII,S$GLB,, | Performed by: ORTHOPAEDIC SURGERY

## 2022-10-10 PROCEDURE — 1159F PR MEDICATION LIST DOCUMENTED IN MEDICAL RECORD: ICD-10-PCS | Mod: CPTII,S$GLB,, | Performed by: ORTHOPAEDIC SURGERY

## 2022-10-10 PROCEDURE — 3044F PR MOST RECENT HEMOGLOBIN A1C LEVEL <7.0%: ICD-10-PCS | Mod: CPTII,S$GLB,, | Performed by: ORTHOPAEDIC SURGERY

## 2022-10-10 PROCEDURE — 99999 PR PBB SHADOW E&M-EST. PATIENT-LVL III: CPT | Mod: PBBFAC,,, | Performed by: ORTHOPAEDIC SURGERY

## 2022-10-10 PROCEDURE — 3008F PR BODY MASS INDEX (BMI) DOCUMENTED: ICD-10-PCS | Mod: CPTII,S$GLB,, | Performed by: ORTHOPAEDIC SURGERY

## 2022-10-10 PROCEDURE — 99213 OFFICE O/P EST LOW 20 MIN: CPT | Mod: 25,S$GLB,, | Performed by: ORTHOPAEDIC SURGERY

## 2022-10-10 PROCEDURE — 20605 PR DRAIN/INJECT INTERMEDIATE JOINT/BURSA: ICD-10-PCS | Mod: LT,S$GLB,, | Performed by: ORTHOPAEDIC SURGERY

## 2022-10-10 RX ORDER — METHYLPREDNISOLONE ACETATE 80 MG/ML
80 INJECTION, SUSPENSION INTRA-ARTICULAR; INTRALESIONAL; INTRAMUSCULAR; SOFT TISSUE
Status: COMPLETED | OUTPATIENT
Start: 2022-10-10 | End: 2022-10-10

## 2022-10-10 RX ADMIN — METHYLPREDNISOLONE ACETATE 80 MG: 80 INJECTION, SUSPENSION INTRA-ARTICULAR; INTRALESIONAL; INTRAMUSCULAR; SOFT TISSUE at 03:10

## 2022-10-10 NOTE — PROGRESS NOTES
Joy Crawford  Returns today for follow-up.  This is a 57-year-old  who had a previous excision of a complex ganglion cyst from the dorsal lateral aspect of her foot which was noted to be emanating from the subtalar joint and the extensor tendons.  She has had symptoms related to her subtalar and has received previous corticosteroid injections which have given her relief.  She comes in today for another steroid injection.    Examination:  She has some mild swelling noted about her left hindfoot and tenderness in the sinus tarsi area.  She has functional motion of her subtalar joint with mild discomfort.    Impression:  1. Arthritis of left subtalar joint  methylPREDNISolone acetate injection 80 mg        Recommendation: After verbal consent and sterile prep I injected the left subtalar joint with 2 cc lidocaine and 80 mg of methylprednisolone    Follow-up as needed

## 2022-10-10 NOTE — TELEPHONE ENCOUNTER
Patient showed up and was seen.        ----- Message from Kami Levy sent at 10/10/2022  2:41 PM CDT -----  Regarding: LATE - WRONG LOCATION  Contact: Self  Pt stated she will be late due to went to the wrong location - Subha Pt ask for a call      Contact info   [phone] (home)

## 2022-10-17 ENCOUNTER — TELEPHONE (OUTPATIENT)
Dept: ALLERGY | Facility: CLINIC | Age: 57
End: 2022-10-17

## 2022-10-17 DIAGNOSIS — Z91.018 HX OF FOOD ALLERGY: Primary | ICD-10-CM

## 2022-10-17 NOTE — TELEPHONE ENCOUNTER
----- Message from Susan Dempsey MA sent at 10/7/2022  3:00 PM CDT -----  Regarding: FW: Specimen Issue  fyi  ----- Message -----  From: Angeles Dozier  Sent: 10/7/2022  12:22 PM CDT  To: Edouard George Staff  Subject: Specimen Issue                                   There was an issue with the Peanut IgE and RAST Allergen test ordered on this patient from 10/3/22    Unfortunately, the reference lab received a sample that was mislabelled with 2 patient names. The order has been cancelled. You will need to reorder and contact the patient for recollection if clinically indicated.    If there are any questions, please call the [facility] lab at [phone] ext. [phone].  Anyone in the [facility] lab will be able to assist you.

## 2022-10-17 NOTE — TELEPHONE ENCOUNTER
----- Message from Susan Dempsey MA sent at 10/7/2022  3:00 PM CDT -----  Regarding: FW: Specimen Issue  fyi  ----- Message -----  From: Angeles Dozier  Sent: 10/7/2022  12:22 PM CDT  To: Edouard George Staff  Subject: Specimen Issue                                   There was an issue with the Peanut IgE and RAST Allergen test ordered on this patient from 10/3/22    Unfortunately, the reference lab received a sample that was mislabelled with 2 patient names. The order has been cancelled. You will need to reorder and contact the patient for recollection if clinically indicated.    If there are any questions, please call the [facility] lab at [phone] ext. [phone].  Anyone in the [facility] lab will be able to assist you.

## 2022-10-23 ENCOUNTER — PATIENT MESSAGE (OUTPATIENT)
Dept: INTERNAL MEDICINE | Facility: CLINIC | Age: 57
End: 2022-10-23
Payer: COMMERCIAL

## 2022-10-24 ENCOUNTER — OFFICE VISIT (OUTPATIENT)
Dept: INTERNAL MEDICINE | Facility: CLINIC | Age: 57
End: 2022-10-24
Payer: COMMERCIAL

## 2022-10-24 ENCOUNTER — HOSPITAL ENCOUNTER (OUTPATIENT)
Dept: RADIOLOGY | Facility: HOSPITAL | Age: 57
Discharge: HOME | End: 2022-10-24
Attending: NURSE PRACTITIONER
Payer: COMMERCIAL

## 2022-10-24 VITALS
WEIGHT: 229.75 LBS | SYSTOLIC BLOOD PRESSURE: 100 MMHG | OXYGEN SATURATION: 100 % | BODY MASS INDEX: 38.28 KG/M2 | HEIGHT: 65 IN | HEART RATE: 76 BPM | TEMPERATURE: 98 F | DIASTOLIC BLOOD PRESSURE: 80 MMHG

## 2022-10-24 DIAGNOSIS — R05.3 PERSISTENT COUGH FOR 3 WEEKS OR LONGER: ICD-10-CM

## 2022-10-24 DIAGNOSIS — R07.81 PAINFUL RIB: ICD-10-CM

## 2022-10-24 DIAGNOSIS — J20.9 ACUTE PURULENT BRONCHITIS: Primary | ICD-10-CM

## 2022-10-24 DIAGNOSIS — E66.9 OBESITY (BMI 35.0-39.9 WITHOUT COMORBIDITY): ICD-10-CM

## 2022-10-24 PROBLEM — I10 HYPERTENSION: Status: ACTIVE | Noted: 2022-10-24

## 2022-10-24 PROCEDURE — 1160F RVW MEDS BY RX/DR IN RCRD: CPT | Mod: CPTII,S$GLB,, | Performed by: NURSE PRACTITIONER

## 2022-10-24 PROCEDURE — 71046 XR CHEST PA AND LATERAL: ICD-10-PCS | Mod: 26,,, | Performed by: RADIOLOGY

## 2022-10-24 PROCEDURE — 71046 X-RAY EXAM CHEST 2 VIEWS: CPT | Mod: TC

## 2022-10-24 PROCEDURE — 3079F DIAST BP 80-89 MM HG: CPT | Mod: CPTII,S$GLB,, | Performed by: NURSE PRACTITIONER

## 2022-10-24 PROCEDURE — 3044F PR MOST RECENT HEMOGLOBIN A1C LEVEL <7.0%: ICD-10-PCS | Mod: CPTII,S$GLB,, | Performed by: NURSE PRACTITIONER

## 2022-10-24 PROCEDURE — 1160F PR REVIEW ALL MEDS BY PRESCRIBER/CLIN PHARMACIST DOCUMENTED: ICD-10-PCS | Mod: CPTII,S$GLB,, | Performed by: NURSE PRACTITIONER

## 2022-10-24 PROCEDURE — 3079F PR MOST RECENT DIASTOLIC BLOOD PRESSURE 80-89 MM HG: ICD-10-PCS | Mod: CPTII,S$GLB,, | Performed by: NURSE PRACTITIONER

## 2022-10-24 PROCEDURE — 99999 PR PBB SHADOW E&M-EST. PATIENT-LVL V: ICD-10-PCS | Mod: PBBFAC,,, | Performed by: NURSE PRACTITIONER

## 2022-10-24 PROCEDURE — 4010F PR ACE/ARB THEARPY RXD/TAKEN: ICD-10-PCS | Mod: CPTII,S$GLB,, | Performed by: NURSE PRACTITIONER

## 2022-10-24 PROCEDURE — 99999 PR PBB SHADOW E&M-EST. PATIENT-LVL V: CPT | Mod: PBBFAC,,, | Performed by: NURSE PRACTITIONER

## 2022-10-24 PROCEDURE — 99214 OFFICE O/P EST MOD 30 MIN: CPT | Mod: S$GLB,,, | Performed by: NURSE PRACTITIONER

## 2022-10-24 PROCEDURE — 99214 PR OFFICE/OUTPT VISIT, EST, LEVL IV, 30-39 MIN: ICD-10-PCS | Mod: S$GLB,,, | Performed by: NURSE PRACTITIONER

## 2022-10-24 PROCEDURE — 71046 X-RAY EXAM CHEST 2 VIEWS: CPT | Mod: 26,,, | Performed by: RADIOLOGY

## 2022-10-24 PROCEDURE — 3074F SYST BP LT 130 MM HG: CPT | Mod: CPTII,S$GLB,, | Performed by: NURSE PRACTITIONER

## 2022-10-24 PROCEDURE — 3074F PR MOST RECENT SYSTOLIC BLOOD PRESSURE < 130 MM HG: ICD-10-PCS | Mod: CPTII,S$GLB,, | Performed by: NURSE PRACTITIONER

## 2022-10-24 PROCEDURE — 3044F HG A1C LEVEL LT 7.0%: CPT | Mod: CPTII,S$GLB,, | Performed by: NURSE PRACTITIONER

## 2022-10-24 PROCEDURE — 4010F ACE/ARB THERAPY RXD/TAKEN: CPT | Mod: CPTII,S$GLB,, | Performed by: NURSE PRACTITIONER

## 2022-10-24 PROCEDURE — 1159F PR MEDICATION LIST DOCUMENTED IN MEDICAL RECORD: ICD-10-PCS | Mod: CPTII,S$GLB,, | Performed by: NURSE PRACTITIONER

## 2022-10-24 PROCEDURE — 1159F MED LIST DOCD IN RCRD: CPT | Mod: CPTII,S$GLB,, | Performed by: NURSE PRACTITIONER

## 2022-10-24 RX ORDER — METHYLPREDNISOLONE 4 MG/1
TABLET ORAL
Qty: 21 EACH | Refills: 0 | Status: SHIPPED | OUTPATIENT
Start: 2022-10-24 | End: 2022-11-14

## 2022-10-24 RX ORDER — DOXYCYCLINE 100 MG/1
100 CAPSULE ORAL 2 TIMES DAILY
Qty: 14 CAPSULE | Refills: 0 | Status: SHIPPED | OUTPATIENT
Start: 2022-10-24 | End: 2022-10-31

## 2022-10-24 NOTE — PATIENT INSTRUCTIONS
Check chest xray today, will message with results    Doxycycline 100mg twice a day for 7 days    Medrol dose pack as directed    Warm liquids with honey and lemon to loosen mucus    Tylenol or ibuprofen as needed for pain    Mucinex otc as needed for cough and congestion

## 2022-10-24 NOTE — PROGRESS NOTES
"Subjective:       Patient ID: Joy Crawford is a 57 y.o. female.    Chief Complaint: Cough (X6 weeks ) and Pain (Upper to mid back/rib)    Pt of Dr. Ramos here for, cough and congestion.    Messaged PCP yesterday stating, "I have had a horrible cough with congestion for over 4 weeks now.  I woke up last week with a pain in my ribs and down my back on the left side. I'm a little concerned because the cough had finally lessened but the pain is still there."    Tells the MA she was seen recently at urgent care, they gave her a steroid shot and that's it.    Cough  This is a recurrent problem. The current episode started 1 to 4 weeks ago (6 weeks). The problem has been unchanged. The problem occurs every few hours. The cough is Productive of sputum (yellow mucus). Associated symptoms include chest pain, headaches, nasal congestion, postnasal drip and rhinorrhea. Pertinent negatives include no chills, ear congestion, ear pain, fever, heartburn, hemoptysis, myalgias, rash, sore throat, shortness of breath, sweats, weight loss or wheezing. Associated symptoms comments: Rib and chest pain from coughing. Nothing aggravates the symptoms. Risk factors for lung disease include animal exposure (sleeps with ceiling fan, animals in home). She has tried OTC cough suppressant (Dayquil pills, Nyquil some nights) for the symptoms. The treatment provided mild relief. Her past medical history is significant for asthma and environmental allergies. There is no history of bronchiectasis, bronchitis, COPD, emphysema or pneumonia.   Review of Systems   Constitutional:  Negative for activity change, appetite change, chills, diaphoresis, fatigue, fever, unexpected weight change and weight loss.   HENT:  Positive for postnasal drip and rhinorrhea. Negative for ear pain and sore throat.    Respiratory:  Positive for cough. Negative for hemoptysis, chest tightness, shortness of breath and wheezing.    Cardiovascular:  Positive for chest " pain. Negative for palpitations, leg swelling and claudication.   Gastrointestinal:  Negative for abdominal pain, constipation, diarrhea, heartburn, nausea, vomiting and reflux.   Endocrine: Negative for cold intolerance, heat intolerance, polydipsia, polyphagia and polyuria.   Genitourinary:  Negative for dysuria.   Musculoskeletal:  Positive for arthralgias. Negative for myalgias.        Rib pain     Integumentary:  Negative for rash.   Allergic/Immunologic: Positive for environmental allergies. Negative for food allergies and immunocompromised state.        Sleeps with ceiling fan, animal in fan   Neurological:  Positive for headaches. Negative for dizziness, seizures, syncope, weakness, light-headedness and numbness.   Hematological:  Negative for adenopathy. Does not bruise/bleed easily.   Psychiatric/Behavioral:  Negative for suicidal ideas.        Review of patient's allergies indicates:  No Known Allergies    Current Outpatient Medications:     albuterol (PROVENTIL/VENTOLIN HFA) 90 mcg/actuation inhaler, INHALE 2 PUFFS BY MOUTH EVERY 4 HOURS AS NEEDED FOR WHEEZING OR RESCUE, Disp: 20.1 g, Rfl: 3    azelastine (ASTELIN) 137 mcg (0.1 %) nasal spray, 1 spray (137 mcg total) by Nasal route 2 (two) times daily., Disp: 30 mL, Rfl: 6    buPROPion (WELLBUTRIN SR) 150 MG TBSR 12 hr tablet, Take 1 tablet (150 mg total) by mouth once daily., Disp: 90 tablet, Rfl: 0    dulaglutide (TRULICITY) 0.75 mg/0.5 mL pen injector, Inject 0.75 mg into the skin every 7 days., Disp: 4 pen, Rfl: 3    DULoxetine (CYMBALTA) 60 MG capsule, Take 1 capsule (60 mg total) by mouth once daily., Disp: 90 capsule, Rfl: 1    fluticasone furoate-vilanteroL (BREO ELLIPTA) 200-25 mcg/dose DsDv diskus inhaler, INHALE 1 PUFF BY MOUTH DAILY, Disp: 60 each, Rfl: 6    lisdexamfetamine (VYVANSE) 40 MG Cap, Take 1 capsule (40 mg total) by mouth every morning., Disp: 30 capsule, Rfl: 0    lisinopriL 10 MG tablet, Take 1 tablet (10 mg total) by mouth once  daily., Disp: 90 tablet, Rfl: 4    ondansetron (ZOFRAN) 4 MG tablet, Take 1 tablet (4 mg total) by mouth every 8 (eight) hours as needed for Nausea., Disp: 30 tablet, Rfl: 0    traZODone (DESYREL) 150 MG tablet, Take 1 tablet (150 mg total) by mouth nightly., Disp: 90 tablet, Rfl: 1    busPIRone (BUSPAR) 10 MG tablet, Take 1 tablet (10 mg total) by mouth 2 (two) times daily., Disp: 180 tablet, Rfl: 1    Patient Active Problem List   Diagnosis    Anxiety    Gastroesophageal reflux disease    Peptic ulcer    Herpetic ulceration of vulva    Fatigue    Exacerbation of asthma    AVELINA I (vulvar intraepithelial neoplasia I)    Asthma, currently active    Sleep apnea    BMI 37.0-37.9, adult    Ganglion cyst of left foot    Allergy    Vitamin B12 deficiency    Vitamin D deficiency disease    Adjustment disorder with mixed anxiety and depressed mood    Recurrent major depressive disorder, in partial remission    ADHD (attention deficit hyperactivity disorder), inattentive type    Acute lateral meniscus tear of right knee    Acute medial meniscus tear of right knee    Acute pain of right knee    Decreased range of motion    Decreased strength    Hypertension     Past Medical History:   Diagnosis Date    Anxiety     Asthma     Depression     Esophageal reflux     H/O mammogram 01/20/2017    Normal  (DIS)     Herpes simplex virus (HSV) infection     no out breaks    Hypertension     Peptic ulcer     Sleep apnea     doesnt use cpap     Past Surgical History:   Procedure Laterality Date    ARTHROSCOPIC CHONDROPLASTY OF KNEE JOINT Right 7/7/2022    Procedure: ARTHROSCOPY, KNEE, WITH CHONDROPLASTY;  Surgeon: Elyssa Mitchell MD;  Location: [facility] OR;  Service: Orthopedics;  Laterality: Right;    CYST REMOVAL  06/25/2020    Procedure: EXCISION, CYST;  Surgeon: Gallo Maddox MD;  Location: [facility] OR;  Service: Orthopedics;;  COMPLEXT CYST LEFT  ANKLE AND FOOT    ENDOMETRIAL BIOPSY N/A 03/11/2020    Procedure: BIOPSY, ENDOMETRIUM;   "Surgeon: José Miguel Leon MD;  Location: [facility] OR;  Service: OB/GYN;  Laterality: N/A;    foot ankle sx Left 06/25/2020    KNEE ARTHROSCOPY W/ MENISCECTOMY Right 7/7/2022    Procedure: ARTHROSCOPY, KNEE, MEDIAL AND LATERAL MENISCECTOMY - POLAR CARE;  Surgeon: Elyssa Mitchell MD;  Location: [facility] OR;  Service: Orthopedics;  Laterality: Right;    KNEE ARTHROSCOPY W/ PLICA EXCISION Right 7/7/2022    Procedure: EXCISION, PLICA, KNEE, ARTHROSCOPIC;  Surgeon: Elyssa Mitchell MD;  Location: [facility] OR;  Service: Orthopedics;  Laterality: Right;    KNEE DEBRIDEMENT  7/7/2022    Procedure: DEBRIDEMENT, KNEE;  Surgeon: Elyssa Mitchell MD;  Location: [facility] OR;  Service: Orthopedics;;    RHINOPLASTY  2006    SINUS SURGERY  2006    SPINAL FUSION  09/21/2021    L4-5    SYNOVECTOMY OF KNEE Right 7/7/2022    Procedure: PARTIAL SYNOVECTOMY, KNEE;  Surgeon: Elyssa Mitchell MD;  Location: [facility] OR;  Service: Orthopedics;  Laterality: Right;    VULVECTOMY N/A 03/11/2020    Procedure: VULVECTOMY;  Surgeon: José Miguel Leon MD;  Location: [facility] OR;  Service: OB/GYN;  Laterality: N/A;     Social History     Socioeconomic History    Marital status: Single   Tobacco Use    Smoking status: Never    Smokeless tobacco: Never   Substance and Sexual Activity    Alcohol use: Yes     Comment: social    Drug use: No    Sexual activity: Not Currently     Partners: Male     Birth control/protection: Post-menopausal   Social History Narrative    , one son (20 years old).  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      Family History   Problem Relation Age of Onset    Skin cancer Father     Hypertension Father     Hyperlipidemia Father     Hypertension Mother     Diabetes Mother     Hyperlipidemia Mother     No Known Problems Son     Depression Brother     Breast cancer Neg Hx        Objective:      Vitals:    10/24/22 1602   BP: 100/80   Pulse: 76   Temp: 98.1 °F (36.7 °C)   TempSrc: Oral   SpO2: 100%   Weight: 104.2 kg (229 lb 11.5 oz)   Height: 5' 5" (1.651 " m)   PainSc:   6     Body mass index is 38.23 kg/m².    Physical Exam  Vitals and nursing note reviewed.   Constitutional:       Appearance: She is obese.   HENT:      Head: Normocephalic.      Right Ear: Tympanic membrane, ear canal and external ear normal. There is no impacted cerumen.      Left Ear: Tympanic membrane, ear canal and external ear normal. There is no impacted cerumen.      Nose: Nose normal. No congestion or rhinorrhea.      Mouth/Throat:      Mouth: Mucous membranes are moist.   Eyes:      Conjunctiva/sclera: Conjunctivae normal.   Cardiovascular:      Rate and Rhythm: Normal rate and regular rhythm.      Pulses: Normal pulses.      Heart sounds: Normal heart sounds.   Pulmonary:      Effort: Pulmonary effort is normal.      Breath sounds: Normal breath sounds.   Musculoskeletal:         General: Normal range of motion.      Cervical back: Normal range of motion.   Skin:     General: Skin is warm.   Neurological:      General: No focal deficit present.      Mental Status: She is alert and oriented to person, place, and time.   Psychiatric:         Mood and Affect: Mood normal.         Behavior: Behavior normal.         Thought Content: Thought content normal.         Judgment: Judgment normal.       Assessment:       Problem List Items Addressed This Visit    None  Visit Diagnoses       Acute purulent bronchitis    -  Primary    Relevant Medications    doxycycline (VIBRAMYCIN) 100 MG Cap    methylPREDNISolone (MEDROL DOSEPACK) 4 mg tablet    Persistent cough for 3 weeks or longer        Relevant Medications    methylPREDNISolone (MEDROL DOSEPACK) 4 mg tablet    Other Relevant Orders    X-Ray Chest PA And Lateral    Painful rib        Relevant Medications    methylPREDNISolone (MEDROL DOSEPACK) 4 mg tablet    Other Relevant Orders    X-Ray Chest PA And Lateral    BMI 38.0-38.9,adult        Obesity (BMI 35.0-39.9 without comorbidity)                Plan:       Joy was seen today for cough and  pain.    Diagnoses and all orders for this visit:    Acute purulent bronchitis  -     doxycycline (VIBRAMYCIN) 100 MG Cap; Take 1 capsule (100 mg total) by mouth 2 (two) times daily. for 7 days  -     methylPREDNISolone (MEDROL DOSEPACK) 4 mg tablet; use as directed    Persistent cough for 3 weeks or longer  -     X-Ray Chest PA And Lateral; Future  -     methylPREDNISolone (MEDROL DOSEPACK) 4 mg tablet; use as directed    Painful rib  -     X-Ray Chest PA And Lateral; Future  -     methylPREDNISolone (MEDROL DOSEPACK) 4 mg tablet; use as directed    BMI 38.0-38.9,adult  BMI reviewed    Obesity (BMI 35.0-39.9 without comorbidity)  BMI reviewed.    Diet and exercise to lose weight.    Check chest xray today, will message with results    Doxycycline 100mg twice a day for 7 days    Medrol dose pack as directed    Warm liquids with honey and lemon to loosen mucus    Tylenol or ibuprofen as needed for pain    Mucinex otc as needed for cough and congestion    Self care instructions provided in AVS    Follow up if symptoms worsen or fail to improve.

## 2022-11-08 ENCOUNTER — PATIENT MESSAGE (OUTPATIENT)
Dept: PSYCHIATRY | Facility: CLINIC | Age: 57
End: 2022-11-08
Payer: COMMERCIAL

## 2022-11-09 DIAGNOSIS — F90.0 ADHD (ATTENTION DEFICIT HYPERACTIVITY DISORDER), INATTENTIVE TYPE: ICD-10-CM

## 2022-11-09 RX ORDER — LISDEXAMFETAMINE DIMESYLATE 40 MG/1
40 CAPSULE ORAL EVERY MORNING
Qty: 30 CAPSULE | Refills: 0 | Status: SHIPPED | OUTPATIENT
Start: 2022-11-09 | End: 2022-12-19 | Stop reason: SDUPTHER

## 2022-11-11 ENCOUNTER — PATIENT MESSAGE (OUTPATIENT)
Dept: INTERNAL MEDICINE | Facility: CLINIC | Age: 57
End: 2022-11-11
Payer: COMMERCIAL

## 2022-11-13 RX ORDER — LISINOPRIL 10 MG/1
10 TABLET ORAL DAILY
Qty: 90 TABLET | Refills: 4 | Status: SHIPPED | OUTPATIENT
Start: 2022-11-13 | End: 2023-10-09 | Stop reason: SDUPTHER

## 2022-11-17 ENCOUNTER — PATIENT MESSAGE (OUTPATIENT)
Dept: INTERNAL MEDICINE | Facility: CLINIC | Age: 57
End: 2022-11-17
Payer: COMMERCIAL

## 2022-11-22 ENCOUNTER — OFFICE VISIT (OUTPATIENT)
Dept: INTERNAL MEDICINE | Facility: CLINIC | Age: 57
End: 2022-11-22
Payer: COMMERCIAL

## 2022-11-22 ENCOUNTER — LAB VISIT (OUTPATIENT)
Dept: LAB | Facility: HOSPITAL | Age: 57
End: 2022-11-22
Attending: ALLERGY & IMMUNOLOGY
Payer: COMMERCIAL

## 2022-11-22 VITALS
WEIGHT: 225 LBS | SYSTOLIC BLOOD PRESSURE: 110 MMHG | DIASTOLIC BLOOD PRESSURE: 80 MMHG | HEIGHT: 65 IN | HEART RATE: 98 BPM | OXYGEN SATURATION: 95 % | BODY MASS INDEX: 37.49 KG/M2

## 2022-11-22 DIAGNOSIS — J45.909 ASTHMA, CURRENTLY ACTIVE: Primary | ICD-10-CM

## 2022-11-22 DIAGNOSIS — F33.41 RECURRENT MAJOR DEPRESSIVE DISORDER, IN PARTIAL REMISSION: ICD-10-CM

## 2022-11-22 DIAGNOSIS — G47.30 SLEEP APNEA, UNSPECIFIED TYPE: ICD-10-CM

## 2022-11-22 DIAGNOSIS — I10 HYPERTENSION, UNSPECIFIED TYPE: ICD-10-CM

## 2022-11-22 DIAGNOSIS — Z91.018 HX OF FOOD ALLERGY: ICD-10-CM

## 2022-11-22 PROCEDURE — 3008F PR BODY MASS INDEX (BMI) DOCUMENTED: ICD-10-PCS | Mod: CPTII,S$GLB,, | Performed by: INTERNAL MEDICINE

## 2022-11-22 PROCEDURE — 99214 PR OFFICE/OUTPT VISIT, EST, LEVL IV, 30-39 MIN: ICD-10-PCS | Mod: S$GLB,,, | Performed by: INTERNAL MEDICINE

## 2022-11-22 PROCEDURE — 3074F SYST BP LT 130 MM HG: CPT | Mod: CPTII,S$GLB,, | Performed by: INTERNAL MEDICINE

## 2022-11-22 PROCEDURE — 3044F HG A1C LEVEL LT 7.0%: CPT | Mod: CPTII,S$GLB,, | Performed by: INTERNAL MEDICINE

## 2022-11-22 PROCEDURE — 3079F DIAST BP 80-89 MM HG: CPT | Mod: CPTII,S$GLB,, | Performed by: INTERNAL MEDICINE

## 2022-11-22 PROCEDURE — 86008 ALLG SPEC IGE RECOMB EA: CPT | Mod: 59 | Performed by: ALLERGY & IMMUNOLOGY

## 2022-11-22 PROCEDURE — 3044F PR MOST RECENT HEMOGLOBIN A1C LEVEL <7.0%: ICD-10-PCS | Mod: CPTII,S$GLB,, | Performed by: INTERNAL MEDICINE

## 2022-11-22 PROCEDURE — 1159F MED LIST DOCD IN RCRD: CPT | Mod: CPTII,S$GLB,, | Performed by: INTERNAL MEDICINE

## 2022-11-22 PROCEDURE — 3079F PR MOST RECENT DIASTOLIC BLOOD PRESSURE 80-89 MM HG: ICD-10-PCS | Mod: CPTII,S$GLB,, | Performed by: INTERNAL MEDICINE

## 2022-11-22 PROCEDURE — 4010F ACE/ARB THERAPY RXD/TAKEN: CPT | Mod: CPTII,S$GLB,, | Performed by: INTERNAL MEDICINE

## 2022-11-22 PROCEDURE — 99999 PR PBB SHADOW E&M-EST. PATIENT-LVL IV: CPT | Mod: PBBFAC,,, | Performed by: INTERNAL MEDICINE

## 2022-11-22 PROCEDURE — 99214 OFFICE O/P EST MOD 30 MIN: CPT | Mod: S$GLB,,, | Performed by: INTERNAL MEDICINE

## 2022-11-22 PROCEDURE — 36415 COLL VENOUS BLD VENIPUNCTURE: CPT | Performed by: ALLERGY & IMMUNOLOGY

## 2022-11-22 PROCEDURE — 1159F PR MEDICATION LIST DOCUMENTED IN MEDICAL RECORD: ICD-10-PCS | Mod: CPTII,S$GLB,, | Performed by: INTERNAL MEDICINE

## 2022-11-22 PROCEDURE — 4010F PR ACE/ARB THEARPY RXD/TAKEN: ICD-10-PCS | Mod: CPTII,S$GLB,, | Performed by: INTERNAL MEDICINE

## 2022-11-22 PROCEDURE — 3008F BODY MASS INDEX DOCD: CPT | Mod: CPTII,S$GLB,, | Performed by: INTERNAL MEDICINE

## 2022-11-22 PROCEDURE — 99999 PR PBB SHADOW E&M-EST. PATIENT-LVL IV: ICD-10-PCS | Mod: PBBFAC,,, | Performed by: INTERNAL MEDICINE

## 2022-11-22 PROCEDURE — 86003 ALLG SPEC IGE CRUDE XTRC EA: CPT | Performed by: ALLERGY & IMMUNOLOGY

## 2022-11-22 PROCEDURE — 3074F PR MOST RECENT SYSTOLIC BLOOD PRESSURE < 130 MM HG: ICD-10-PCS | Mod: CPTII,S$GLB,, | Performed by: INTERNAL MEDICINE

## 2022-11-22 RX ORDER — DULAGLUTIDE 1.5 MG/.5ML
1.5 INJECTION, SOLUTION SUBCUTANEOUS
Qty: 4 PEN | Refills: 11 | Status: SHIPPED | OUTPATIENT
Start: 2022-11-22 | End: 2023-06-07

## 2022-11-22 NOTE — PROGRESS NOTES
Chief Complaint: Follow up of multiple issues     HPI: This is a 57 year old woman who presents for above.       She had a massage 8/1/2021 - had low back pain. She wound up having fusion at L4 and L5 with Dr Rueda in [place] on 9/21/2021.  She will follow up with him tomorrow. She has some left rib pain after a fall.      End of April 2022 - tore her right meniscus - had a scope 7/7/2022 with Elyssa Mitchell MD- She completed physical therapy and knee is doing fine.    She has had asthma since a young child. She was hospitalized as a child. She lived in [place] as a child. She has not been hospitalized for her asthma as an adult.  She saw Dr Nunn in allergy on 10/3/2022. She has been using Neril Med Sinus rinse twice daily which is helping. She is using astelin nasal spray. She takes Breo one puff daily.  She takes prednisone when she feels a flare coming.   She has lots of allergies.  She has been breathing well. She saw pulmonary on 5/26/20 and has moderate to severe obstruction.  She has seen allergy clinic on 6/2/20 - she has a very high IGE level and has lots of allergies. She has had allergy shots as a child.  She had an allergic reaction to peanut butter and jelly sandwich Jan 2022.  She has avoided peanuts since then.      She had a ganglion cyst removed from her left ankle by Dr Maddox on 6/25/20.  The left ankle is much improved. She will have left ankle pain at times. Foot pain from plantar fasciitis comes and goes - doing fine right now. .      She is seeing Angelina Lim NP in psychiatry and therapy with Robel Fajardo MSW. She is feeling better.  She continues to take Cymbalta 60 mg daily, Wellbutrin  mg daily and buspirone 10 mg in the morning (has RX to take twice daily) . SHe is taking Vyvanse 40 mg daily for ADHD.   She tried to increase Cymbalta from 60 mg to 90 mg daily. She did not like how she felt on Cymbalta 90 mg daily so she is taking 60 mg dialy. SHe is taking trazodone 150  "mg at bedtime which is helping her sleep.  Anxiety is slightly worse with the start of school.      SHe has seen sleep medicine and has been diagnoed with sleep apnea. She has a CPAP machine. She is out of the routine of wearing it.  She feels more refreshed in the morning when she wakes up.       She takes lisinopril 10 mg once daily for hypertension.      No periods.      She has not been able to tolerate vitamin D and B12 supplement.  She states that they make her nauseated    She is taking Trulicity 0.75 mg once a week. She has lost 22 pounds since 8/5/2022.  No nausea or vomiting. She has a BM every 3-4 days. She has             Past Medical History:   Diagnosis Date    Anxiety      Asthma      Depression      Esophageal reflux      H/O mammogram 01/20/2017     Normal  (DIS)     Herpes simplex virus (HSV) infection       no out breaks    Hypertension      Peptic ulcer      Sleep apnea       doesnt use cpap                Past Surgical History:   Procedure Laterality Date    ENDOMETRIAL BIOPSY N/A 3/11/2020     Procedure: BIOPSY, ENDOMETRIUM;  Surgeon: José Miguel Leon MD;  Location: [facility];  Service: OB/GYN;  Laterality: N/A;    RHINOPLASTY   2006    SINUS SURGERY   2006    VULVECTOMY N/A 3/11/2020     Procedure: VULVECTOMY;  Surgeon: José Miguel Leon MD;  Location: [facility];  Service: OB/GYN;  Laterality: N/A;            Meds and allergies: updated on EPIC     REVIEW OF SYSTEMS: She denies fevers, chills, night sweats, fatigue, visual change, hearing loss, sinus congestion, sore throat, chest pain, nausea, vomiting, diarrhea, dysuria, hematuria, polydipsia, polyuria headaches     She has been constipated lately for the last week - routine has changed this week.      Physical exam:      /80 (BP Location: Left arm, Patient Position: Sitting)   Pulse 98   Ht 5' 5" (1.651 m)   Wt 102 kg (224 lb 15.7 oz)   LMP 07/12/2018 (Approximate)   SpO2 95%   BMI 37.44 kg/m²       General: alert, oriented x 3, no " apparent distress.  Affect normal  HEENT: Conjunctivae: anicteric,  Neck: supple, no thyroid enlargement, no cervical lymphadenopathy  Resp: effort normal, lungs clear bilaterally  CV: Regular rate and rhythm without murmurs, gallops or rubs, no lower extremity edema,         Assessment/Plan:        Asthma and Allergies- to follow up with allergy clinic.     Anxiety and depression - folow up with psychiatry  Sleep apnea - get back on cpap machine and feeling benefit  S/p left ankle surgery - doing better.  Vitamin D deficiency -level is fine    Vitamin B12 deficiency - get on supplementl  Diabetes - increase trulicity to 1.5 mg SQ week   PAP 12/2020  GYn exam 12/28/22  She is to return in 12 weeks, sooner if problems arise

## 2022-11-25 ENCOUNTER — PATIENT MESSAGE (OUTPATIENT)
Dept: PSYCHIATRY | Facility: CLINIC | Age: 57
End: 2022-11-25
Payer: COMMERCIAL

## 2022-11-28 LAB
ALLERGY INTERPRETATION: NORMAL
DEPRECATED KIWIFRUIT IGE RAST QL: ABNORMAL
DEPRECATED PEANUT (RARA H) 2 IGE RAST QL: NORMAL
DEPRECATED PEANUT (RARA H) 2 IGE RAST QL: NORMAL
DEPRECATED PEANUT (RARA H) 3 IGE RAST QL: NORMAL
DEPRECATED PEANUT (RARA H) 6 IGE RAST QL: NORMAL
DEPRECATED PEANUT (RARA H) 8 IGE RAST QL: NORMAL
KIWIFRUIT IGE QN: 0.12 KU/L
PEANUT (RARA H) 1 IGE QN: <0.1 KU/L
PEANUT (RARA H) 2 IGE QN: <0.1 KU/L
PEANUT (RARA H) 3 IGE QN: <0.1 KU/L
PEANUT (RARA H) 6 IGE QN: <0.1 KU/L
PEANUT (RARA H) 8 IGE QN: <0.1 KU/L
PEANUT (RARA H) 9 IGE QN: <0.1 KU/L
PEANUT (RARA H) 9 IGE QN: NORMAL

## 2022-12-19 ENCOUNTER — OFFICE VISIT (OUTPATIENT)
Dept: PSYCHIATRY | Facility: CLINIC | Age: 57
End: 2022-12-19
Payer: COMMERCIAL

## 2022-12-19 DIAGNOSIS — F43.23 ADJUSTMENT DISORDER WITH MIXED ANXIETY AND DEPRESSED MOOD: ICD-10-CM

## 2022-12-19 DIAGNOSIS — F33.41 RECURRENT MAJOR DEPRESSIVE DISORDER, IN PARTIAL REMISSION: ICD-10-CM

## 2022-12-19 DIAGNOSIS — F90.0 ADHD (ATTENTION DEFICIT HYPERACTIVITY DISORDER), INATTENTIVE TYPE: ICD-10-CM

## 2022-12-19 PROCEDURE — 1160F PR REVIEW ALL MEDS BY PRESCRIBER/CLIN PHARMACIST DOCUMENTED: ICD-10-PCS | Mod: CPTII,95,, | Performed by: NURSE PRACTITIONER

## 2022-12-19 PROCEDURE — 99214 OFFICE O/P EST MOD 30 MIN: CPT | Mod: 95,,, | Performed by: NURSE PRACTITIONER

## 2022-12-19 PROCEDURE — 3044F PR MOST RECENT HEMOGLOBIN A1C LEVEL <7.0%: ICD-10-PCS | Mod: CPTII,95,, | Performed by: NURSE PRACTITIONER

## 2022-12-19 PROCEDURE — 1159F PR MEDICATION LIST DOCUMENTED IN MEDICAL RECORD: ICD-10-PCS | Mod: CPTII,95,, | Performed by: NURSE PRACTITIONER

## 2022-12-19 PROCEDURE — 3044F HG A1C LEVEL LT 7.0%: CPT | Mod: CPTII,95,, | Performed by: NURSE PRACTITIONER

## 2022-12-19 PROCEDURE — 4010F PR ACE/ARB THEARPY RXD/TAKEN: ICD-10-PCS | Mod: CPTII,95,, | Performed by: NURSE PRACTITIONER

## 2022-12-19 PROCEDURE — 1159F MED LIST DOCD IN RCRD: CPT | Mod: CPTII,95,, | Performed by: NURSE PRACTITIONER

## 2022-12-19 PROCEDURE — 4010F ACE/ARB THERAPY RXD/TAKEN: CPT | Mod: CPTII,95,, | Performed by: NURSE PRACTITIONER

## 2022-12-19 PROCEDURE — 99214 PR OFFICE/OUTPT VISIT, EST, LEVL IV, 30-39 MIN: ICD-10-PCS | Mod: 95,,, | Performed by: NURSE PRACTITIONER

## 2022-12-19 PROCEDURE — 1160F RVW MEDS BY RX/DR IN RCRD: CPT | Mod: CPTII,95,, | Performed by: NURSE PRACTITIONER

## 2022-12-19 RX ORDER — BUPROPION HYDROCHLORIDE 200 MG/1
200 TABLET, EXTENDED RELEASE ORAL DAILY
Qty: 30 TABLET | Refills: 0 | Status: SHIPPED | OUTPATIENT
Start: 2022-12-19 | End: 2023-01-21 | Stop reason: SDUPTHER

## 2022-12-19 RX ORDER — LISDEXAMFETAMINE DIMESYLATE 50 MG/1
50 CAPSULE ORAL EVERY MORNING
Qty: 30 CAPSULE | Refills: 0 | Status: SHIPPED | OUTPATIENT
Start: 2022-12-19 | End: 2023-02-24

## 2022-12-19 RX ORDER — LISDEXAMFETAMINE DIMESYLATE 50 MG/1
50 CAPSULE ORAL EVERY MORNING
Qty: 30 CAPSULE | Refills: 0 | Status: SHIPPED | OUTPATIENT
Start: 2023-02-13 | End: 2023-01-21 | Stop reason: SDUPTHER

## 2022-12-19 RX ORDER — DULOXETIN HYDROCHLORIDE 60 MG/1
60 CAPSULE, DELAYED RELEASE ORAL DAILY
Qty: 90 CAPSULE | Refills: 3 | Status: SHIPPED | OUTPATIENT
Start: 2022-12-19 | End: 2023-02-24 | Stop reason: SDUPTHER

## 2022-12-19 RX ORDER — BUSPIRONE HYDROCHLORIDE 10 MG/1
10 TABLET ORAL 2 TIMES DAILY
Qty: 180 TABLET | Refills: 1 | Status: SHIPPED | OUTPATIENT
Start: 2022-12-19 | End: 2023-02-24 | Stop reason: SDUPTHER

## 2022-12-19 RX ORDER — TRAZODONE HYDROCHLORIDE 150 MG/1
TABLET ORAL
Qty: 90 TABLET | Refills: 2 | Status: SHIPPED | OUTPATIENT
Start: 2022-12-19 | End: 2023-02-24 | Stop reason: SDUPTHER

## 2022-12-19 RX ORDER — LISDEXAMFETAMINE DIMESYLATE 50 MG/1
50 CAPSULE ORAL EVERY MORNING
Qty: 30 CAPSULE | Refills: 0 | Status: SHIPPED | OUTPATIENT
Start: 2023-01-16 | End: 2023-02-24

## 2022-12-19 NOTE — PROGRESS NOTES
.mOutpatient Psychiatry Follow-Up Visit (MD/NP)    12/19/2022     The patient location is: [facility]  The chief complaint leading to consultation is: depression, ADHD, anxious    Visit type: audiovisual    Face to Face time with patient: 11 minutes  11 minutes of total time spent on the encounter, which includes face to face time and non-face to face time preparing to see the patient (eg, review of tests), Obtaining and/or reviewing separately obtained history, Documenting clinical information in the electronic or other health record, Independently interpreting results (not separately reported) and communicating results to the patient/family/caregiver, or Care coordination (not separately reported).     Each patient to whom he or she provides medical services by telemedicine is:  (1) informed of the relationship between the physician and patient and the respective role of any other health care provider with respect to management of the patient; and (2) notified that he or she may decline to receive medical services by telemedicine and may withdraw from such care at any time.    Clinical Status of Patient:  Outpatient (Ambulatory)    Chief Complaint:  Joy Crawford is a 57 y.o. female who presents today for follow-up of depression, anxiety and attention problems.  Met with patient.      Interval History and Content of Current Session:  Interim Events/Subjective Report/Content of Current Session:   Joy Crawford checked in on time for her appointment. Presents for follow-up. Last visit we restarted Vyvanse and increased to 40 mg. Today she reports symptom improvement. Reports she feels by lunchtime she is feels drained but admits she is on go non-stop once her day starts. Desires to increase. She denies any problems with headache, stomach upset, weight loss, insomnia, chest pain, palpitations, tics, or tremors. She begins crying at this point and reports its because paulette and this happens yearly. Is off for  "two weeks due to school. "Truly randy been great but this is a hard time."  Denies SI/HI/AVH. No objective s/sx of psychosis or guillermo. Patient verbalized motivation for compliance with medications and all other elements of treatment plan.     Previous medication trials:  Gabapentin - nausea  90 mg of Cymbalta - made symptoms worse, cried for 3 days  Wellbutrin- caused insomnia with XL  Zoloft- ineffective  Abilify- insomnia    Unsure if she has tried Effexor or Pristiq  Review of Systems   PSYCHIATRIC: Pertinant items are noted in the narrative.  CONSTITUTIONAL: No weight gain or loss.   MUSCULOSKELETAL: No pain or stiffness of the joints.  CARDIOVASCULAR: No tachycardia or chest pain.  GASTROINTESTINAL: No nausea, vomiting, pain, constipation or diarrhea.    Past Medical, Family and Social History: The patient's past medical, family and social history have been reviewed and updated as appropriate within the electronic medical record - see encounter notes.    Compliance: yes    Side effects: None    Risk Parameters:  Patient reports no suicidal ideation  Patient reports no homicidal ideation  Patient reports no self-injurious behavior  Patient reports no violent behavior    Exam (detailed: at least 9 elements; comprehensive: all 15 elements)   Constitutional  Vitals:  From 11/2/22:  BP:110/80  HR:98       General:  unremarkable, age appropriate     Musculoskeletal  Muscle Strength/Tone:  not examined   Gait & Station:  [facility] due to video visit      Psychiatric  Speech:  no latency; no press   Mood & Affect:  sad  congruent and appropriate   Thought Process:  normal and logical   Associations:  intact   Thought Content:  normal, no suicidality, no homicidality, delusions, or paranoia   Insight:  intact   Judgement: behavior is adequate to circumstances   Orientation:  grossly intact   Memory: intact for content of interview   Language: grossly intact   Attention Span & Concentration:  able to focus   Fund of Knowledge:  " intact and appropriate to age and level of education     Assessment and Diagnosis   Status/Progress: Based on the examination today, the patient's problem(s) is/are adequately but not ideally controlled.  New problems have not been presented today.   Co-morbidities, Diagnostic uncertainty and Lack of compliance are not complicating management of the primary condition.  There are no active rule-out diagnoses for this patient at this time.     General Impression: Joy Crawford is a 56 y.o. female who presents today for follow-up of depression and anxiety. Patient seen and chart reviewed. Previous patient of Dr. Mckeon - no medication changes made. Presents 4/21/21- anxiety and motivation remain an issues, Abilify started. Presents 7/12/21 failed Abilify, wants to restart Vyvanse Presents 10/26/21- anxiety improved with Buspar, never started Vyvanse, Wellbutrin added for depression Presents 5/30/22- Wellbutrin switched to SR due to insomnia on XL Presents 7/11/22- insomnia resolved, Vyvanse restarted Presents 12/19/22- symptom partially improved. Some depression due to paulette season.       ICD-10-CM ICD-9-CM   1. ADHD (attention deficit hyperactivity disorder), inattentive type  F90.0 314.00   2. Adjustment disorder with mixed anxiety and depressed mood  F43.23 309.28   3. Recurrent major depressive disorder, in partial remission  F33.41 296.35         Intervention/Counseling/Treatment Plan   Medication Management: Continue current medications.   Cymbalta 60 mg daily  Increase Vyvanse to 50 mg daily  Checked LA  and no irregularities were noted.  Wellbutrin  mg daily  Buspar 10 mg BID  Trazodone 150 mg nightly - from sleep medicine.   The risks and benefits of medication were discussed with the patient.  Discussed diagnosis, risks and benefits of proposed treatment above vs alternative treatments vs no treatment, and potential side effects of these treatments. The patient expresses understanding of the  above and displays the capacity to agree with this treatment given said understanding. Patient also agrees that, currently, the benefits outweigh the risks and would like to pursue treatment at this time.  Discussed inherent unpredictability of medications in each individual.   Encouraged Patient to keep future appointments.   Take medications as prescribed and abstain from substance abuse.   In the event of an emergency patient was advised to go to the emergency room    Informed patient I will be leaving [facility] and the need to schedule with another provider. Encouraged to schedule at conclusion of this visit to insure a timely appointment and avoid a gap in treatment including medication management. Patient verbalized understanding.       Return to Clinic: as scheduled    Angelina Lim DNP-BC PMHNP  [facility]

## 2022-12-23 ENCOUNTER — HOSPITAL ENCOUNTER (EMERGENCY)
Facility: HOSPITAL | Age: 57
Discharge: HOME | End: 2022-12-23
Attending: EMERGENCY MEDICINE
Payer: COMMERCIAL

## 2022-12-23 VITALS
HEART RATE: 84 BPM | OXYGEN SATURATION: 97 % | SYSTOLIC BLOOD PRESSURE: 179 MMHG | TEMPERATURE: 98 F | DIASTOLIC BLOOD PRESSURE: 106 MMHG | HEIGHT: 65 IN | WEIGHT: 215 LBS | RESPIRATION RATE: 18 BRPM | BODY MASS INDEX: 35.82 KG/M2

## 2022-12-23 DIAGNOSIS — M54.50 LEFT-SIDED LOW BACK PAIN WITHOUT SCIATICA, UNSPECIFIED CHRONICITY: Primary | ICD-10-CM

## 2022-12-23 LAB
ALBUMIN SERPL BCP-MCNC: 4.3 G/DL (ref 3.5–5.2)
ALP SERPL-CCNC: 85 U/L (ref 55–135)
ALT SERPL W/O P-5'-P-CCNC: 23 U/L (ref 10–44)
ANION GAP SERPL CALC-SCNC: 9 MMOL/L (ref 8–16)
AST SERPL-CCNC: 17 U/L (ref 10–40)
BACTERIA #/AREA URNS AUTO: ABNORMAL /HPF
BASOPHILS # BLD AUTO: 0.05 K/UL (ref 0–0.2)
BASOPHILS NFR BLD: 0.8 % (ref 0–1.9)
BILIRUB SERPL-MCNC: 0.3 MG/DL (ref 0.1–1)
BILIRUB UR QL STRIP: NEGATIVE
BUN SERPL-MCNC: 15 MG/DL (ref 6–20)
CALCIUM SERPL-MCNC: 9.4 MG/DL (ref 8.7–10.5)
CHLORIDE SERPL-SCNC: 109 MMOL/L (ref 95–110)
CLARITY UR REFRACT.AUTO: CLEAR
CO2 SERPL-SCNC: 23 MMOL/L (ref 23–29)
COLOR UR AUTO: YELLOW
CREAT SERPL-MCNC: 0.7 MG/DL (ref 0.5–1.4)
CRP SERPL-MCNC: 1.6 MG/L (ref 0–8.2)
D DIMER PPP IA.FEU-MCNC: 0.27 MG/L FEU
DIFFERENTIAL METHOD: ABNORMAL
EOSINOPHIL # BLD AUTO: 0.6 K/UL (ref 0–0.5)
EOSINOPHIL NFR BLD: 9.4 % (ref 0–8)
ERYTHROCYTE [DISTWIDTH] IN BLOOD BY AUTOMATED COUNT: 14.6 % (ref 11.5–14.5)
ERYTHROCYTE [SEDIMENTATION RATE] IN BLOOD BY PHOTOMETRIC METHOD: 8 MM/HR (ref 0–36)
EST. GFR  (NO RACE VARIABLE): >60 ML/MIN/1.73 M^2
GLUCOSE SERPL-MCNC: 71 MG/DL (ref 70–110)
GLUCOSE UR QL STRIP: NEGATIVE
HCT VFR BLD AUTO: 43 % (ref 37–48.5)
HGB BLD-MCNC: 13.7 G/DL (ref 12–16)
HGB UR QL STRIP: NEGATIVE
HYALINE CASTS UR QL AUTO: 2 /LPF
IMM GRANULOCYTES # BLD AUTO: 0.01 K/UL (ref 0–0.04)
IMM GRANULOCYTES NFR BLD AUTO: 0.2 % (ref 0–0.5)
KETONES UR QL STRIP: NEGATIVE
LEUKOCYTE ESTERASE UR QL STRIP: ABNORMAL
LYMPHOCYTES # BLD AUTO: 1.4 K/UL (ref 1–4.8)
LYMPHOCYTES NFR BLD: 23.5 % (ref 18–48)
MCH RBC QN AUTO: 30.3 PG (ref 27–31)
MCHC RBC AUTO-ENTMCNC: 31.9 G/DL (ref 32–36)
MCV RBC AUTO: 95 FL (ref 82–98)
MICROSCOPIC COMMENT: ABNORMAL
MONOCYTES # BLD AUTO: 0.5 K/UL (ref 0.3–1)
MONOCYTES NFR BLD: 7.6 % (ref 4–15)
NEUTROPHILS # BLD AUTO: 3.5 K/UL (ref 1.8–7.7)
NEUTROPHILS NFR BLD: 58.5 % (ref 38–73)
NITRITE UR QL STRIP: NEGATIVE
NRBC BLD-RTO: 0 /100 WBC
PH UR STRIP: 6 [PH] (ref 5–8)
PLATELET # BLD AUTO: 237 K/UL (ref 150–450)
PMV BLD AUTO: 9.3 FL (ref 9.2–12.9)
POTASSIUM SERPL-SCNC: 4.3 MMOL/L (ref 3.5–5.1)
PROT SERPL-MCNC: 7.3 G/DL (ref 6–8.4)
PROT UR QL STRIP: NEGATIVE
RBC # BLD AUTO: 4.52 M/UL (ref 4–5.4)
RBC #/AREA URNS AUTO: 1 /HPF (ref 0–4)
SODIUM SERPL-SCNC: 141 MMOL/L (ref 136–145)
SP GR UR STRIP: 1.02 (ref 1–1.03)
SQUAMOUS #/AREA URNS AUTO: 1 /HPF
URN SPEC COLLECT METH UR: ABNORMAL
WBC # BLD AUTO: 5.95 K/UL (ref 3.9–12.7)
WBC #/AREA URNS AUTO: 7 /HPF (ref 0–5)

## 2022-12-23 PROCEDURE — 99284 EMERGENCY DEPT VISIT MOD MDM: CPT | Mod: ,,, | Performed by: EMERGENCY MEDICINE

## 2022-12-23 PROCEDURE — 85652 RBC SED RATE AUTOMATED: CPT | Performed by: EMERGENCY MEDICINE

## 2022-12-23 PROCEDURE — 86140 C-REACTIVE PROTEIN: CPT | Performed by: EMERGENCY MEDICINE

## 2022-12-23 PROCEDURE — 80053 COMPREHEN METABOLIC PANEL: CPT | Performed by: EMERGENCY MEDICINE

## 2022-12-23 PROCEDURE — 63600175 PHARM REV CODE 636 W HCPCS: Performed by: EMERGENCY MEDICINE

## 2022-12-23 PROCEDURE — 81001 URINALYSIS AUTO W/SCOPE: CPT | Performed by: EMERGENCY MEDICINE

## 2022-12-23 PROCEDURE — 25000003 PHARM REV CODE 250: Performed by: EMERGENCY MEDICINE

## 2022-12-23 PROCEDURE — 85025 COMPLETE CBC W/AUTO DIFF WBC: CPT | Performed by: EMERGENCY MEDICINE

## 2022-12-23 PROCEDURE — 96375 TX/PRO/DX INJ NEW DRUG ADDON: CPT

## 2022-12-23 PROCEDURE — 99285 EMERGENCY DEPT VISIT HI MDM: CPT | Mod: 25

## 2022-12-23 PROCEDURE — 96361 HYDRATE IV INFUSION ADD-ON: CPT

## 2022-12-23 PROCEDURE — 96374 THER/PROPH/DIAG INJ IV PUSH: CPT

## 2022-12-23 PROCEDURE — 85379 FIBRIN DEGRADATION QUANT: CPT | Performed by: EMERGENCY MEDICINE

## 2022-12-23 PROCEDURE — 99284 PR EMERGENCY DEPT VISIT,LEVEL IV: ICD-10-PCS | Mod: ,,, | Performed by: EMERGENCY MEDICINE

## 2022-12-23 RX ORDER — ORPHENADRINE CITRATE 30 MG/ML
60 INJECTION INTRAMUSCULAR; INTRAVENOUS
Status: COMPLETED | OUTPATIENT
Start: 2022-12-23 | End: 2022-12-23

## 2022-12-23 RX ORDER — HYDROCODONE BITARTRATE AND ACETAMINOPHEN 5; 325 MG/1; MG/1
1 TABLET ORAL EVERY 6 HOURS PRN
Qty: 8 TABLET | Refills: 0 | Status: SHIPPED | OUTPATIENT
Start: 2022-12-23

## 2022-12-23 RX ORDER — KETOROLAC TROMETHAMINE 30 MG/ML
10 INJECTION, SOLUTION INTRAMUSCULAR; INTRAVENOUS
Status: COMPLETED | OUTPATIENT
Start: 2022-12-23 | End: 2022-12-23

## 2022-12-23 RX ORDER — LIDOCAINE 50 MG/G
2 PATCH TOPICAL DAILY
Qty: 14 PATCH | Refills: 0 | Status: SHIPPED | OUTPATIENT
Start: 2022-12-23 | End: 2022-12-27 | Stop reason: SDUPTHER

## 2022-12-23 RX ORDER — METHYLPREDNISOLONE 4 MG/1
TABLET ORAL
Qty: 1 EACH | Refills: 0 | Status: SHIPPED | OUTPATIENT
Start: 2022-12-23 | End: 2023-01-13

## 2022-12-23 RX ORDER — LIDOCAINE 50 MG/G
3 PATCH TOPICAL
Status: DISCONTINUED | OUTPATIENT
Start: 2022-12-23 | End: 2022-12-23 | Stop reason: HOSPADM

## 2022-12-23 RX ADMIN — ORPHENADRINE CITRATE 60 MG: 30 INJECTION INTRAMUSCULAR; INTRAVENOUS at 12:12

## 2022-12-23 RX ADMIN — SODIUM CHLORIDE, POTASSIUM CHLORIDE, SODIUM LACTATE AND CALCIUM CHLORIDE 1000 ML: 600; 310; 30; 20 INJECTION, SOLUTION INTRAVENOUS at 11:12

## 2022-12-23 RX ADMIN — LIDOCAINE 3 PATCH: 50 PATCH CUTANEOUS at 12:12

## 2022-12-23 RX ADMIN — KETOROLAC TROMETHAMINE 10 MG: 30 INJECTION, SOLUTION INTRAMUSCULAR; INTRAVENOUS at 12:12

## 2022-12-23 NOTE — ED PROVIDER NOTES
"Encounter Date: 12/23/2022    SCRIBE #1 NOTE: I, Brneda Kohler, am scribing for, and in the presence of,  Colin Jama MD. I have scribed the following portions of the note - Other sections scribed: HPI, ROS.     History     Chief Complaint   Patient presents with    Back Pain     L sided lower back pain, previously had L4-5 fusion      57 y.o. female with a PMHx of anxiety, esophageal reflux, peptic ulcer, HTN, and asthma, who presents to the ED c/o lower left sided back pain. Patient reports having a L4-5 fusions previously and then having a twist an fall back in October of 2022. States the pain has always been constant and "sharp" that worsened 3 days ago. The pain is stated to start at the left upper chest and goes to the lower back exacerbated with movement and deep breathing. States she called her orthopedic surgeon and was refereed to the ED as well as given muscle relaxants that have had no relief to patients pain. Pt endorses rib pain, and coughing but denies numbness, tingling, dysuria, diarrhea, and/or melena. Reports taking ibuprofen daily to manage pain with no relief. Patient is on trulicity which causes bruising. This is the extent of the patients medical problem at this time.       The history is provided by the patient and medical records. No  was used.   Review of patient's allergies indicates:  No Known Allergies  Past Medical History:   Diagnosis Date    Anxiety     Asthma     Depression     Esophageal reflux     H/O mammogram 01/20/2017    Normal  (DIS)     Herpes simplex virus (HSV) infection     no out breaks    Hypertension     Peptic ulcer     Sleep apnea     doesnt use cpap     Past Surgical History:   Procedure Laterality Date    ARTHROSCOPIC CHONDROPLASTY OF KNEE JOINT Right 7/7/2022    Procedure: ARTHROSCOPY, KNEE, WITH CHONDROPLASTY;  Surgeon: Elyssa Mitchell MD;  Location: [facility] OR;  Service: Orthopedics;  Laterality: Right;    CYST REMOVAL  06/25/2020    " Procedure: EXCISION, CYST;  Surgeon: Gallo Maddox MD;  Location: [facility] OR;  Service: Orthopedics;;  COMPLEXT CYST LEFT  ANKLE AND FOOT    ENDOMETRIAL BIOPSY N/A 03/11/2020    Procedure: BIOPSY, ENDOMETRIUM;  Surgeon: José Miguel Leon MD;  Location: [facility] OR;  Service: OB/GYN;  Laterality: N/A;    foot ankle sx Left 06/25/2020    KNEE ARTHROSCOPY W/ MENISCECTOMY Right 7/7/2022    Procedure: ARTHROSCOPY, KNEE, MEDIAL AND LATERAL MENISCECTOMY - [facility];  Surgeon: Elyssa Mitchell MD;  Location: [facility] OR;  Service: Orthopedics;  Laterality: Right;    KNEE ARTHROSCOPY W/ PLICA EXCISION Right 7/7/2022    Procedure: EXCISION, PLICA, KNEE, ARTHROSCOPIC;  Surgeon: Elyssa Mitchell MD;  Location: [facility] OR;  Service: Orthopedics;  Laterality: Right;    KNEE DEBRIDEMENT  7/7/2022    Procedure: DEBRIDEMENT, KNEE;  Surgeon: Elyssa Mitchell MD;  Location: [facility] OR;  Service: Orthopedics;;    RHINOPLASTY  2006    SINUS SURGERY  2006    SPINAL FUSION  09/21/2021    L4-5    SYNOVECTOMY OF KNEE Right 7/7/2022    Procedure: PARTIAL SYNOVECTOMY, KNEE;  Surgeon: Elyssa Mitchell MD;  Location: [facility] OR;  Service: Orthopedics;  Laterality: Right;    VULVECTOMY N/A 03/11/2020    Procedure: VULVECTOMY;  Surgeon: José Miguel Leon MD;  Location: [facility];  Service: OB/GYN;  Laterality: N/A;     Family History   Problem Relation Age of Onset    Skin cancer Father     Hypertension Father     Hyperlipidemia Father     Hypertension Mother     Diabetes Mother     Hyperlipidemia Mother     No Known Problems Son     Depression Brother     Breast cancer Neg Hx      Social History     Tobacco Use    Smoking status: Never    Smokeless tobacco: Never   Substance Use Topics    Alcohol use: Yes     Comment: social    Drug use: No     Review of Systems   Constitutional:  Negative for fever.        +bruises easily   HENT:  Negative for sore throat.    Respiratory:  Positive for cough. Negative for shortness of breath.    Cardiovascular:  Positive for chest pain.    Gastrointestinal:  Negative for diarrhea (melena) and nausea.   Genitourinary:  Negative for dysuria.   Musculoskeletal:  Positive for back pain.        +rib pain   Skin:  Negative for rash.   Neurological:  Negative for weakness and numbness.        No tingling    Hematological:  Does not bruise/bleed easily.     Physical Exam     Initial Vitals [12/23/22 1028]   BP Pulse Resp Temp SpO2   (!) 179/106 84 18 97.9 °F (36.6 °C) 97 %      MAP       --         Physical Exam    Vitals reviewed.  Constitutional:   57-year-old  woman, moderate discomfort noted, anxious   HENT:   Head: Normocephalic and atraumatic.   Mouth/Throat: Oropharynx is clear and moist.   Eyes: EOM are normal. Pupils are equal, round, and reactive to light.   Neck: No tracheal deviation present.   Cardiovascular:  Normal rate, regular rhythm and intact distal pulses.           Pulmonary/Chest: Breath sounds normal. No stridor. No respiratory distress. She has no wheezes.   Abdominal:   Obese, nontender; mild-to-moderate left flank palpation tenderness without overlying skin changes   Musculoskeletal:         General: Normal range of motion.      Comments: There is minimal midline tenderness at the lower lumbar spine without overlying skin changes.  There is moderate palpation tenderness to the left lateral lumbar and lower thoracic musculature without midline tenderness to palpation.  Superficial abrasions and mild bruising is noted to the left lateral thoracic back consistent with cupping and derm abrasion therapy provided at PT     Neurological: She is alert and oriented to person, place, and time.   Ambulatory unassisted   Skin: Skin is warm and dry.   Psychiatric:   Moderately anxious, cooperative       ED Course   Procedures  Labs Reviewed   CBC W/ AUTO DIFFERENTIAL - Abnormal; Notable for the following components:       Result Value    MCHC 31.9 (*)     RDW 14.6 (*)     Eos # 0.6 (*)     Eosinophil % 9.4 (*)     All other components  within normal limits   URINALYSIS, REFLEX TO URINE CULTURE - Abnormal; Notable for the following components:    Leukocytes, UA 1+ (*)     All other components within normal limits    Narrative:     Specimen Source->Urine   URINALYSIS MICROSCOPIC - Abnormal; Notable for the following components:    WBC, UA 7 (*)     Hyaline Casts, UA 2 (*)     All other components within normal limits    Narrative:     Specimen Source->Urine   COMPREHENSIVE METABOLIC PANEL   D DIMER, QUANTITATIVE   SEDIMENTATION RATE   C-REACTIVE PROTEIN          Imaging Results              CT Renal Stone Study ABD Pelvis WO (Final result)  Result time 12/23/22 14:34:00      Final result by Damion Low MD (12/23/22 14:34:00)                   Impression:      No evidence of obstructive uropathy or renal stones.  No acute findings.    Electronically signed by resident: Britney Isaac  Date:    12/23/2022  Time:    14:10    Electronically signed by: Damion Low MD  Date:    12/23/2022  Time:    14:34               Narrative:    EXAMINATION:  CT RENAL STONE STUDY ABD PELVIS WO    CLINICAL HISTORY:  Flank pain, kidney stone suspected;    TECHNIQUE:  5.0 mm axial CT images of the abdomen and pelvis were obtained via helical, multi detector CT technique.  No intravenous contrast material was administered.    COMPARISON:  None    FINDINGS:  Heart: No cardiomegaly or pericardial effusion.    Lung Bases: Mild bibasilar interstitial thickening, favored to reflect atelectasis.    Liver: Normal in size and attenuation.  Subcentimeter hypodensity, too small to characterize.    Gallbladder: Normal appearance without evidence for cholecystitis.    Bile Ducts: No intra or extrahepatic biliary ductal dilation.    Pancreas: No pancreatic mass lesion or peripancreatic inflammatory change.    Spleen: Normal.  1.6 cm accessory splenule.    Adrenals: Normal.    Genitourinary: Normal in size and location.  No nephrolithiasis or hydronephrosis.  Bladder is  decompressed, limiting evaluation.    Reproductive organs: Normal.    GI tract/Mesentery: Small hiatal hernia.  Visualized loops of small and large bowel are normal in caliber without evidence for inflammation or obstruction.  Diverticulosis coli predominantly involving the sigmoid colon without evidence of diverticulitis.  Appendix appears unremarkable.    No abdominopelvic ascites or intraperitoneal free air.  No pathologic lymphadenopathy.    Abdominal wall: Small fat containing umbilical hernia.    Vasculature: Abdominal aorta is normal in caliber without significant calcific atherosclerosis.    Bones: Postoperative changes L4-L5 posterior fusion.  Multilevel degenerative changes of the visualized spine.  No acute fracture or bony destructive process.                                       X-Ray Chest PA And Lateral (Final result)  Result time 12/23/22 11:48:35      Final result by Robetr Cervantes III, MD (12/23/22 11:48:35)                   Impression:      No acute process seen.      Electronically signed by: Robert Cervantes MD  Date:    12/23/2022  Time:    11:48               Narrative:    EXAMINATION:  XR CHEST PA AND LATERAL    CLINICAL HISTORY:  chest wall pain;    FINDINGS:  Chest two views: Heart size is normal.  Lungs are clear.  Aortic plaque.                                       Medications   LIDOcaine 5 % patch 3 patch (3 patches Transdermal Patch Applied 12/23/22 1238)   lactated ringers bolus 1,000 mL (0 mLs Intravenous Stopped 12/23/22 1238)   ketorolac injection 9.999 mg (9.999 mg Intravenous Given 12/23/22 1237)   orphenadrine injection 60 mg (60 mg Intravenous Given 12/23/22 1237)     Medical Decision Making:   History:   Old Medical Records: I decided to obtain old medical records.  Differential Diagnosis:   Subacute lumbar strain, pulmonary embolus, UTI, renal colic, empyema  Clinical Tests:   Lab Tests: Ordered and Reviewed  Radiological Study: Ordered and Reviewed        Scribe  Attestation:   Scribe #1: I performed the above scribed service and the documentation accurately describes the services I performed. I attest to the accuracy of the note.    Attending Attestation:             Attending ED Notes:   Laboratory evaluation does not reveal evidence of significant hematologic or metabolic derangements.  Urinalysis reveals minimal pyuria without the presence of nitrite, and no history of dysuria.  Due to this patient's ongoing concern of intra-abdominal pathology, renal stone study has been obtained which does not reveal any evidence of occult pathology.  The patient describes moderate improvement of her presenting symptoms with emergency department therapy.  She will be discharged home in improved condition with prescriptions for 8 tablets of Norco 5-325 to be taken in addition to her outpatient medications as well as Lidoderm patches and a Medrol Dosepak.  I recommended that she contact her managing orthopedist for further guidance and therapy, and return to the emergency department as needed for emergent concerns.                 Clinical Impression:   Final diagnoses:  [M54.50] Left-sided low back pain without sciatica, unspecified chronicity (Primary)        ED Disposition Condition    Discharge Stable          ED Prescriptions       Medication Sig Dispense Start Date End Date Auth. Provider    methylPREDNISolone (MEDROL DOSEPACK) 4 mg tablet Take as directed 1 each 12/23/2022 1/13/2023 Colin Jama MD    HYDROcodone-acetaminophen (NORCO) 5-325 mg per tablet Take 1 tablet by mouth every 6 (six) hours as needed for Pain. 8 tablet 12/23/2022 -- Colin Jama MD    LIDOcaine (LIDODERM) 5 % Place 2 patches onto the skin once daily. Remove & Discard patch within 12 hours or as directed by MD 14 patch 12/23/2022 -- Colin Jama MD          Follow-up Information       Follow up With Specialties Details Why Contact Info    Jennifer Ramos MD Internal Medicine  Schedule an appointment as soon as possible for a visit in 3 days  [address]  [facility] [address]  [phone]               Colin Jama MD  12/23/22 [id number]

## 2022-12-23 NOTE — ED NOTES

## 2022-12-26 ENCOUNTER — PATIENT MESSAGE (OUTPATIENT)
Dept: INTERNAL MEDICINE | Facility: CLINIC | Age: 57
End: 2022-12-26
Payer: COMMERCIAL

## 2022-12-27 ENCOUNTER — LAB VISIT (OUTPATIENT)
Dept: LAB | Facility: HOSPITAL | Age: 57
End: 2022-12-27
Payer: COMMERCIAL

## 2022-12-27 ENCOUNTER — OFFICE VISIT (OUTPATIENT)
Dept: INTERNAL MEDICINE | Facility: CLINIC | Age: 57
End: 2022-12-27
Payer: COMMERCIAL

## 2022-12-27 VITALS
HEIGHT: 65 IN | WEIGHT: 222.25 LBS | DIASTOLIC BLOOD PRESSURE: 88 MMHG | BODY MASS INDEX: 37.03 KG/M2 | HEART RATE: 100 BPM | OXYGEN SATURATION: 97 % | RESPIRATION RATE: 18 BRPM | SYSTOLIC BLOOD PRESSURE: 144 MMHG

## 2022-12-27 DIAGNOSIS — M54.50 LEFT-SIDED LOW BACK PAIN WITHOUT SCIATICA, UNSPECIFIED CHRONICITY: Primary | ICD-10-CM

## 2022-12-27 DIAGNOSIS — E66.9 OBESITY (BMI 35.0-39.9 WITHOUT COMORBIDITY): ICD-10-CM

## 2022-12-27 DIAGNOSIS — M54.50 LEFT-SIDED LOW BACK PAIN WITHOUT SCIATICA, UNSPECIFIED CHRONICITY: ICD-10-CM

## 2022-12-27 LAB
AMYLASE SERPL-CCNC: 65 U/L (ref 20–110)
LIPASE SERPL-CCNC: 22 U/L (ref 4–60)

## 2022-12-27 PROCEDURE — 3079F PR MOST RECENT DIASTOLIC BLOOD PRESSURE 80-89 MM HG: ICD-10-PCS | Mod: CPTII,S$GLB,, | Performed by: NURSE PRACTITIONER

## 2022-12-27 PROCEDURE — 99999 PR PBB SHADOW E&M-EST. PATIENT-LVL V: CPT | Mod: PBBFAC,,, | Performed by: NURSE PRACTITIONER

## 2022-12-27 PROCEDURE — 3077F SYST BP >= 140 MM HG: CPT | Mod: CPTII,S$GLB,, | Performed by: NURSE PRACTITIONER

## 2022-12-27 PROCEDURE — 1159F PR MEDICATION LIST DOCUMENTED IN MEDICAL RECORD: ICD-10-PCS | Mod: CPTII,S$GLB,, | Performed by: NURSE PRACTITIONER

## 2022-12-27 PROCEDURE — 3079F DIAST BP 80-89 MM HG: CPT | Mod: CPTII,S$GLB,, | Performed by: NURSE PRACTITIONER

## 2022-12-27 PROCEDURE — 82150 ASSAY OF AMYLASE: CPT | Performed by: NURSE PRACTITIONER

## 2022-12-27 PROCEDURE — 3077F PR MOST RECENT SYSTOLIC BLOOD PRESSURE >= 140 MM HG: ICD-10-PCS | Mod: CPTII,S$GLB,, | Performed by: NURSE PRACTITIONER

## 2022-12-27 PROCEDURE — 99214 OFFICE O/P EST MOD 30 MIN: CPT | Mod: S$GLB,,, | Performed by: NURSE PRACTITIONER

## 2022-12-27 PROCEDURE — 83690 ASSAY OF LIPASE: CPT | Performed by: NURSE PRACTITIONER

## 2022-12-27 PROCEDURE — 99999 PR PBB SHADOW E&M-EST. PATIENT-LVL V: ICD-10-PCS | Mod: PBBFAC,,, | Performed by: NURSE PRACTITIONER

## 2022-12-27 PROCEDURE — 4010F ACE/ARB THERAPY RXD/TAKEN: CPT | Mod: CPTII,S$GLB,, | Performed by: NURSE PRACTITIONER

## 2022-12-27 PROCEDURE — 3008F PR BODY MASS INDEX (BMI) DOCUMENTED: ICD-10-PCS | Mod: CPTII,S$GLB,, | Performed by: NURSE PRACTITIONER

## 2022-12-27 PROCEDURE — 36415 COLL VENOUS BLD VENIPUNCTURE: CPT | Performed by: NURSE PRACTITIONER

## 2022-12-27 PROCEDURE — 99214 PR OFFICE/OUTPT VISIT, EST, LEVL IV, 30-39 MIN: ICD-10-PCS | Mod: S$GLB,,, | Performed by: NURSE PRACTITIONER

## 2022-12-27 PROCEDURE — 4010F PR ACE/ARB THEARPY RXD/TAKEN: ICD-10-PCS | Mod: CPTII,S$GLB,, | Performed by: NURSE PRACTITIONER

## 2022-12-27 PROCEDURE — 3008F BODY MASS INDEX DOCD: CPT | Mod: CPTII,S$GLB,, | Performed by: NURSE PRACTITIONER

## 2022-12-27 PROCEDURE — 3044F HG A1C LEVEL LT 7.0%: CPT | Mod: CPTII,S$GLB,, | Performed by: NURSE PRACTITIONER

## 2022-12-27 PROCEDURE — 1159F MED LIST DOCD IN RCRD: CPT | Mod: CPTII,S$GLB,, | Performed by: NURSE PRACTITIONER

## 2022-12-27 PROCEDURE — 3044F PR MOST RECENT HEMOGLOBIN A1C LEVEL <7.0%: ICD-10-PCS | Mod: CPTII,S$GLB,, | Performed by: NURSE PRACTITIONER

## 2022-12-27 RX ORDER — METHOCARBAMOL 500 MG/1
500 TABLET, FILM COATED ORAL 3 TIMES DAILY PRN
Qty: 30 TABLET | Refills: 0 | Status: SHIPPED | OUTPATIENT
Start: 2022-12-27

## 2022-12-27 RX ORDER — LIDOCAINE 50 MG/G
2 PATCH TOPICAL DAILY
Qty: 14 PATCH | Refills: 0 | OUTPATIENT
Start: 2022-12-27 | End: 2023-10-09

## 2022-12-27 RX ORDER — METFORMIN HYDROCHLORIDE 500 MG/1
500 TABLET, EXTENDED RELEASE ORAL 2 TIMES DAILY
COMMUNITY
Start: 2022-11-20 | End: 2023-04-10

## 2022-12-27 RX ORDER — CYCLOBENZAPRINE HCL 5 MG
5 TABLET ORAL EVERY 8 HOURS PRN
COMMUNITY
Start: 2022-12-21 | End: 2022-12-27

## 2022-12-27 NOTE — PATIENT INSTRUCTIONS
Check pancreatic enzymes per pt request    Likely musculoskeletal, stop the flexeril since not working, try Robaxin every 8 hrs as needed for pain    Warm compresses as needed    Refilled Lidocaine patches    Recommend keeping MRI appt next Wednesday and follow up with back surgeon as scheduled

## 2022-12-27 NOTE — PROGRESS NOTES
"Subjective:       Patient ID: Joy Crawford is a 57 y.o. female.    Chief Complaint: Follow-up    Pt of Dr. Ramos, here for ER follow up.    Messaged PCP yesterday stating, "I am still having back pain on my left side below and under my ribs.  It also radiates around my side.  I have been going to physical therapy but the last time really caused a lot of pain.  I've had 2 CT scans and 2 X-rays now.  The overall consensus is that it's muscular but the pain I'm experiencing  does not feel muscular.  I went to the ER on 12/23 and Dr. Esparza  gave me 8 pills of hydrocodizone for pain, Lidocan patches, and a steroid pack but my back and side are still throbbing.  Could this pain be a result of using Trulicity?  What are your thoughts??"    Reviewed ER visit plan of care from 12-23-22 below:  Attending ED Notes:   " Patient reports having a L4-5 fusions previously and then having a twist an fall back in October of 2022. States the pain has always been constant and "sharp" that worsened 3 days ago. The pain is stated to start at the left upper chest and goes to the lower back exacerbated with movement and deep breathing. States she called her orthopedic surgeon and was refereed to the ED as well as given muscle relaxants that have had no relief to patients pain. Pt endorses rib pain, and coughing but denies numbness, tingling, dysuria, diarrhea, and/or melena. Reports taking ibuprofen daily to manage pain with no relief. Patient is on trulicity which causes bruising. This is the extent of the patients medical problem at this time. "    Laboratory evaluation does not reveal evidence of significant hematologic or metabolic derangements.  Urinalysis reveals minimal pyuria without the presence of nitrite, and no history of dysuria.  Due to this patient's ongoing concern of intra-abdominal pathology, renal stone study has been obtained which does not reveal any evidence of occult pathology.  The patient describes moderate " improvement of her presenting symptoms with emergency department therapy.  She will be discharged home in improved condition with prescriptions for 8 tablets of Norco 5-325 to be taken in addition to her outpatient medications as well as Lidoderm patches and a Medrol Dosepak.  I recommended that she contact her managing orthopedist for further guidance and therapy, and return to the emergency department as needed for emergent concerns.             Clinical Impression:  Final diagnoses:  [M54.50] Left-sided low back pain without sciatica, unspecified chronicity (Primary)     ED Disposition Condition    Discharge Stable      ED Prescriptions      Medication Sig Dispense Start Date End Date Auth. Provider    methylPREDNISolone (MEDROL DOSEPACK) 4 mg tablet Take as directed 1 each 12/23/2022 1/13/2023 Colin Jama MD    HYDROcodone-acetaminophen (NORCO) 5-325 mg per tablet Take 1 tablet by mouth every 6 (six) hours as needed for Pain. 8 tablet 12/23/2022 -- Colin Jama MD    LIDOcaine (LIDODERM) 5 % Place 2 patches onto the skin once daily. Remove & Discard patch within 12 hours or as directed by MD 14 patch 12/23/2022 -- Colin Jama MD      Follow-up Information      Follow up With Specialties Details Why Contact Info    Jennifer Ramos MD Internal Medicine Schedule an appointment as soon as possible for a visit in 3 days   [address]  [place] [address]  [phone]      Colin Jama MD  12/23/22 1730    Presents today with same left sided back pain that wraps to front of abdomen. She is followed by Dr. Hummel with [facility]. Has MRI next Wednesday scheduled by him. Needs are not working, about to be complete on Medrol pack. Flexeril not working, running out of Lidocaine patches. [place] like the ER doctor did not chyna to her complaints. Has been on trulicity since September and stopped it thinking pain was pancreas related. CT scan in ER showed no  intra-abdominal issues including negative for pancreatic mass and renal stones. She even tried PT and had to stop due to pain. Pt is a  and will be going back to work next Tuesday. Pain is described as pulled and sharp. Would like labs to check pancreas done.    Review of Systems   Constitutional:  Negative for activity change, appetite change, chills, diaphoresis, fatigue, fever and unexpected weight change.   Respiratory:  Negative for chest tightness and shortness of breath.    Cardiovascular:  Negative for chest pain, palpitations and claudication.   Gastrointestinal:  Negative for abdominal pain, constipation, diarrhea, nausea, vomiting and reflux.   Genitourinary:  Negative for dysuria, hematuria and nocturia.   Musculoskeletal:  Positive for arthralgias, back pain and myalgias.        As documented in HPI     Allergic/Immunologic: Negative for environmental allergies, food allergies and immunocompromised state.   Neurological:  Negative for dizziness, weakness and numbness.   Hematological:  Negative for adenopathy. Does not bruise/bleed easily.   Psychiatric/Behavioral:  Negative for suicidal ideas.        Review of patient's allergies indicates:  No Known Allergies    Current Outpatient Medications:     albuterol (PROVENTIL/VENTOLIN HFA) 90 mcg/actuation inhaler, INHALE 2 PUFFS BY MOUTH EVERY 4 HOURS AS NEEDED FOR WHEEZING OR RESCUE, Disp: 20.1 g, Rfl: 3    azelastine (ASTELIN) 137 mcg (0.1 %) nasal spray, 1 spray (137 mcg total) by Nasal route 2 (two) times daily., Disp: 30 mL, Rfl: 6    buPROPion (WELLBUTRIN SR) 200 MG SR12, Take 1 tablet (200 mg total) by mouth once daily., Disp: 30 tablet, Rfl: 0    busPIRone (BUSPAR) 10 MG tablet, Take 1 tablet (10 mg total) by mouth 2 (two) times daily., Disp: 180 tablet, Rfl: 1    cyclobenzaprine (FLEXERIL) 5 MG tablet, Take 5 mg by mouth every 8 (eight) hours as needed., Disp: , Rfl:     dulaglutide (TRULICITY) 0.75 mg/0.5 mL pen injector, Inject 0.75  mg into the skin every 7 days., Disp: 4 pen, Rfl: 3    dulaglutide (TRULICITY) 1.5 mg/0.5 mL pen injector, Inject 1.5 mg into the skin every 7 days., Disp: 4 pen, Rfl: 11    DULoxetine (CYMBALTA) 60 MG capsule, Take 1 capsule (60 mg total) by mouth once daily., Disp: 90 capsule, Rfl: 3    fluticasone furoate-vilanteroL (BREO ELLIPTA) 200-25 mcg/dose DsDv diskus inhaler, INHALE 1 PUFF BY MOUTH DAILY, Disp: 60 each, Rfl: 6    HYDROcodone-acetaminophen (NORCO) 5-325 mg per tablet, Take 1 tablet by mouth every 6 (six) hours as needed for Pain., Disp: 8 tablet, Rfl: 0    LIDOcaine (LIDODERM) 5 %, Place 2 patches onto the skin once daily. Remove & Discard patch within 12 hours or as directed by MD, Disp: 14 patch, Rfl: 0    lisdexamfetamine (VYVANSE) 50 MG capsule, Take 1 capsule (50 mg total) by mouth every morning., Disp: 30 capsule, Rfl: 0    [START ON 2/13/2023] lisdexamfetamine (VYVANSE) 50 MG capsule, Take 1 capsule (50 mg total) by mouth every morning., Disp: 30 capsule, Rfl: 0    [START ON 1/16/2023] lisdexamfetamine (VYVANSE) 50 MG capsule, Take 1 capsule (50 mg total) by mouth every morning., Disp: 30 capsule, Rfl: 0    lisinopriL 10 MG tablet, Take 1 tablet (10 mg total) by mouth once daily., Disp: 90 tablet, Rfl: 4    methylPREDNISolone (MEDROL DOSEPACK) 4 mg tablet, Take as directed, Disp: 1 each, Rfl: 0    traZODone (DESYREL) 150 MG tablet, TAKE 1 TABLET(150 MG) BY MOUTH EVERY NIGHT, Disp: 90 tablet, Rfl: 2    metFORMIN (GLUCOPHAGE-XR) 500 MG ER 24hr tablet, Take 500 mg by mouth 2 (two) times daily., Disp: , Rfl:     Patient Active Problem List   Diagnosis    Anxiety    Gastroesophageal reflux disease    Peptic ulcer    Herpetic ulceration of vulva    Fatigue    Exacerbation of asthma    AVELINA I (vulvar intraepithelial neoplasia I)    Asthma, currently active    Sleep apnea    BMI 37.0-37.9, adult    Ganglion cyst of left foot    Allergy    Vitamin B12 deficiency    Vitamin D deficiency disease    Adjustment  disorder with mixed anxiety and depressed mood    Recurrent major depressive disorder, in partial remission    ADHD (attention deficit hyperactivity disorder), inattentive type    Acute lateral meniscus tear of right knee    Acute medial meniscus tear of right knee    Acute pain of right knee    Decreased range of motion    Decreased strength    Hypertension     Past Medical History:   Diagnosis Date    Anxiety     Asthma     Depression     Esophageal reflux     H/O mammogram 01/20/2017    Normal  (DIS)     Herpes simplex virus (HSV) infection     no out breaks    Hypertension     Peptic ulcer     Sleep apnea     doesnt use cpap     Past Surgical History:   Procedure Laterality Date    ARTHROSCOPIC CHONDROPLASTY OF KNEE JOINT Right 7/7/2022    Procedure: ARTHROSCOPY, KNEE, WITH CHONDROPLASTY;  Surgeon: Elyssa Mitchell MD;  Location: [facility] OR;  Service: Orthopedics;  Laterality: Right;    CYST REMOVAL  06/25/2020    Procedure: EXCISION, CYST;  Surgeon: Gallo Maddox MD;  Location: [facility] OR;  Service: Orthopedics;;  COMPLEXT CYST LEFT  ANKLE AND FOOT    ENDOMETRIAL BIOPSY N/A 03/11/2020    Procedure: BIOPSY, ENDOMETRIUM;  Surgeon: José Miguel Leon MD;  Location: [facility] OR;  Service: OB/GYN;  Laterality: N/A;    foot ankle sx Left 06/25/2020    KNEE ARTHROSCOPY W/ MENISCECTOMY Right 7/7/2022    Procedure: ARTHROSCOPY, KNEE, MEDIAL AND LATERAL MENISCECTOMY - POLAR CARE;  Surgeon: Elyssa Mitchell MD;  Location: [facility] OR;  Service: Orthopedics;  Laterality: Right;    KNEE ARTHROSCOPY W/ PLICA EXCISION Right 7/7/2022    Procedure: EXCISION, PLICA, KNEE, ARTHROSCOPIC;  Surgeon: Elyssa Mtichell MD;  Location: [facility] OR;  Service: Orthopedics;  Laterality: Right;    KNEE DEBRIDEMENT  7/7/2022    Procedure: DEBRIDEMENT, KNEE;  Surgeon: Elyssa Mitchell MD;  Location: [facility] OR;  Service: Orthopedics;;    RHINOPLASTY  2006    SINUS SURGERY  2006    SPINAL FUSION  09/21/2021    L4-5    SYNOVECTOMY OF KNEE Right 7/7/2022    Procedure: PARTIAL  "SYNOVECTOMY, KNEE;  Surgeon: Elyssa Mitchell MD;  Location: [facility] OR;  Service: Orthopedics;  Laterality: Right;    VULVECTOMY N/A 03/11/2020    Procedure: VULVECTOMY;  Surgeon: José Miguel Leon MD;  Location: [facility];  Service: OB/GYN;  Laterality: N/A;     Social History     Socioeconomic History    Marital status: Single   Tobacco Use    Smoking status: Never    Smokeless tobacco: Never   Substance and Sexual Activity    Alcohol use: Yes     Comment: social    Drug use: No    Sexual activity: Not Currently     Partners: Male     Birth control/protection: Post-menopausal   Social History Narrative    , one son (20 years old).  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      Family History   Problem Relation Age of Onset    Skin cancer Father     Hypertension Father     Hyperlipidemia Father     Hypertension Mother     Diabetes Mother     Hyperlipidemia Mother     No Known Problems Son     Depression Brother     Breast cancer Neg Hx        Objective:      Vitals:    12/27/22 1558   BP: (!) 144/88   Pulse: 100   Resp: 18   SpO2: 97%   Weight: 100.8 kg (222 lb 3.6 oz)   Height: 5' 5" (1.651 m)   PainSc:   7   PainLoc: Back     Body mass index is 36.98 kg/m².    Physical Exam  Vitals and nursing note reviewed.   Constitutional:       Appearance: She is obese.   HENT:      Head: Normocephalic.      Nose: Nose normal.      Mouth/Throat:      Mouth: Mucous membranes are moist.   Eyes:      Conjunctiva/sclera: Conjunctivae normal.   Cardiovascular:      Rate and Rhythm: Normal rate and regular rhythm.      Pulses: Normal pulses.      Heart sounds: Normal heart sounds.   Pulmonary:      Effort: Pulmonary effort is normal.      Breath sounds: Normal breath sounds.   Abdominal:      General: Bowel sounds are normal. There is no distension.      Palpations: Abdomen is soft. There is no mass.      Tenderness: There is no abdominal tenderness. There is left CVA tenderness. There is no right CVA tenderness, guarding " or rebound.      Hernia: No hernia is present.   Musculoskeletal:         General: Normal range of motion.      Cervical back: Normal range of motion.   Skin:     General: Skin is warm.   Neurological:      General: No focal deficit present.      Mental Status: She is alert and oriented to person, place, and time.   Psychiatric:         Mood and Affect: Mood normal.         Behavior: Behavior normal.         Thought Content: Thought content normal.         Judgment: Judgment normal.       Assessment:       Problem List Items Addressed This Visit    None  Visit Diagnoses       Left-sided low back pain without sciatica, unspecified chronicity    -  Primary    Relevant Medications    methocarbamoL (ROBAXIN) 500 MG Tab    LIDOcaine (LIDODERM) 5 %    Other Relevant Orders    Amylase    Lipase    BMI 36.0-36.9,adult        Obesity (BMI 35.0-39.9 without comorbidity)                Plan:       Joy was seen today for follow-up.    Diagnoses and all orders for this visit:    Left-sided low back pain without sciatica, unspecified chronicity  -     Amylase; Future  -     Lipase; Future  -     methocarbamoL (ROBAXIN) 500 MG Tab; Take 1 tablet (500 mg total) by mouth 3 (three) times daily as needed (left side pain).  -     LIDOcaine (LIDODERM) 5 %; Place 2 patches onto the skin once daily. Remove & Discard patch within 12 hours or as directed by MD    BMI 36.0-36.9,adult  BMI reviewed    Obesity (BMI 35.0-39.9 without comorbidity)  BMI reviewed.    Diet and exercise to lose weight.    Check pancreatic enzymes per pt request    Likely musculoskeletal, stop the flexeril since not working, try Robaxin every 8 hrs as needed for pain    Warm compresses as needed    Refilled Lidocaine patches    Recommend keeping MRI appt next Wednesday and follow up with back surgeon as scheduled    Follow up if symptoms worsen or fail to improve.

## 2023-01-04 ENCOUNTER — PATIENT OUTREACH (OUTPATIENT)
Dept: ADMINISTRATIVE | Facility: HOSPITAL | Age: 58
End: 2023-01-04
Payer: COMMERCIAL

## 2023-01-04 NOTE — PROGRESS NOTES
Health Maintenance Due   Topic Date Due    Hepatitis C Screening  Never done    HIV Screening  Never done    TETANUS VACCINE  Never done    Colorectal Cancer Screening  Never done    Shingles Vaccine (1 of 2) Never done    Mammogram  06/24/2020    COVID-19 Vaccine (3 - Booster for Pfizer series) 09/23/2021    Cervical Cancer Screening  12/03/2021        updated. Triggered LINKS and Care Everywhere. Reconciled immunizations. Chart reviewed.     Aishwarya Rose LPN   Clinical Care Coordinator  Primary Care and Wellness

## 2023-01-10 NOTE — PATIENT INSTRUCTIONS
General Discharge Instructions   If you were prescribed a narcotic or controlled medication, do not drive or operate heavy equipment or machinery while taking these medications.  If you were prescribed antibiotics, please take them to completion.  You must understand that you've received an Urgent Care treatment only and that you may be released before all your medical problems are known or treated. You, the patient, will arrange for follow up care as instructed.  Follow up with your PCP or specialty clinic as directed in the next 1-2 weeks if not improved or as needed.  You can call [phone] to schedule an appointment with the appropriate provider.  If your condition worsens we recommend that you receive another evaluation at the emergency room immediately or contact your primary medical clinics after hours call service to discuss your concerns.  Please return here or go to the Emergency Department for any concerns or worsening of condition.      External Ear Infection (Adult)    External otitis (also called swimmers ear) is an infection in the ear canal. It is often caused by bacteria or fungus. It can occur a few days after water gets trapped in the ear canal (from swimming or bathing). It can also occur after cleaning too deeply in the ear canal with a cotton swab or other object. Sometimes, hair care products get into the ear canal and cause this problem.  Symptoms can include pain, fever, itching, redness, drainage, or swelling of the ear canal. Temporary hearing loss may also occur.  Home care  · Do not try to clean the ear canal. This can push pus and bacteria deeper into the canal.  · Use prescribed ear drops as directed. These help reduce swelling and fight the infection. If an ear wick was placed in the ear canal, apply drops right onto the end of the wick. The wick will draw the medication into the ear canal even if it is swollen closed.  · A cotton ball may be loosely placed in the outer ear to  absorb any drainage.  · You may use acetaminophen or ibuprofen to control pain, unless another medication was prescribed. Note: If you have chronic liver or kidney disease or ever had a stomach ulcer or GI bleeding, talk to your health care provider before taking any of these medications.  · Do not allow water to get into your ear when bathing. Also, avoid swimming until the infection has cleared.  Prevention  · Keep your ears dry. This helps lower the risk of infection. Dry your ears with a towel or hair dryer after getting wet. Also, use ear plugs when swimming.  · Do not stick any objects in the ear to remove wax.  · If you feel water trapped in your ear, use ear drops right away. You can get these drops over the counter at most drugstores. They work by removing water from the ear canal.  Follow-up care  Follow up with your health care provider in one week, or as advised.  When to seek medical advice  Call your health care provider right away if any of these occur:  · Ear pain becomes worse or doesnt improve after 3 days of treatment  · Redness or swelling of the outer ear occurs or gets worse  · Headache  · Painful or stiff neck  · Drowsiness or confusion  · Fever of 100.4ºF (38ºC) or higher, or as directed by your health care provider  · Seizure  Date Last Reviewed: 3/22/2015  © 7537-5001 [employer]. [address] [place] [address]. All rights reserved. This information is not intended as a substitute for professional medical care. Always follow your healthcare professional's instructions.        First-Degree Burn  A burn occurs when skin is exposed to too much heat, sun, or harsh chemicals. A first-degree burn (superficial burn) causes mainly redness. It heals in a few days.  Home care  Follow these guidelines when caring for yourself at home:  · Use pain medicine as directed. You may use over-the-counter medicine to control pain if no pain medicine was prescribed. If you have chronic  liver or kidney disease, talk with your healthcare provider before using acetaminophen or ibuprofen. Also talk with your provider if you've had a stomach ulcer or GI bleeding.  · On the first day, you may put a cool compress on the burn to relieve pain. You can use a small towel soaked in cool water as a cool compress.  · Numbing medicines for sunburn can be used for temporary relief of the burning feeling. These medicines include lidocaine or benzocaine. You dont need a prescription for them.  · Moisturizers with aloe vera can help soothe the burn.  · Don't pick or scratch at the affected areas. Use over-the-counter medicines like diphenhydramine for itching. This medicine is taken by mouth.  · Since you don't have an open wound, you don't need to use antibiotic cream or ointment. Sometimes an infection may occur even with proper treatment. Be sure to check the burn daily for the signs of infection listed below.  · Wear a hat, sunscreen, and long sleeves while in the sun.  · Wear loose-fitting clothing until the burn heals.  Follow-up care  Follow up with your healthcare provider, or as advised. Most first-degree burns heal well without complications.  When to seek medical advice  Call your healthcare provider right away if any of these signs of infection occur:  · Fever of 100.4°F (38°C) or higher, or as directed by your healthcare provider  · Pain gets worse  · Redness or swelling gets worse  · Pus comes from the burn  · Red streaks in your skin come from the burn  · Wound doesn't appear to be healing  Date Last Reviewed: 12/1/2016  © 8781-8803 [employer]. [address] [place] [address], [place] [address]. All rights reserved. This information is not intended as a substitute for professional medical care. Always follow your healthcare professional's instructions.         Valtrex Pregnancy And Lactation Text: this medication is Pregnancy Category B and is considered safe during pregnancy. This medication is not directly found in breast milk but it's metabolite acyclovir is present.

## 2023-01-21 DIAGNOSIS — F90.0 ADHD (ATTENTION DEFICIT HYPERACTIVITY DISORDER), INATTENTIVE TYPE: ICD-10-CM

## 2023-01-21 DIAGNOSIS — F43.23 ADJUSTMENT DISORDER WITH MIXED ANXIETY AND DEPRESSED MOOD: ICD-10-CM

## 2023-01-21 DIAGNOSIS — F33.41 RECURRENT MAJOR DEPRESSIVE DISORDER, IN PARTIAL REMISSION: ICD-10-CM

## 2023-01-21 RX ORDER — BUPROPION HYDROCHLORIDE 200 MG/1
200 TABLET, EXTENDED RELEASE ORAL DAILY
Qty: 30 TABLET | Refills: 0 | Status: SHIPPED | OUTPATIENT
Start: 2023-01-21 | End: 2023-02-24 | Stop reason: SDUPTHER

## 2023-01-21 RX ORDER — LISDEXAMFETAMINE DIMESYLATE 50 MG/1
50 CAPSULE ORAL EVERY MORNING
Qty: 30 CAPSULE | Refills: 0 | Status: SHIPPED | OUTPATIENT
Start: 2023-02-13 | End: 2023-02-24 | Stop reason: SDUPTHER

## 2023-02-24 ENCOUNTER — OFFICE VISIT (OUTPATIENT)
Dept: PSYCHIATRY | Facility: CLINIC | Age: 58
End: 2023-02-24
Payer: COMMERCIAL

## 2023-02-24 DIAGNOSIS — F33.42 RECURRENT MAJOR DEPRESSIVE DISORDER, IN FULL REMISSION: Primary | ICD-10-CM

## 2023-02-24 DIAGNOSIS — F41.1 GAD (GENERALIZED ANXIETY DISORDER): ICD-10-CM

## 2023-02-24 DIAGNOSIS — F90.0 ADHD (ATTENTION DEFICIT HYPERACTIVITY DISORDER), INATTENTIVE TYPE: ICD-10-CM

## 2023-02-24 PROCEDURE — 99213 OFFICE O/P EST LOW 20 MIN: CPT | Mod: 95,,, | Performed by: PSYCHIATRY & NEUROLOGY

## 2023-02-24 PROCEDURE — 4010F PR ACE/ARB THEARPY RXD/TAKEN: ICD-10-PCS | Mod: CPTII,95,, | Performed by: PSYCHIATRY & NEUROLOGY

## 2023-02-24 PROCEDURE — 4010F ACE/ARB THERAPY RXD/TAKEN: CPT | Mod: CPTII,95,, | Performed by: PSYCHIATRY & NEUROLOGY

## 2023-02-24 PROCEDURE — 99213 PR OFFICE/OUTPT VISIT, EST, LEVL III, 20-29 MIN: ICD-10-PCS | Mod: 95,,, | Performed by: PSYCHIATRY & NEUROLOGY

## 2023-02-24 RX ORDER — LISDEXAMFETAMINE DIMESYLATE 50 MG/1
50 CAPSULE ORAL EVERY MORNING
Qty: 30 CAPSULE | Refills: 0 | Status: SHIPPED | OUTPATIENT
Start: 2023-02-24 | End: 2023-07-05

## 2023-02-24 RX ORDER — TRAZODONE HYDROCHLORIDE 150 MG/1
TABLET ORAL
Qty: 90 TABLET | Refills: 0 | Status: SHIPPED | OUTPATIENT
Start: 2023-02-24 | End: 2024-01-30 | Stop reason: SDUPTHER

## 2023-02-24 RX ORDER — LISDEXAMFETAMINE DIMESYLATE 50 MG/1
50 CAPSULE ORAL EVERY MORNING
Qty: 30 CAPSULE | Refills: 0 | Status: SHIPPED | OUTPATIENT
Start: 2023-03-21 | End: 2023-11-17 | Stop reason: SDUPTHER

## 2023-02-24 RX ORDER — BUSPIRONE HYDROCHLORIDE 10 MG/1
10 TABLET ORAL 2 TIMES DAILY
Qty: 180 TABLET | Refills: 0 | Status: SHIPPED | OUTPATIENT
Start: 2023-02-24

## 2023-02-24 RX ORDER — DULOXETIN HYDROCHLORIDE 60 MG/1
60 CAPSULE, DELAYED RELEASE ORAL DAILY
Qty: 90 CAPSULE | Refills: 0 | Status: SHIPPED | OUTPATIENT
Start: 2023-02-24 | End: 2023-03-22 | Stop reason: SDUPTHER

## 2023-02-24 RX ORDER — BUPROPION HYDROCHLORIDE 200 MG/1
200 TABLET, EXTENDED RELEASE ORAL DAILY
Qty: 90 TABLET | Refills: 0 | Status: SHIPPED | OUTPATIENT
Start: 2023-02-24 | End: 2023-03-22 | Stop reason: SDUPTHER

## 2023-02-24 NOTE — PROGRESS NOTES
.mOutpatient Psychiatry Follow-Up Visit (MD/NP)    2/24/2023     The patient location is: [facility]  The chief complaint leading to consultation is: depression, ADHD, anxious    Visit type: audiovisual    Face to Face time with patient: 11 minutes  11 minutes of total time spent on the encounter, which includes face to face time and non-face to face time preparing to see the patient (eg, review of tests), Obtaining and/or reviewing separately obtained history, Documenting clinical information in the electronic or other health record, Independently interpreting results (not separately reported) and communicating results to the patient/family/caregiver, or Care coordination (not separately reported).     Each patient to whom he or she provides medical services by telemedicine is:  (1) informed of the relationship between the physician and patient and the respective role of any other health care provider with respect to management of the patient; and (2) notified that he or she may decline to receive medical services by telemedicine and may withdraw from such care at any time.    Clinical Status of Patient:  Outpatient (Ambulatory)    Chief Complaint:  Joy Crawford is a 57 y.o. female who presents today for follow-up of depression, anxiety and attention problems.  Met with patient.      Interval History and Content of Current Session:  Interim Events/Subjective Report/Content of Current Session:   Joy Crawford checked in on time for her appointment. Presents for follow-up. Last visit we restarted Vyvanse and increased to 40 mg. Today she reports symptom improvement. Reports she feels by lunchtime she is feels drained but admits she is on go non-stop once her day starts. Desires to increase. She denies any problems with headache, stomach upset, weight loss, insomnia, chest pain, palpitations, tics, or tremors. She begins crying at this point and reports its because paulette and this happens yearly. Is off for  "two weeks due to school. "Truly randy been great but this is a hard time."  Denies SI/HI/AVH. No objective s/sx of psychosis or guillermo. Patient verbalized motivation for compliance with medications and all other elements of treatment plan.       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   Mood has improved after Luis  dysphoria         Psychiatric Review Of Systems - Is patient experiencing or having changes in:  sleep: no  appetite: lower   weight: no losing wt with trulicity  per PCP ; lost 46 # and feels great   energy/anergy: no  interest/pleasure/anhedonia: yes enjoyed walking in [place]   somatic symptoms: no  libido: no  anxiety/panic: no  guilty/hopelessness: no  concentration: no  S.I.B.s/risky behavior: no  Irritability: no  Racing thoughts: no  Impulsive behaviors: no  Paranoia: no  AVH: no       Cymbalta 60 mg daily   Vyvanse  50 mg daily  Checked LA  and no irregularities were noted.  Wellbutrin  mg daily  Buspar 10 mg BID  Trazodone 150 mg nightly - from sleep medicine.     Previous medication trials:  Gabapentin - nausea  90 mg of Cymbalta - made symptoms worse, cried for 3 days  Wellbutrin- caused insomnia with XL  Zoloft- ineffective  Abilify- insomnia    Unsure if she has tried Effexor or Pristiq  Review of Systems   PSYCHIATRIC: Pertinant items are noted in the narrative.  CONSTITUTIONAL: No weight gain or loss.   MUSCULOSKELETAL: No pain or stiffness of the joints.  CARDIOVASCULAR: No tachycardia or chest pain.  GASTROINTESTINAL: No nausea, vomiting, pain, constipation or diarrhea.    Past Medical, Family and Social History: The patient's past medical, family and social history have been reviewed and updated as appropriate within the electronic medical record - see encounter notes.    Compliance: yes    Side effects: None    Risk Parameters:  Patient reports no suicidal ideation  Patient reports no homicidal ideation  Patient reports no self-injurious behavior  Patient reports no violent " behavior    Exam (detailed: at least 9 elements; comprehensive: all 15 elements)   Constitutional  Vitals:  From 11/2/22:  BP:110/80  HR:98       General:  unremarkable, age appropriate     Musculoskeletal  Muscle Strength/Tone:  not examined   Gait & Station:  BRIANNE due to video visit      Psychiatric  Speech:  no latency; no press   Mood & Affect:  sad  congruent and appropriate   Thought Process:  normal and logical   Associations:  intact   Thought Content:  normal, no suicidality, no homicidality, delusions, or paranoia   Insight:  intact   Judgement: behavior is adequate to circumstances   Orientation:  grossly intact   Memory: intact for content of interview   Language: grossly intact   Attention Span & Concentration:  able to focus   Fund of Knowledge:  intact and appropriate to age and level of education     Assessment and Diagnosis   Status/Progress: Based on the examination today, the patient's problem(s) is/are adequately but not ideally controlled.  New problems have not been presented today.   Co-morbidities, Diagnostic uncertainty and Lack of compliance are not complicating management of the primary condition.  There are no active rule-out diagnoses for this patient at this time.     General Impression: Joy Crawford is a 56 y.o. female who presents today for follow-up of depression and anxiety. Patient seen and chart reviewed. Previous patient of Dr. Mckeon - no medication changes made. Presents 4/21/21- anxiety and motivation remain an issues, Abilify started. Presents 7/12/21 failed Abilify, wants to restart Vyvanse Presents 10/26/21- anxiety improved with Buspar, never started Vyvanse, Wellbutrin added for depression Presents 5/30/22- Wellbutrin switched to SR due to insomnia on XL Presents 7/11/22- insomnia resolved, Vyvanse restarted Presents 12/19/22- symptom partially improved. Some depression due to paulette season.  Presesnts 2-24-23 and doing better - refilled all for 90 days except for  Vyvanse refilled x 2 total       ICD-10-CM ICD-9-CM   1. Recurrent major depressive disorder, in full remission  F33.42 296.36   2. ADHD (attention deficit hyperactivity disorder), inattentive type  F90.0 314.00   3. RAYNA (generalized anxiety disorder)  F41.1 300.02           Intervention/Counseling/Treatment Plan   Medication Management: Continue current medications.   Refill Cymbalta 60 mg daily  Refill Vyvanse to 50 mg daily plus one   Checked LA  and no irregularities were noted.  Refill Wellbutrin   mg daily   Refill Buspar 10 mg BID   Refill Trazodone 150 mg nightly - from sleep medicine.   The risks and benefits of medication were discussed with the patient.  Discussed diagnosis, risks and benefits of proposed treatment above vs alternative treatments vs no treatment, and potential side effects of these treatments. The patient expresses understanding of the above and displays the capacity to agree with this treatment given said understanding. Patient also agrees that, currently, the benefits outweigh the risks and would like to pursue treatment at this time.  Discussed inherent unpredictability of medications in each individual.   Encouraged Patient to keep future appointments.   Take medications as prescribed and abstain from substance abuse.   In the event of an emergency patient was advised to go to the emergency room      Will coordinate transition of care to PAULA Jensen  who will be cc'd     Return to Clinic:  in March 2023 as scheduled

## 2023-03-22 ENCOUNTER — OFFICE VISIT (OUTPATIENT)
Dept: PSYCHIATRY | Facility: CLINIC | Age: 58
End: 2023-03-22
Payer: COMMERCIAL

## 2023-03-22 DIAGNOSIS — F33.42 RECURRENT MAJOR DEPRESSIVE DISORDER, IN FULL REMISSION: ICD-10-CM

## 2023-03-22 DIAGNOSIS — F90.0 ADHD (ATTENTION DEFICIT HYPERACTIVITY DISORDER), INATTENTIVE TYPE: Primary | ICD-10-CM

## 2023-03-22 DIAGNOSIS — G47.00 INSOMNIA, UNSPECIFIED TYPE: ICD-10-CM

## 2023-03-22 PROCEDURE — 99214 PR OFFICE/OUTPT VISIT, EST, LEVL IV, 30-39 MIN: ICD-10-PCS | Mod: 95,,, | Performed by: NURSE PRACTITIONER

## 2023-03-22 PROCEDURE — 4010F ACE/ARB THERAPY RXD/TAKEN: CPT | Mod: CPTII,95,, | Performed by: NURSE PRACTITIONER

## 2023-03-22 PROCEDURE — 4010F PR ACE/ARB THEARPY RXD/TAKEN: ICD-10-PCS | Mod: CPTII,95,, | Performed by: NURSE PRACTITIONER

## 2023-03-22 PROCEDURE — 99214 OFFICE O/P EST MOD 30 MIN: CPT | Mod: 95,,, | Performed by: NURSE PRACTITIONER

## 2023-03-22 RX ORDER — BUPROPION HYDROCHLORIDE 200 MG/1
200 TABLET, EXTENDED RELEASE ORAL DAILY
Qty: 90 TABLET | Refills: 0 | Status: SHIPPED | OUTPATIENT
Start: 2023-03-22 | End: 2023-09-04

## 2023-03-22 RX ORDER — DULOXETIN HYDROCHLORIDE 60 MG/1
60 CAPSULE, DELAYED RELEASE ORAL DAILY
Qty: 90 CAPSULE | Refills: 0 | Status: SHIPPED | OUTPATIENT
Start: 2023-03-22 | End: 2023-07-05 | Stop reason: SDUPTHER

## 2023-03-22 NOTE — PROGRESS NOTES
.Outpatient Psychiatry Follow-Up Visit (MD/NP)    3/22/2023     The patient location is: [place]  The chief complaint leading to consultation is: depression, ADHD, anxious    Visit type: audiovisual    Face to Face time with patient: 20 minutes  30 minutes of total time spent on the encounter, which includes face to face time and non-face to face time preparing to see the patient (eg, review of tests), Obtaining and/or reviewing separately obtained history, Documenting clinical information in the electronic or other health record, Independently interpreting results (not separately reported) and communicating results to the patient/family/caregiver, or Care coordination (not separately reported).     Each patient to whom he or she provides medical services by telemedicine is:  (1) informed of the relationship between the physician and patient and the respective role of any other health care provider with respect to management of the patient; and (2) notified that he or she may decline to receive medical services by telemedicine and may withdraw from such care at any time.    Clinical Status of Patient:  Outpatient (Ambulatory)    Chief Complaint:  Joy Crawford is a 57 y.o. female who presents today for follow-up of depression, anxiety and attention problems.  Met with patient.      Interval History and Content of Current Session:  Interim Events/Subjective Report/Content of Current Session:   Joy Crawford checked in on time for her appointment. She is a transfer from Angelina Lim DNP. This our first session.   She presented for follow-up today. She stated she lost 50 lbs since October 2022 and feels well emotionally and physcially. She stated Vyvanse 50 mg po daily wears off around lunch time and wants either increase to 60 mg daily or take adderall 5 mg po qnoon to help with focus in the afternoon. She reported improved depression and anxiety with her current medication regimen. She stated she feels by  lunchtime she is drained but admits she is on a non-stop once her day starts due to being a . She denied any problems with headache, stomach upset, weight loss, insomnia, chest pain, palpitations, tics, or tremors. She denied SI/HI/AVH. No objective s/sx of psychosis or guillermo. Patient verbalized motivation for compliance with medications and all other elements of treatment plan. Informed patient to get her vitals before increasing vyvanse or adding adderall and she agreed to it ASAP.    Psychiatric Review Of Systems - Is patient experiencing or having changes in:  sleep: no with trazodone  appetite: no  weight: no  energy/anergy: no  interest/pleasure/anhedonia: no  somatic symptoms: no  anxiety/panic: yes but managed  guilty/hopelessness: no  concentration: yes but improved with vyvanse  S.I.B.s/risky behavior: no  Irritability: no  Racing thoughts: no  Impulsive behaviors: no  Paranoia:no  AVH:no  Suicidal thoughts/plan/intent: no        Psychiatric Review Of Systems - Is patient experiencing or having changes in:  sleep: no  appetite: lower   weight: no losing wt with trulicity  per PCP ; lost 46 # and feels great   energy/anergy: no  interest/pleasure/anhedonia: yes enjoyed walking in [place]   somatic symptoms: no  libido: no  anxiety/panic: no  guilty/hopelessness: no  concentration: no  S.I.B.s/risky behavior: no  Irritability: no  Racing thoughts: no  Impulsive behaviors: no  Paranoia: no  AVH: no     Current Medications:  Cymbalta 60 mg daily   Vyvanse  50 mg daily  Checked LA  and no irregularities were noted.  Wellbutrin  mg daily  Buspar 10 mg BID  Trazodone 150 mg nightly - from sleep medicine.     Previous medication trials:  Gabapentin - nausea  90 mg of Cymbalta - made symptoms worse, cried for 3 days  Wellbutrin- caused insomnia with XL  Zoloft- ineffective  Abilify- insomnia    Unsure if she has tried Effexor or Pristiq  Review of Systems   PSYCHIATRIC: Pertinant items are noted  "in the narrative.  CONSTITUTIONAL: No weight gain or loss.   MUSCULOSKELETAL: No pain or stiffness of the joints.  CARDIOVASCULAR: No tachycardia or chest pain.  GASTROINTESTINAL: No nausea, vomiting, pain, constipation or diarrhea.    Past Medical, Family and Social History: The patient's past medical, family and social history have been reviewed and updated as appropriate within the electronic medical record - see encounter notes.    Compliance: yes    Side effects: None    Risk Parameters:  Patient reports no suicidal ideation  Patient reports no homicidal ideation  Patient reports no self-injurious behavior  Patient reports no violent behavior    Exam (detailed: at least 9 elements; comprehensive: all 15 elements)   Constitutional  Vitals:  From 11/2/22:  BP:110/80  HR:98       General:  unremarkable, age appropriate     Musculoskeletal  Muscle Strength/Tone:  not examined   Gait & Station:  BRIANNE due to video visit      Psychiatric  Speech:  no latency; no press   Mood & Affect:  "Good"  congruent and appropriate   Thought Process:  normal and logical   Associations:  intact   Thought Content:  normal, no suicidality, no homicidality, delusions, or paranoia   Insight:  intact   Judgement: behavior is adequate to circumstances   Orientation:  grossly intact   Memory: intact for content of interview   Language: grossly intact, able to name, able to repeat   Attention Span & Concentration:  able to focus   Fund of Knowledge:  intact and appropriate to age and level of education     Assessment and Diagnosis   Status/Progress: Based on the examination today, the patient's problem(s) is/are adequately but not ideally controlled.  New problems have not been presented today.   Co-morbidities, Diagnostic uncertainty and Lack of compliance are not complicating management of the primary condition.  There are no active rule-out diagnoses for this patient at this time.     General Impression: Joy Crawford is a 56 y.o. " female who presents today for follow-up of depression and anxiety. Patient seen and chart reviewed. Previous patient of Dr. Mckeon - no medication changes made. Presents 4/21/21- anxiety and motivation remain an issues, Abilify started. Presents 7/12/21 failed Abilify, wants to restart Vyvanse Presents 10/26/21- anxiety improved with Buspar, never started Vyvanse, Wellbutrin added for depression Presents 5/30/22- Wellbutrin switched to SR due to insomnia on XL Presents 7/11/22- insomnia resolved, Vyvanse restarted Presents 12/19/22- symptom partially improved. Some depression due to paulette season.  Presesnts 2-24-23 and doing better - refilled all for 90 days except for Vyvanse refilled x 2 total       ICD-10-CM ICD-9-CM   1. ADHD (attention deficit hyperactivity disorder), inattentive type  F90.0 314.00   2. Recurrent major depressive disorder, in full remission  F33.42 296.36   3. Insomnia, unspecified type  G47.00 780.52           Intervention/Counseling/Treatment Plan   Medication Management: Continue current medications.   Continue Cymbalta 60 mg daily  Increase Vyvanse to 60 mg daily and add adderall 5 mg po daily  Checked LA  and no irregularities were noted.  Continue Wellbutrin   mg daily   Not taking Buspar 10 mg BID   Continue Trazodone 150 mg nightly - from sleep medicine.   The risks and benefits of medication were discussed with the patient.  Discussed diagnosis, risks and benefits of proposed treatment above vs alternative treatments vs no treatment, and potential side effects of these treatments. The patient expresses understanding of the above and displays the capacity to agree with this treatment given said understanding. Patient also agrees that, currently, the benefits outweigh the risks and would like to pursue treatment at this time.  Discussed inherent unpredictability of medications in each individual.   Encouraged Patient to keep future appointments.   Take medications as  prescribed and abstain from substance abuse.   In the event of an emergency patient was advised to go to the emergency room      Return to Clinic: follow up in 3 months or earlier as needed    LUANN Romero-BC

## 2023-03-27 ENCOUNTER — PATIENT OUTREACH (OUTPATIENT)
Dept: ADMINISTRATIVE | Facility: HOSPITAL | Age: 58
End: 2023-03-27
Payer: COMMERCIAL

## 2023-03-27 NOTE — PROGRESS NOTES
Health Maintenance Due   Topic Date Due    Hepatitis C Screening  Never done    HIV Screening  Never done    TETANUS VACCINE  Never done    Colorectal Cancer Screening  Never done    Shingles Vaccine (1 of 2) Never done    Mammogram  06/24/2020    COVID-19 Vaccine (3 - Booster for Pfizer series) 09/23/2021    Cervical Cancer Screening  12/03/2021      Chart reviewed. Triggered LINKS. Updated Care Everywhere.     Nabeel Nicholson CMA  Population Health Care Coordinator  Primary Care Team

## 2023-04-03 ENCOUNTER — PATIENT MESSAGE (OUTPATIENT)
Dept: ADMINISTRATIVE | Facility: HOSPITAL | Age: 58
End: 2023-04-03
Payer: COMMERCIAL

## 2023-04-10 ENCOUNTER — PATIENT MESSAGE (OUTPATIENT)
Dept: PSYCHIATRY | Facility: CLINIC | Age: 58
End: 2023-04-10
Payer: COMMERCIAL

## 2023-04-10 ENCOUNTER — OFFICE VISIT (OUTPATIENT)
Dept: INTERNAL MEDICINE | Facility: CLINIC | Age: 58
End: 2023-04-10
Payer: COMMERCIAL

## 2023-04-10 ENCOUNTER — HOSPITAL ENCOUNTER (OUTPATIENT)
Dept: RADIOLOGY | Facility: HOSPITAL | Age: 58
Discharge: HOME | End: 2023-04-10
Attending: INTERNAL MEDICINE
Payer: COMMERCIAL

## 2023-04-10 VITALS
OXYGEN SATURATION: 97 % | WEIGHT: 206.56 LBS | HEIGHT: 65 IN | HEART RATE: 84 BPM | DIASTOLIC BLOOD PRESSURE: 78 MMHG | SYSTOLIC BLOOD PRESSURE: 110 MMHG | BODY MASS INDEX: 34.41 KG/M2

## 2023-04-10 VITALS — BODY MASS INDEX: 34.32 KG/M2 | HEIGHT: 65 IN | WEIGHT: 206 LBS

## 2023-04-10 DIAGNOSIS — E66.9 OBESITY (BMI 35.0-39.9 WITHOUT COMORBIDITY): ICD-10-CM

## 2023-04-10 DIAGNOSIS — F90.0 ATTENTION DEFICIT HYPERACTIVITY DISORDER (ADHD), PREDOMINANTLY INATTENTIVE TYPE: Primary | ICD-10-CM

## 2023-04-10 DIAGNOSIS — Z12.31 SCREENING MAMMOGRAM FOR BREAST CANCER: ICD-10-CM

## 2023-04-10 DIAGNOSIS — E55.9 VITAMIN D DEFICIENCY DISEASE: ICD-10-CM

## 2023-04-10 DIAGNOSIS — I10 HYPERTENSION, UNSPECIFIED TYPE: ICD-10-CM

## 2023-04-10 DIAGNOSIS — E11.9 TYPE 2 DIABETES MELLITUS WITHOUT COMPLICATION, WITHOUT LONG-TERM CURRENT USE OF INSULIN: Primary | ICD-10-CM

## 2023-04-10 DIAGNOSIS — G47.30 SLEEP APNEA, UNSPECIFIED TYPE: ICD-10-CM

## 2023-04-10 DIAGNOSIS — T78.40XA ALLERGY, INITIAL ENCOUNTER: ICD-10-CM

## 2023-04-10 DIAGNOSIS — J45.909 ASTHMA, CURRENTLY ACTIVE: ICD-10-CM

## 2023-04-10 DIAGNOSIS — E53.8 VITAMIN B12 DEFICIENCY: ICD-10-CM

## 2023-04-10 PROCEDURE — 99999 PR PBB SHADOW E&M-EST. PATIENT-LVL IV: ICD-10-PCS | Mod: PBBFAC,,, | Performed by: INTERNAL MEDICINE

## 2023-04-10 PROCEDURE — 3078F PR MOST RECENT DIASTOLIC BLOOD PRESSURE < 80 MM HG: ICD-10-PCS | Mod: CPTII,S$GLB,, | Performed by: INTERNAL MEDICINE

## 2023-04-10 PROCEDURE — 1159F PR MEDICATION LIST DOCUMENTED IN MEDICAL RECORD: ICD-10-PCS | Mod: CPTII,S$GLB,, | Performed by: INTERNAL MEDICINE

## 2023-04-10 PROCEDURE — 3074F PR MOST RECENT SYSTOLIC BLOOD PRESSURE < 130 MM HG: ICD-10-PCS | Mod: CPTII,S$GLB,, | Performed by: INTERNAL MEDICINE

## 2023-04-10 PROCEDURE — 3008F PR BODY MASS INDEX (BMI) DOCUMENTED: ICD-10-PCS | Mod: CPTII,S$GLB,, | Performed by: INTERNAL MEDICINE

## 2023-04-10 PROCEDURE — 77067 SCR MAMMO BI INCL CAD: CPT | Mod: TC

## 2023-04-10 PROCEDURE — 4010F ACE/ARB THERAPY RXD/TAKEN: CPT | Mod: CPTII,S$GLB,, | Performed by: INTERNAL MEDICINE

## 2023-04-10 PROCEDURE — 77067 MAMMO DIGITAL SCREENING BILAT WITH TOMO: ICD-10-PCS | Mod: 26,,, | Performed by: RADIOLOGY

## 2023-04-10 PROCEDURE — 3074F SYST BP LT 130 MM HG: CPT | Mod: CPTII,S$GLB,, | Performed by: INTERNAL MEDICINE

## 2023-04-10 PROCEDURE — 1159F MED LIST DOCD IN RCRD: CPT | Mod: CPTII,S$GLB,, | Performed by: INTERNAL MEDICINE

## 2023-04-10 PROCEDURE — 99999 PR PBB SHADOW E&M-EST. PATIENT-LVL IV: CPT | Mod: PBBFAC,,, | Performed by: INTERNAL MEDICINE

## 2023-04-10 PROCEDURE — 4010F PR ACE/ARB THEARPY RXD/TAKEN: ICD-10-PCS | Mod: CPTII,S$GLB,, | Performed by: INTERNAL MEDICINE

## 2023-04-10 PROCEDURE — 99214 OFFICE O/P EST MOD 30 MIN: CPT | Mod: S$GLB,,, | Performed by: INTERNAL MEDICINE

## 2023-04-10 PROCEDURE — 99214 PR OFFICE/OUTPT VISIT, EST, LEVL IV, 30-39 MIN: ICD-10-PCS | Mod: S$GLB,,, | Performed by: INTERNAL MEDICINE

## 2023-04-10 PROCEDURE — 3078F DIAST BP <80 MM HG: CPT | Mod: CPTII,S$GLB,, | Performed by: INTERNAL MEDICINE

## 2023-04-10 PROCEDURE — 3008F BODY MASS INDEX DOCD: CPT | Mod: CPTII,S$GLB,, | Performed by: INTERNAL MEDICINE

## 2023-04-10 PROCEDURE — 77067 SCR MAMMO BI INCL CAD: CPT | Mod: 26,,, | Performed by: RADIOLOGY

## 2023-04-10 PROCEDURE — 77063 MAMMO DIGITAL SCREENING BILAT WITH TOMO: ICD-10-PCS | Mod: 26,,, | Performed by: RADIOLOGY

## 2023-04-10 PROCEDURE — 77063 BREAST TOMOSYNTHESIS BI: CPT | Mod: 26,,, | Performed by: RADIOLOGY

## 2023-04-10 RX ORDER — LISDEXAMFETAMINE DIMESYLATE 60 MG/1
60 CAPSULE ORAL EVERY MORNING
Qty: 60 CAPSULE | Refills: 0 | Status: SHIPPED | OUTPATIENT
Start: 2023-04-15 | End: 2023-05-15

## 2023-04-10 NOTE — PROGRESS NOTES
Chief Complaint: Follow up of multiple issues     HPI: This is a 58 year old woman who presents for above.       She had a massage 8/1/2021 - had low back pain. She wound up having fusion at L4 and L5 with Dr Rueda in [place] on 9/21/2021.  Back is good.   Left hip is better (had some muscluar pain)     End of April 2022 - tore her right meniscus - had a scope 7/7/2022 with Elyssa Mitchell MD- knee is doing fine.     She has had asthma since a young child. She was hospitalized as a child. She lived in [place] as a child. She has not been hospitalized for her asthma as an adult.  She saw Dr Nunn in allergy on 10/3/2022. She has been using Neril Med Sinus rinse twice daily which is helping. She has not needed astelin nasal spray. She takes Breo one puff daily.  She takes prednisone when she feels a flare coming.   She has lots of allergies.  She has been breathing well. She saw pulmonary on 5/26/20 and has moderate to severe obstruction.  She has seen allergy clinic on 6/2/20 - she has a very high IGE level and has lots of allergies. She has had allergy shots as a child.  She had an allergic reaction to peanut butter and jelly sandwich Jan 2022.  She has avoided peanuts since then.      She had a ganglion cyst removed from her left ankle by Dr Maddox on 6/25/20.  The left ankle is much improved. She will have left ankle pain at times. Foot pain from plantar fasciitis comes and goes - doing fine right now. .      She is seeing Matilda Jensen NP in psychiatry and therapy with Robel Fajardo MSW. She is feeling better.  She continues to take Cymbalta 60 mg daily, Wellbutrin  mg daily and buspirone 10 mg in the morning (has RX to take twice daily) . SHe is taking Vyvanse 40 mg daily for ADHD. She will increase Vyvanse to 50 mg once daliy  She tried to increase Cymbalta from 60 mg to 90 mg daily. She did not like how she felt on Cymbalta 90 mg daily so she is taking 60 mg dialy. SHe is taking trazodone 150 mg  "at bedtime which is helping her sleep.    SHe has seen sleep medicine and has been diagnoed with sleep apnea. She has a CPAP machine. She is out of the routine of wearing it.  She feels more refreshed in the morning when she wakes up.       She takes lisinopril 10 mg once daily for hypertension.      No periods.      She has not been able to tolerate vitamin D and B12 supplement.  She states that they make her nauseated     She is taking Trulicity 1.5  mg once a week. She has lost 18 more pounds since 11/22/23  No nausea or vomiting. She has a BM every 3-4 days. Weight 246 in 8//2022.  Weight 4/16/22 was 254. Trulicity started 9/5/2022.              Past Medical History:   Diagnosis Date    Anxiety      Asthma      Depression      Esophageal reflux      H/O mammogram 01/20/2017     Normal  (DIS)     Herpes simplex virus (HSV) infection       no out breaks    Hypertension      Peptic ulcer      Sleep apnea       doesnt use cpap                Past Surgical History:   Procedure Laterality Date    ENDOMETRIAL BIOPSY N/A 3/11/2020     Procedure: BIOPSY, ENDOMETRIUM;  Surgeon: José Miguel Leon MD;  Location: [facility];  Service: OB/GYN;  Laterality: N/A;    RHINOPLASTY   2006    SINUS SURGERY   2006    VULVECTOMY N/A 3/11/2020     Procedure: VULVECTOMY;  Surgeon: José Miguel Leon MD;  Location: [facility];  Service: OB/GYN;  Laterality: N/A;            Meds and allergies: updated on EPIC     REVIEW OF SYSTEMS: She denies fevers, chills, night sweats, fatigue, visual change, hearing loss, sinus congestion, sore throat, chest pain, nausea, vomiting, diarrhea, dysuria, hematuria, polydipsia, polyuria headaches     She has been constipated lately for the last week - routine has changed this week.      Physical exam:       /78 (BP Location: Right arm, Patient Position: Sitting, BP Method: Medium (Manual))   Pulse 84   Ht 5' 5" (1.651 m)   Wt 93.7 kg (206 lb 9.1 oz)   LMP 07/12/2018 (Approximate)   SpO2 97%   BMI 34.38 kg/m² "        General: alert, oriented x 3, no apparent distress.  Affect normal  HEENT: Conjunctivae: anicteric,  Neck: supple, no thyroid enlargement, no cervical lymphadenopathy  Resp: effort normal, lungs clear bilaterally  CV: Regular rate and rhythm without murmurs, gallops or rubs, no lower extremity edema,         Assessment/Plan:        Asthma and Allergies- stable  Anxiety and depression -stable    Sleep apnea - try to wear CPAP regluarly    S/p left ankle surgery - doing better.  Vitamin D deficiency -level is fine     Vitamin B12 deficiency - get on supplementl    Diabetes - better controlled    Addendum 6/7/23 - on Trulicity 0.75 mg once weekly - not doing well on Trulicity. Will try Mounjaro 5 mg once weekly. A1C controlled at 5.3 on 4/10/23.   Failed meformin in the past with diarrhea     PAP 12/2020  GYn exam 12/28/22  She is to return in 12 weeks, sooner if problems arise

## 2023-04-12 ENCOUNTER — TELEPHONE (OUTPATIENT)
Dept: INTERNAL MEDICINE | Facility: CLINIC | Age: 58
End: 2023-04-12
Payer: COMMERCIAL

## 2023-04-15 ENCOUNTER — PATIENT MESSAGE (OUTPATIENT)
Dept: INTERNAL MEDICINE | Facility: CLINIC | Age: 58
End: 2023-04-15
Payer: COMMERCIAL

## 2023-04-19 ENCOUNTER — PATIENT MESSAGE (OUTPATIENT)
Dept: INTERNAL MEDICINE | Facility: CLINIC | Age: 58
End: 2023-04-19
Payer: COMMERCIAL

## 2023-04-21 ENCOUNTER — PATIENT MESSAGE (OUTPATIENT)
Dept: ADMINISTRATIVE | Facility: HOSPITAL | Age: 58
End: 2023-04-21
Payer: COMMERCIAL

## 2023-05-18 ENCOUNTER — PATIENT MESSAGE (OUTPATIENT)
Dept: INTERNAL MEDICINE | Facility: CLINIC | Age: 58
End: 2023-05-18
Payer: COMMERCIAL

## 2023-05-18 RX ORDER — ALBUTEROL SULFATE 90 UG/1
AEROSOL, METERED RESPIRATORY (INHALATION)
Qty: 20.1 G | Refills: 0 | Status: SHIPPED | OUTPATIENT
Start: 2023-05-18 | End: 2023-07-10 | Stop reason: SDUPTHER

## 2023-05-18 NOTE — TELEPHONE ENCOUNTER
No care due was identified.  [facility] Embedded Care Due Messages. Reference number: [id number].   5/18/2023 2:52:57 PM CDT

## 2023-05-20 ENCOUNTER — PATIENT MESSAGE (OUTPATIENT)
Dept: INTERNAL MEDICINE | Facility: CLINIC | Age: 58
End: 2023-05-20
Payer: COMMERCIAL

## 2023-05-21 ENCOUNTER — PATIENT MESSAGE (OUTPATIENT)
Dept: INTERNAL MEDICINE | Facility: CLINIC | Age: 58
End: 2023-05-21
Payer: COMMERCIAL

## 2023-05-26 ENCOUNTER — OFFICE VISIT (OUTPATIENT)
Dept: PODIATRY | Facility: CLINIC | Age: 58
End: 2023-05-26
Payer: COMMERCIAL

## 2023-05-26 ENCOUNTER — HOSPITAL ENCOUNTER (OUTPATIENT)
Dept: RADIOLOGY | Facility: HOSPITAL | Age: 58
Discharge: HOME | End: 2023-05-26
Attending: PODIATRIST
Payer: COMMERCIAL

## 2023-05-26 DIAGNOSIS — M25.572 JOINT PAIN OF ANKLE AND FOOT, LEFT: Primary | ICD-10-CM

## 2023-05-26 DIAGNOSIS — M72.2 PLANTAR FASCIITIS: ICD-10-CM

## 2023-05-26 DIAGNOSIS — M25.572 JOINT PAIN OF ANKLE AND FOOT, LEFT: ICD-10-CM

## 2023-05-26 DIAGNOSIS — M76.72 PERONEAL TENDONITIS OF LEFT LOWER LEG: ICD-10-CM

## 2023-05-26 DIAGNOSIS — M76.62 ACHILLES TENDINITIS OF LEFT LOWER EXTREMITY: ICD-10-CM

## 2023-05-26 PROCEDURE — 4010F PR ACE/ARB THEARPY RXD/TAKEN: ICD-10-PCS | Mod: CPTII,S$GLB,, | Performed by: PODIATRIST

## 2023-05-26 PROCEDURE — 73630 X-RAY EXAM OF FOOT: CPT | Mod: 26,LT,, | Performed by: RADIOLOGY

## 2023-05-26 PROCEDURE — 73630 XR FOOT COMPLETE 3 VIEW LEFT: ICD-10-PCS | Mod: 26,LT,, | Performed by: RADIOLOGY

## 2023-05-26 PROCEDURE — 4010F ACE/ARB THERAPY RXD/TAKEN: CPT | Mod: CPTII,S$GLB,, | Performed by: PODIATRIST

## 2023-05-26 PROCEDURE — 99203 OFFICE O/P NEW LOW 30 MIN: CPT | Mod: S$GLB,,, | Performed by: PODIATRIST

## 2023-05-26 PROCEDURE — 99999 PR PBB SHADOW E&M-EST. PATIENT-LVL III: ICD-10-PCS | Mod: PBBFAC,,, | Performed by: PODIATRIST

## 2023-05-26 PROCEDURE — 99203 PR OFFICE/OUTPT VISIT, NEW, LEVL III, 30-44 MIN: ICD-10-PCS | Mod: S$GLB,,, | Performed by: PODIATRIST

## 2023-05-26 PROCEDURE — 3044F PR MOST RECENT HEMOGLOBIN A1C LEVEL <7.0%: ICD-10-PCS | Mod: CPTII,S$GLB,, | Performed by: PODIATRIST

## 2023-05-26 PROCEDURE — 3044F HG A1C LEVEL LT 7.0%: CPT | Mod: CPTII,S$GLB,, | Performed by: PODIATRIST

## 2023-05-26 PROCEDURE — 99999 PR PBB SHADOW E&M-EST. PATIENT-LVL III: CPT | Mod: PBBFAC,,, | Performed by: PODIATRIST

## 2023-05-26 PROCEDURE — 73630 X-RAY EXAM OF FOOT: CPT | Mod: TC,PO,LT

## 2023-05-26 PROCEDURE — 1159F MED LIST DOCD IN RCRD: CPT | Mod: CPTII,S$GLB,, | Performed by: PODIATRIST

## 2023-05-26 PROCEDURE — 1159F PR MEDICATION LIST DOCUMENTED IN MEDICAL RECORD: ICD-10-PCS | Mod: CPTII,S$GLB,, | Performed by: PODIATRIST

## 2023-05-26 RX ORDER — METHYLPREDNISOLONE 4 MG/1
TABLET ORAL
Qty: 1 EACH | Refills: 0 | Status: SHIPPED | OUTPATIENT
Start: 2023-05-26 | End: 2023-10-09

## 2023-05-28 ENCOUNTER — PATIENT MESSAGE (OUTPATIENT)
Dept: OBSTETRICS AND GYNECOLOGY | Facility: CLINIC | Age: 58
End: 2023-05-28
Payer: COMMERCIAL

## 2023-05-28 NOTE — PROGRESS NOTES
Subjective:     Patient ID: Joy Crawford is a 58 y.o. female.    Chief Complaint: Foot Pain (New pt here for foot pain. Pt had a hx with cyst on top of foot, removal done by Orthopedics surgeon in 2020. Pt has been constant since sx. Has tried steroid shots to try and help but no relief. Htn controlled with meds. )    Joy is a 58 y.o. female with a past medical history of Anxiety, Asthma, Depression, Esophageal reflux, H/O mammogram (01/20/2017), Herpes simplex virus (HSV) infection, Hypertension, Peptic ulcer, and Sleep apnea. The patient's chief complaint consists of Lt. Diffuse foot pain that has been present since 2020.  Notes having a ganglion cyst removed from the Lt. Dorsal midfoot by Dr. Maddox in 2020.  States progressively the mass has since returned.  Notes being treated with several corticosteroid shots at the site of the mass with no resolution.  She has since begun to develop Lt. Medial heel pain, posterior ankle pain, and lateral forefoot/midfoot pain.  Rates pain symptoms currently as an 8/10.  Symptoms are aggravated with all weight bearing.  She has attempted to wear supportive shoe gear with no alleviation of pain symptoms.  Denies recent injury to the affected limb.  Denies any additional pedal complaints.       Past Medical History:   Diagnosis Date    Anxiety     Asthma     Depression     Esophageal reflux     H/O mammogram 01/20/2017    Normal  (DIS)     Herpes simplex virus (HSV) infection     no out breaks    Hypertension     Peptic ulcer     Sleep apnea     doesnt use cpap       Past Surgical History:   Procedure Laterality Date    ARTHROSCOPIC CHONDROPLASTY OF KNEE JOINT Right 7/7/2022    Procedure: ARTHROSCOPY, KNEE, WITH CHONDROPLASTY;  Surgeon: Elyssa Mitchell MD;  Location: [facility] OR;  Service: Orthopedics;  Laterality: Right;    CYST REMOVAL  06/25/2020    Procedure: EXCISION, CYST;  Surgeon: Gallo Maddox MD;  Location: [facility] OR;  Service: Orthopedics;;  COMPLEXT CYST LEFT   ANKLE AND FOOT    ENDOMETRIAL BIOPSY N/A 03/11/2020    Procedure: BIOPSY, ENDOMETRIUM;  Surgeon: José Miguel Leon MD;  Location: [facility] OR;  Service: OB/GYN;  Laterality: N/A;    foot ankle sx Left 06/25/2020    KNEE ARTHROSCOPY W/ MENISCECTOMY Right 7/7/2022    Procedure: ARTHROSCOPY, KNEE, MEDIAL AND LATERAL MENISCECTOMY - [facility];  Surgeon: Elyssa Mitchell MD;  Location: [facility] OR;  Service: Orthopedics;  Laterality: Right;    KNEE ARTHROSCOPY W/ PLICA EXCISION Right 7/7/2022    Procedure: EXCISION, PLICA, KNEE, ARTHROSCOPIC;  Surgeon: Elyssa Mitchell MD;  Location: [facility] OR;  Service: Orthopedics;  Laterality: Right;    KNEE DEBRIDEMENT  7/7/2022    Procedure: DEBRIDEMENT, KNEE;  Surgeon: Elyssa Mitchell MD;  Location: [facility] OR;  Service: Orthopedics;;    RHINOPLASTY  2006    SINUS SURGERY  2006    SPINAL FUSION  09/21/2021    L4-5    SYNOVECTOMY OF KNEE Right 7/7/2022    Procedure: PARTIAL SYNOVECTOMY, KNEE;  Surgeon: Elyssa Mitchell MD;  Location: [facility] OR;  Service: Orthopedics;  Laterality: Right;    VULVECTOMY N/A 03/11/2020    Procedure: VULVECTOMY;  Surgeon: José Miguel Leon MD;  Location: [facility] OR;  Service: OB/GYN;  Laterality: N/A;       Family History   Problem Relation Age of Onset    Skin cancer Father     Hypertension Father     Hyperlipidemia Father     Hypertension Mother     Diabetes Mother     Hyperlipidemia Mother     No Known Problems Son     Depression Brother     Breast cancer Neg Hx        Social History     Socioeconomic History    Marital status: Single   Tobacco Use    Smoking status: Never    Smokeless tobacco: Never   Substance and Sexual Activity    Alcohol use: Yes     Comment: social    Drug use: No    Sexual activity: Not Currently     Partners: Male     Birth control/protection: Post-menopausal   Social History Narrative    , one son (20 years old).  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        Current Outpatient Medications   Medication Sig Dispense Refill    albuterol  (PROVENTIL/VENTOLIN HFA) 90 mcg/actuation inhaler Rescue 20.1 g 0    azelastine (ASTELIN) 137 mcg (0.1 %) nasal spray 1 spray (137 mcg total) by Nasal route 2 (two) times daily. 30 mL 6    buPROPion (WELLBUTRIN SR) 200 MG SR12 Take 1 tablet (200 mg total) by mouth once daily. 90 tablet 0    busPIRone (BUSPAR) 10 MG tablet Take 1 tablet (10 mg total) by mouth 2 (two) times daily. 180 tablet 0    dulaglutide (TRULICITY) 0.75 mg/0.5 mL pen injector Inject 0.75 mg into the skin every 7 days. 4 pen 3    dulaglutide (TRULICITY) 1.5 mg/0.5 mL pen injector Inject 1.5 mg into the skin every 7 days. 4 pen 11    DULoxetine (CYMBALTA) 60 MG capsule Take 1 capsule (60 mg total) by mouth once daily. 90 capsule 0    fluticasone furoate-vilanteroL (BREO ELLIPTA) 200-25 mcg/dose DsDv diskus inhaler INHALE 1 PUFF BY MOUTH DAILY 60 each 6    HYDROcodone-acetaminophen (NORCO) 5-325 mg per tablet Take 1 tablet by mouth every 6 (six) hours as needed for Pain. 8 tablet 0    LIDOcaine (LIDODERM) 5 % Place 2 patches onto the skin once daily. Remove & Discard patch within 12 hours or as directed by MD 14 patch 0    lisdexamfetamine (VYVANSE) 50 MG capsule Take 1 capsule (50 mg total) by mouth every morning. 30 capsule 0    lisdexamfetamine (VYVANSE) 50 MG capsule Take 1 capsule (50 mg total) by mouth every morning. 30 capsule 0    lisinopriL 10 MG tablet Take 1 tablet (10 mg total) by mouth once daily. 90 tablet 4    methocarbamoL (ROBAXIN) 500 MG Tab Take 1 tablet (500 mg total) by mouth 3 (three) times daily as needed (left side pain). 30 tablet 0    traZODone (DESYREL) 150 MG tablet TAKE 1 TABLET(150 MG) BY MOUTH EVERY NIGHT 90 tablet 0    methylPREDNISolone (MEDROL DOSEPACK) 4 mg tablet use as directed 1 each 0     No current facility-administered medications for this visit.       Review of patient's allergies indicates:  No Known Allergies     Hemoglobin A1C   Date Value Ref Range Status   04/10/2023 5.3 4.0 - 5.6 % Final     Comment:      ADA Screening Guidelines:  5.7-6.4%  Consistent with prediabetes  >or=6.5%  Consistent with diabetes    High levels of fetal hemoglobin interfere with the HbA1C  assay. Heterozygous hemoglobin variants (HbS, HgC, etc)do  not significantly interfere with this assay.   However, presence of multiple variants may affect accuracy.     08/05/2022 5.2 4.0 - 5.6 % Final     Comment:     ADA Screening Guidelines:  5.7-6.4%  Consistent with prediabetes  >or=6.5%  Consistent with diabetes    High levels of fetal hemoglobin interfere with the HbA1C  assay. Heterozygous hemoglobin variants (HbS, HgC, etc)do  not significantly interfere with this assay.   However, presence of multiple variants may affect accuracy.     05/27/2020 5.1 4.0 - 5.6 % Final     Comment:     ADA Screening Guidelines:  5.7-6.4%  Consistent with prediabetes  >or=6.5%  Consistent with diabetes  High levels of fetal hemoglobin interfere with the HbA1C  assay. Heterozygous hemoglobin variants (HbS, HgC, etc)do  not significantly interfere with this assay.   However, presence of multiple variants may affect accuracy.         Review of Systems   Constitutional: Negative for chills and fever.   Cardiovascular:  Negative for claudication and leg swelling.   Skin:  Negative for color change and nail changes.   Musculoskeletal:  Positive for joint swelling and myalgias. Negative for joint pain, muscle cramps and muscle weakness.   Gastrointestinal:  Negative for nausea and vomiting.   Neurological:  Negative for numbness and paresthesias.   Psychiatric/Behavioral:  Negative for altered mental status.       Objective:     Physical Exam  Constitutional:       Appearance: Normal appearance.   Cardiovascular:      Pulses:           Dorsalis pedis pulses are 2+ on the right side and 2+ on the left side.        Posterior tibial pulses are 2+ on the right side and 2+ on the left side.      Comments: CFT is < 3 seconds bilateral.  Pedal hair growth is present  bilateral.  No lower extremity edema noted bilateral.  Toes are warm to touch bilateral.    Musculoskeletal:         General: Swelling and tenderness present. No signs of injury.      Right lower leg: No edema.      Left lower leg: No edema.      Comments: Muscle strength 5/5 in all muscle groups bilateral.  Pain with active and passive ROM in all four quadrants of the Lt. Foot.  Pain with palpation along the distal 1/3 of the Lt. Achilles tendon.  Pain with palpation at the medial calcaneal tubercle of the Lt. Foot.  Lt. Sided gastrocnemius equinus noted.  Pain with palpation along the distal 1/2 of the Lt. Peroneal tendon.     Skin:     General: Skin is warm and dry.      Capillary Refill: Capillary refill takes 2 to 3 seconds.      Findings: No bruising, ecchymosis, erythema, signs of injury, laceration, lesion, petechiae, rash or wound.      Comments: Pedal skin has normal turgor, temperature, and texture bilateral.  Toenails x 10 appear normotrophic.  Well healed cicatrix noted to the dorsum of the Lt. Midfoot.      Neurological:      General: No focal deficit present.      Mental Status: She is alert.      Sensory: No sensory deficit.      Motor: No weakness or atrophy.      Comments: Light touch is intact bilateral.           Assessment:      Encounter Diagnoses   Name Primary?    Joint pain of ankle and foot, left Yes    Plantar fasciitis     Achilles tendinitis of left lower extremity     Peroneal tendonitis of left lower leg      Plan:     Joy was seen today for foot pain.    Diagnoses and all orders for this visit:    Joint pain of ankle and foot, left  -     X-Ray Foot Complete Left; Future  -     MRI Foot ([facility]) Left Without Contrast; Future  -     methylPREDNISolone (MEDROL DOSEPACK) 4 mg tablet; use as directed    Plantar fasciitis    Achilles tendinitis of left lower extremity    Peroneal tendonitis of left lower leg      I counseled the patient on her conditions, their implications and medical  management.       - Based on the level of her pain symptoms, I recommended offloading the extremity in a postop boot.  Patient was not amenable to said plan.     - As a less effective alternative, I recommend wearing supportive shoes only.  Discussed avoidance of barefoot walking, flip flops, and Crocs, as this will exacerbate current symptoms.       - Recommend icing the affected areas a minimum of 20 minutes daily.    - Discussed avoidance of high impact activities such as squatting, stooping, and running as these activities will exacerbate symptoms.       - May consider applying a topical analgesic (Voltaren cream) to help with pain symptoms.      - Rx written for a medrol dose pack.     - RTC pending the results of the upcoming MRI.     Chirag Plata DPM

## 2023-05-29 ENCOUNTER — PATIENT MESSAGE (OUTPATIENT)
Dept: PODIATRY | Facility: CLINIC | Age: 58
End: 2023-05-29
Payer: COMMERCIAL

## 2023-05-30 ENCOUNTER — PATIENT MESSAGE (OUTPATIENT)
Dept: INTERNAL MEDICINE | Facility: CLINIC | Age: 58
End: 2023-05-30
Payer: COMMERCIAL

## 2023-06-06 ENCOUNTER — HOSPITAL ENCOUNTER (OUTPATIENT)
Dept: RADIOLOGY | Facility: HOSPITAL | Age: 58
Discharge: HOME | End: 2023-06-06
Attending: PODIATRIST
Payer: COMMERCIAL

## 2023-06-06 DIAGNOSIS — M25.572 JOINT PAIN OF ANKLE AND FOOT, LEFT: ICD-10-CM

## 2023-06-06 PROCEDURE — 73718 MRI LOWER EXTREMITY W/O DYE: CPT | Mod: TC,PO,LT

## 2023-06-07 ENCOUNTER — TELEPHONE (OUTPATIENT)
Dept: PODIATRY | Facility: CLINIC | Age: 58
End: 2023-06-07
Payer: COMMERCIAL

## 2023-06-07 ENCOUNTER — PATIENT MESSAGE (OUTPATIENT)
Dept: INTERNAL MEDICINE | Facility: CLINIC | Age: 58
End: 2023-06-07
Payer: COMMERCIAL

## 2023-06-07 DIAGNOSIS — E11.9 TYPE 2 DIABETES MELLITUS WITHOUT COMPLICATION, WITHOUT LONG-TERM CURRENT USE OF INSULIN: ICD-10-CM

## 2023-06-07 RX ORDER — TIRZEPATIDE 5 MG/.5ML
5 INJECTION, SOLUTION SUBCUTANEOUS
Qty: 4 PEN | Refills: 11 | Status: SHIPPED | OUTPATIENT
Start: 2023-06-07 | End: 2023-07-16

## 2023-06-07 NOTE — TELEPHONE ENCOUNTER
----- Message from Nataliya Cerrato sent at 6/7/2023  8:57 AM CDT -----  Contact: patient  Type:  Test Results    Who Called:  patient  Name of Test (Lab/Mammo/Etc):  mri  Date of Test:  6/6  Ordering Provider:  faiza  Where the test was performed:    Best Call Back Number:  [phone] (home)   Additional Information:   she is available to talk for the next 40 minutes or after 3pm

## 2023-06-07 NOTE — TELEPHONE ENCOUNTER
Patient was notified that Dr Plata has not had a chance to review since it was done yesterday and he is out of the office today, Once he has reviewed she will be notified of results and recommendations.

## 2023-06-11 ENCOUNTER — PATIENT MESSAGE (OUTPATIENT)
Dept: PODIATRY | Facility: CLINIC | Age: 58
End: 2023-06-11
Payer: COMMERCIAL

## 2023-06-11 ENCOUNTER — PATIENT MESSAGE (OUTPATIENT)
Dept: INTERNAL MEDICINE | Facility: CLINIC | Age: 58
End: 2023-06-11
Payer: COMMERCIAL

## 2023-06-12 ENCOUNTER — TELEPHONE (OUTPATIENT)
Dept: PODIATRY | Facility: CLINIC | Age: 58
End: 2023-06-12
Payer: COMMERCIAL

## 2023-06-12 NOTE — TELEPHONE ENCOUNTER
----- Message from Brynn Garrett sent at 6/12/2023  9:00 AM CDT -----  Regarding: advie / results  Contact: patient  Type: Needs Medical Advice  Who Called:  Patient  Symptoms (please be specific):  mri results  How long has patient had these symptoms:    Pharmacy name and phone #:    Best Call Back Number: [phone]     Additional Information: Please call to advise; she also states her foot is throbbing thanks!

## 2023-06-13 NOTE — TELEPHONE ENCOUNTER
"Dr Plata says, "Please let the patient know her MRI was positive for significant arthritis of the ankle joint as well as the joint below.   She also had a full tear of ligament that runs along the anterior ankle.  Lastly, there are prior injuries noted to the tip of both parts of the ankle.  On account of the arthritis, I recommend a referral to a surgeon, as I don't feel conservative measures are going to give her pain relief.   This may require a joint fusion to mitigate her pain symptoms.  See if she is amenable, and I will refer her to Dr. Lucero."    Pt was notified of the results and recommendations, she gave verbal understanding.  "

## 2023-06-13 NOTE — TELEPHONE ENCOUNTER
----- Message from Melony Grissom sent at 6/13/2023 11:39 AM CDT -----  Type: Needs Medical Advice  Who Called: pt    Best Call Back Number: [phone] (home)     Additional Information: requesting a call back about mri results and pt is in A LOT of pain.  please advise thank you

## 2023-06-17 ENCOUNTER — PATIENT MESSAGE (OUTPATIENT)
Dept: OBSTETRICS AND GYNECOLOGY | Facility: CLINIC | Age: 58
End: 2023-06-17
Payer: COMMERCIAL

## 2023-06-19 NOTE — TELEPHONE ENCOUNTER
Martin   I am not sure if this has been done she had the pharmacy run medication again on the [facility]

## 2023-06-22 ENCOUNTER — OFFICE VISIT (OUTPATIENT)
Dept: OBSTETRICS AND GYNECOLOGY | Facility: CLINIC | Age: 58
End: 2023-06-22
Attending: OBSTETRICS & GYNECOLOGY
Payer: COMMERCIAL

## 2023-06-22 VITALS — BODY MASS INDEX: 34.77 KG/M2 | HEIGHT: 65 IN | WEIGHT: 208.69 LBS

## 2023-06-22 DIAGNOSIS — N90.89 VULVAR LESION: ICD-10-CM

## 2023-06-22 DIAGNOSIS — A63.0 CONDYLOMA ACUMINATA: ICD-10-CM

## 2023-06-22 DIAGNOSIS — Z01.419 ENCOUNTER FOR GYNECOLOGICAL EXAMINATION: Primary | ICD-10-CM

## 2023-06-22 PROCEDURE — 88305 TISSUE EXAM BY PATHOLOGIST: ICD-10-PCS | Mod: 26,,, | Performed by: PATHOLOGY

## 2023-06-22 PROCEDURE — 4010F PR ACE/ARB THEARPY RXD/TAKEN: ICD-10-PCS | Mod: CPTII,,, | Performed by: OBSTETRICS & GYNECOLOGY

## 2023-06-22 PROCEDURE — 99396 PR PREVENTIVE VISIT,EST,40-64: ICD-10-PCS | Mod: 25,,, | Performed by: OBSTETRICS & GYNECOLOGY

## 2023-06-22 PROCEDURE — 88342 CHG IMMUNOCYTOCHEMISTRY: ICD-10-PCS | Mod: 26,,, | Performed by: PATHOLOGY

## 2023-06-22 PROCEDURE — 1159F PR MEDICATION LIST DOCUMENTED IN MEDICAL RECORD: ICD-10-PCS | Mod: CPTII,,, | Performed by: OBSTETRICS & GYNECOLOGY

## 2023-06-22 PROCEDURE — 4010F ACE/ARB THERAPY RXD/TAKEN: CPT | Mod: CPTII,,, | Performed by: OBSTETRICS & GYNECOLOGY

## 2023-06-22 PROCEDURE — 56606 BIOPSY (GYNECOLOGICAL): ICD-10-PCS | Mod: ,,, | Performed by: OBSTETRICS & GYNECOLOGY

## 2023-06-22 PROCEDURE — 99999 PR PBB SHADOW E&M-EST. PATIENT-LVL IV: ICD-10-PCS | Mod: PBBFAC,,, | Performed by: OBSTETRICS & GYNECOLOGY

## 2023-06-22 PROCEDURE — 88305 TISSUE EXAM BY PATHOLOGIST: CPT | Mod: 59 | Performed by: PATHOLOGY

## 2023-06-22 PROCEDURE — 56605 BIOPSY (GYNECOLOGICAL): ICD-10-PCS | Mod: ,,, | Performed by: OBSTETRICS & GYNECOLOGY

## 2023-06-22 PROCEDURE — 3008F PR BODY MASS INDEX (BMI) DOCUMENTED: ICD-10-PCS | Mod: CPTII,,, | Performed by: OBSTETRICS & GYNECOLOGY

## 2023-06-22 PROCEDURE — 99999 PR PBB SHADOW E&M-EST. PATIENT-LVL IV: CPT | Mod: PBBFAC,,, | Performed by: OBSTETRICS & GYNECOLOGY

## 2023-06-22 PROCEDURE — 88342 IMHCHEM/IMCYTCHM 1ST ANTB: CPT | Mod: 26,,, | Performed by: PATHOLOGY

## 2023-06-22 PROCEDURE — 56606 BIOPSY OF VULVA/PERINEUM: CPT | Mod: ,,, | Performed by: OBSTETRICS & GYNECOLOGY

## 2023-06-22 PROCEDURE — 88305 TISSUE EXAM BY PATHOLOGIST: CPT | Mod: 26,,, | Performed by: PATHOLOGY

## 2023-06-22 PROCEDURE — 3044F HG A1C LEVEL LT 7.0%: CPT | Mod: CPTII,,, | Performed by: OBSTETRICS & GYNECOLOGY

## 2023-06-22 PROCEDURE — 99396 PREV VISIT EST AGE 40-64: CPT | Mod: 25,,, | Performed by: OBSTETRICS & GYNECOLOGY

## 2023-06-22 PROCEDURE — 3008F BODY MASS INDEX DOCD: CPT | Mod: CPTII,,, | Performed by: OBSTETRICS & GYNECOLOGY

## 2023-06-22 PROCEDURE — 3044F PR MOST RECENT HEMOGLOBIN A1C LEVEL <7.0%: ICD-10-PCS | Mod: CPTII,,, | Performed by: OBSTETRICS & GYNECOLOGY

## 2023-06-22 PROCEDURE — 88342 IMHCHEM/IMCYTCHM 1ST ANTB: CPT | Performed by: PATHOLOGY

## 2023-06-22 PROCEDURE — 1159F MED LIST DOCD IN RCRD: CPT | Mod: CPTII,,, | Performed by: OBSTETRICS & GYNECOLOGY

## 2023-06-22 PROCEDURE — 56605 BIOPSY OF VULVA/PERINEUM: CPT | Mod: ,,, | Performed by: OBSTETRICS & GYNECOLOGY

## 2023-06-22 RX ORDER — DULAGLUTIDE 1.5 MG/.5ML
INJECTION, SOLUTION SUBCUTANEOUS
COMMUNITY
Start: 2023-06-12 | End: 2023-10-09

## 2023-06-22 NOTE — PROGRESS NOTES
LMP: Patient's last menstrual period was 2018 (approximate)..  .  Meds per MD: none    Last Pap:  pap & hpv negative  Last MM- birads 1 [facility]  Last Colonoscopy: yrs ago  Last Bone Density:  normal

## 2023-06-28 LAB
COMMENT: NORMAL
FINAL PATHOLOGIC DIAGNOSIS: NORMAL
GROSS: NORMAL
Lab: NORMAL

## 2023-06-28 RX ORDER — IMIQUIMOD 12.5 MG/.25G
CREAM TOPICAL
Qty: 12 PACKET | Refills: 3 | Status: SHIPPED | OUTPATIENT
Start: 2023-06-28

## 2023-06-29 ENCOUNTER — PATIENT MESSAGE (OUTPATIENT)
Dept: OBSTETRICS AND GYNECOLOGY | Facility: CLINIC | Age: 58
End: 2023-06-29
Payer: COMMERCIAL

## 2023-06-29 NOTE — PROCEDURES
Biopsy (Gynecological)    Date/Time: 6/22/2023 2:30 PM  Performed by: José Miguel Leon MD  Authorized by: José Miguel Leon MD     Consent Done?:  Yes (Verbal)  Local anesthesia used?: Yes    Anesthesia:  Local infiltration  Local anesthetic:  Lidocaine 1% without epinephrine  Anesthetic total (ml):  2    Biopsy Location:  Vulva  Vulva:     # of lesions:  3     3 areas of white plaques c/w CoAc- punch bx taken x3

## 2023-06-29 NOTE — PROGRESS NOTES
Chief Complaint Well woman exam:  Annual Exam    She is established    Joy Crawford is a 58 y.o. female  presents for a well woman exam.    Would like 2 papillomas on left labia removed     Review of Systems -    No abdominal pain, No vaginal bleeding or discharge,   No breast pain or masses, No rectal bleeding     LMP: Patient's last menstrual period was 2018 (approximate)..  .  Meds per MD: none     Last Pap:  pap & hpv negative  Last MM- birads 1 [facility]  Last Colonoscopy: yrs ago  Last Bone Density:  normal         Past Medical History:   Diagnosis Date    Anxiety     Asthma     Depression     Esophageal reflux     H/O mammogram 2017    Normal  (DIS)     Herpes simplex virus (HSV) infection     no out breaks    Hypertension     Peptic ulcer     Sleep apnea     doesnt use cpap       Past Surgical History:   Procedure Laterality Date    ARTHROSCOPIC CHONDROPLASTY OF KNEE JOINT Right 2022    Procedure: ARTHROSCOPY, KNEE, WITH CHONDROPLASTY;  Surgeon: Elyssa Mitchell MD;  Location: [facility] OR;  Service: Orthopedics;  Laterality: Right;    CYST REMOVAL  2020    Procedure: EXCISION, CYST;  Surgeon: Gallo Maddox MD;  Location: [facility] OR;  Service: Orthopedics;;  COMPLEXT CYST LEFT  ANKLE AND FOOT    ENDOMETRIAL BIOPSY N/A 2020    Procedure: BIOPSY, ENDOMETRIUM;  Surgeon: José Miguel Leon MD;  Location: [facility] OR;  Service: OB/GYN;  Laterality: N/A;    foot ankle sx Left 2020    KNEE ARTHROSCOPY W/ MENISCECTOMY Right 2022    Procedure: ARTHROSCOPY, KNEE, MEDIAL AND LATERAL MENISCECTOMY - [facility];  Surgeon: Elyssa Mitchell MD;  Location: [facility] OR;  Service: Orthopedics;  Laterality: Right;    KNEE ARTHROSCOPY W/ PLICA EXCISION Right 2022    Procedure: EXCISION, PLICA, KNEE, ARTHROSCOPIC;  Surgeon: Elyssa Mitchell MD;  Location: [facility] OR;  Service: Orthopedics;  Laterality: Right;    KNEE DEBRIDEMENT  2022    Procedure: DEBRIDEMENT, KNEE;  Surgeon: Elyssa Mitchell  "MD;  Location: [facility] OR;  Service: Orthopedics;;    RHINOPLASTY  2006    SINUS SURGERY  2006    SPINAL FUSION  2021    L4-5    SYNOVECTOMY OF KNEE Right 2022    Procedure: PARTIAL SYNOVECTOMY, KNEE;  Surgeon: Elyssa Mitchell MD;  Location: [facility] OR;  Service: Orthopedics;  Laterality: Right;    VULVECTOMY N/A 2020    Procedure: VULVECTOMY;  Surgeon: José Miguel Leon MD;  Location: [facility] OR;  Service: OB/GYN;  Laterality: N/A;       OB History    Para Term  AB Living   1 1 1     1   SAB IAB Ectopic Multiple Live Births           1      # Outcome Date GA Lbr Balwinder/2nd Weight Sex Delivery Anes PTL Lv   1 Term  40w0d  3.175 kg (7 lb) M Vag-Spont EPI  ENEDIA       Family History   Problem Relation Age of Onset    Skin cancer Father     Hypertension Father     Hyperlipidemia Father     Hypertension Mother     Diabetes Mother     Hyperlipidemia Mother     No Known Problems Son     Depression Brother     Breast cancer Neg Hx        Social History     Tobacco Use    Smoking status: Never    Smokeless tobacco: Never   Substance Use Topics    Alcohol use: Yes     Comment: social    Drug use: No         Physical Exam:  Ht 5' 5" (1.651 m)   Wt 94.7 kg (208 lb 10.7 oz)   LMP 2018 (Approximate)   BMI 34.72 kg/m²     APPEARANCE: Well nourished, well developed, in no acute distress.  AFFECT: WNL, alert and oriented x 3  SKIN: No acne or hirsutism  BREASTS: Symmetrical, no skin changes.                     No nipple discharge.   No palpable masses bilaterally  NODES: No inguinal nor axillary LAD  ABDOMEN: soft Non tender Non distended No masses  PELVIC:   Normal external genitalia with 3 areas on left labia - white areas and consistent with Co Ac  Normal urethral meatus.    Vagina atrophic without lesions or discharge.    Cervix atrophic, without lesions, discharge or tenderness.    Bimanual exam shows uterus to be normal size, regular, mobile and nontender.    Adnexa without masses or tenderness.  "   EXTREMITIES: No edema.    ASSESSMENT AND PLAN    Joy was seen today for annual exam.    Diagnoses and all orders for this visit:    Encounter for gynecological examination    Vulvar lesion  -     Specimen to Pathology, Ob/Gyn    Condyloma acuminata  -     imiquimod (ALDARA) 5 % cream; Apply topically 3 (three) times a week.            Patient was counseled today on A.C.S. Pap guidelines and recommendations for yearly pelvic exams, mammograms and monthly self breast exams; to see her PCP for other health maintenance.     Patient encouraged to register for portal and results will be sent via portal.    No follow-ups on file.         Health Maintenance   Topic Date Due    Hepatitis C Screening  Never done    TETANUS VACCINE  Never done    Mammogram  04/10/2024    DEXA Scan  08/22/2026    Lipid Panel  08/05/2027

## 2023-07-04 ENCOUNTER — PATIENT MESSAGE (OUTPATIENT)
Dept: INTERNAL MEDICINE | Facility: CLINIC | Age: 58
End: 2023-07-04
Payer: COMMERCIAL

## 2023-07-04 ENCOUNTER — PATIENT MESSAGE (OUTPATIENT)
Dept: PSYCHIATRY | Facility: CLINIC | Age: 58
End: 2023-07-04
Payer: COMMERCIAL

## 2023-07-04 ENCOUNTER — PATIENT MESSAGE (OUTPATIENT)
Dept: SPORTS MEDICINE | Facility: CLINIC | Age: 58
End: 2023-07-04
Payer: COMMERCIAL

## 2023-07-04 DIAGNOSIS — Z12.11 SCREENING FOR MALIGNANT NEOPLASM OF COLON: Primary | ICD-10-CM

## 2023-07-05 DIAGNOSIS — F90.0 ADHD (ATTENTION DEFICIT HYPERACTIVITY DISORDER), INATTENTIVE TYPE: ICD-10-CM

## 2023-07-05 DIAGNOSIS — F33.42 RECURRENT MAJOR DEPRESSIVE DISORDER, IN FULL REMISSION: ICD-10-CM

## 2023-07-05 RX ORDER — DULOXETIN HYDROCHLORIDE 60 MG/1
60 CAPSULE, DELAYED RELEASE ORAL DAILY
Qty: 90 CAPSULE | Refills: 0 | Status: SHIPPED | OUTPATIENT
Start: 2023-07-05 | End: 2023-12-20 | Stop reason: SDUPTHER

## 2023-07-05 RX ORDER — LISDEXAMFETAMINE DIMESYLATE 60 MG/1
60 CAPSULE ORAL EVERY MORNING
Qty: 60 CAPSULE | Refills: 0 | Status: SHIPPED | OUTPATIENT
Start: 2023-07-05 | End: 2023-09-03

## 2023-07-06 PROBLEM — M25.672 DECREASED RANGE OF MOTION OF LEFT ANKLE: Status: ACTIVE | Noted: 2023-07-06

## 2023-07-10 ENCOUNTER — PATIENT MESSAGE (OUTPATIENT)
Dept: INTERNAL MEDICINE | Facility: CLINIC | Age: 58
End: 2023-07-10
Payer: COMMERCIAL

## 2023-07-10 RX ORDER — ALBUTEROL SULFATE 90 UG/1
AEROSOL, METERED RESPIRATORY (INHALATION)
Qty: 20.1 G | Refills: 0 | Status: SHIPPED | OUTPATIENT
Start: 2023-07-10 | End: 2023-07-10 | Stop reason: SDUPTHER

## 2023-07-10 RX ORDER — ALBUTEROL SULFATE 90 UG/1
2 AEROSOL, METERED RESPIRATORY (INHALATION) EVERY 6 HOURS PRN
Qty: 20.1 G | Refills: 1 | Status: SHIPPED | OUTPATIENT
Start: 2023-07-10 | End: 2023-11-30 | Stop reason: SDUPTHER

## 2023-07-12 ENCOUNTER — PATIENT MESSAGE (OUTPATIENT)
Dept: INTERNAL MEDICINE | Facility: CLINIC | Age: 58
End: 2023-07-12
Payer: COMMERCIAL

## 2023-07-12 DIAGNOSIS — E11.9 TYPE 2 DIABETES MELLITUS WITHOUT COMPLICATION, WITHOUT LONG-TERM CURRENT USE OF INSULIN: Primary | ICD-10-CM

## 2023-07-16 RX ORDER — SEMAGLUTIDE 0.68 MG/ML
0.5 INJECTION, SOLUTION SUBCUTANEOUS
Qty: 1 EACH | Refills: 3 | Status: SHIPPED | OUTPATIENT
Start: 2023-07-16

## 2023-07-19 ENCOUNTER — OFFICE VISIT (OUTPATIENT)
Dept: SPORTS MEDICINE | Facility: CLINIC | Age: 58
End: 2023-07-19
Payer: COMMERCIAL

## 2023-07-19 VITALS
HEIGHT: 65 IN | DIASTOLIC BLOOD PRESSURE: 90 MMHG | BODY MASS INDEX: 34.63 KG/M2 | WEIGHT: 207.88 LBS | HEART RATE: 81 BPM | SYSTOLIC BLOOD PRESSURE: 126 MMHG

## 2023-07-19 DIAGNOSIS — Z98.890 S/P ARTHROSCOPIC SURGERY OF RIGHT KNEE: Primary | ICD-10-CM

## 2023-07-19 PROCEDURE — 3044F HG A1C LEVEL LT 7.0%: CPT | Mod: CPTII,S$GLB,, | Performed by: ORTHOPAEDIC SURGERY

## 2023-07-19 PROCEDURE — 3008F BODY MASS INDEX DOCD: CPT | Mod: CPTII,S$GLB,, | Performed by: ORTHOPAEDIC SURGERY

## 2023-07-19 PROCEDURE — 1159F MED LIST DOCD IN RCRD: CPT | Mod: CPTII,S$GLB,, | Performed by: ORTHOPAEDIC SURGERY

## 2023-07-19 PROCEDURE — 99999 PR PBB SHADOW E&M-EST. PATIENT-LVL IV: ICD-10-PCS | Mod: PBBFAC,,, | Performed by: ORTHOPAEDIC SURGERY

## 2023-07-19 PROCEDURE — 1159F PR MEDICATION LIST DOCUMENTED IN MEDICAL RECORD: ICD-10-PCS | Mod: CPTII,S$GLB,, | Performed by: ORTHOPAEDIC SURGERY

## 2023-07-19 PROCEDURE — 3008F PR BODY MASS INDEX (BMI) DOCUMENTED: ICD-10-PCS | Mod: CPTII,S$GLB,, | Performed by: ORTHOPAEDIC SURGERY

## 2023-07-19 PROCEDURE — 3080F DIAST BP >= 90 MM HG: CPT | Mod: CPTII,S$GLB,, | Performed by: ORTHOPAEDIC SURGERY

## 2023-07-19 PROCEDURE — 3074F SYST BP LT 130 MM HG: CPT | Mod: CPTII,S$GLB,, | Performed by: ORTHOPAEDIC SURGERY

## 2023-07-19 PROCEDURE — 4010F PR ACE/ARB THEARPY RXD/TAKEN: ICD-10-PCS | Mod: CPTII,S$GLB,, | Performed by: ORTHOPAEDIC SURGERY

## 2023-07-19 PROCEDURE — 4010F ACE/ARB THERAPY RXD/TAKEN: CPT | Mod: CPTII,S$GLB,, | Performed by: ORTHOPAEDIC SURGERY

## 2023-07-19 PROCEDURE — 3074F PR MOST RECENT SYSTOLIC BLOOD PRESSURE < 130 MM HG: ICD-10-PCS | Mod: CPTII,S$GLB,, | Performed by: ORTHOPAEDIC SURGERY

## 2023-07-19 PROCEDURE — 99214 PR OFFICE/OUTPT VISIT, EST, LEVL IV, 30-39 MIN: ICD-10-PCS | Mod: S$GLB,,, | Performed by: ORTHOPAEDIC SURGERY

## 2023-07-19 PROCEDURE — 99999 PR PBB SHADOW E&M-EST. PATIENT-LVL IV: CPT | Mod: PBBFAC,,, | Performed by: ORTHOPAEDIC SURGERY

## 2023-07-19 PROCEDURE — 3080F PR MOST RECENT DIASTOLIC BLOOD PRESSURE >= 90 MM HG: ICD-10-PCS | Mod: CPTII,S$GLB,, | Performed by: ORTHOPAEDIC SURGERY

## 2023-07-19 PROCEDURE — 99214 OFFICE O/P EST MOD 30 MIN: CPT | Mod: S$GLB,,, | Performed by: ORTHOPAEDIC SURGERY

## 2023-07-19 PROCEDURE — 3044F PR MOST RECENT HEMOGLOBIN A1C LEVEL <7.0%: ICD-10-PCS | Mod: CPTII,S$GLB,, | Performed by: ORTHOPAEDIC SURGERY

## 2023-07-19 NOTE — PROGRESS NOTES
CC: right knee pain    History of present illness: Pt is here today for post-operative followup of knee arthroscopy.  she is doing well.  We have reviewed her findings and discussed plan of care and future treatment options.      Doing well.   Doing PT and HEP.  Having some intermittent knee swelling.     SANE 90  1/10 pain    Lost 40 lbs     DATE OF PROCEDURE: 07/07/2022  SURGEON:  Elyssa Mitchell M.D  PROCEDURE PERFORMED:   right  1. knee arthroscopic chondroplasty (CPT 38147)  2. knee arthroscopic medial and lateral (CPT 43935) meniscectomy   3. knee arthroscopic partial synovectomy/debridement (CPT 39010).   4. knee arthroscopic plica excision(CPT 05080).    5. Knee arthroscopic lysis of adhesions (CPT 11570)    In the patellofemoral compartment, there was chondral damage to:  Patella 30 x 40 mm grade 4  Chondroplasty was performed using arthroscopic shaver.     There was chondral damage to:  Medial femoral condyle 15 x 20 mm grade 3  Chondroplasty was performed using arthroscopic shaver.     There was chondral damage to:  Lateral femoral condyle 20 x 20 mm grade 3  Lateral tibial plateau 10 x 10 mm grade 4  Chondroplasty was performed using arthroscopic shaver.                                                                               PHYSICAL EXAMINATION:     Incision sites healed well  No evidence of any erythema, infection or induration  Range of motion 0-135 degrees  Minimal effusion  2+ DP pulse  No swelling, no calf tenderness  - Polly's sign  Negative medial joint line tenderness  Moderate quad atrophy                                                                                 ASSESSMENT:                                                                                                                                               1. Status post above, doing well.                                                                                                                               PLAN:                                                                                                                                                      1. Continue with PT  2. Emphasized quad function.  3. I have discussed return to activity in detail.  4.Patient will see us back at 6 week post-op travis.                                    5. All questions were answered and patient should contact us if she  has any questions or concerns in the interim.

## 2023-07-27 ENCOUNTER — TELEPHONE (OUTPATIENT)
Dept: ENDOSCOPY | Facility: HOSPITAL | Age: 58
End: 2023-07-27

## 2023-07-27 ENCOUNTER — CLINICAL SUPPORT (OUTPATIENT)
Dept: ENDOSCOPY | Facility: HOSPITAL | Age: 58
End: 2023-07-27
Attending: INTERNAL MEDICINE
Payer: COMMERCIAL

## 2023-07-27 VITALS — BODY MASS INDEX: 33.32 KG/M2 | WEIGHT: 200 LBS | HEIGHT: 65 IN

## 2023-07-27 DIAGNOSIS — Z12.11 SPECIAL SCREENING FOR MALIGNANT NEOPLASMS, COLON: Primary | ICD-10-CM

## 2023-07-27 DIAGNOSIS — Z12.11 SCREENING FOR MALIGNANT NEOPLASM OF COLON: ICD-10-CM

## 2023-07-27 RX ORDER — SOD SULF/POT CHLORIDE/MAG SULF 1.479 G
12 TABLET ORAL DAILY
Qty: 24 TABLET | Refills: 0 | Status: SHIPPED | OUTPATIENT
Start: 2023-07-27

## 2023-07-27 NOTE — TELEPHONE ENCOUNTER
Spoke to patient to schedule procedure(s) Colonoscopy       Physician to perform procedure(s) Dr. ZULEYMA Zurita  Date of Procedure (s) 10/4/23  Arrival Time 9:55 AM  Time of Procedure(s) 10:55 AM   Location of Procedure(s) [facility] [address]  Type of Rx Prep sent to patient: Sutab  Instructions provided to patient via [facility]    Patient was informed on the following information and verbalized understanding. Screening questionnaire reviewed with patient and complete. If procedure requires anesthesia, a responsible adult needs to be present to accompany the patient home, patient cannot drive after receiving anesthesia. Appointment details are tentative, especially check-in time. Patient will receive a prep-op call 4 days prior to confirm check-in time for procedure. If applicable the patient should contact their pharmacy to verify Rx for procedure prep is ready for pick-up. Patient was advised to call the scheduling department at [phone] if pharmacy states no Rx is available. Patient was advised to call the endoscopy scheduling department if any questions or concerns arise.      [facility] Scheduling Department

## 2023-08-17 NOTE — ANESTHESIA POSTPROCEDURE EVALUATION
Anesthesia Post Evaluation    Patient: Joy Crawford    Procedure(s) Performed: Procedure(s) (LRB):  VULVECTOMY (N/A)  BIOPSY, ENDOMETRIUM (N/A)    Final Anesthesia Type: general    Patient location during evaluation: PACU  Patient participation: Yes- Able to Participate  Level of consciousness: awake and alert  Post-procedure vital signs: reviewed and stable  Pain management: adequate  Airway patency: patent    PONV status at discharge: No PONV  Anesthetic complications: no      Cardiovascular status: blood pressure returned to baseline  Respiratory status: unassisted, spontaneous ventilation and room air  Hydration status: euvolemic  Follow-up not needed.          Vitals Value Taken Time   /71 3/11/2020 12:20 PM   Temp 36.9 °C (98.5 °F) 3/11/2020 12:20 PM   Pulse 82 3/11/2020 12:25 PM   Resp 18 3/11/2020 12:20 PM   SpO2 97 % 3/11/2020 12:25 PM   Vitals shown include unvalidated device data.      Event Time     Out of Recovery 12:27:22          Pain/Osmar Score: Pain Rating Prior to Med Admin: 4 (3/11/2020 12:09 PM)  Pain Rating Post Med Admin: 4 (3/11/2020 12:25 PM)  Osmar Score: 10 (3/11/2020 12:25 PM)        
2014

## 2023-08-28 ENCOUNTER — OFFICE VISIT (OUTPATIENT)
Dept: OBSTETRICS AND GYNECOLOGY | Facility: CLINIC | Age: 58
End: 2023-08-28
Attending: OBSTETRICS & GYNECOLOGY
Payer: COMMERCIAL

## 2023-08-28 VITALS — HEIGHT: 65 IN | WEIGHT: 200.63 LBS | BODY MASS INDEX: 33.43 KG/M2

## 2023-08-28 DIAGNOSIS — A63.0 CONDYLOMA ACUMINATA: ICD-10-CM

## 2023-08-28 DIAGNOSIS — N90.0 VIN I (VULVAR INTRAEPITHELIAL NEOPLASIA I): Primary | ICD-10-CM

## 2023-08-28 PROCEDURE — 99999 PR PBB SHADOW E&M-EST. PATIENT-LVL IV: CPT | Mod: PBBFAC,,, | Performed by: OBSTETRICS & GYNECOLOGY

## 2023-08-28 PROCEDURE — 4010F ACE/ARB THERAPY RXD/TAKEN: CPT | Mod: CPTII,S$GLB,, | Performed by: OBSTETRICS & GYNECOLOGY

## 2023-08-28 PROCEDURE — 3044F HG A1C LEVEL LT 7.0%: CPT | Mod: CPTII,S$GLB,, | Performed by: OBSTETRICS & GYNECOLOGY

## 2023-08-28 PROCEDURE — 88305 TISSUE EXAM BY PATHOLOGIST: ICD-10-PCS | Mod: 26,,, | Performed by: PATHOLOGY

## 2023-08-28 PROCEDURE — 3008F PR BODY MASS INDEX (BMI) DOCUMENTED: ICD-10-PCS | Mod: CPTII,S$GLB,, | Performed by: OBSTETRICS & GYNECOLOGY

## 2023-08-28 PROCEDURE — 3008F BODY MASS INDEX DOCD: CPT | Mod: CPTII,S$GLB,, | Performed by: OBSTETRICS & GYNECOLOGY

## 2023-08-28 PROCEDURE — 88305 TISSUE EXAM BY PATHOLOGIST: CPT | Mod: 26,,, | Performed by: PATHOLOGY

## 2023-08-28 PROCEDURE — 56605 PR BIOPSY VULVA/PERINEUM,ONE LESN: ICD-10-PCS | Mod: S$GLB,,, | Performed by: OBSTETRICS & GYNECOLOGY

## 2023-08-28 PROCEDURE — 4010F PR ACE/ARB THEARPY RXD/TAKEN: ICD-10-PCS | Mod: CPTII,S$GLB,, | Performed by: OBSTETRICS & GYNECOLOGY

## 2023-08-28 PROCEDURE — 1159F MED LIST DOCD IN RCRD: CPT | Mod: CPTII,S$GLB,, | Performed by: OBSTETRICS & GYNECOLOGY

## 2023-08-28 PROCEDURE — 3044F PR MOST RECENT HEMOGLOBIN A1C LEVEL <7.0%: ICD-10-PCS | Mod: CPTII,S$GLB,, | Performed by: OBSTETRICS & GYNECOLOGY

## 2023-08-28 PROCEDURE — 1159F PR MEDICATION LIST DOCUMENTED IN MEDICAL RECORD: ICD-10-PCS | Mod: CPTII,S$GLB,, | Performed by: OBSTETRICS & GYNECOLOGY

## 2023-08-28 PROCEDURE — 99213 OFFICE O/P EST LOW 20 MIN: CPT | Mod: 25,S$GLB,, | Performed by: OBSTETRICS & GYNECOLOGY

## 2023-08-28 PROCEDURE — 99213 PR OFFICE/OUTPT VISIT, EST, LEVL III, 20-29 MIN: ICD-10-PCS | Mod: 25,S$GLB,, | Performed by: OBSTETRICS & GYNECOLOGY

## 2023-08-28 PROCEDURE — 56605 BIOPSY OF VULVA/PERINEUM: CPT | Mod: S$GLB,,, | Performed by: OBSTETRICS & GYNECOLOGY

## 2023-08-28 PROCEDURE — 88305 TISSUE EXAM BY PATHOLOGIST: CPT | Performed by: PATHOLOGY

## 2023-08-28 PROCEDURE — 99999 PR PBB SHADOW E&M-EST. PATIENT-LVL IV: ICD-10-PCS | Mod: PBBFAC,,, | Performed by: OBSTETRICS & GYNECOLOGY

## 2023-08-28 NOTE — PROGRESS NOTES
Subjective:       Patient ID: Joy Crawford is a 58 y.o. female.    Chief Complaint:  Follow-up (Condyloma & Aldara)      History of Present Illness  Here for f/u of CoAc and AVELINA from  3 areas on left labia - white CoAc with VIN1  Started Aldara and instructed use for 12 weeks -   Has been applying a pkt all over but not 2-3 x a week - not consistent   Here today for 6 week f/u  Feels a small bump by rectum       GYN & OB History  Patient's last menstrual period was 2018 (approximate).   Date of Last Pap: 2021    OB History    Para Term  AB Living   1 1 1     1   SAB IAB Ectopic Multiple Live Births           1      # Outcome Date GA Lbr Balwinder/2nd Weight Sex Delivery Anes PTL Lv   1 Term  40w0d  3.175 kg (7 lb) M Vag-Spont EPI  ENEIDA        Objective:     There were no vitals filed for this visit.    Physical Exam:   Constitutional: She appears well-developed and well-nourished.               Genitourinary:    Vagina normal.         The external female genitalia was normal.                  Skin: Skin is warm and dry.    Psychiatric: She has a normal mood and affect. Her behavior is normal.     Vulva/ perineum:   No CoAc or raised skin seen on vulva   One area of hyperpig on left labia - poss scar from prev excision - smooth   Small 1-2mm CoAc noted ot left of rectum,    Procedure:   Small 1-2mm Coac to left of rectum removed with wase and sent to lab   Silver nitrate applied           Assessment/ Plan:     Orders Placed This Encounter    Ambulatory referral/consult to Gynecologic Oncology       Joy was seen today for follow-up.    Diagnoses and all orders for this visit:    AVELINA I (vulvar intraepithelial neoplasia I)  -     Ambulatory referral/consult to Gynecologic Oncology; Future    Condyloma acuminata  -     Ambulatory referral/consult to Gynecologic Oncology; Future    Pt not using Aldara reliably  Looks overall ok today  Will refer to GYN ONC for long term plan of care  For now  cont aldara 2 a week for 3 months and will follow pt every 6 months with repeat exam     Follow up in about 6 months (around 2/28/2024) for medication followup.      Health Maintenance         Date Due Completion Date    Hepatitis C Screening Never done ---    HIV Screening Never done ---    TETANUS VACCINE Never done ---    Colorectal Cancer Screening Never done ---    Shingles Vaccine (1 of 2) Never done ---    COVID-19 Vaccine (3 - Pfizer series) 09/23/2021 7/29/2021    Cervical Cancer Screening 12/03/2021 12/3/2020    Influenza Vaccine (1) 09/01/2023 9/30/2022    Pneumococcal Vaccines (Age 0-64) (2 - PCV) 10/03/2023 10/3/2022    Mammogram 04/10/2024 4/10/2023    DEXA Scan 08/22/2026 8/22/2022    Lipid Panel 08/05/2027 8/5/2022

## 2023-08-30 ENCOUNTER — TELEPHONE (OUTPATIENT)
Dept: GYNECOLOGIC ONCOLOGY | Facility: CLINIC | Age: 58
End: 2023-08-30
Payer: COMMERCIAL

## 2023-09-01 LAB
FINAL PATHOLOGIC DIAGNOSIS: NORMAL
GROSS: NORMAL
Lab: NORMAL

## 2023-09-02 DIAGNOSIS — F33.42 RECURRENT MAJOR DEPRESSIVE DISORDER, IN FULL REMISSION: ICD-10-CM

## 2023-09-04 ENCOUNTER — PATIENT MESSAGE (OUTPATIENT)
Dept: PSYCHIATRY | Facility: CLINIC | Age: 58
End: 2023-09-04
Payer: COMMERCIAL

## 2023-09-04 RX ORDER — BUPROPION HYDROCHLORIDE 200 MG/1
200 TABLET, EXTENDED RELEASE ORAL
Qty: 30 TABLET | Refills: 0 | Status: SHIPPED | OUTPATIENT
Start: 2023-09-04 | End: 2023-09-09 | Stop reason: SDUPTHER

## 2023-09-07 ENCOUNTER — TELEPHONE (OUTPATIENT)
Dept: OBSTETRICS AND GYNECOLOGY | Facility: CLINIC | Age: 58
End: 2023-09-07
Payer: COMMERCIAL

## 2023-09-07 NOTE — TELEPHONE ENCOUNTER
Advised per Dr. Leon.  Pt feels reassured.  
Iliana can you try to calk pt  She did not see my portal message below    Good news! Biopsy is back.  Just a regular condyloma No abnormal cells :)    Path: Condyloma acuminata       -  Negative for high-grade dysplasia or malignancy  
5 year old female received aox3 ambulatory accompanied by parents c/o bleeding to upper lip after child tripped and fell on a rug at home. denies head trauma/loc. dry blood noted to area. no other injuries noted.

## 2023-09-09 DIAGNOSIS — F33.42 RECURRENT MAJOR DEPRESSIVE DISORDER, IN FULL REMISSION: ICD-10-CM

## 2023-09-11 RX ORDER — BUPROPION HYDROCHLORIDE 200 MG/1
200 TABLET, EXTENDED RELEASE ORAL DAILY
Qty: 30 TABLET | Refills: 0 | Status: SHIPPED | OUTPATIENT
Start: 2023-09-11 | End: 2023-10-12 | Stop reason: SDUPTHER

## 2023-09-18 RX ORDER — FLUTICASONE FUROATE AND VILANTEROL TRIFENATATE 200; 25 UG/1; UG/1
1 POWDER RESPIRATORY (INHALATION)
Qty: 60 EACH | Refills: 6 | Status: SHIPPED | OUTPATIENT
Start: 2023-09-18

## 2023-10-01 ENCOUNTER — PATIENT MESSAGE (OUTPATIENT)
Dept: ENDOSCOPY | Facility: HOSPITAL | Age: 58
End: 2023-10-01
Payer: COMMERCIAL

## 2023-10-02 ENCOUNTER — TELEPHONE (OUTPATIENT)
Dept: ENDOSCOPY | Facility: HOSPITAL | Age: 58
End: 2023-10-02
Payer: COMMERCIAL

## 2023-10-03 ENCOUNTER — TELEPHONE (OUTPATIENT)
Dept: ENDOSCOPY | Facility: HOSPITAL | Age: 58
End: 2023-10-03
Payer: COMMERCIAL

## 2023-10-03 NOTE — TELEPHONE ENCOUNTER
Contacted the patient to confirm canceling procedure on 10/04. The patient did not answer the call and was unable to leave voice message. Procedure has been cancelled at pt's request.

## 2023-10-03 NOTE — TELEPHONE ENCOUNTER
----- Message from Lise Dow sent at 10/3/2023 12:49 PM CDT -----  Contact: [phone]    ----- Message -----  From: Flora Bravo  Sent: 10/3/2023  12:24 PM CDT  To: [facility] Endo Schedulers    Patient is calling in regards to her 10/4 procedure. Pt stated that she is not feeling well and would have to cancel the procedure. Please call her back at [phone]. Thanks KB

## 2023-10-09 ENCOUNTER — OFFICE VISIT (OUTPATIENT)
Dept: INTERNAL MEDICINE | Facility: CLINIC | Age: 58
End: 2023-10-09
Payer: COMMERCIAL

## 2023-10-09 VITALS
SYSTOLIC BLOOD PRESSURE: 110 MMHG | WEIGHT: 207 LBS | HEART RATE: 86 BPM | DIASTOLIC BLOOD PRESSURE: 82 MMHG | BODY MASS INDEX: 34.49 KG/M2 | OXYGEN SATURATION: 97 % | HEIGHT: 65 IN

## 2023-10-09 DIAGNOSIS — Z12.11 SCREENING FOR MALIGNANT NEOPLASM OF COLON: ICD-10-CM

## 2023-10-09 DIAGNOSIS — E11.9 TYPE 2 DIABETES MELLITUS WITHOUT COMPLICATION, WITHOUT LONG-TERM CURRENT USE OF INSULIN: ICD-10-CM

## 2023-10-09 DIAGNOSIS — Z12.31 SCREENING MAMMOGRAM FOR BREAST CANCER: ICD-10-CM

## 2023-10-09 DIAGNOSIS — Z00.00 ROUTINE GENERAL MEDICAL EXAMINATION AT A HEALTH CARE FACILITY: Primary | ICD-10-CM

## 2023-10-09 PROCEDURE — 3079F DIAST BP 80-89 MM HG: CPT | Mod: CPTII,S$GLB,, | Performed by: INTERNAL MEDICINE

## 2023-10-09 PROCEDURE — 99999 PR PBB SHADOW E&M-EST. PATIENT-LVL V: CPT | Mod: PBBFAC,,, | Performed by: INTERNAL MEDICINE

## 2023-10-09 PROCEDURE — 99396 PR PREVENTIVE VISIT,EST,40-64: ICD-10-PCS | Mod: S$GLB,,, | Performed by: INTERNAL MEDICINE

## 2023-10-09 PROCEDURE — 3008F BODY MASS INDEX DOCD: CPT | Mod: CPTII,S$GLB,, | Performed by: INTERNAL MEDICINE

## 2023-10-09 PROCEDURE — 4010F ACE/ARB THERAPY RXD/TAKEN: CPT | Mod: CPTII,S$GLB,, | Performed by: INTERNAL MEDICINE

## 2023-10-09 PROCEDURE — 3044F HG A1C LEVEL LT 7.0%: CPT | Mod: CPTII,S$GLB,, | Performed by: INTERNAL MEDICINE

## 2023-10-09 PROCEDURE — 3008F PR BODY MASS INDEX (BMI) DOCUMENTED: ICD-10-PCS | Mod: CPTII,S$GLB,, | Performed by: INTERNAL MEDICINE

## 2023-10-09 PROCEDURE — 1159F MED LIST DOCD IN RCRD: CPT | Mod: CPTII,S$GLB,, | Performed by: INTERNAL MEDICINE

## 2023-10-09 PROCEDURE — 4010F PR ACE/ARB THEARPY RXD/TAKEN: ICD-10-PCS | Mod: CPTII,S$GLB,, | Performed by: INTERNAL MEDICINE

## 2023-10-09 PROCEDURE — 99999 PR PBB SHADOW E&M-EST. PATIENT-LVL V: ICD-10-PCS | Mod: PBBFAC,,, | Performed by: INTERNAL MEDICINE

## 2023-10-09 PROCEDURE — 3044F PR MOST RECENT HEMOGLOBIN A1C LEVEL <7.0%: ICD-10-PCS | Mod: CPTII,S$GLB,, | Performed by: INTERNAL MEDICINE

## 2023-10-09 PROCEDURE — 1159F PR MEDICATION LIST DOCUMENTED IN MEDICAL RECORD: ICD-10-PCS | Mod: CPTII,S$GLB,, | Performed by: INTERNAL MEDICINE

## 2023-10-09 PROCEDURE — 3074F SYST BP LT 130 MM HG: CPT | Mod: CPTII,S$GLB,, | Performed by: INTERNAL MEDICINE

## 2023-10-09 PROCEDURE — 99396 PREV VISIT EST AGE 40-64: CPT | Mod: S$GLB,,, | Performed by: INTERNAL MEDICINE

## 2023-10-09 PROCEDURE — 3079F PR MOST RECENT DIASTOLIC BLOOD PRESSURE 80-89 MM HG: ICD-10-PCS | Mod: CPTII,S$GLB,, | Performed by: INTERNAL MEDICINE

## 2023-10-09 PROCEDURE — 3074F PR MOST RECENT SYSTOLIC BLOOD PRESSURE < 130 MM HG: ICD-10-PCS | Mod: CPTII,S$GLB,, | Performed by: INTERNAL MEDICINE

## 2023-10-09 RX ORDER — LISINOPRIL 10 MG/1
10 TABLET ORAL DAILY
Qty: 90 TABLET | Refills: 4 | Status: SHIPPED | OUTPATIENT
Start: 2023-10-09

## 2023-10-09 RX ORDER — ONDANSETRON 4 MG/1
4 TABLET, FILM COATED ORAL EVERY 8 HOURS PRN
Qty: 30 TABLET | Refills: 1 | Status: SHIPPED | OUTPATIENT
Start: 2023-10-09

## 2023-10-09 NOTE — PROGRESS NOTES
Chief Complaint: Annual exam and Follow up of multiple issues     HPI: This is a 58 year old woman who presents for above.      She is taking Ozempic 0.5 mg once weekly. She is nauseated for several days after the injection.  No heartburn. NO constipation or diarrhea. She feels that the Ozempic is no longer supressing her appetite. She has gained 7 pounds recently.  She has been more active since school has started this fall.  She has been eating pre packaged meals.      She had a massage 8/1/2021 - had low back pain. She wound up having fusion at L4 and L5 with Dr Rueda in [place] on 9/21/2021.  Back is good.   Left hip is better (had some muscluar pain)     End of April 2022 - tore her right meniscus - had a scope 7/7/2022 with Elyssa Mitchell MD- knee is doing fine.     She has had asthma since a young child. She was hospitalized as a child. She lived in [place] as a child. She has not been hospitalized for her asthma as an adult.  She saw Dr Nunn in allergy on 10/3/2022. She has been using Neril Med Sinus rinse twice daily which is helping. She has not needed astelin nasal spray. She takes Breo one puff daily.  She takes prednisone when she feels a flare coming.   She has lots of allergies.  She has been breathing well. She saw pulmonary on 5/26/20 and has moderate to severe obstruction.  She has seen allergy clinic on 6/2/20 - she has a very high IGE level and has lots of allergies. She has had allergy shots as a child.  She had an allergic reaction to peanut butter and jelly sandwich Jan 2022.  She has avoided peanuts since then.      She had a ganglion cyst removed from her left ankle by Dr Maddox on 6/25/20.  The left ankle is much improved. She will have left ankle pain at times. Foot pain from plantar fasciitis comes and goes - doing fine right now. .      She is seeing Matilda Jensen NP in psychiatry and did therapy with Robel TABOR. She is feeling better.  She continues to take Cymbalta 60  mg daily, Wellbutrin  mg daily and buspirone 10 mg in the morning (has RX to take twice daily) . SHe is taking Vyvanse 40 mg daily for ADHD. SHe has not taken Vyvanse 50 mg in the last several weeks.  SHe is taking trazodone 150 mg at bedtime which is helping her sleep.    SHe has seen sleep medicine and has been diagnoed with sleep apnea. She has a CPAP machine. She is out of the routine of wearing it.  She feels more refreshed in the morning when she wakes up.       She takes lisinopril 10 mg once daily for hypertension.      No periods.                Past Medical History:   Diagnosis Date    Anxiety      Asthma      Depression      Esophageal reflux      H/O mammogram 01/20/2017     Normal  (DIS)     Herpes simplex virus (HSV) infection       no out breaks    Hypertension      Peptic ulcer      Sleep apnea       doesnt use cpap                Past Surgical History:   Procedure Laterality Date    ENDOMETRIAL BIOPSY N/A 3/11/2020     Procedure: BIOPSY, ENDOMETRIUM;  Surgeon: José Miguel Leon MD;  Location: [facility] OR;  Service: OB/GYN;  Laterality: N/A;    RHINOPLASTY   2006    SINUS SURGERY   2006    VULVECTOMY N/A 3/11/2020     Procedure: VULVECTOMY;  Surgeon: José Miguel Leon MD;  Location: [facility];  Service: OB/GYN;  Laterality: N/A;            Meds and allergies: updated on EPIC     REVIEW OF SYSTEMS: She denies fevers, chills, night sweats, fatigue, visual change, hearing loss, sinus congestion, sore throat, chest pain, nausea, vomiting, diarrhea, dysuria, hematuria, polydipsia, polyuria headaches        Physical exam:           General: alert, oriented x 3, no apparent distress.  Affect normal  HEENT: Conjunctivae: anicteric,  Neck: supple, no thyroid enlargement, no cervical lymphadenopathy  Resp: effort normal, lungs clear bilaterally  CV: Regular rate and rhythm without murmurs, gallops or rubs, no lower extremity edema,       labs 10/6/23 reviewed - A1C 5.2 on Ozempic    Assessment/Plan:    Annual exam -  discussed diet, exercise and safety issues.      Asthma and Allergies- stable    Anxiety and depression -stable     Sleep apnea - try to wear CPAP regluarly     S/p left ankle surgery - doing better.    Vitamin D deficiency -start vitamin D 1000 units daily     Vitamin B12 deficiency - on vitamin B12 1000 mcg daily,      Diabetes - Failed meformin in the past with diarrhea. Nausea from Ozempic and Truliicity. Will try Mounjaro 7.5 mg once weekly       PAP 12/2020  GYn exam 12/28/22    She had to cancel her c olonoscopy this past week.  Reordered  She is to return in 12 weeks, sooner if problems arise

## 2023-10-11 DIAGNOSIS — E11.9 TYPE 2 DIABETES MELLITUS WITHOUT COMPLICATION: ICD-10-CM

## 2023-10-12 ENCOUNTER — PATIENT MESSAGE (OUTPATIENT)
Dept: PSYCHIATRY | Facility: CLINIC | Age: 58
End: 2023-10-12
Payer: COMMERCIAL

## 2023-10-12 DIAGNOSIS — F90.0 ADHD (ATTENTION DEFICIT HYPERACTIVITY DISORDER), INATTENTIVE TYPE: ICD-10-CM

## 2023-10-12 DIAGNOSIS — F33.42 RECURRENT MAJOR DEPRESSIVE DISORDER, IN FULL REMISSION: ICD-10-CM

## 2023-10-12 RX ORDER — LISDEXAMFETAMINE DIMESYLATE 60 MG/1
60 CAPSULE ORAL EVERY MORNING
Qty: 30 CAPSULE | Refills: 0 | Status: SHIPPED | OUTPATIENT
Start: 2023-10-12 | End: 2023-11-11

## 2023-10-12 RX ORDER — BUPROPION HYDROCHLORIDE 200 MG/1
200 TABLET, EXTENDED RELEASE ORAL DAILY
Qty: 30 TABLET | Refills: 0 | Status: SHIPPED | OUTPATIENT
Start: 2023-10-12 | End: 2023-11-17 | Stop reason: SDUPTHER

## 2023-10-16 ENCOUNTER — PATIENT MESSAGE (OUTPATIENT)
Dept: ADMINISTRATIVE | Facility: HOSPITAL | Age: 58
End: 2023-10-16
Payer: COMMERCIAL

## 2023-10-18 ENCOUNTER — PATIENT MESSAGE (OUTPATIENT)
Dept: PSYCHIATRY | Facility: CLINIC | Age: 58
End: 2023-10-18
Payer: COMMERCIAL

## 2023-11-07 ENCOUNTER — CLINICAL SUPPORT (OUTPATIENT)
Dept: ENDOSCOPY | Facility: HOSPITAL | Age: 58
End: 2023-11-07
Attending: INTERNAL MEDICINE
Payer: COMMERCIAL

## 2023-11-07 ENCOUNTER — TELEPHONE (OUTPATIENT)
Dept: ENDOSCOPY | Facility: HOSPITAL | Age: 58
End: 2023-11-07

## 2023-11-07 DIAGNOSIS — Z12.11 SCREENING FOR MALIGNANT NEOPLASM OF COLON: ICD-10-CM

## 2023-11-07 NOTE — PLAN OF CARE
Attempted to contact the patient to schedule endoscopy procedure. The patient did not answer the call and voicemail full. Message sent to patient portal.

## 2023-11-16 ENCOUNTER — TELEPHONE (OUTPATIENT)
Dept: GYNECOLOGIC ONCOLOGY | Facility: CLINIC | Age: 58
End: 2023-11-16
Payer: COMMERCIAL

## 2023-11-17 ENCOUNTER — PATIENT MESSAGE (OUTPATIENT)
Dept: PSYCHIATRY | Facility: CLINIC | Age: 58
End: 2023-11-17
Payer: COMMERCIAL

## 2023-11-17 DIAGNOSIS — F90.0 ADHD (ATTENTION DEFICIT HYPERACTIVITY DISORDER), INATTENTIVE TYPE: ICD-10-CM

## 2023-11-17 DIAGNOSIS — F33.42 RECURRENT MAJOR DEPRESSIVE DISORDER, IN FULL REMISSION: ICD-10-CM

## 2023-11-17 RX ORDER — LISDEXAMFETAMINE DIMESYLATE 50 MG/1
50 CAPSULE ORAL EVERY MORNING
Qty: 13 CAPSULE | Refills: 0 | Status: SHIPPED | OUTPATIENT
Start: 2023-11-17 | End: 2023-12-20 | Stop reason: SDUPTHER

## 2023-11-17 RX ORDER — BUPROPION HYDROCHLORIDE 200 MG/1
200 TABLET, EXTENDED RELEASE ORAL DAILY
Qty: 30 TABLET | Refills: 0 | Status: SHIPPED | OUTPATIENT
Start: 2023-11-17 | End: 2023-12-20 | Stop reason: SDUPTHER

## 2023-11-27 ENCOUNTER — E-VISIT (OUTPATIENT)
Dept: INTERNAL MEDICINE | Facility: CLINIC | Age: 58
End: 2023-11-27
Payer: COMMERCIAL

## 2023-11-27 DIAGNOSIS — J44.1 COPD EXACERBATION: Primary | ICD-10-CM

## 2023-11-28 ENCOUNTER — OFFICE VISIT (OUTPATIENT)
Dept: INTERNAL MEDICINE | Facility: CLINIC | Age: 58
End: 2023-11-28
Payer: COMMERCIAL

## 2023-11-28 ENCOUNTER — PATIENT MESSAGE (OUTPATIENT)
Dept: INTERNAL MEDICINE | Facility: CLINIC | Age: 58
End: 2023-11-28
Payer: COMMERCIAL

## 2023-11-28 VITALS
HEART RATE: 93 BPM | SYSTOLIC BLOOD PRESSURE: 118 MMHG | OXYGEN SATURATION: 96 % | WEIGHT: 217.81 LBS | BODY MASS INDEX: 36.25 KG/M2 | DIASTOLIC BLOOD PRESSURE: 80 MMHG

## 2023-11-28 DIAGNOSIS — J06.9 URI, ACUTE: Primary | ICD-10-CM

## 2023-11-28 PROCEDURE — 4010F PR ACE/ARB THEARPY RXD/TAKEN: ICD-10-PCS | Mod: CPTII,S$GLB,, | Performed by: INTERNAL MEDICINE

## 2023-11-28 PROCEDURE — 1160F RVW MEDS BY RX/DR IN RCRD: CPT | Mod: CPTII,S$GLB,, | Performed by: INTERNAL MEDICINE

## 2023-11-28 PROCEDURE — 99212 PR OFFICE/OUTPT VISIT, EST, LEVL II, 10-19 MIN: ICD-10-PCS | Mod: S$GLB,,, | Performed by: INTERNAL MEDICINE

## 2023-11-28 PROCEDURE — 1159F PR MEDICATION LIST DOCUMENTED IN MEDICAL RECORD: ICD-10-PCS | Mod: CPTII,S$GLB,, | Performed by: INTERNAL MEDICINE

## 2023-11-28 PROCEDURE — 99422 PR E&M, ONLINE DIGIT, EST, < 7 DAYS,  11-20 MINS: ICD-10-PCS | Mod: ,,, | Performed by: INTERNAL MEDICINE

## 2023-11-28 PROCEDURE — 1160F PR REVIEW ALL MEDS BY PRESCRIBER/CLIN PHARMACIST DOCUMENTED: ICD-10-PCS | Mod: CPTII,S$GLB,, | Performed by: INTERNAL MEDICINE

## 2023-11-28 PROCEDURE — 99212 OFFICE O/P EST SF 10 MIN: CPT | Mod: S$GLB,,, | Performed by: INTERNAL MEDICINE

## 2023-11-28 PROCEDURE — 3079F DIAST BP 80-89 MM HG: CPT | Mod: CPTII,S$GLB,, | Performed by: INTERNAL MEDICINE

## 2023-11-28 PROCEDURE — 3008F PR BODY MASS INDEX (BMI) DOCUMENTED: ICD-10-PCS | Mod: CPTII,S$GLB,, | Performed by: INTERNAL MEDICINE

## 2023-11-28 PROCEDURE — 99999 PR PBB SHADOW E&M-EST. PATIENT-LVL III: ICD-10-PCS | Mod: PBBFAC,,, | Performed by: INTERNAL MEDICINE

## 2023-11-28 PROCEDURE — 99422 OL DIG E/M SVC 11-20 MIN: CPT | Mod: ,,, | Performed by: INTERNAL MEDICINE

## 2023-11-28 PROCEDURE — 1159F MED LIST DOCD IN RCRD: CPT | Mod: CPTII,S$GLB,, | Performed by: INTERNAL MEDICINE

## 2023-11-28 PROCEDURE — 3074F PR MOST RECENT SYSTOLIC BLOOD PRESSURE < 130 MM HG: ICD-10-PCS | Mod: CPTII,S$GLB,, | Performed by: INTERNAL MEDICINE

## 2023-11-28 PROCEDURE — 4010F ACE/ARB THERAPY RXD/TAKEN: CPT | Mod: CPTII,S$GLB,, | Performed by: INTERNAL MEDICINE

## 2023-11-28 PROCEDURE — 3079F PR MOST RECENT DIASTOLIC BLOOD PRESSURE 80-89 MM HG: ICD-10-PCS | Mod: CPTII,S$GLB,, | Performed by: INTERNAL MEDICINE

## 2023-11-28 PROCEDURE — 99999 PR PBB SHADOW E&M-EST. PATIENT-LVL III: CPT | Mod: PBBFAC,,, | Performed by: INTERNAL MEDICINE

## 2023-11-28 PROCEDURE — 3074F SYST BP LT 130 MM HG: CPT | Mod: CPTII,S$GLB,, | Performed by: INTERNAL MEDICINE

## 2023-11-28 PROCEDURE — 3044F HG A1C LEVEL LT 7.0%: CPT | Mod: CPTII,S$GLB,, | Performed by: INTERNAL MEDICINE

## 2023-11-28 PROCEDURE — 3044F PR MOST RECENT HEMOGLOBIN A1C LEVEL <7.0%: ICD-10-PCS | Mod: CPTII,S$GLB,, | Performed by: INTERNAL MEDICINE

## 2023-11-28 PROCEDURE — 3008F BODY MASS INDEX DOCD: CPT | Mod: CPTII,S$GLB,, | Performed by: INTERNAL MEDICINE

## 2023-11-28 RX ORDER — DOXYCYCLINE HYCLATE 100 MG
100 TABLET ORAL 2 TIMES DAILY
Qty: 20 TABLET | Refills: 0 | Status: SHIPPED | OUTPATIENT
Start: 2023-11-28 | End: 2023-12-08

## 2023-11-28 RX ORDER — PREDNISONE 10 MG/1
TABLET ORAL
Qty: 10 TABLET | Refills: 0 | Status: SHIPPED | OUTPATIENT
Start: 2023-11-28 | End: 2023-12-20 | Stop reason: SDUPTHER

## 2023-11-28 NOTE — PROGRESS NOTES
Patient ID: Joy Crawford is a 58 y.o. female.    Chief Complaint: URI (Entered automatically based on patient selection in Patient Portal.)    The patient initiated a request through [employer] on 11/27/2023 for evaluation and management with a chief complaint of URI (Entered automatically based on patient selection in Patient Portal.)     I evaluated the questionnaire submission on 11/28/23  Ohs Peq Evisit Upper Respitatory/Cough Questionnaire    11/27/2023  5:58 PM CST - Filed by Patient   Do you agree to participate in an E-Visit? Yes   If you have any of the following symptoms, please present to your local ER or call 911:  I acknowledge   What is the main issue that you would like for your doctor to address today? Fluid behind both ears, congestion and cough, and fever.   Are you able to take your vital signs? No   What symptoms do you currently have?  Chills;  Cough;  Headache;  Nasal Congestion;  Muscle or body aches;  Sore throat;  Ear pain   Describe your cough: Productive (containing mucus);  Bothersome (interferes with daily activities)   Describe the mucus: Yellow;  Thick   Have you had any of the following? Difficulty swallowing   Please enter a few details about your swallowing, breathing, or visual problems. Throat hurts while coughing and swalling.   Have you ever smoked? I have never smoked   Have you had a fever? Yes   What has been the range of your fever? 100.4 or greater   When did your symptoms first appear? 11/26/2023   In the last two weeks, have you been in close contact with someone who has COVID-19 or the Flu? No   In the last two weeks, have you worked or volunteered in a healthcare facility or as a ? Healthcare facilities include a hospital, medical or dental clinic, long-term care facility, or nursing home No   Do you live in a long-term care facility, nursing home, group home, or homeless shelter? No   List what you have done or taken to help your symptoms. Went to  [facility] and tested negative for Covid, flu, and strep.   How severe are your symptoms? Moderate   Have your symptoms improved since they first appeared? Worse   Have you taken an at home Covid test? No   Have you taken a Flu test? Yes   What were the results? Negative   Have you been fully vaccinated for COVID? (2 Pfizer, 2 Moderna or 1 Ryan & Ryan vaccine injections) No   Have you received your first dose of the Pfizer or Moderna COVID vaccine? No   Have you recieved a Flu shot? No   Do you have transportation to get tested for COVID if it is indicated and ordered for you at an [facility] location? Yes   Provide any information you feel is important to your history not asked above I was told at Urgent Care that I have fluid behind both ears and that my throat is very red.  I believe i have another sinus infection which has resulted in sore throat, major congestion and cough, and fever.   Please attach any relevant images or files          Recent Labs Obtained:  No visits with results within 7 Day(s) from this visit.   Latest known visit with results is:   Lab Visit on 10/06/2023   Component Date Value Ref Range Status    WBC 10/06/2023 5.74  3.90 - 12.70 K/uL Final    RBC 10/06/2023 4.27  4.00 - 5.40 M/uL Final    Hemoglobin 10/06/2023 12.6  12.0 - 16.0 g/dL Final    Hematocrit 10/06/2023 39.9  37.0 - 48.5 % Final    MCV 10/06/2023 93  82 - 98 fL Final    MCH 10/06/2023 29.5  27.0 - 31.0 pg Final    MCHC 10/06/2023 31.6 (L)  32.0 - 36.0 g/dL Final    RDW 10/06/2023 13.5  11.5 - 14.5 % Final    Platelets 10/06/2023 243  150 - 450 K/uL Final    MPV 10/06/2023 9.4  9.2 - 12.9 fL Final    Immature Granulocytes 10/06/2023 0.2  0.0 - 0.5 % Final    Gran # (ANC) 10/06/2023 3.4  1.8 - 7.7 K/uL Final    Immature Grans (Abs) 10/06/2023 0.01  0.00 - 0.04 K/uL Final    Comment: Mild elevation in immature granulocytes is non specific and   can be seen in a variety of conditions including stress response,    acute inflammation, trauma and pregnancy. Correlation with other   laboratory and clinical findings is essential.      Lymph # 10/06/2023 1.6  1.0 - 4.8 K/uL Final    Mono # 10/06/2023 0.4  0.3 - 1.0 K/uL Final    Eos # 10/06/2023 0.3  0.0 - 0.5 K/uL Final    Baso # 10/06/2023 0.03  0.00 - 0.20 K/uL Final    nRBC 10/06/2023 0  0 /100 WBC Final    Gran % 10/06/2023 58.7  38.0 - 73.0 % Final    Lymph % 10/06/2023 28.2  18.0 - 48.0 % Final    Mono % 10/06/2023 7.7  4.0 - 15.0 % Final    Eosinophil % 10/06/2023 4.7  0.0 - 8.0 % Final    Basophil % 10/06/2023 0.5  0.0 - 1.9 % Final    Differential Method 10/06/2023 Automated   Final    Sodium 10/06/2023 141  136 - 145 mmol/L Final    Potassium 10/06/2023 4.4  3.5 - 5.1 mmol/L Final    Chloride 10/06/2023 107  95 - 110 mmol/L Final    CO2 10/06/2023 26  23 - 29 mmol/L Final    Glucose 10/06/2023 97  70 - 110 mg/dL Final    BUN 10/06/2023 19 (H)  7 - 17 mg/dL Final    Creatinine 10/06/2023 0.76  0.50 - 1.40 mg/dL Final    Calcium 10/06/2023 8.9  8.7 - 10.5 mg/dL Final    Total Protein 10/06/2023 7.0  6.0 - 8.4 g/dL Final    Albumin 10/06/2023 4.0  3.5 - 5.2 g/dL Final    Total Bilirubin 10/06/2023 0.3  0.1 - 1.0 mg/dL Final    Comment: For infants and newborns, interpretation of results should be based  on gestational age, weight and in agreement with clinical  observations.    Premature Infant recommended reference ranges:  Up to 24 hours.............<8.0 mg/dL  Up to 48 hours............<12.0 mg/dL  3-5 days..................<15.0 mg/dL  6-29 days.................<15.0 mg/dL      Alkaline Phosphatase 10/06/2023 76  38 - 126 U/L Final    AST 10/06/2023 22  15 - 46 U/L Final    ALT 10/06/2023 21  10 - 44 U/L Final    Anion Gap 10/06/2023 8  8 - 16 mmol/L Final    eGFR 10/06/2023 >60.0  >60 mL/min/1.73 m^2 Final    Cholesterol 10/06/2023 146  120 - 199 mg/dL Final    Comment: The National Cholesterol Education Program (NCEP) has set the  following guidelines (reference  ranges) for Cholesterol:  Optimal.....................<200 mg/dL  Borderline High.............200-239 mg/dL  High........................> or = 240 mg/dL      Triglycerides 10/06/2023 91  30 - 150 mg/dL Final    Comment: The National Cholesterol Education Program (NCEP) has set the  following guidelines (reference values) for triglycerides:  Normal......................<150 mg/dL  Borderline High.............150-199 mg/dL  High........................200-499 mg/dL      HDL 10/06/2023 53  40 - 75 mg/dL Final    Comment: The National Cholesterol Education Program (NCEP) has set the  following guidelines (reference values) for HDL Cholesterol:  Low...............<40 mg/dL  Optimal...........>60 mg/dL      LDL Cholesterol 10/06/2023 74.8  63.0 - 159.0 mg/dL Final    Comment: The National Cholesterol Education Program (NCEP) has set the  following guidelines (reference values) for LDL Cholesterol:  Optimal.......................<130 mg/dL  Borderline High...............130-159 mg/dL  High..........................160-189 mg/dL  Very High.....................>190 mg/dL      HDL/Cholesterol Ratio 10/06/2023 36.3  20.0 - 50.0 % Final    Total Cholesterol/HDL Ratio 10/06/2023 2.8  2.0 - 5.0 Final    Non-HDL Cholesterol 10/06/2023 93  mg/dL Final    Comment: Risk category and Non-HDL cholesterol goals:  Coronary heart disease (CHD)or equivalent (10-year risk of CHD >20%):  Non-HDL cholesterol goal     <130 mg/dL  Two or more CHD risk factors and 10-year risk of CHD <= 20%:  Non-HDL cholesterol goal     <160 mg/dL  0 to 1 CHD risk factor:  Non-HDL cholesterol goal     <190 mg/dL      TSH 10/06/2023 0.809  0.400 - 4.000 uIU/mL Final    Comment: Warning:  Heterophilic antibodies in serum or plasma of   certain individuals are known to cause interference with   immunoassays. These antibodies may be present in blood samples   from individuals regularly exposed to animal or who have been   treated with animal products.     Patients  taking high doses of supplemental biotin may have  negatively biased results.       Hemoglobin A1C 10/06/2023 5.2  4.0 - 5.6 % Final    Comment: ADA Screening Guidelines:  5.7-6.4%  Consistent with prediabetes  >or=6.5%  Consistent with diabetes    High levels of fetal hemoglobin interfere with the HbA1C  assay. Heterozygous hemoglobin variants (HbS, HgC, etc)do  not significantly interfere with this assay.   However, presence of multiple variants may affect accuracy.      Estimated Avg Glucose 10/06/2023 103  68 - 131 mg/dL Final    Vit D, 25-Hydroxy 10/06/2023 26 (L)  30 - 96 ng/mL Final    Comment: Vitamin D deficiency.........<10 ng/mL                              Vitamin D insufficiency......10-29 ng/mL       Vitamin D sufficiency........> or equal to 30 ng/mL  Vitamin D toxicity............>100 ng/mL      Vitamin B-12 10/06/2023 >2000 (H)  210 - 950 pg/mL Final       Encounter Diagnosis   Name Primary?    COPD exacerbation Yes        No orders of the defined types were placed in this encounter.     Medications Ordered This Encounter   Medications    doxycycline (VIBRA-TABS) 100 MG tablet     Sig: Take 1 tablet (100 mg total) by mouth 2 (two) times daily. for 10 days     Dispense:  20 tablet     Refill:  0    predniSONE (DELTASONE) 10 MG tablet     Si tablets daily for 3 days then one tablet daily for 4 days     Dispense:  10 tablet     Refill:  0        No follow-ups on file.      E-Visit Time Trackin minutes

## 2023-11-29 NOTE — PROGRESS NOTES
Subjective:       Patient ID: Joy Crawford is a 58 y.o. female.    Chief Complaint: No chief complaint on file.      Joy Crawford is 58 y.o. female Type II DM and asthma who presents for fever up to 100.7F, sinus congestion, sore throat, X 4 days.  Pt was seen in urgent care 2 days ago with negative flu, strep and COVID 19 testing.  Pt has been taking mucinex BID and nyquil cold & flu without change in symptoms.  Pt contacted her PCP who put in a prescription for doxycycline and prednisone.  Pt picked up medication but would like to be retested to ensure she is not contagious since she is a .        Review of Systems   Constitutional:  Positive for fever. Negative for chills.   HENT:  Positive for sinus pressure/congestion and sore throat.    Respiratory:  Positive for cough. Negative for shortness of breath and wheezing.    Gastrointestinal:  Negative for abdominal pain, constipation, diarrhea, nausea and vomiting.         Objective:      Physical Exam  Vitals reviewed.   Constitutional:       General: She is awake.      Appearance: Normal appearance.   HENT:      Head: Normocephalic and atraumatic.      Nose:      Right Turbinates: Swollen.      Left Turbinates: Swollen.      Right Sinus: No maxillary sinus tenderness or frontal sinus tenderness.      Left Sinus: No maxillary sinus tenderness or frontal sinus tenderness.      Mouth/Throat:      Mouth: Mucous membranes are moist.      Pharynx: Posterior oropharyngeal erythema present. No oropharyngeal exudate.   Eyes:      Extraocular Movements: Extraocular movements intact.      Conjunctiva/sclera: Conjunctivae normal.      Pupils: Pupils are equal, round, and reactive to light.   Cardiovascular:      Rate and Rhythm: Normal rate and regular rhythm.      Heart sounds: No murmur heard.     No friction rub. No gallop.   Pulmonary:      Effort: Pulmonary effort is normal.      Breath sounds: Normal breath sounds.   Abdominal:      General: Bowel  sounds are normal. There is no distension.      Tenderness: There is no abdominal tenderness. There is no guarding or rebound.   Musculoskeletal:      Right lower leg: No edema.      Left lower leg: No edema.   Lymphadenopathy:      Cervical: No cervical adenopathy.   Neurological:      Mental Status: She is alert and oriented to person, place, and time.   Psychiatric:         Mood and Affect: Mood normal.         Behavior: Behavior is cooperative.         Assessment:       1. URI, acute        Plan:     Pt with acute URI.  Pt has doxycyline and prednisone from her PCP which she will start.  Repeat COVID 19 and strep testing were negative.  Patient was advised to follow up if symptom are not improving or worsened.    Diagnoses and all orders for this visit:    URI, acute  -     POCT COVID-19 Rapid Screening  -     POCT Rapid Strep A

## 2023-11-30 ENCOUNTER — OFFICE VISIT (OUTPATIENT)
Dept: PSYCHIATRY | Facility: CLINIC | Age: 58
End: 2023-11-30
Payer: COMMERCIAL

## 2023-11-30 DIAGNOSIS — F33.41 RECURRENT MAJOR DEPRESSIVE DISORDER, IN PARTIAL REMISSION: Primary | ICD-10-CM

## 2023-11-30 DIAGNOSIS — F41.1 GAD (GENERALIZED ANXIETY DISORDER): ICD-10-CM

## 2023-11-30 DIAGNOSIS — G47.00 INSOMNIA, UNSPECIFIED TYPE: ICD-10-CM

## 2023-11-30 DIAGNOSIS — F90.0 ADHD (ATTENTION DEFICIT HYPERACTIVITY DISORDER), INATTENTIVE TYPE: ICD-10-CM

## 2023-11-30 PROCEDURE — 3044F PR MOST RECENT HEMOGLOBIN A1C LEVEL <7.0%: ICD-10-PCS | Mod: CPTII,95,, | Performed by: NURSE PRACTITIONER

## 2023-11-30 PROCEDURE — 99214 PR OFFICE/OUTPT VISIT, EST, LEVL IV, 30-39 MIN: ICD-10-PCS | Mod: 95,,, | Performed by: NURSE PRACTITIONER

## 2023-11-30 PROCEDURE — 3044F HG A1C LEVEL LT 7.0%: CPT | Mod: CPTII,95,, | Performed by: NURSE PRACTITIONER

## 2023-11-30 PROCEDURE — 4010F PR ACE/ARB THEARPY RXD/TAKEN: ICD-10-PCS | Mod: CPTII,95,, | Performed by: NURSE PRACTITIONER

## 2023-11-30 PROCEDURE — 99214 OFFICE O/P EST MOD 30 MIN: CPT | Mod: 95,,, | Performed by: NURSE PRACTITIONER

## 2023-11-30 PROCEDURE — 4010F ACE/ARB THERAPY RXD/TAKEN: CPT | Mod: CPTII,95,, | Performed by: NURSE PRACTITIONER

## 2023-11-30 RX ORDER — ALBUTEROL SULFATE 90 UG/1
2 AEROSOL, METERED RESPIRATORY (INHALATION) EVERY 6 HOURS PRN
Qty: 20.1 G | Refills: 1 | Status: SHIPPED | OUTPATIENT
Start: 2023-11-30

## 2023-11-30 NOTE — PROGRESS NOTES
.Outpatient Psychiatry Follow-Up Visit (MD/NP)    11/30/2023     The patient location is: [facility]  The chief complaint leading to consultation is: depression, ADHD, anxious    Visit type: audiovisual    Face to Face time with patient: 25 minutes  30 minutes of total time spent on the encounter, which includes face to face time and non-face to face time preparing to see the patient (eg, review of tests), Obtaining and/or reviewing separately obtained history, Documenting clinical information in the electronic or other health record, Independently interpreting results (not separately reported) and communicating results to the patient/family/caregiver, or Care coordination (not separately reported).     Each patient to whom he or she provides medical services by telemedicine is:  (1) informed of the relationship between the physician and patient and the respective role of any other health care provider with respect to management of the patient; and (2) notified that he or she may decline to receive medical services by telemedicine and may withdraw from such care at any time.    Clinical Status of Patient:  Outpatient (Ambulatory)    Chief Complaint:  Joy Crawford is a 58 y.o. female who presents today for follow-up of depression, anxiety and attention problems.  Met with patient.      Interval History and Content of Current Session:  Interim Events/Subjective Report/Content of Current Session:   Joy Crawford checked in on time for her appointment. She is a transfer from Angelina Lim DNP and we only had one session. She presented for follow-up today. Discussed the need to schedule in person follow up visit and the need to have consistent follow up visits to adhere for FDA guidelines and she agreed.     She stated she lost 60 lbs since October 2022 and feels well emotionally and physcially. She stated blood pressure is managed well and is within the normal range, follows up with her PCP, and takes a low dose  of lisinopril 10 mg po daily from her PCP.     She stated Vyvanse 50 mg po daily wears off around lunch and finds very little difference between vyvanse 50 mg and vyvanse 60 mg daily. She is taking vyvanse 50 mg daily and wants to keep vyvanse at 50 mg daily at this time. She reported improved depression and anxiety with her current medication regimen. She stated she feels by lunchtime she is drained but admits she is on a non-stop once her day starts due to being a . She denied any problems with headache, stomach upset, weight loss, insomnia, chest pain, palpitations, tics, or tremors. She denied SI/HI/AVH. She denied suicidal thoughts and no suicide plan or intent. No objective s/sx of psychosis or guillermo. Patient verbalized motivation for compliance with medications and all other elements of treatment plan.     Patient stated in the future she wants to be tapered off duloxetine 60 mg po daily but not at this time.    Psychiatric Review Of Systems - Is patient experiencing or having changes in:  sleep: no. Good sleep with trazodone  appetite: no  weight: yes, lost weight  energy/anergy: no  interest/pleasure/anhedonia: no  somatic symptoms: no  anxiety/panic: yes but managed  guilty/hopelessness: no  concentration: yes but improved with vyvanse  S.I.B.s/risky behavior: no  Irritability: no  Racing thoughts: no  Impulsive behaviors: no  Paranoia:no  AVH:no  Suicidal thoughts/plan/intent: no      Psychiatric Review Of Systems - Is patient experiencing or having changes in:  sleep: no  appetite: lower   weight: no losing wt with trulicity  per PCP ; lost 46 # and feels great   energy/anergy: no  interest/pleasure/anhedonia: yes enjoyed walking in [place]   somatic symptoms: no  libido: no  anxiety/panic: no  guilty/hopelessness: no  concentration: no  S.I.B.s/risky behavior: no  Irritability: no  Racing thoughts: no  Impulsive behaviors: no  Paranoia: no  AVH: no     Current Medications:  Cymbalta 60 mg  "daily   Vyvanse  50 mg daily  Checked LA  and no irregularities were noted.  Wellbutrin  mg daily  Buspar 10 mg BID  Trazodone 150 mg nightly - from sleep medicine.     Previous medication trials:  Gabapentin - nausea  90 mg of Cymbalta - made symptoms worse, cried for 3 days  Wellbutrin- caused insomnia with XL  Zoloft- ineffective  Abilify- insomnia    Unsure if she has tried Effexor or Pristiq  Review of Systems   PSYCHIATRIC: Pertinant items are noted in the narrative.  CONSTITUTIONAL: No weight gain or loss.   MUSCULOSKELETAL: No pain or stiffness of the joints.  CARDIOVASCULAR: No tachycardia or chest pain.  GASTROINTESTINAL: No nausea, vomiting, pain, constipation or diarrhea.    Past Medical, Family and Social History: The patient's past medical, family and social history have been reviewed and updated as appropriate within the electronic medical record - see encounter notes.    Compliance: yes    Side effects: None    Risk Parameters:  Patient reports no suicidal ideation  Patient reports no homicidal ideation  Patient reports no self-injurious behavior  Patient reports no violent behavior    Exam (detailed: at least 9 elements; comprehensive: all 15 elements)   Constitutional  Vitals:  From 11/2/22:  BP:110/80  HR:98       General:  unremarkable, age appropriate     Musculoskeletal  Muscle Strength/Tone:  not examined   Gait & Station:  BRIANNE due to video visit      Psychiatric  Speech:  no latency; no press   Mood & Affect:  "Good"  congruent and appropriate   Thought Process:  normal and logical   Associations:  intact   Thought Content:  normal, no suicidality, no homicidality, delusions, or paranoia   Insight:  intact   Judgement: behavior is adequate to circumstances   Orientation:  grossly intact   Memory: intact for content of interview   Language: grossly intact, able to name, able to repeat   Attention Span & Concentration:  able to focus   Fund of Knowledge:  intact and appropriate to age " and level of education     Assessment and Diagnosis   Status/Progress: Based on the examination today, the patient's problem(s) is/are adequately but not ideally controlled.  New problems have not been presented today.   Co-morbidities, Diagnostic uncertainty and Lack of compliance are not complicating management of the primary condition.  There are no active rule-out diagnoses for this patient at this time.     General Impression: Joy Crawford is a 56 y.o. female who presents today for follow-up of depression and anxiety. Patient seen and chart reviewed. Previous patient of Dr. Mckeon - no medication changes made. Presents 4/21/21- anxiety and motivation remain an issues, Abilify started. Presents 7/12/21 failed Abilify, wants to restart Vyvanse Presents 10/26/21- anxiety improved with Buspar, never started Vyvanse, Wellbutrin added for depression Presents 5/30/22- Wellbutrin switched to SR due to insomnia on XL Presents 7/11/22- insomnia resolved, Vyvanse restarted Presents 12/19/22- symptom partially improved. Some depression due to paulette season.  Presents 2-24-23 and doing better - refilled all for 90 days except for Vyvanse refilled x 2 total. 11/30/23 doing well on her current medication regimen and requested keeping Vyvane 50 mg po daily, Wellbutrin sr 200 mg po daily, Trazodone 150 mg po qhs, and Cymbalta 60 mg po daily due  to their effectiveness in treating her symptoms.       ICD-10-CM ICD-9-CM   1. Recurrent major depressive disorder, in partial remission  F33.41 296.35   2. ADHD (attention deficit hyperactivity disorder), inattentive type  F90.0 314.00   3. RAYNA (generalized anxiety disorder)  F41.1 300.02   4. Insomnia, unspecified type  G47.00 780.52         Intervention/Counseling/Treatment Plan   Medication Management: Continue current medications.   Continue Cymbalta 60 mg daily  She stated she may be interested in trintellix in the future instead of cymbalta  Decrease Vyvanse 50 mg daily and  not taking adderall 5 mg po daily  Checked LA  and no irregularities were noted.  Continue Wellbutrin   mg daily   Continue Trazodone 150 mg nightly - from sleep medicine.   The risks and benefits of medication were discussed with the patient.  Discussed diagnosis, risks and benefits of proposed treatment above vs alternative treatments vs no treatment, and potential side effects of these treatments. The patient expresses understanding of the above and displays the capacity to agree with this treatment given said understanding. Patient also agrees that, currently, the benefits outweigh the risks and would like to pursue treatment at this time.  Discussed inherent unpredictability of medications in each individual.   Encouraged Patient to keep future appointments.   Take medications as prescribed and abstain from substance abuse.   In the event of an emergency patient was advised to go to the emergency room      Return to Clinic: follow up in 3 months or earlier as needed    LUANN Romero-BC

## 2023-12-20 ENCOUNTER — PATIENT MESSAGE (OUTPATIENT)
Dept: INTERNAL MEDICINE | Facility: CLINIC | Age: 58
End: 2023-12-20
Payer: COMMERCIAL

## 2023-12-20 ENCOUNTER — PATIENT MESSAGE (OUTPATIENT)
Dept: PSYCHIATRY | Facility: CLINIC | Age: 58
End: 2023-12-20
Payer: COMMERCIAL

## 2023-12-20 DIAGNOSIS — F33.42 RECURRENT MAJOR DEPRESSIVE DISORDER, IN FULL REMISSION: ICD-10-CM

## 2023-12-20 DIAGNOSIS — F90.0 ADHD (ATTENTION DEFICIT HYPERACTIVITY DISORDER), INATTENTIVE TYPE: ICD-10-CM

## 2023-12-20 RX ORDER — DULOXETIN HYDROCHLORIDE 60 MG/1
60 CAPSULE, DELAYED RELEASE ORAL DAILY
Qty: 90 CAPSULE | Refills: 0 | Status: SHIPPED | OUTPATIENT
Start: 2023-12-20 | End: 2024-03-22 | Stop reason: SDUPTHER

## 2023-12-20 RX ORDER — BUPROPION HYDROCHLORIDE 200 MG/1
200 TABLET, EXTENDED RELEASE ORAL DAILY
Qty: 30 TABLET | Refills: 0 | OUTPATIENT
Start: 2023-12-20

## 2023-12-20 RX ORDER — DULOXETIN HYDROCHLORIDE 60 MG/1
60 CAPSULE, DELAYED RELEASE ORAL DAILY
Qty: 90 CAPSULE | Refills: 0 | OUTPATIENT
Start: 2023-12-20

## 2023-12-20 RX ORDER — PREDNISONE 10 MG/1
TABLET ORAL
Qty: 9 TABLET | Refills: 0 | Status: SHIPPED | OUTPATIENT
Start: 2023-12-20

## 2023-12-20 RX ORDER — LISDEXAMFETAMINE DIMESYLATE 50 MG/1
50 CAPSULE ORAL EVERY MORNING
Qty: 13 CAPSULE | Refills: 0 | OUTPATIENT
Start: 2023-12-20 | End: 2024-01-02

## 2023-12-20 RX ORDER — BUPROPION HYDROCHLORIDE 200 MG/1
200 TABLET, EXTENDED RELEASE ORAL DAILY
Qty: 30 TABLET | Refills: 0 | Status: SHIPPED | OUTPATIENT
Start: 2023-12-20 | End: 2024-01-30 | Stop reason: SDUPTHER

## 2023-12-20 RX ORDER — LISDEXAMFETAMINE DIMESYLATE 50 MG/1
50 CAPSULE ORAL EVERY MORNING
Qty: 13 CAPSULE | Refills: 0 | Status: SHIPPED | OUTPATIENT
Start: 2023-12-20 | End: 2024-01-10 | Stop reason: SDUPTHER

## 2023-12-23 ENCOUNTER — PATIENT MESSAGE (OUTPATIENT)
Dept: INTERNAL MEDICINE | Facility: CLINIC | Age: 58
End: 2023-12-23
Payer: COMMERCIAL

## 2024-01-10 ENCOUNTER — OFFICE VISIT (OUTPATIENT)
Dept: PSYCHIATRY | Facility: CLINIC | Age: 59
End: 2024-01-10
Payer: COMMERCIAL

## 2024-01-10 VITALS
SYSTOLIC BLOOD PRESSURE: 130 MMHG | WEIGHT: 188.06 LBS | BODY MASS INDEX: 31.29 KG/M2 | DIASTOLIC BLOOD PRESSURE: 68 MMHG | HEART RATE: 81 BPM

## 2024-01-10 DIAGNOSIS — G47.00 INSOMNIA, UNSPECIFIED TYPE: ICD-10-CM

## 2024-01-10 DIAGNOSIS — F90.0 ADHD (ATTENTION DEFICIT HYPERACTIVITY DISORDER), INATTENTIVE TYPE: ICD-10-CM

## 2024-01-10 DIAGNOSIS — F33.41 RECURRENT MAJOR DEPRESSIVE DISORDER, IN PARTIAL REMISSION: Primary | ICD-10-CM

## 2024-01-10 DIAGNOSIS — F41.1 GAD (GENERALIZED ANXIETY DISORDER): ICD-10-CM

## 2024-01-10 PROCEDURE — 3078F DIAST BP <80 MM HG: CPT | Mod: CPTII,S$GLB,, | Performed by: NURSE PRACTITIONER

## 2024-01-10 PROCEDURE — 3008F BODY MASS INDEX DOCD: CPT | Mod: CPTII,S$GLB,, | Performed by: NURSE PRACTITIONER

## 2024-01-10 PROCEDURE — 3075F SYST BP GE 130 - 139MM HG: CPT | Mod: CPTII,S$GLB,, | Performed by: NURSE PRACTITIONER

## 2024-01-10 PROCEDURE — 99214 OFFICE O/P EST MOD 30 MIN: CPT | Mod: S$GLB,,, | Performed by: NURSE PRACTITIONER

## 2024-01-10 PROCEDURE — 99999 PR PBB SHADOW E&M-EST. PATIENT-LVL II: CPT | Mod: PBBFAC,,, | Performed by: NURSE PRACTITIONER

## 2024-01-10 RX ORDER — LISDEXAMFETAMINE DIMESYLATE CAPSULES 50 MG/1
50 CAPSULE ORAL EVERY MORNING
Qty: 30 CAPSULE | Refills: 0 | Status: SHIPPED | OUTPATIENT
Start: 2024-02-08 | End: 2024-03-09

## 2024-01-10 RX ORDER — LISDEXAMFETAMINE DIMESYLATE CAPSULES 50 MG/1
50 CAPSULE ORAL EVERY MORNING
Qty: 30 CAPSULE | Refills: 0 | Status: SHIPPED | OUTPATIENT
Start: 2024-01-10 | End: 2024-02-09

## 2024-01-10 NOTE — PROGRESS NOTES
.Outpatient Psychiatry Follow-Up Visit (MD/NP)    1/10/2024     The patient location is: [place], [place]  The chief complaint leading to consultation is: depression, ADHD, anxious    Clinical Status of Patient:  Outpatient (Ambulatory)    Chief Complaint:  Joy Crawford is a 58 y.o. female who presents today for follow-up of depression, anxiety and attention problems.  Met with patient.      Interval History and Content of Current Session:  Interim Events/Subjective Report/Content of Current Session:   Joy Crawford checked in late for in person follow up appointment today. She presented as pleasant and cooperative. She reported doing well since her last visit and wants to continue with the same medication regimen due to its effectiveness in treating her symptoms.     She stated she lost 63 lbs since October 2022 with mounjaro injections and feels well emotionally, medically, and physcially. She stated blood pressure is managed well and is within the normal range, follows up with her PCP, and takes a low dose of lisinopril 10 mg po daily from her PCP.     She stated Vyvanse 50 mg po daily wears off late in the afternoon but she wants to continue with vyvanse 50 mg po daily and does not to increase due to concerns of tolerance.  She reported improved depression and anxiety with her current medication regimen. She reported good sleep with trazodone. She stated she puts a lot of effort and thoughts into her classes and is usually non-stop once her day starts. She stated she still enjoys her job as a . She denied any problems with headache, stomach upset, weight loss, insomnia, chest pain, palpitations, tics, or tremors. She denied suicidal ideation and suicide plan or intent. She denied SI/HI/AVH. No objective s/sx of psychosis or guillermo. She denied alcohol or drug use. No changes in medical health.  Patient verbalized motivation for compliance with medications and all other elements of  treatment plan.     Patient stated in the future she wants to be tapered off duloxetine 60 mg po daily but not at this time.    Psychiatric Review Of Systems - Is patient experiencing or having changes in:  sleep: no. Good sleep with trazodone  appetite: no  weight: yes, lost weight  energy/anergy: no  interest/pleasure/anhedonia: no  somatic symptoms: no  anxiety/panic: yes but managed  guilty/hopelessness: no  concentration: yes but improved with vyvanse  S.I.B.s/risky behavior: no  Irritability: no  Racing thoughts: no  Impulsive behaviors: no  Paranoia:no  AVH:no  Suicidal thoughts/plan/intent: no      Current Medications:  Cymbalta 60 mg daily   Vyvanse  50 mg daily  Checked LA  and no irregularities were noted.  Wellbutrin  mg daily  Buspar 10 mg BID  Trazodone 150 mg nightly - from sleep medicine.     Previous medication trials:  Gabapentin - nausea  90 mg of Cymbalta - made symptoms worse, cried for 3 days  Wellbutrin- caused insomnia with XL  Zoloft- ineffective  Abilify- insomnia    Unsure if she has tried Effexor or Pristiq  Review of Systems   PSYCHIATRIC: Pertinant items are noted in the narrative.  CONSTITUTIONAL: No weight gain or loss.   MUSCULOSKELETAL: No pain or stiffness of the joints.  CARDIOVASCULAR: No tachycardia or chest pain.  GASTROINTESTINAL: No nausea, vomiting, pain, constipation or diarrhea.    Past Medical, Family and Social History: The patient's past medical, family and social history have been reviewed and updated as appropriate within the electronic medical record - see encounter notes.    Compliance: yes    Side effects: None    Risk Parameters:  Patient reports no suicidal ideation  Patient reports no homicidal ideation  Patient reports no self-injurious behavior  Patient reports no violent behavior    Exam (detailed: at least 9 elements; comprehensive: all 15 elements)   Constitutional  Vitals:  From 1/9/24  BP:130/68  HR:81       General:  unremarkable, age  "appropriate     Musculoskeletal  Muscle Strength/Tone:  not examined   Gait & Station:  Normal      Psychiatric  Speech:  no latency; no press   Mood & Affect:  "Good"  congruent and appropriate   Thought Process:  normal and logical   Associations:  intact   Thought Content:  normal, no suicidality, no homicidality, delusions, or paranoia   Insight:  intact   Judgement: behavior is adequate to circumstances   Orientation:  grossly intact   Memory: intact for content of interview   Language: grossly intact, able to name, able to repeat   Attention Span & Concentration:  able to focus with vyvanse    Fund of Knowledge:  intact and appropriate to age and level of education     Assessment and Diagnosis   Status/Progress: Based on the examination today, the patient's problem(s) is/are adequately but not ideally controlled.  New problems have not been presented today.   Co-morbidities, Diagnostic uncertainty and Lack of compliance are not complicating management of the primary condition.  There are no active rule-out diagnoses for this patient at this time.     General Impression: Joy Crawford is a 56 y.o. female who presents today for follow-up of depression and anxiety. Patient seen and chart reviewed. Previous patient of Dr. Mckeon - no medication changes made. Presents 4/21/21- anxiety and motivation remain an issues, Abilify started. Presents 7/12/21 failed Abilify, wants to restart Vyvanse Presents 10/26/21- anxiety improved with Buspar, never started Vyvanse, Wellbutrin added for depression Presents 5/30/22- Wellbutrin switched to SR due to insomnia on XL Presents 7/11/22- insomnia resolved, Vyvanse restarted Presents 12/19/22- symptom partially improved. Some depression due to paulette season.  Presents 2-24-23 and doing better - refilled all for 90 days except for Vyvanse refilled x 2 total. 11/30/23 doing well on her current medication regimen and requested keeping Vyvane 50 mg po daily, Wellbutrin sr 200 mg " po daily, Trazodone 150 mg po qhs, and Cymbalta 60 mg po daily due  to their effectiveness in treating her symptoms. 1/10/24 doing well since her last visit and wants to continue with Vyvane 50 mg po daily, Wellbutrin sr 200 mg po daily, Trazodone 150 mg po qhs, and Cymbalta 60 mg po daily due  to their effectiveness in treating her symptoms.        ICD-10-CM ICD-9-CM   1. Recurrent major depressive disorder, in partial remission  F33.41 296.35   2. ADHD (attention deficit hyperactivity disorder), inattentive type  F90.0 314.00   3. Insomnia, unspecified type  G47.00 780.52   4. RAYNA (generalized anxiety disorder)  F41.1 300.02         Intervention/Counseling/Treatment Plan   Medication Management: Continue current medications.   In person visit 1/10/2024  Continue Cymbalta 60 mg daily  She stated she may be interested in TintElize in the future instead of Cymbalta but not at this time  Continue Vyvanse 50 mg daily for ADHD  Checked LA  and no irregularities were noted.  Continue Wellbutrin   mg daily for depression   Continue Trazodone 150 mg nightly - from sleep medicine.   The risks and benefits of medication were discussed with the patient.  Discussed diagnosis, risks and benefits of proposed treatment above vs alternative treatments vs no treatment, and potential side effects of these treatments. The patient expresses understanding of the above and displays the capacity to agree with this treatment given said understanding. Patient also agrees that, currently, the benefits outweigh the risks and would like to pursue treatment at this time.  Discussed inherent unpredictability of medications in each individual.   Encouraged Patient to keep future appointments.   Take medications as prescribed and abstain from substance abuse.   In the event of an emergency patient was advised to go to the emergency room      Return to Clinic: follow up in 3 months or earlier as needed    LUANN Romero-BC

## 2024-01-15 ENCOUNTER — PATIENT MESSAGE (OUTPATIENT)
Dept: PSYCHIATRY | Facility: CLINIC | Age: 59
End: 2024-01-15
Payer: COMMERCIAL

## 2024-01-15 DIAGNOSIS — F90.0 ATTENTION DEFICIT HYPERACTIVITY DISORDER (ADHD), PREDOMINANTLY INATTENTIVE TYPE: Primary | ICD-10-CM

## 2024-01-15 RX ORDER — LISDEXAMFETAMINE DIMESYLATE CAPSULES 60 MG/1
60 CAPSULE ORAL EVERY MORNING
Qty: 30 CAPSULE | Refills: 0 | Status: SHIPPED | OUTPATIENT
Start: 2024-01-15 | End: 2024-02-15 | Stop reason: SDUPTHER

## 2024-01-19 ENCOUNTER — PATIENT MESSAGE (OUTPATIENT)
Dept: PSYCHIATRY | Facility: CLINIC | Age: 59
End: 2024-01-19
Payer: COMMERCIAL

## 2024-01-30 ENCOUNTER — PATIENT MESSAGE (OUTPATIENT)
Dept: PSYCHIATRY | Facility: CLINIC | Age: 59
End: 2024-01-30
Payer: COMMERCIAL

## 2024-01-30 DIAGNOSIS — F33.42 RECURRENT MAJOR DEPRESSIVE DISORDER, IN FULL REMISSION: ICD-10-CM

## 2024-01-30 RX ORDER — BUPROPION HYDROCHLORIDE 200 MG/1
200 TABLET, EXTENDED RELEASE ORAL DAILY
Qty: 90 TABLET | Refills: 1 | Status: SHIPPED | OUTPATIENT
Start: 2024-01-30 | End: 2024-03-22 | Stop reason: SDUPTHER

## 2024-01-30 RX ORDER — TRAZODONE HYDROCHLORIDE 150 MG/1
TABLET ORAL
Qty: 90 TABLET | Refills: 1 | Status: SHIPPED | OUTPATIENT
Start: 2024-01-30 | End: 2024-03-22 | Stop reason: SDUPTHER

## 2024-02-15 ENCOUNTER — PATIENT MESSAGE (OUTPATIENT)
Dept: PSYCHIATRY | Facility: CLINIC | Age: 59
End: 2024-02-15
Payer: COMMERCIAL

## 2024-02-15 DIAGNOSIS — F90.0 ATTENTION DEFICIT HYPERACTIVITY DISORDER (ADHD), PREDOMINANTLY INATTENTIVE TYPE: ICD-10-CM

## 2024-02-15 RX ORDER — LISDEXAMFETAMINE DIMESYLATE 60 MG/1
60 CAPSULE ORAL EVERY MORNING
Qty: 30 CAPSULE | Refills: 0 | Status: SHIPPED | OUTPATIENT
Start: 2024-02-15 | End: 2024-03-22 | Stop reason: SDUPTHER

## 2024-02-24 ENCOUNTER — PATIENT MESSAGE (OUTPATIENT)
Dept: INTERNAL MEDICINE | Facility: CLINIC | Age: 59
End: 2024-02-24
Payer: COMMERCIAL

## 2024-02-26 ENCOUNTER — PATIENT OUTREACH (OUTPATIENT)
Dept: ADMINISTRATIVE | Facility: HOSPITAL | Age: 59
End: 2024-02-26
Payer: COMMERCIAL

## 2024-02-26 NOTE — PROGRESS NOTES
Health Maintenance Due   Topic Date Due    Hepatitis C Screening  Never done    Diabetes Urine Screening  Never done    Foot Exam  Never done    Eye Exam  Never done    HIV Screening  Never done    TETANUS VACCINE  Never done    Low Dose Statin  Never done    Colorectal Cancer Screening  Never done    Shingles Vaccine (1 of 2) Never done    Cervical Cancer Screening  12/03/2021    Influenza Vaccine (1) 09/01/2023    COVID-19 Vaccine (3 - 2023-24 season) 09/01/2023    Pneumococcal Vaccines (Age 0-64) (2 of 2 - PCV) 10/03/2023      Chart reviewed. Triggered LINKS. Updated Care Everywhere. Pt has schedule upcoming pcp appointment to discuss labs and other health maintenance screenings.     Nabeel Nicholson CMA  Population Health Care Coordinator  Primary Care Team

## 2024-03-21 ENCOUNTER — PATIENT MESSAGE (OUTPATIENT)
Dept: PSYCHIATRY | Facility: CLINIC | Age: 59
End: 2024-03-21
Payer: COMMERCIAL

## 2024-03-21 DIAGNOSIS — F90.0 ATTENTION DEFICIT HYPERACTIVITY DISORDER (ADHD), PREDOMINANTLY INATTENTIVE TYPE: ICD-10-CM

## 2024-03-22 DIAGNOSIS — F33.42 RECURRENT MAJOR DEPRESSIVE DISORDER, IN FULL REMISSION: ICD-10-CM

## 2024-03-22 DIAGNOSIS — F90.0 ATTENTION DEFICIT HYPERACTIVITY DISORDER (ADHD), PREDOMINANTLY INATTENTIVE TYPE: ICD-10-CM

## 2024-03-22 RX ORDER — LISDEXAMFETAMINE DIMESYLATE 60 MG/1
60 CAPSULE ORAL EVERY MORNING
Qty: 30 CAPSULE | Refills: 0 | Status: SHIPPED | OUTPATIENT
Start: 2024-03-22 | End: 2024-03-26 | Stop reason: SDUPTHER

## 2024-03-22 RX ORDER — LISDEXAMFETAMINE DIMESYLATE 60 MG/1
60 CAPSULE ORAL EVERY MORNING
Qty: 30 CAPSULE | Refills: 0 | OUTPATIENT
Start: 2024-03-22 | End: 2024-04-21

## 2024-03-22 RX ORDER — DULOXETIN HYDROCHLORIDE 60 MG/1
60 CAPSULE, DELAYED RELEASE ORAL DAILY
Qty: 90 CAPSULE | Refills: 0 | Status: SHIPPED | OUTPATIENT
Start: 2024-03-22

## 2024-03-22 RX ORDER — BUPROPION HYDROCHLORIDE 200 MG/1
200 TABLET, EXTENDED RELEASE ORAL DAILY
Qty: 90 TABLET | Refills: 0 | Status: SHIPPED | OUTPATIENT
Start: 2024-03-22 | End: 2024-05-05 | Stop reason: SDUPTHER

## 2024-03-22 RX ORDER — TRAZODONE HYDROCHLORIDE 150 MG/1
TABLET ORAL
Qty: 90 TABLET | Refills: 0 | Status: SHIPPED | OUTPATIENT
Start: 2024-03-22

## 2024-03-25 ENCOUNTER — PATIENT MESSAGE (OUTPATIENT)
Dept: PSYCHIATRY | Facility: CLINIC | Age: 59
End: 2024-03-25
Payer: COMMERCIAL

## 2024-03-25 DIAGNOSIS — F90.0 ATTENTION DEFICIT HYPERACTIVITY DISORDER (ADHD), PREDOMINANTLY INATTENTIVE TYPE: ICD-10-CM

## 2024-03-26 RX ORDER — LISDEXAMFETAMINE DIMESYLATE 60 MG/1
60 CAPSULE ORAL EVERY MORNING
Qty: 30 CAPSULE | Refills: 0 | Status: SHIPPED | OUTPATIENT
Start: 2024-03-26 | End: 2024-04-08 | Stop reason: SDUPTHER

## 2024-03-27 DIAGNOSIS — E11.9 TYPE 2 DIABETES MELLITUS WITHOUT COMPLICATION, UNSPECIFIED WHETHER LONG TERM INSULIN USE: ICD-10-CM

## 2024-04-02 ENCOUNTER — PATIENT MESSAGE (OUTPATIENT)
Dept: INTERNAL MEDICINE | Facility: CLINIC | Age: 59
End: 2024-04-02
Payer: COMMERCIAL

## 2024-04-02 DIAGNOSIS — E11.9 TYPE 2 DIABETES MELLITUS WITHOUT COMPLICATION, WITHOUT LONG-TERM CURRENT USE OF INSULIN: Primary | ICD-10-CM

## 2024-04-03 DIAGNOSIS — F90.0 ATTENTION DEFICIT HYPERACTIVITY DISORDER (ADHD), PREDOMINANTLY INATTENTIVE TYPE: ICD-10-CM

## 2024-04-03 RX ORDER — LISDEXAMFETAMINE DIMESYLATE 60 MG/1
60 CAPSULE ORAL EVERY MORNING
Qty: 30 CAPSULE | Refills: 0 | Status: CANCELLED | OUTPATIENT
Start: 2024-04-03 | End: 2024-05-03

## 2024-04-03 NOTE — TELEPHONE ENCOUNTER
Pt requesting med refill and to increase dose. Pended 10mg of mounjaro for your review. Pt states she tolerated medication well with no side effects.      LOV with Jennifer Ramos MD , 11/27/2023

## 2024-04-03 NOTE — TELEPHONE ENCOUNTER
Care Due:                  Date            Visit Type   Department     Provider  --------------------------------------------------------------------------------                                SAME DAY -                              ESTABLISHED   [facility] INTERNAL  Last Visit: 11-      PATIENT      MEDICINE       Shira Christianson                              EP -                              PRIMARY      [facility] INTERNAL  Next Visit: 04-      CARE ([facility])   MEDICINE       EVAN PALAFOX                                                            Last  Test          Frequency    Reason                     Performed    Due Date  --------------------------------------------------------------------------------    HBA1C.......  6 months...  semaglutide, tirzepatide.  10-   04-    Health [facility] Embedded Care Due Messages. Reference number: [id number].   4/03/2024 10:22:15 AM CDT

## 2024-04-08 DIAGNOSIS — F90.0 ATTENTION DEFICIT HYPERACTIVITY DISORDER (ADHD), PREDOMINANTLY INATTENTIVE TYPE: ICD-10-CM

## 2024-04-08 RX ORDER — LISDEXAMFETAMINE DIMESYLATE 60 MG/1
60 CAPSULE ORAL EVERY MORNING
Qty: 90 CAPSULE | Refills: 0 | Status: SHIPPED | OUTPATIENT
Start: 2024-04-23 | End: 2024-05-05 | Stop reason: SDUPTHER

## 2024-04-08 RX ORDER — LISDEXAMFETAMINE DIMESYLATE 60 MG/1
60 CAPSULE ORAL EVERY MORNING
Qty: 90 CAPSULE | Refills: 0 | Status: SHIPPED | OUTPATIENT
Start: 2024-04-08 | End: 2024-04-08 | Stop reason: SDUPTHER

## 2024-04-12 ENCOUNTER — PATIENT MESSAGE (OUTPATIENT)
Dept: INTERNAL MEDICINE | Facility: CLINIC | Age: 59
End: 2024-04-12
Payer: COMMERCIAL

## 2024-04-16 ENCOUNTER — TELEPHONE (OUTPATIENT)
Dept: PHARMACY | Facility: CLINIC | Age: 59
End: 2024-04-16
Payer: COMMERCIAL

## 2024-04-25 ENCOUNTER — TELEPHONE (OUTPATIENT)
Dept: INTERNAL MEDICINE | Facility: CLINIC | Age: 59
End: 2024-04-25
Payer: COMMERCIAL

## 2024-04-25 ENCOUNTER — PATIENT MESSAGE (OUTPATIENT)
Dept: INTERNAL MEDICINE | Facility: CLINIC | Age: 59
End: 2024-04-25
Payer: COMMERCIAL

## 2024-04-25 NOTE — TELEPHONE ENCOUNTER
Called patient checking to see if they had their Diabetic eye exam done this year.   If have not had eye exam done this year, offering the DM eyecam.   [place] [facility] [place] [phone]   Ashlyn Denson RN HC

## 2024-05-04 ENCOUNTER — PATIENT MESSAGE (OUTPATIENT)
Dept: PSYCHIATRY | Facility: CLINIC | Age: 59
End: 2024-05-04
Payer: COMMERCIAL

## 2024-05-04 DIAGNOSIS — F33.42 RECURRENT MAJOR DEPRESSIVE DISORDER, IN FULL REMISSION: ICD-10-CM

## 2024-05-04 DIAGNOSIS — F90.0 ATTENTION DEFICIT HYPERACTIVITY DISORDER (ADHD), PREDOMINANTLY INATTENTIVE TYPE: ICD-10-CM

## 2024-05-05 RX ORDER — BUPROPION HYDROCHLORIDE 200 MG/1
200 TABLET, EXTENDED RELEASE ORAL DAILY
Qty: 90 TABLET | Refills: 0 | Status: SHIPPED | OUTPATIENT
Start: 2024-05-05 | End: 2024-08-03

## 2024-05-05 RX ORDER — LISDEXAMFETAMINE DIMESYLATE 60 MG/1
60 CAPSULE ORAL EVERY MORNING
Qty: 90 CAPSULE | Refills: 0 | Status: SHIPPED | OUTPATIENT
Start: 2024-05-05 | End: 2024-08-03

## 2024-05-07 ENCOUNTER — PATIENT MESSAGE (OUTPATIENT)
Dept: INTERNAL MEDICINE | Facility: CLINIC | Age: 59
End: 2024-05-07
Payer: COMMERCIAL

## 2024-05-07 DIAGNOSIS — E11.9 TYPE 2 DIABETES MELLITUS WITHOUT COMPLICATION, WITHOUT LONG-TERM CURRENT USE OF INSULIN: ICD-10-CM

## 2024-05-07 NOTE — TELEPHONE ENCOUNTER
Pt missed appt on 4/30, requesting to reschedule. Ok to use a DCHANGE?    Pt also states she started the 10mg Mounjaro, feels horrible. Pt c/o insomnia, nausea and body aches.

## 2024-05-09 NOTE — TELEPHONE ENCOUNTER
Book her to see me next available    Book her to see Love Morales in the next 1-2 weeks with labs prior

## 2024-06-10 ENCOUNTER — PATIENT MESSAGE (OUTPATIENT)
Dept: INTERNAL MEDICINE | Facility: CLINIC | Age: 59
End: 2024-06-10
Payer: COMMERCIAL

## 2024-06-10 ENCOUNTER — NURSE TRIAGE (OUTPATIENT)
Dept: ADMINISTRATIVE | Facility: CLINIC | Age: 59
End: 2024-06-10
Payer: COMMERCIAL

## 2024-06-10 DIAGNOSIS — M54.50 LEFT-SIDED LOW BACK PAIN WITHOUT SCIATICA, UNSPECIFIED CHRONICITY: ICD-10-CM

## 2024-06-10 RX ORDER — METHOCARBAMOL 500 MG/1
500 TABLET, FILM COATED ORAL 3 TIMES DAILY PRN
Qty: 50 TABLET | Refills: 1 | Status: SHIPPED | OUTPATIENT
Start: 2024-06-10

## 2024-06-10 NOTE — TELEPHONE ENCOUNTER
Nadya Miller, RN  You1 hour ago (4:55 PM)     MINA  Called patient, she states she has arthritis, having a major flare up, does not have a BP cuff, no headache, no SOB, upper back pain 9/10, she refused ER and UC for pain medications. She is asking for just a couple of muscle relaxers to get her through til tomorrow. She states she has no allergies.      Alberta Barker RN Fernandez Sara E Staff2 hours ago (3:47 PM)     CHARMAINE  Spoke with pt who reports that that she is having severe upper back pain. [facility] surgery in the past in L4, and L5  Advised to be seen in office today. [facility] was able to get appointment for tomorrow. Requesting medication for back pain.[facility] [place] [facility] preferred pharmacy is [facility] pharmacy [id number]

## 2024-06-10 NOTE — TELEPHONE ENCOUNTER
Spoke with pt who reports that that she is having severe upper back pain. [facility] surgery in the past in L4, and L5  Advised to be seen in office today. [facility] was able to get appointment for tomorrow. Requesting medication for back pain.[facility] preferred pharmacy is [facility] pharmacy [id number]    Reason for Disposition   SEVERE back pain (e.g., excruciating, unable to do any normal activities) and not improved after pain medicine and CARE ADVICE    Additional Information   Negative: SEVERE back pain of sudden onset and age > 60 years   Negative: SEVERE abdominal pain (e.g., excruciating)   Negative: Abdominal pain and age > 60 years   Negative: Unable to urinate (or only a few drops) and bladder feels very full   Negative: Loss of bladder or bowel control (urine or bowel incontinence; wetting self, leaking stool) of new-onset   Negative: Numbness (loss of sensation) in groin or rectal area   Negative: Pain radiates into groin, scrotum   Negative: Blood in urine (red, pink, or tea-colored)   Negative: Vomiting and pain over lower ribs of back (i.e., flank - kidney area)   Negative: Weakness of a leg or foot (e.g., unable to bear weight, dragging foot)   Negative: Patient sounds very sick or weak to the triager   Negative: Fever > 100.4 F (38.0 C) and flank pain   Negative: Pain or burning with passing urine (urination)    Protocols used: Back Pain-A-[place]

## 2024-06-11 ENCOUNTER — HOSPITAL ENCOUNTER (OUTPATIENT)
Dept: RADIOLOGY | Facility: HOSPITAL | Age: 59
Discharge: HOME | End: 2024-06-11
Attending: NURSE PRACTITIONER
Payer: COMMERCIAL

## 2024-06-11 ENCOUNTER — OFFICE VISIT (OUTPATIENT)
Dept: INTERNAL MEDICINE | Facility: CLINIC | Age: 59
End: 2024-06-11
Payer: COMMERCIAL

## 2024-06-11 VITALS
OXYGEN SATURATION: 97 % | HEART RATE: 78 BPM | WEIGHT: 166.88 LBS | SYSTOLIC BLOOD PRESSURE: 124 MMHG | DIASTOLIC BLOOD PRESSURE: 82 MMHG | BODY MASS INDEX: 27.81 KG/M2 | HEIGHT: 65 IN

## 2024-06-11 DIAGNOSIS — E11.9 TYPE 2 DIABETES MELLITUS WITHOUT COMPLICATION, WITHOUT LONG-TERM CURRENT USE OF INSULIN: ICD-10-CM

## 2024-06-11 DIAGNOSIS — M54.6 ACUTE BILATERAL THORACIC BACK PAIN: ICD-10-CM

## 2024-06-11 DIAGNOSIS — M54.6 ACUTE BILATERAL THORACIC BACK PAIN: Primary | ICD-10-CM

## 2024-06-11 PROCEDURE — 3008F BODY MASS INDEX DOCD: CPT | Mod: CPTII,S$GLB,, | Performed by: NURSE PRACTITIONER

## 2024-06-11 PROCEDURE — 99214 OFFICE O/P EST MOD 30 MIN: CPT | Mod: S$GLB,,, | Performed by: NURSE PRACTITIONER

## 2024-06-11 PROCEDURE — 99999 PR PBB SHADOW E&M-EST. PATIENT-LVL V: CPT | Mod: PBBFAC,,, | Performed by: NURSE PRACTITIONER

## 2024-06-11 PROCEDURE — 3079F DIAST BP 80-89 MM HG: CPT | Mod: CPTII,S$GLB,, | Performed by: NURSE PRACTITIONER

## 2024-06-11 PROCEDURE — 72070 X-RAY EXAM THORAC SPINE 2VWS: CPT | Mod: 26,,, | Performed by: RADIOLOGY

## 2024-06-11 PROCEDURE — 72070 X-RAY EXAM THORAC SPINE 2VWS: CPT | Mod: TC

## 2024-06-11 PROCEDURE — 1159F MED LIST DOCD IN RCRD: CPT | Mod: CPTII,S$GLB,, | Performed by: NURSE PRACTITIONER

## 2024-06-11 PROCEDURE — 3074F SYST BP LT 130 MM HG: CPT | Mod: CPTII,S$GLB,, | Performed by: NURSE PRACTITIONER

## 2024-06-11 PROCEDURE — 4010F ACE/ARB THERAPY RXD/TAKEN: CPT | Mod: CPTII,S$GLB,, | Performed by: NURSE PRACTITIONER

## 2024-06-11 RX ORDER — METHYLPREDNISOLONE 4 MG/1
TABLET ORAL
Qty: 21 EACH | Refills: 0 | Status: SHIPPED | OUTPATIENT
Start: 2024-06-11 | End: 2024-07-02

## 2024-06-11 RX ORDER — MELOXICAM 15 MG/1
15 TABLET ORAL DAILY PRN
Qty: 30 TABLET | Refills: 0 | Status: SHIPPED | OUTPATIENT
Start: 2024-06-11

## 2024-06-11 RX ORDER — GABAPENTIN 100 MG/1
100 CAPSULE ORAL NIGHTLY PRN
Qty: 30 CAPSULE | Refills: 0 | Status: SHIPPED | OUTPATIENT
Start: 2024-06-11 | End: 2025-06-11

## 2024-06-11 NOTE — PROGRESS NOTES
INTERNAL MEDICINE PROGRESS/URGENT CARE NOTE    CHIEF COMPLAINT     No chief complaint on file.      HPI     Joy Crawford is a 59 y.o. female who presents for an urgent visit today.    PCP: VANI Khan 10/9/2023    Pt here for urgent focused visit c/o bilat thoracic back pain that radiates anteriorly and inferiorly x 1 week. Worse with movement and certain position changes. No numbness or tingling. No injury or trauma. Taking tylenol arthritis, IBU. 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   Hx of lumbar fusion in past. No issues today with that.     Past Medical History:  Past Medical History:   Diagnosis Date    Anxiety     Asthma     Condyloma     Depression     Esophageal reflux     H/O mammogram 01/20/2017    Normal  (DIS)     Herpes simplex virus (HSV) infection     no out breaks    Hypertension     Peptic ulcer     Sleep apnea     doesnt use cpap        Past Surgical History:  Past Surgical History:   Procedure Laterality Date    ARTHROSCOPIC CHONDROPLASTY OF KNEE JOINT Right 7/7/2022    Procedure: ARTHROSCOPY, KNEE, WITH CHONDROPLASTY;  Surgeon: Elyssa Mitchell MD;  Location: [facility] OR;  Service: Orthopedics;  Laterality: Right;    CYST REMOVAL  06/25/2020    Procedure: EXCISION, CYST;  Surgeon: Gallo Maddox MD;  Location: [facility] OR;  Service: Orthopedics;;  COMPLEXT CYST LEFT  ANKLE AND FOOT    ENDOMETRIAL BIOPSY N/A 03/11/2020    Procedure: BIOPSY, ENDOMETRIUM;  Surgeon: José Miguel Leon MD;  Location: [facility] OR;  Service: OB/GYN;  Laterality: N/A;    foot ankle sx Left 06/25/2020    KNEE ARTHROSCOPY W/ MENISCECTOMY Right 7/7/2022    Procedure: ARTHROSCOPY, KNEE, MEDIAL AND LATERAL MENISCECTOMY - [facility] CARE;  Surgeon: Elyssa Mitchell MD;  Location: [facility] OR;  Service: Orthopedics;  Laterality: Right;    KNEE ARTHROSCOPY W/ PLICA EXCISION Right 7/7/2022    Procedure: EXCISION, PLICA, KNEE, ARTHROSCOPIC;  Surgeon: Elyssa  MD Paula;  Location: [facility] OR;  Service: Orthopedics;  Laterality: Right;    KNEE DEBRIDEMENT  7/7/2022    Procedure: DEBRIDEMENT, KNEE;  Surgeon: Elyssa Mitchell MD;  Location: [facility] OR;  Service: Orthopedics;;    RHINOPLASTY  2006    SINUS SURGERY  2006    SPINAL FUSION  09/21/2021    L4-5    SYNOVECTOMY OF KNEE Right 7/7/2022    Procedure: PARTIAL SYNOVECTOMY, KNEE;  Surgeon: Elyssa Mitchell MD;  Location: [facility] OR;  Service: Orthopedics;  Laterality: Right;    VULVECTOMY N/A 03/11/2020    Procedure: VULVECTOMY;  Surgeon: José Miguel Leon MD;  Location: [facility] OR;  Service: OB/GYN;  Laterality: N/A;        Allergies:  Review of patient's allergies indicates:  No Known Allergies    Home Medications:    Current Outpatient Medications:     albuterol (PROVENTIL/VENTOLIN HFA) 90 mcg/actuation inhaler, Inhale 2 puffs into the lungs every 6 (six) hours as needed for Wheezing. Rescue, Disp: 20.1 g, Rfl: 1    azelastine (ASTELIN) 137 mcg (0.1 %) nasal spray, 1 spray (137 mcg total) by Nasal route 2 (two) times daily., Disp: 30 mL, Rfl: 6    BREO ELLIPTA 200-25 mcg/dose DsDv diskus inhaler, INHALE 1 PUFF BY MOUTH DAILY, Disp: 60 each, Rfl: 6    buPROPion (WELLBUTRIN SR) 200 MG SR12, Take 1 tablet (200 mg total) by mouth once daily., Disp: 90 tablet, Rfl: 0    busPIRone (BUSPAR) 10 MG tablet, Take 1 tablet (10 mg total) by mouth 2 (two) times daily., Disp: 180 tablet, Rfl: 0    DULoxetine (CYMBALTA) 60 MG capsule, Take 1 capsule (60 mg total) by mouth once daily., Disp: 90 capsule, Rfl: 0    gabapentin (NEURONTIN) 100 MG capsule, Take 1 capsule (100 mg total) by mouth nightly as needed., Disp: 30 capsule, Rfl: 0    imiquimod (ALDARA) 5 % cream, Apply topically 3 (three) times a week., Disp: 12 packet, Rfl: 3    lisdexamfetamine (VYVANSE) 60 MG capsule, Take 1 capsule (60 mg total) by mouth every morning., Disp: 90 capsule, Rfl: 0    lisinopriL 10 MG tablet, Take 1 tablet (10 mg total) by mouth once daily., Disp: 90 tablet, Rfl: 4     "meloxicam (MOBIC) 15 MG tablet, Take 1 tablet (15 mg total) by mouth daily as needed for Pain., Disp: 30 tablet, Rfl: 0    methocarbamoL (ROBAXIN) 500 MG Tab, Take 1 tablet (500 mg total) by mouth 3 (three) times daily as needed., Disp: 50 tablet, Rfl: 1    methylPREDNISolone (MEDROL DOSEPACK) 4 mg tablet, use as directed, Disp: 21 each, Rfl: 0    ondansetron (ZOFRAN) 4 MG tablet, Take 1 tablet (4 mg total) by mouth every 8 (eight) hours as needed for Nausea., Disp: 30 tablet, Rfl: 1    sod sulf-pot chloride-mag sulf (SUTAB) 1.479-0.188- 0.225 gram tablet, Take 12 tablets by mouth once daily. BIN: [id number]  PCN: [id number]  GROUP: [id number] MEMBER ID: [id number] (Patient not taking: Reported on 10/9/2023), Disp: 24 tablet, Rfl: 0    tirzepatide 10 mg/0.5 mL PnIj, Inject 10 mg into the skin every 7 days., Disp: 4 Pen, Rfl: 0    tirzepatide 7.5 mg/0.5 mL PnIj, Inject 7.5 mg into the skin every 7 days., Disp: 4 Pen, Rfl: 3    traZODone (DESYREL) 150 MG tablet, TAKE 1 TABLET(150 MG) BY MOUTH EVERY NIGHT, Disp: 90 tablet, Rfl: 0     Review of Systems:  Review of Systems   Constitutional:  Negative for fever.   Respiratory:  Negative for cough and shortness of breath.    Cardiovascular:  Negative for chest pain.   Musculoskeletal:  Positive for back pain.   Skin:  Negative for rash.   Neurological:  Negative for weakness and numbness.         PHYSICAL EXAM     Vitals:    06/11/24 1543 06/11/24 1559   BP: 124/82    Pulse: 87 78   SpO2: (!) 93% 97%   Weight: 75.7 kg (166 lb 14.2 oz)    Height: 5' 5" (1.651 m)       Body mass index is 27.77 kg/m².     Physical Exam  Vitals reviewed.   Constitutional:       Appearance: Normal appearance.   HENT:      Head: Normocephalic.      Mouth/Throat:      Mouth: Mucous membranes are moist.      Pharynx: Oropharynx is clear.   Eyes:      Conjunctiva/sclera: Conjunctivae normal.      Pupils: Pupils are equal, round, and reactive to light.   Cardiovascular:      Rate and Rhythm: Normal rate and " regular rhythm.   Pulmonary:      Effort: Pulmonary effort is normal.      Breath sounds: Normal breath sounds. No wheezing, rhonchi or rales.   Musculoskeletal:      Cervical back: No tenderness.      Thoracic back: Tenderness present. No bony tenderness.        Back:       Comments: TTP bilat psm.   Strength in BUE 5/5. Pain reproducible in back with certain arm movements.   Skin:     General: Skin is warm and dry.   Neurological:      General: No focal deficit present.      Mental Status: She is alert.   Psychiatric:         Mood and Affect: Mood normal.         Behavior: Behavior normal.         LABS     Lab Results   Component Value Date    HGBA1C 5.2 10/06/2023     CMP  Sodium   Date Value Ref Range Status   10/06/2023 141 136 - 145 mmol/L Final     Potassium   Date Value Ref Range Status   10/06/2023 4.4 3.5 - 5.1 mmol/L Final     Chloride   Date Value Ref Range Status   10/06/2023 107 95 - 110 mmol/L Final     CO2   Date Value Ref Range Status   10/06/2023 26 23 - 29 mmol/L Final     Glucose   Date Value Ref Range Status   10/06/2023 97 70 - 110 mg/dL Final     BUN   Date Value Ref Range Status   10/06/2023 19 (H) 7 - 17 mg/dL Final     Creatinine   Date Value Ref Range Status   10/06/2023 0.76 0.50 - 1.40 mg/dL Final     Calcium   Date Value Ref Range Status   10/06/2023 8.9 8.7 - 10.5 mg/dL Final     Total Protein   Date Value Ref Range Status   10/06/2023 7.0 6.0 - 8.4 g/dL Final     Albumin   Date Value Ref Range Status   10/06/2023 4.0 3.5 - 5.2 g/dL Final     Total Bilirubin   Date Value Ref Range Status   10/06/2023 0.3 0.1 - 1.0 mg/dL Final     Comment:     For infants and newborns, interpretation of results should be based  on gestational age, weight and in agreement with clinical  observations.    Premature Infant recommended reference ranges:  Up to 24 hours.............<8.0 mg/dL  Up to 48 hours............<12.0 mg/dL  3-5 days..................<15.0 mg/dL  6-29 days.................<15.0 mg/dL        Alkaline Phosphatase   Date Value Ref Range Status   10/06/2023 76 38 - 126 U/L Final     AST   Date Value Ref Range Status   10/06/2023 22 15 - 46 U/L Final     ALT   Date Value Ref Range Status   10/06/2023 21 10 - 44 U/L Final     Anion Gap   Date Value Ref Range Status   10/06/2023 8 8 - 16 mmol/L Final     eGFR if    Date Value Ref Range Status   06/29/2022 >60.0 >60 mL/min/1.73 m^2 Final     eGFR if non    Date Value Ref Range Status   06/29/2022 >60.0 >60 mL/min/1.73 m^2 Final     Comment:     Calculation used to obtain the estimated glomerular filtration  rate (eGFR) is the CKD-EPI equation.        Lab Results   Component Value Date    WBC 5.74 10/06/2023    HGB 12.6 10/06/2023    HCT 39.9 10/06/2023    MCV 93 10/06/2023     10/06/2023     Lab Results   Component Value Date    CHOL 146 10/06/2023    CHOL 196 08/05/2022    CHOL 219 (H) 05/27/2020     Lab Results   Component Value Date    HDL 53 10/06/2023    HDL 62 08/05/2022    HDL 66 05/27/2020     Lab Results   Component Value Date    LDLCALC 74.8 10/06/2023    LDLCALC 102.6 08/05/2022    LDLCALC 128.0 05/27/2020     Lab Results   Component Value Date    TRIG 91 10/06/2023    TRIG 157 (H) 08/05/2022    TRIG 125 05/27/2020     Lab Results   Component Value Date    CHOLHDL 36.3 10/06/2023    CHOLHDL 31.6 08/05/2022    CHOLHDL 30.1 05/27/2020     Lab Results   Component Value Date    TSH 0.809 10/06/2023       ASSESSMENT     1. Acute bilateral thoracic back pain    2. Type 2 diabetes mellitus without complication, without long-term current use of insulin           PLAN  Sounds MSK.  Instructed patient to stop all pain meds over-the-counter except Tylenol prn.  Start meloxicam daily.we will also send her a Medrol pack and gabapentin to take nightly.  Can continue the Robaxin as well.  We will x-ray today and call with results.   On mounjaro. Last A1c 5.6. due for labs and appt.   Instructed her to follow up with her  PCP in the next 2 weeks to reassess and for chronic disease management.    1. Acute bilateral thoracic back pain  - meloxicam (MOBIC) 15 MG tablet; Take 1 tablet (15 mg total) by mouth daily as needed for Pain.  Dispense: 30 tablet; Refill: 0  - methylPREDNISolone (MEDROL DOSEPACK) 4 mg tablet; use as directed  Dispense: 21 each; Refill: 0  - gabapentin (NEURONTIN) 100 MG capsule; Take 1 capsule (100 mg total) by mouth nightly as needed.  Dispense: 30 capsule; Refill: 0  - X-Ray Thoracic Spine AP Lateral; Future    2. Type 2 diabetes mellitus without complication, without long-term current use of insulin       Follow up with PCP     Patient was counseled on when to seek emergent care. Patient's plan/treatment was discussed including medications and possible side effects. Verbalized understanding of all instructions.          NEELAM Zarate  Department of Internal Medicine - [facility]  06/11/2024

## 2024-06-17 ENCOUNTER — TELEPHONE (OUTPATIENT)
Dept: INTERNAL MEDICINE | Facility: CLINIC | Age: 59
End: 2024-06-17
Payer: COMMERCIAL

## 2024-06-17 NOTE — TELEPHONE ENCOUNTER
----- Message from Dorothea Tanner sent at 6/17/2024  4:00 PM CDT -----  Contact: [phone] Patient  Caller is requesting an earlier appointment then we can schedule.  Caller is requesting a message be sent to the provider.    If this is for urgent care symptoms, did you offer other providers at this location, providers at other locations, or [facility]? (yes, no, n/a):      If this is for the patients physical, did you offer to schedule next available and put on wait list, or to see NP or PA for their physical?  (yes, no, n/a):      When is the next available appointment with their provider:  10/15/2024    Reason for the appointment:  F/U for back pain    Patient preference of timeframe to be scheduled:  ASAP    Would the patient like a call back, or a response through their [facility] portal?:   Call Back Please. Thank you    Comments:

## 2024-06-19 ENCOUNTER — PATIENT MESSAGE (OUTPATIENT)
Dept: INTERNAL MEDICINE | Facility: CLINIC | Age: 59
End: 2024-06-19
Payer: COMMERCIAL

## 2024-06-20 RX ORDER — PREDNISONE 10 MG/1
TABLET ORAL
Qty: 10 TABLET | Refills: 0 | Status: SHIPPED | OUTPATIENT
Start: 2024-06-20

## 2024-06-20 NOTE — TELEPHONE ENCOUNTER
Pt has f/u appt scheduled for June 25, but states the back pain resumed once finishing the steroids. She is requesting a refill on the steroids. Pt is still taking the Mobic and Neurontin but it doesn't;t last.

## 2024-06-25 ENCOUNTER — PATIENT MESSAGE (OUTPATIENT)
Dept: PSYCHIATRY | Facility: CLINIC | Age: 59
End: 2024-06-25
Payer: COMMERCIAL

## 2024-06-25 ENCOUNTER — OFFICE VISIT (OUTPATIENT)
Dept: INTERNAL MEDICINE | Facility: CLINIC | Age: 59
End: 2024-06-25
Payer: COMMERCIAL

## 2024-06-25 VITALS
HEART RATE: 70 BPM | HEIGHT: 65 IN | OXYGEN SATURATION: 99 % | DIASTOLIC BLOOD PRESSURE: 80 MMHG | BODY MASS INDEX: 28.04 KG/M2 | SYSTOLIC BLOOD PRESSURE: 124 MMHG | WEIGHT: 168.31 LBS

## 2024-06-25 DIAGNOSIS — F33.42 RECURRENT MAJOR DEPRESSIVE DISORDER, IN FULL REMISSION: ICD-10-CM

## 2024-06-25 DIAGNOSIS — M54.6 THORACIC BACK PAIN, UNSPECIFIED BACK PAIN LATERALITY, UNSPECIFIED CHRONICITY: Primary | ICD-10-CM

## 2024-06-25 DIAGNOSIS — M79.18 MYOFASCIAL PAIN: ICD-10-CM

## 2024-06-25 DIAGNOSIS — I10 HYPERTENSION, UNSPECIFIED TYPE: ICD-10-CM

## 2024-06-25 PROCEDURE — 99999 PR PBB SHADOW E&M-EST. PATIENT-LVL IV: CPT | Mod: PBBFAC,,, | Performed by: PHYSICIAN ASSISTANT

## 2024-06-25 PROCEDURE — 4010F ACE/ARB THERAPY RXD/TAKEN: CPT | Mod: CPTII,S$GLB,, | Performed by: PHYSICIAN ASSISTANT

## 2024-06-25 PROCEDURE — 3074F SYST BP LT 130 MM HG: CPT | Mod: CPTII,S$GLB,, | Performed by: PHYSICIAN ASSISTANT

## 2024-06-25 PROCEDURE — 1160F RVW MEDS BY RX/DR IN RCRD: CPT | Mod: CPTII,S$GLB,, | Performed by: PHYSICIAN ASSISTANT

## 2024-06-25 PROCEDURE — 1159F MED LIST DOCD IN RCRD: CPT | Mod: CPTII,S$GLB,, | Performed by: PHYSICIAN ASSISTANT

## 2024-06-25 PROCEDURE — 3079F DIAST BP 80-89 MM HG: CPT | Mod: CPTII,S$GLB,, | Performed by: PHYSICIAN ASSISTANT

## 2024-06-25 PROCEDURE — 3008F BODY MASS INDEX DOCD: CPT | Mod: CPTII,S$GLB,, | Performed by: PHYSICIAN ASSISTANT

## 2024-06-25 PROCEDURE — 99214 OFFICE O/P EST MOD 30 MIN: CPT | Mod: S$GLB,,, | Performed by: PHYSICIAN ASSISTANT

## 2024-06-26 RX ORDER — DULOXETIN HYDROCHLORIDE 60 MG/1
60 CAPSULE, DELAYED RELEASE ORAL DAILY
Qty: 90 CAPSULE | Refills: 0 | Status: SHIPPED | OUTPATIENT
Start: 2024-06-26

## 2024-06-28 ENCOUNTER — PATIENT MESSAGE (OUTPATIENT)
Dept: INTERNAL MEDICINE | Facility: CLINIC | Age: 59
End: 2024-06-28
Payer: COMMERCIAL

## 2024-06-28 DIAGNOSIS — M54.6 ACUTE BILATERAL THORACIC BACK PAIN: ICD-10-CM

## 2024-07-03 RX ORDER — GABAPENTIN 100 MG/1
100 CAPSULE ORAL NIGHTLY PRN
Qty: 30 CAPSULE | Refills: 0 | OUTPATIENT
Start: 2024-07-03 | End: 2025-07-03

## 2024-07-03 NOTE — TELEPHONE ENCOUNTER
Pt is requesting refill of Gabapentin, pt states the meloxicam and robaxin prescribed by pain mgmt doesn't help. Pt states she has had to take more than one dose of Gabapentin daily. Pended the last rx for Gabapentin for your review, was prescribed nightly.

## 2024-07-06 NOTE — TELEPHONE ENCOUNTER
Care Due:                  Date            Visit Type   Department     Provider  --------------------------------------------------------------------------------                                SAME DAY -                              ESTABLISHED   [facility] INTERNAL  Last Visit: 11-      PATIENT      MEDICINE       Shira Christianson  Next Visit: None Scheduled  None         None Found                                                            Last  Test          Frequency    Reason                     Performed    Due Date  --------------------------------------------------------------------------------    CMP.........  12 months..  lisinopriL...............  10-   09-    HBA1C.......  6 months...  tirzepatide..............  10-   04-    [facility] Embedded Care Due Messages. Reference number: [id number].   7/06/2024 8:53:11 AM CDT

## 2024-07-09 ENCOUNTER — OFFICE VISIT (OUTPATIENT)
Dept: PAIN MEDICINE | Facility: CLINIC | Age: 59
End: 2024-07-09
Payer: COMMERCIAL

## 2024-07-09 VITALS
WEIGHT: 164.44 LBS | HEART RATE: 91 BPM | HEIGHT: 65 IN | SYSTOLIC BLOOD PRESSURE: 108 MMHG | BODY MASS INDEX: 27.4 KG/M2 | DIASTOLIC BLOOD PRESSURE: 76 MMHG

## 2024-07-09 DIAGNOSIS — M54.6 THORACIC BACK PAIN, UNSPECIFIED BACK PAIN LATERALITY, UNSPECIFIED CHRONICITY: ICD-10-CM

## 2024-07-09 DIAGNOSIS — M79.18 MYOFASCIAL PAIN: ICD-10-CM

## 2024-07-09 DIAGNOSIS — M54.6 PAIN IN THORACIC SPINE: Primary | ICD-10-CM

## 2024-07-09 PROCEDURE — 4010F ACE/ARB THERAPY RXD/TAKEN: CPT | Mod: CPTII,S$GLB,, | Performed by: EMERGENCY MEDICINE

## 2024-07-09 PROCEDURE — 3078F DIAST BP <80 MM HG: CPT | Mod: CPTII,S$GLB,, | Performed by: EMERGENCY MEDICINE

## 2024-07-09 PROCEDURE — 1159F MED LIST DOCD IN RCRD: CPT | Mod: CPTII,S$GLB,, | Performed by: EMERGENCY MEDICINE

## 2024-07-09 PROCEDURE — 99999 PR PBB SHADOW E&M-EST. PATIENT-LVL IV: CPT | Mod: PBBFAC,,, | Performed by: EMERGENCY MEDICINE

## 2024-07-09 PROCEDURE — 99204 OFFICE O/P NEW MOD 45 MIN: CPT | Mod: S$GLB,,, | Performed by: EMERGENCY MEDICINE

## 2024-07-09 PROCEDURE — 3074F SYST BP LT 130 MM HG: CPT | Mod: CPTII,S$GLB,, | Performed by: EMERGENCY MEDICINE

## 2024-07-09 PROCEDURE — 3008F BODY MASS INDEX DOCD: CPT | Mod: CPTII,S$GLB,, | Performed by: EMERGENCY MEDICINE

## 2024-07-09 RX ORDER — GABAPENTIN 300 MG/1
CAPSULE ORAL
Qty: 69 CAPSULE | Refills: 0 | Status: SHIPPED | OUTPATIENT
Start: 2024-07-09 | End: 2024-08-07

## 2024-07-09 RX ORDER — TIZANIDINE 4 MG/1
4 TABLET ORAL NIGHTLY PRN
Qty: 30 TABLET | Refills: 0 | Status: SHIPPED | OUTPATIENT
Start: 2024-07-09 | End: 2024-08-08

## 2024-07-09 NOTE — PROGRESS NOTES
Chief Complaint   Patient presents with    Back Pain        Original HPI 07/09/2024: Joy Crawford is a 59 y.o. year old female patient who has a past medical history of Anxiety, Asthma, Condyloma, Depression, Esophageal reflux, H/O mammogram, Herpes simplex virus (HSV) infection, Hypertension, Peptic ulcer, and Sleep apnea. She presents in referral from Love Morales for midback pain    Original Pain Description:  The pain is located in the upper back and is radiates to the bilateral mid-axillary regions, left more than right.  The pain is described as aching. Exacerbating factors: daily activities. Standing makes the pain worse. Mitigating factors medications. Symptoms interfere with daily activity and sleeping. The patient feels like symptoms have been unchanged. Patient denies significant motor weakness. 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     PAIN SCORES:  Best: Pain is 6  Current: Pain is 6  Worst: Pain is 10        7/9/2024     3:22 PM   Last 3 PDI Scores   Pain Disability Index (PDI) 49       6 weeks of Conservative therapy:  PT: Completed  Chiro:  HEP: Participating    Treatments / Medications:   Cymbalta 60 mg   Gabapentin 100 mg TID  Mobic 15 mg - not taking, didn't help  Robaxin 500 mg - not taking, didn't help  Prednisone 10 mg - helped until ran out    Antiplatelets/Anticoagulants:  None    Interventional Pain Procedures:   09/2021 L4/5 Fusion     IMAGING:    XR THORACIC SPINE AP LATERAL 06/12/2024  vertebral bodies are intact without collapse or destruction. Bony spurring and degenerative changes seen at multiple levels.       MRI LUMBAR SPINE WITHOUT CONTRAST  8/9/2021   T11-T12: Minimal BBDB. Minimal spinal canal narrowing.      T12-L1: Mild BBDB. Mild spinal canal narrowing. Minimal bilateral neural foraminal narrowing.    L1-L2: BBDB. Mild spinal canal narrowing.  L2-L3: BBDB. Mild spinal canal narrowing.  L4-L5: Grade  1-2 anterior SL. Mild-mod BBDB w/left paracentral extrusion 13mm cranially abuts descending left L4 NR. Mod-severe right and mild left facet hypertrophy.    Mod-severe spinal canal narrowing. BL lateral recesses narrowing. Mod right neural foraminal narrowing. Mild left neural foraminal narrowing.      L5-S1: Mild-mod right / mild left facet hypertrophy. Minimal left neural foraminal narrowing.       Past Surgical History:   Procedure Laterality Date    ARTHROSCOPIC CHONDROPLASTY OF KNEE JOINT Right 7/7/2022    Procedure: ARTHROSCOPY, KNEE, WITH CHONDROPLASTY;  Surgeon: Elyssa Mitchell MD;  Location: [facility] OR;  Service: Orthopedics;  Laterality: Right;    CYST REMOVAL  06/25/2020    Procedure: EXCISION, CYST;  Surgeon: Gallo Maddox MD;  Location: [facility] OR;  Service: Orthopedics;;  COMPLEXT CYST LEFT  ANKLE AND FOOT    ENDOMETRIAL BIOPSY N/A 03/11/2020    Procedure: BIOPSY, ENDOMETRIUM;  Surgeon: José Miguel Leon MD;  Location: [facility] OR;  Service: OB/GYN;  Laterality: N/A;    foot ankle sx Left 06/25/2020    KNEE ARTHROSCOPY W/ MENISCECTOMY Right 7/7/2022    Procedure: ARTHROSCOPY, KNEE, MEDIAL AND LATERAL MENISCECTOMY - POL[facility] CARE;  Surgeon: Elyssa Mitchell MD;  Location: [facility] OR;  Service: Orthopedics;  Laterality: Right;    KNEE ARTHROSCOPY W/ PLICA EXCISION Right 7/7/2022    Procedure: EXCISION, PLICA, KNEE, ARTHROSCOPIC;  Surgeon: Elyssa Mitchell MD;  Location: [facility] OR;  Service: Orthopedics;  Laterality: Right;    KNEE DEBRIDEMENT  7/7/2022    Procedure: DEBRIDEMENT, KNEE;  Surgeon: Elyssa Mitchell MD;  Location: [facility] OR;  Service: Orthopedics;;    RHINOPLASTY  2006    SINUS SURGERY  2006    SPINAL FUSION  09/21/2021    L4-5    SYNOVECTOMY OF KNEE Right 7/7/2022    Procedure: PARTIAL SYNOVECTOMY, KNEE;  Surgeon: Elyssa Mitchell MD;  Location: [facility] OR;  Service: Orthopedics;  Laterality: Right;    VULVECTOMY N/A 03/11/2020    Procedure: VULVECTOMY;  Surgeon: José Miguel Leon MD;  Location: [facility] OR;  Service: OB/GYN;  Laterality: N/A;  "      Social History     Socioeconomic History    Marital status: Single   Tobacco Use    Smoking status: Never    Smokeless tobacco: Never   Substance and Sexual Activity    Alcohol use: Yes     Comment: social    Drug use: No    Sexual activity: Not Currently     Partners: Male     Birth control/protection: Post-menopausal   Social History Narrative    , one son (20 years old).  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      Social Determinants of Health     Financial Resource Strain: Patient Declined (11/30/2023)    Overall Financial Resource Strain (CARDIA)     Difficulty of Paying Living Expenses: Patient declined   Food Insecurity: Unknown (11/30/2023)    Hunger Vital Sign     Ran Out of Food in the Last Year: Patient declined   Transportation Needs: Patient Declined (11/30/2023)    PRAPARE - Transportation     Lack of Transportation (Medical): Patient declined     Lack of Transportation (Non-Medical): Patient declined   Physical Activity: Unknown (11/30/2023)    Exercise Vital Sign     Days of Exercise per Week: Patient declined   Stress: Patient Declined (11/30/2023)    [employer] - Occupational Stress Questionnaire     Feeling of Stress : Patient declined   Housing Stability: Unknown (11/30/2023)    Housing Stability Vital Sign     Unable to Pay for Housing in the Last Year: Patient refused     Unstable Housing in the Last Year: Patient refused       Medications/Allergies: See med card    ROS:  GENERAL: No fever. No chills. No fatigue. Denies weight loss. Denies weight gain.  Back / musculoskeletal / neuro : See HPI    VITALS:   Vitals:    07/09/24 1513   BP: 108/76   Pulse: 91   Weight: 74.6 kg (164 lb 7.4 oz)   Height: 5' 5" (1.651 m)   PainSc:   6   PainLoc: Back     Body mass index is 27.37 kg/m².      7/9/2024     3:22 PM   Last 3 PDI Scores   Pain Disability Index (PDI) 49       PHYSICAL EXAM:   GENERAL: Well appearing, in no acute distress, alert and " oriented x3.  PSYCH:  Mood and affect appropriate.  SKIN: Skin color, texture, turgor normal, no rashes or lesions.  HEENT:  Normocephalic, atraumatic. Cranial nerves grossly intact.  NECK: No pain to palpation over the cervical paraspinous muscles. No pain to palpation over facets. No pain with neck flexion, extension, or lateral flexion.   PULM: No evidence of respiratory difficulty, symmetric chest rise.  GI:  Non-distended  BACK: Normal range of motion. No pain to palpation over the spinous processes. No pain to palpation over facet joints. There is no pain with palpation over the sacroiliac joints bilaterally.   EXTREMITIES: No deformities, edema, or skin discoloration.   MUSCULOSKELETAL: Shoulder, hip, and knee provocative maneuvers are negative. No atrophy is noted.  NEURO: Sensation is equal and appropriate bilaterally. Bilateral upper and lower extremity strength is normal and symmetric. Bilateral upper and lower extremity coordination and muscle stretch reflexes are physiologic and symmetric. Plantar response are downgoing. Straight leg raising in the supine position is negative to radicular pain.   GAIT: normal.      LABS:    Lab Results   Component Value Date    HGBA1C 5.2 10/06/2023       Lab Results   Component Value Date    CREATININE 0.76 10/06/2023       ASSESSMENT: 59 y.o. year old female with pain, consistent with:    Encounter Diagnoses   Name Primary?    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  - Patient can continue with medications for now since they are providing benefits, using them appropriately, and without side effects.  - Counseled patient regarding the importance of activity  modification and constant sleeping habits.  - Medications: Start Neurontin 300mg gradually to three times a day. Explained titration schedule with starting nightly and increase dose gradually to tolerance without adverse effects. and Start Zanaflex 4mg at bedtime PRN to help with muscular symptoms.    - Imaging: Reviewed available imaging with patient and answered any questions they had regarding study. Order MRI of the Thoracic Spine to help evaluate possible source of pain.  - The patient's pathophysiology was explained in detail with reference to x-rays, models, other visual aids as appropriate.   - Follow up visit: return to clinic in 1 week after the MRI      I would like to thank Love Morales PA-C for the opportunity to assist in the care of this patient. We had a very nice visit and I look forward to continuing their care. Please let me know if I can be of further assistance.     Roger Hernandez MD  07/09/2024

## 2024-07-16 ENCOUNTER — PATIENT MESSAGE (OUTPATIENT)
Dept: PAIN MEDICINE | Facility: CLINIC | Age: 59
End: 2024-07-16
Payer: COMMERCIAL

## 2024-07-16 DIAGNOSIS — M54.6 ACUTE BILATERAL THORACIC BACK PAIN: ICD-10-CM

## 2024-07-16 RX ORDER — CELECOXIB 200 MG/1
200 CAPSULE ORAL 2 TIMES DAILY
Qty: 60 CAPSULE | Refills: 0 | Status: SHIPPED | OUTPATIENT
Start: 2024-07-16 | End: 2024-08-15

## 2024-07-24 ENCOUNTER — HOSPITAL ENCOUNTER (OUTPATIENT)
Dept: RADIOLOGY | Facility: HOSPITAL | Age: 59
Discharge: HOME | End: 2024-07-24
Attending: EMERGENCY MEDICINE
Payer: COMMERCIAL

## 2024-07-24 DIAGNOSIS — M54.6 PAIN IN THORACIC SPINE: ICD-10-CM

## 2024-07-24 PROCEDURE — 72146 MRI CHEST SPINE W/O DYE: CPT | Mod: 26,,, | Performed by: STUDENT IN AN ORGANIZED HEALTH CARE EDUCATION/TRAINING PROGRAM

## 2024-07-24 PROCEDURE — 72146 MRI CHEST SPINE W/O DYE: CPT | Mod: TC

## 2024-07-31 ENCOUNTER — OFFICE VISIT (OUTPATIENT)
Dept: PAIN MEDICINE | Facility: CLINIC | Age: 59
End: 2024-07-31
Payer: COMMERCIAL

## 2024-07-31 VITALS
HEIGHT: 65 IN | DIASTOLIC BLOOD PRESSURE: 71 MMHG | SYSTOLIC BLOOD PRESSURE: 101 MMHG | HEART RATE: 94 BPM | WEIGHT: 164.44 LBS | BODY MASS INDEX: 27.4 KG/M2

## 2024-07-31 DIAGNOSIS — M54.14 THORACIC RADICULOPATHY: ICD-10-CM

## 2024-07-31 DIAGNOSIS — M54.6 PAIN IN THORACIC SPINE: ICD-10-CM

## 2024-07-31 DIAGNOSIS — M54.6 THORACIC BACK PAIN, UNSPECIFIED BACK PAIN LATERALITY, UNSPECIFIED CHRONICITY: Primary | ICD-10-CM

## 2024-07-31 DIAGNOSIS — M51.34 THORACIC DEGENERATIVE DISC DISEASE: ICD-10-CM

## 2024-07-31 PROCEDURE — 4010F ACE/ARB THERAPY RXD/TAKEN: CPT | Mod: CPTII,S$GLB,, | Performed by: EMERGENCY MEDICINE

## 2024-07-31 PROCEDURE — 3078F DIAST BP <80 MM HG: CPT | Mod: CPTII,S$GLB,, | Performed by: EMERGENCY MEDICINE

## 2024-07-31 PROCEDURE — 1159F MED LIST DOCD IN RCRD: CPT | Mod: CPTII,S$GLB,, | Performed by: EMERGENCY MEDICINE

## 2024-07-31 PROCEDURE — 99214 OFFICE O/P EST MOD 30 MIN: CPT | Mod: S$GLB,,, | Performed by: EMERGENCY MEDICINE

## 2024-07-31 PROCEDURE — 3008F BODY MASS INDEX DOCD: CPT | Mod: CPTII,S$GLB,, | Performed by: EMERGENCY MEDICINE

## 2024-07-31 PROCEDURE — 99999 PR PBB SHADOW E&M-EST. PATIENT-LVL IV: CPT | Mod: PBBFAC,,, | Performed by: EMERGENCY MEDICINE

## 2024-07-31 PROCEDURE — 3074F SYST BP LT 130 MM HG: CPT | Mod: CPTII,S$GLB,, | Performed by: EMERGENCY MEDICINE

## 2024-07-31 RX ORDER — TIZANIDINE 4 MG/1
4 TABLET ORAL NIGHTLY PRN
Qty: 90 TABLET | Refills: 1 | Status: SHIPPED | OUTPATIENT
Start: 2024-07-31 | End: 2025-01-27

## 2024-07-31 NOTE — PROGRESS NOTES
Chronic Pain-Established (Follow up visit)       Interval History 07/31/24: Patient was started on gabapentin and Tizanidine during their last visit and they have not been taking it.  She states that she tried the gabapentin, but it was not helping.  The pain remains mid thorax and wraps around to her chest, but she has no pain in her umbilical region.  Pain is not made worse with palpation or with specific muscular movements.    Original HPI 07/09/2024: Joy Crawford is a 59 y.o. year old female patient who has a past medical history of Anxiety, Asthma, Condyloma, Depression, Esophageal reflux, H/O mammogram, Herpes simplex virus (HSV) infection, Hypertension, Peptic ulcer, and Sleep apnea. She presents in referral from No ref. provider found for midback pain    Original Pain Description:  The pain is located in the upper back and is radiates to the bilateral mid-axillary regions, left more than right.  The pain is described as aching. Exacerbating factors: daily activities. Standing makes the pain worse. Mitigating factors medications. Symptoms interfere with daily activity and sleeping. The patient feels like symptoms have been unchanged. Patient denies significant motor weakness. 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     PAIN SCORES:  Best: Pain is 6  Current: Pain is 6  Worst: Pain is 10        7/9/2024     3:22 PM   Last 3 PDI Scores   Pain Disability Index (PDI) 49       6 weeks of Conservative therapy:  PT: Completed  Chiro:  HEP: Participating    Treatments / Medications:   Cymbalta 60 mg   Gabapentin 100 mg TID  Mobic 15 mg - not taking, didn't help  Robaxin 500 mg - not taking, didn't help  Prednisone 10 mg - helped until ran out    Antiplatelets/Anticoagulants:  None    Interventional Pain Procedures:   09/2021 L4/5 Fusion     IMAGING:    MRI THORACIC SPINE WITHOUT CONTRAST 07/24/2024   Scattered endplate degenerative change  throughout the visualized thoracic spine (both Modic type 1 and type 2 changes).  T6-T7: BBDB w/focal DP->indents VTS.    T7-T8: DP w/superior migration   T10-T11: DB and BL facet joint arthropathy=>mod BL neural foraminal narrowing   Multilevel thoracic spondylosis=>mod bilateral neural foraminal narrowing at T10-T11    XR THORACIC SPINE AP LATERAL 06/12/2024  vertebral bodies are intact without collapse or destruction. Bony spurring and degenerative changes seen at multiple levels.       MRI LUMBAR SPINE WITHOUT CONTRAST  8/9/2021   T11-T12: Minimal BBDB. Minimal spinal canal narrowing.      T12-L1: Mild BBDB. Mild spinal canal narrowing. Minimal bilateral neural foraminal narrowing.    L1-L2: BBDB. Mild spinal canal narrowing.  L2-L3: BBDB. Mild spinal canal narrowing.  L4-L5: Grade 1-2 anterior SL. Mild-mod BBDB w/left paracentral extrusion 13mm cranially abuts descending left L4 NR. Mod-severe right and mild left facet hypertrophy.    Mod-severe spinal canal narrowing. BL lateral recesses narrowing. Mod right neural foraminal narrowing. Mild left neural foraminal narrowing.      L5-S1: Mild-mod right / mild left facet hypertrophy. Minimal left neural foraminal narrowing.       Past Surgical History:   Procedure Laterality Date    ARTHROSCOPIC CHONDROPLASTY OF KNEE JOINT Right 7/7/2022    Procedure: ARTHROSCOPY, KNEE, WITH CHONDROPLASTY;  Surgeon: Elyssa Mitchell MD;  Location: [facility] OR;  Service: Orthopedics;  Laterality: Right;    CYST REMOVAL  06/25/2020    Procedure: EXCISION, CYST;  Surgeon: Gallo Maddox MD;  Location: [facility] OR;  Service: Orthopedics;;  COMPLEXT CYST LEFT  ANKLE AND FOOT    ENDOMETRIAL BIOPSY N/A 03/11/2020    Procedure: BIOPSY, ENDOMETRIUM;  Surgeon: José Miguel Leon MD;  Location: [facility] OR;  Service: OB/GYN;  Laterality: N/A;    foot ankle sx Left 06/25/2020    KNEE ARTHROSCOPY W/ MENISCECTOMY Right 7/7/2022    Procedure: ARTHROSCOPY, KNEE, MEDIAL AND LATERAL MENISCECTOMY - [facility];   Surgeon: Elyssa Mitchell MD;  Location: [facility] OR;  Service: Orthopedics;  Laterality: Right;    KNEE ARTHROSCOPY W/ PLICA EXCISION Right 7/7/2022    Procedure: EXCISION, PLICA, KNEE, ARTHROSCOPIC;  Surgeon: Elyssa Mitchell MD;  Location: [facility] OR;  Service: Orthopedics;  Laterality: Right;    KNEE DEBRIDEMENT  7/7/2022    Procedure: DEBRIDEMENT, KNEE;  Surgeon: Elyssa Mitchell MD;  Location: [facility] OR;  Service: Orthopedics;;    RHINOPLASTY  2006    SINUS SURGERY  2006    SPINAL FUSION  09/21/2021    L4-5    SYNOVECTOMY OF KNEE Right 7/7/2022    Procedure: PARTIAL SYNOVECTOMY, KNEE;  Surgeon: Elyssa Mitchell MD;  Location: [facility] OR;  Service: Orthopedics;  Laterality: Right;    VULVECTOMY N/A 03/11/2020    Procedure: VULVECTOMY;  Surgeon: José Miguel Leon MD;  Location: [facility] OR;  Service: OB/GYN;  Laterality: N/A;       Social History     Socioeconomic History    Marital status: Single   Tobacco Use    Smoking status: Never    Smokeless tobacco: Never   Substance and Sexual Activity    Alcohol use: Yes     Comment: social    Drug use: No    Sexual activity: Not Currently     Partners: Male     Birth control/protection: Post-menopausal   Social History Narrative    , one son (20 years old).  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      Social Determinants of Health     Financial Resource Strain: Patient Declined (11/30/2023)    Overall Financial Resource Strain (CARDIA)     Difficulty of Paying Living Expenses: Patient declined   Food Insecurity: Unknown (11/30/2023)    Hunger Vital Sign     Ran Out of Food in the Last Year: Patient declined   Transportation Needs: Patient Declined (11/30/2023)    PRAPARE - Transportation     Lack of Transportation (Medical): Patient declined     Lack of Transportation (Non-Medical): Patient declined   Physical Activity: Unknown (11/30/2023)    Exercise Vital Sign     Days of Exercise per Week: Patient declined   Stress: Patient Declined (11/30/2023)    [facility] of  "Occupational Health - Occupational Stress Questionnaire     Feeling of Stress : Patient declined   Housing Stability: Unknown (11/30/2023)    Housing Stability Vital Sign     Unable to Pay for Housing in the Last Year: Patient refused     Unstable Housing in the Last Year: Patient refused       Medications/Allergies: See med card    ROS:  GENERAL: No fever. No chills. No fatigue. Denies weight loss. Denies weight gain.  Back / musculoskeletal / neuro : See HPI    VITALS:   Vitals:    07/31/24 1558   BP: 101/71   Pulse: 94   Weight: 74.6 kg (164 lb 7.4 oz)   Height: 5' 5" (1.651 m)   PainSc:   5   PainLoc: Back       Body mass index is 27.37 kg/m².      7/9/2024     3:22 PM   Last 3 PDI Scores   Pain Disability Index (PDI) 49       PHYSICAL EXAM:   GENERAL: Well appearing, in no acute distress, alert and oriented x3.  PSYCH:  Mood and affect appropriate.  SKIN: Skin color, texture, turgor normal, no rashes or lesions.  HEENT:  Normocephalic, atraumatic. Cranial nerves grossly intact.  NECK: No pain to palpation over the cervical paraspinous muscles. No pain to palpation over facets. No pain with neck flexion, extension, or lateral flexion.   PULM: No evidence of respiratory difficulty, symmetric chest rise.  GI:  Non-distended  BACK: Normal range of motion. No pain to palpation over the spinous processes. No pain to palpation over facet joints. There is no pain with palpation over the sacroiliac joints bilaterally.  There is no tenderness to palpation over the musculature bilateral thoracic, scapular region or intercostal.  EXTREMITIES: No deformities, edema, or skin discoloration.   MUSCULOSKELETAL: Shoulder, hip, and knee provocative maneuvers are negative. No atrophy is noted.  NEURO: Sensation is equal and appropriate bilaterally. Bilateral upper and lower extremity strength is normal and symmetric. Bilateral upper and lower extremity coordination and muscle stretch reflexes are physiologic and symmetric. Plantar " response are downgoing. Straight leg raising in the supine position is negative to radicular pain.   GAIT: normal.      LABS:    Lab Results   Component Value Date    HGBA1C 5.2 10/06/2023       Lab Results   Component Value Date    CREATININE 0.76 10/06/2023       ASSESSMENT: 59 y.o. year old female with pain, consistent with:    Encounter Diagnoses   Name Primary?    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      PLAN:  - I have stressed the importance of physical activity and a home exercise plan to help with pain and improve health.  - Patient can continue with medications for now since they are providing benefits, using them appropriately, and without side effects.  - Counseled patient regarding the importance of activity modification and constant sleeping habits.  - Medications: Restart Neurontin 100mg BID and 300mg QHS.  She will notify me on 8/5/24 if she is tolerating, and at this time we will increase     - Imaging: Reviewed available imaging with patient and answered any questions they had regarding study.   - will schedule for a T7/8 PRABHU  - The patient's pathophysiology was explained in detail with reference to x-rays, models, other visual aids as appropriate.   - Follow up visit: return to clinic 3-4 weeks after procedure    Roger Hernandez MD  07/31/2024

## 2024-08-01 ENCOUNTER — TELEPHONE (OUTPATIENT)
Dept: PAIN MEDICINE | Facility: CLINIC | Age: 59
End: 2024-08-01
Payer: COMMERCIAL

## 2024-08-03 ENCOUNTER — PATIENT MESSAGE (OUTPATIENT)
Dept: PSYCHIATRY | Facility: CLINIC | Age: 59
End: 2024-08-03
Payer: COMMERCIAL

## 2024-08-05 ENCOUNTER — TELEPHONE (OUTPATIENT)
Dept: PAIN MEDICINE | Facility: CLINIC | Age: 59
End: 2024-08-05
Payer: COMMERCIAL

## 2024-08-05 DIAGNOSIS — M54.14 THORACIC RADICULOPATHY: Primary | ICD-10-CM

## 2024-08-14 ENCOUNTER — PATIENT MESSAGE (OUTPATIENT)
Dept: PSYCHIATRY | Facility: CLINIC | Age: 59
End: 2024-08-14
Payer: COMMERCIAL

## 2024-08-14 DIAGNOSIS — F33.42 RECURRENT MAJOR DEPRESSIVE DISORDER, IN FULL REMISSION: ICD-10-CM

## 2024-08-14 RX ORDER — TRAZODONE HYDROCHLORIDE 150 MG/1
TABLET ORAL
Qty: 90 TABLET | Refills: 0 | Status: SHIPPED | OUTPATIENT
Start: 2024-08-14 | End: 2024-08-15 | Stop reason: SDUPTHER

## 2024-08-15 ENCOUNTER — OFFICE VISIT (OUTPATIENT)
Dept: PSYCHIATRY | Facility: CLINIC | Age: 59
End: 2024-08-15
Payer: COMMERCIAL

## 2024-08-15 DIAGNOSIS — G47.00 INSOMNIA, UNSPECIFIED TYPE: ICD-10-CM

## 2024-08-15 DIAGNOSIS — F41.1 GAD (GENERALIZED ANXIETY DISORDER): ICD-10-CM

## 2024-08-15 DIAGNOSIS — F33.1 MODERATE EPISODE OF RECURRENT MAJOR DEPRESSIVE DISORDER: Primary | ICD-10-CM

## 2024-08-15 DIAGNOSIS — F90.0 ATTENTION DEFICIT HYPERACTIVITY DISORDER (ADHD), PREDOMINANTLY INATTENTIVE TYPE: ICD-10-CM

## 2024-08-15 RX ORDER — LISDEXAMFETAMINE DIMESYLATE 60 MG/1
60 CAPSULE ORAL EVERY MORNING
Qty: 60 CAPSULE | Refills: 0 | Status: SHIPPED | OUTPATIENT
Start: 2024-08-15 | End: 2024-10-14

## 2024-08-15 RX ORDER — DULOXETIN HYDROCHLORIDE 60 MG/1
CAPSULE, DELAYED RELEASE ORAL
Qty: 90 CAPSULE | Refills: 0 | Status: SHIPPED | OUTPATIENT
Start: 2024-08-15

## 2024-08-15 RX ORDER — LISDEXAMFETAMINE DIMESYLATE 60 MG/1
60 CAPSULE ORAL EVERY MORNING
Qty: 30 CAPSULE | Refills: 0 | Status: SHIPPED | OUTPATIENT
Start: 2024-10-10 | End: 2024-11-09

## 2024-08-15 RX ORDER — DULOXETIN HYDROCHLORIDE 20 MG/1
CAPSULE, DELAYED RELEASE ORAL
Qty: 90 CAPSULE | Refills: 1 | Status: SHIPPED | OUTPATIENT
Start: 2024-08-15

## 2024-08-15 RX ORDER — LISDEXAMFETAMINE DIMESYLATE 60 MG/1
60 CAPSULE ORAL EVERY MORNING
Qty: 30 CAPSULE | Refills: 0 | Status: SHIPPED | OUTPATIENT
Start: 2024-09-12 | End: 2024-10-12

## 2024-08-15 RX ORDER — BUPROPION HYDROCHLORIDE 200 MG/1
200 TABLET, EXTENDED RELEASE ORAL DAILY
Qty: 90 TABLET | Refills: 0 | Status: SHIPPED | OUTPATIENT
Start: 2024-08-15 | End: 2024-11-13

## 2024-08-15 RX ORDER — TRAZODONE HYDROCHLORIDE 150 MG/1
TABLET ORAL
Qty: 90 TABLET | Refills: 0 | Status: SHIPPED | OUTPATIENT
Start: 2024-08-15

## 2024-08-15 NOTE — PROGRESS NOTES
"  .Outpatient Psychiatry Follow-Up Visit (MD/NP)    8/15/2024     The patient location is [place]  The chief complaint leading to consultation is: depression, ADHD, anxious      Visit type: audiovisual    Face to Face time with patient:  26 minutes  30  minutes of total time spent on the encounter, which includes face to face time and non-face to face time preparing to see the patient (eg, review of tests), Obtaining and/or reviewing separately obtained history, Documenting clinical information in the electronic or other health record, Independently interpreting results (not separately reported) and communicating results to the patient/family/caregiver, or Care coordination (not separately reported).         Each patient to whom he or she provides medical services by telemedicine is:  (1) informed of the relationship between the physician and patient and the respective role of any other health care provider with respect to management of the patient; and (2) notified that he or she may decline to receive medical services by telemedicine and may withdraw from such care at any time.    Notes:       Clinical Status of Patient:  Outpatient (Ambulatory)    Chief Complaint:  oJy Crawford is a 59 y.o. female who presents today for follow-up of depression, anxiety and attention problems.  Met with patient.      Interval History and Content of Current Session:  Interim Events/Subjective Report/Content of Current Session:   Joy Crawford checked in on time for her virtual follow up appointment today. She presented as pleasant and cooperative. She reported low mood, fatigue, low motivation, and decreased enjoyment. She stated she is interested in adjusting duloxetine 60 mg po daily and she agreed to increase it to duloxetine 80 mg po daily to treat her depressive symptoms.      She stated she is also having physical/health problems that stops her from doing the things that she used to enjoy doing. She stated,"I have my " "L4 and L5 fused in 2021. Now I have upper back problems and I have problems with my foot". 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    She stated she lost 91 lbs since October 2022 with mounjaro injections and it has improved her medical health. She stated blood pressure is managed well and is within the normal range, follows up with her PCP, and takes a low dose of lisinopril 10 mg po daily from her PCP.     She stated Vyvanse 60 mg po daily is better that vyvanse 50 mg daily but it still wears off late in the afternoon but she wants to continue with vyvanse 60 mg po daily at this time.     She reported increased depressive symptoms but anxiety is still managed. She reported good sleep with trazodone 150 mg po qhs prn. She stated she puts a lot of effort and thoughts into her classes and is usually non-stop once her day starts. She stated she still enjoys her job as a . She denied any problems with headache, stomach upset, weight loss, insomnia, chest pain, palpitations, tics, or tremors. She denied suicidal ideation and suicide plan or intent. She denied SI/HI/AVH. No objective s/sx of psychosis or guillermo. She denied alcohol or drug use. No changes in medical health.  Patient verbalized motivation for compliance with medications and all other elements of treatment plan.       Psychiatric Review Of Systems - Is patient experiencing or having changes in:  sleep: no. Good sleep with trazodone  appetite: no  weight: yes, lost weight 91 total  energy/anergy: no  interest/pleasure/anhedonia: no  somatic symptoms: no  anxiety/panic: yes but managed  guilty/hopelessness: no  concentration: yes but improved with vyvanse  S.I.B.s/risky behavior: no  Irritability: no  Racing thoughts: no  Impulsive behaviors: no  Paranoia:no  AVH:no  Suicidal thoughts/plan/intent: no      Current Medications:  Cymbalta 60 mg " "daily   Vyvanse  60 mg daily  Checked LA  and no irregularities were noted.  Wellbutrin  mg daily  Buspar 10 mg BID occasionally not daily  Trazodone 150 mg nightly - from sleep medicine.     Previous medication trials:  Gabapentin - nausea  90 mg of Cymbalta - made symptoms worse, cried for 3 days  Wellbutrin- caused insomnia with XL  Zoloft- ineffective  Abilify- insomnia    Unsure if she has tried Effexor or Pristiq  Review of Systems   PSYCHIATRIC: Pertinant items are noted in the narrative.  CONSTITUTIONAL: No weight gain or loss.   MUSCULOSKELETAL: No pain or stiffness of the joints.  CARDIOVASCULAR: No tachycardia or chest pain.  GASTROINTESTINAL: No nausea, vomiting, pain, constipation or diarrhea.    Past Medical, Family and Social History: The patient's past medical, family and social history have been reviewed and updated as appropriate within the electronic medical record - see encounter notes.    Compliance: yes    Side effects: None    Risk Parameters:  Patient reports no suicidal ideation  Patient reports no homicidal ideation  Patient reports no self-injurious behavior  Patient reports no violent behavior    Exam (detailed: at least 9 elements; comprehensive: all 15 elements)   Constitutional  Vitals:         General:  unremarkable, age appropriate     Musculoskeletal  Muscle Strength/Tone:  not examined   Gait & Station:  Normal      Psychiatric  Speech:  no latency; no press   Mood & Affect:  "low"  Down    Thought Process:  normal and logical   Associations:  intact   Thought Content:  normal, no suicidality, no homicidality, delusions, or paranoia   Insight:  intact   Judgement: behavior is adequate to circumstances   Orientation:  grossly intact   Memory: intact for content of interview   Language: grossly intact, able to name, able to repeat   Attention Span & Concentration:  able to focus with vyvanse    Fund of Knowledge:  intact and appropriate to age and level of education "     Assessment and Diagnosis   Status/Progress: Based on the examination today, the patient's problem(s) is/are adequately but not ideally controlled.  New problems have not been presented today.   Co-morbidities, Diagnostic uncertainty and Lack of compliance are not complicating management of the primary condition.  There are no active rule-out diagnoses for this patient at this time.     General Impression: Joy Crawford is a 56 y.o. female who presents today for follow-up of depression and anxiety. Patient seen and chart reviewed. Previous patient of Dr. Mckeon - no medication changes made. Presents 4/21/21- anxiety and motivation remain an issues, Abilify started. Presents 7/12/21 failed Abilify, wants to restart Vyvanse Presents 10/26/21- anxiety improved with Buspar, never started Vyvanse, Wellbutrin added for depression Presents 5/30/22- Wellbutrin switched to SR due to insomnia on XL Presents 7/11/22- insomnia resolved, Vyvanse restarted Presents 12/19/22- symptom partially improved. Some depression due to paulette season.  Presents 2-24-23 and doing better - refilled all for 90 days except for Vyvanse refilled x 2 total. 11/30/23 doing well on her current medication regimen and requested keeping Vyvane 50 mg po daily, Wellbutrin sr 200 mg po daily, Trazodone 150 mg po qhs, and Cymbalta 60 mg po daily due  to their effectiveness in treating her symptoms. 1/10/24 doing well since her last visit and wants to continue with Vyvane 50 mg po daily, Wellbutrin sr 200 mg po daily, Trazodone 150 mg po qhs, and Cymbalta 60 mg po daily due  to their effectiveness in treating her symptoms. 8/15/24 stable and okay but reported increased depression and is interested increasing duloxetine to 80 mg p.o. daily.  She requested keeping Vyvanse 60 mg p.o. daily due to its effectiveness in improving her focus treating her ADHD symptoms.  She reported good sleep with trazodone 150 mg p.o. q.h.s. p.r.n. She wants to continue  Wellbutrin SR 20 mg daily because she finds it helpful.        ICD-10-CM ICD-9-CM   1. Moderate episode of recurrent major depressive disorder  F33.1 296.32   2. Attention deficit hyperactivity disorder (ADHD), predominantly inattentive type  F90.0 314.00   3. RAYNA (generalized anxiety disorder)  F41.1 300.02   4. Insomnia, unspecified type  G47.00 780.52           Intervention/Counseling/Treatment Plan   Medication Management: Continue current medications.   In person visit 1/10/2024  Increase Cymbalta to 80  mg daily  Increase  Vyvanse 60 mg daily for ADHD  Checked LA  and no irregularities were noted.  Continue Wellbutrin   mg daily for depression   Continue Trazodone 150 mg nightly - from sleep medicine.   The risks and benefits of medication were discussed with the patient.  Discussed diagnosis, risks and benefits of proposed treatment above vs alternative treatments vs no treatment, and potential side effects of these treatments. The patient expresses understanding of the above and displays the capacity to agree with this treatment given said understanding. Patient also agrees that, currently, the benefits outweigh the risks and would like to pursue treatment at this time.  Discussed inherent unpredictability of medications in each individual.   Encouraged Patient to keep future appointments.   Take medications as prescribed and abstain from substance abuse.   In the event of an emergency patient was advised to go to the emergency room      Return to Clinic: follow up in 3 months or earlier as needed    LUANN Romero-BC

## 2024-08-27 ENCOUNTER — PATIENT MESSAGE (OUTPATIENT)
Dept: INTERNAL MEDICINE | Facility: CLINIC | Age: 59
End: 2024-08-27
Payer: COMMERCIAL

## 2024-08-30 ENCOUNTER — TELEPHONE (OUTPATIENT)
Dept: PAIN MEDICINE | Facility: CLINIC | Age: 59
End: 2024-08-30
Payer: COMMERCIAL

## 2024-08-31 DIAGNOSIS — E11.9 TYPE 2 DIABETES MELLITUS WITHOUT COMPLICATION, WITHOUT LONG-TERM CURRENT USE OF INSULIN: ICD-10-CM

## 2024-09-03 ENCOUNTER — TELEPHONE (OUTPATIENT)
Dept: INTERNAL MEDICINE | Facility: CLINIC | Age: 59
End: 2024-09-03
Payer: COMMERCIAL

## 2024-09-03 NOTE — TELEPHONE ENCOUNTER
Called patient checking to see if they had their Diabetic eye exam done this year.   If have not had eye exam done this year, offering the DM eyecam.   [place] [facility] [place] [phone]   Ashlyn Denson RN HC

## 2024-09-05 ENCOUNTER — TELEPHONE (OUTPATIENT)
Dept: PAIN MEDICINE | Facility: CLINIC | Age: 59
End: 2024-09-05
Payer: COMMERCIAL

## 2024-09-05 ENCOUNTER — PATIENT MESSAGE (OUTPATIENT)
Dept: INTERNAL MEDICINE | Facility: CLINIC | Age: 59
End: 2024-09-05
Payer: COMMERCIAL

## 2024-09-05 ENCOUNTER — PATIENT MESSAGE (OUTPATIENT)
Dept: PAIN MEDICINE | Facility: CLINIC | Age: 59
End: 2024-09-05
Payer: COMMERCIAL

## 2024-09-07 ENCOUNTER — PATIENT MESSAGE (OUTPATIENT)
Dept: ADMINISTRATIVE | Facility: HOSPITAL | Age: 59
End: 2024-09-07
Payer: COMMERCIAL

## 2024-09-10 ENCOUNTER — TELEPHONE (OUTPATIENT)
Dept: PHARMACY | Facility: CLINIC | Age: 59
End: 2024-09-10
Payer: COMMERCIAL

## 2024-09-13 ENCOUNTER — PATIENT MESSAGE (OUTPATIENT)
Dept: INTERNAL MEDICINE | Facility: CLINIC | Age: 59
End: 2024-09-13
Payer: COMMERCIAL

## 2024-09-16 ENCOUNTER — OFFICE VISIT (OUTPATIENT)
Dept: INTERNAL MEDICINE | Facility: CLINIC | Age: 59
End: 2024-09-16
Payer: COMMERCIAL

## 2024-09-16 ENCOUNTER — LAB VISIT (OUTPATIENT)
Dept: LAB | Facility: HOSPITAL | Age: 59
End: 2024-09-16
Attending: INTERNAL MEDICINE
Payer: COMMERCIAL

## 2024-09-16 VITALS
WEIGHT: 166.75 LBS | DIASTOLIC BLOOD PRESSURE: 68 MMHG | HEART RATE: 80 BPM | HEIGHT: 65 IN | SYSTOLIC BLOOD PRESSURE: 114 MMHG | BODY MASS INDEX: 27.78 KG/M2

## 2024-09-16 DIAGNOSIS — F33.41 RECURRENT MAJOR DEPRESSIVE DISORDER, IN PARTIAL REMISSION: ICD-10-CM

## 2024-09-16 DIAGNOSIS — E11.9 TYPE 2 DIABETES MELLITUS WITHOUT COMPLICATION, WITHOUT LONG-TERM CURRENT USE OF INSULIN: ICD-10-CM

## 2024-09-16 DIAGNOSIS — I10 HYPERTENSION, UNSPECIFIED TYPE: ICD-10-CM

## 2024-09-16 DIAGNOSIS — Z13.220 NEED FOR LIPID SCREENING: ICD-10-CM

## 2024-09-16 DIAGNOSIS — Z13.220 NEED FOR LIPID SCREENING: Primary | ICD-10-CM

## 2024-09-16 DIAGNOSIS — N39.0 URINARY TRACT INFECTION WITHOUT HEMATURIA, SITE UNSPECIFIED: Primary | ICD-10-CM

## 2024-09-16 DIAGNOSIS — E55.9 VITAMIN D DEFICIENCY DISEASE: ICD-10-CM

## 2024-09-16 DIAGNOSIS — F90.0 ADHD (ATTENTION DEFICIT HYPERACTIVITY DISORDER), INATTENTIVE TYPE: ICD-10-CM

## 2024-09-16 DIAGNOSIS — F41.9 ANXIETY: ICD-10-CM

## 2024-09-16 DIAGNOSIS — E53.8 VITAMIN B12 DEFICIENCY: ICD-10-CM

## 2024-09-16 DIAGNOSIS — J45.909 ASTHMA, CURRENTLY ACTIVE: ICD-10-CM

## 2024-09-16 PROBLEM — J45.901 EXACERBATION OF ASTHMA: Status: RESOLVED | Noted: 2017-10-11 | Resolved: 2024-09-16

## 2024-09-16 LAB
ALBUMIN SERPL BCP-MCNC: 4 G/DL (ref 3.5–5.2)
ALP SERPL-CCNC: 83 U/L (ref 55–135)
ALT SERPL W/O P-5'-P-CCNC: 15 U/L (ref 10–44)
ANION GAP SERPL CALC-SCNC: 11 MMOL/L (ref 8–16)
AST SERPL-CCNC: 16 U/L (ref 10–40)
BASOPHILS # BLD AUTO: 0.04 K/UL (ref 0–0.2)
BASOPHILS NFR BLD: 0.6 % (ref 0–1.9)
BILIRUB SERPL-MCNC: 0.3 MG/DL (ref 0.1–1)
BUN SERPL-MCNC: 21 MG/DL (ref 6–20)
CALCIUM SERPL-MCNC: 9.4 MG/DL (ref 8.7–10.5)
CHLORIDE SERPL-SCNC: 108 MMOL/L (ref 95–110)
CHOLEST SERPL-MCNC: 172 MG/DL (ref 120–199)
CHOLEST/HDLC SERPL: 2.8 {RATIO} (ref 2–5)
CO2 SERPL-SCNC: 22 MMOL/L (ref 23–29)
CREAT SERPL-MCNC: 0.8 MG/DL (ref 0.5–1.4)
DIFFERENTIAL METHOD BLD: ABNORMAL
EOSINOPHIL # BLD AUTO: 0.5 K/UL (ref 0–0.5)
EOSINOPHIL NFR BLD: 7.1 % (ref 0–8)
ERYTHROCYTE [DISTWIDTH] IN BLOOD BY AUTOMATED COUNT: 14.4 % (ref 11.5–14.5)
EST. GFR  (NO RACE VARIABLE): >60 ML/MIN/1.73 M^2
ESTIMATED AVG GLUCOSE: 103 MG/DL (ref 68–131)
GLUCOSE SERPL-MCNC: 80 MG/DL (ref 70–110)
HBA1C MFR BLD: 5.2 % (ref 4–5.6)
HCT VFR BLD AUTO: 41.8 % (ref 37–48.5)
HDLC SERPL-MCNC: 62 MG/DL (ref 40–75)
HDLC SERPL: 36 % (ref 20–50)
HGB BLD-MCNC: 12.7 G/DL (ref 12–16)
IMM GRANULOCYTES # BLD AUTO: 0.01 K/UL (ref 0–0.04)
IMM GRANULOCYTES NFR BLD AUTO: 0.1 % (ref 0–0.5)
LDLC SERPL CALC-MCNC: 92.8 MG/DL (ref 63–159)
LYMPHOCYTES # BLD AUTO: 1.4 K/UL (ref 1–4.8)
LYMPHOCYTES NFR BLD: 21.2 % (ref 18–48)
MCH RBC QN AUTO: 29.5 PG (ref 27–31)
MCHC RBC AUTO-ENTMCNC: 30.4 G/DL (ref 32–36)
MCV RBC AUTO: 97 FL (ref 82–98)
MONOCYTES # BLD AUTO: 0.7 K/UL (ref 0.3–1)
MONOCYTES NFR BLD: 10.8 % (ref 4–15)
NEUTROPHILS # BLD AUTO: 4.1 K/UL (ref 1.8–7.7)
NEUTROPHILS NFR BLD: 60.2 % (ref 38–73)
NONHDLC SERPL-MCNC: 110 MG/DL
NRBC BLD-RTO: 0 /100 WBC
PLATELET # BLD AUTO: 213 K/UL (ref 150–450)
PMV BLD AUTO: 10.1 FL (ref 9.2–12.9)
POTASSIUM SERPL-SCNC: 4 MMOL/L (ref 3.5–5.1)
PROT SERPL-MCNC: 6.9 G/DL (ref 6–8.4)
RBC # BLD AUTO: 4.31 M/UL (ref 4–5.4)
SODIUM SERPL-SCNC: 141 MMOL/L (ref 136–145)
TRIGL SERPL-MCNC: 86 MG/DL (ref 30–150)
TSH SERPL DL<=0.005 MIU/L-ACNC: 0.96 UIU/ML (ref 0.4–4)
WBC # BLD AUTO: 6.74 K/UL (ref 3.9–12.7)

## 2024-09-16 PROCEDURE — 84443 ASSAY THYROID STIM HORMONE: CPT | Performed by: PHYSICIAN ASSISTANT

## 2024-09-16 PROCEDURE — 4010F ACE/ARB THERAPY RXD/TAKEN: CPT | Mod: CPTII,S$GLB,, | Performed by: PHYSICIAN ASSISTANT

## 2024-09-16 PROCEDURE — 36415 COLL VENOUS BLD VENIPUNCTURE: CPT | Performed by: INTERNAL MEDICINE

## 2024-09-16 PROCEDURE — 99999 PR PBB SHADOW E&M-EST. PATIENT-LVL IV: CPT | Mod: PBBFAC,,, | Performed by: PHYSICIAN ASSISTANT

## 2024-09-16 PROCEDURE — 99214 OFFICE O/P EST MOD 30 MIN: CPT | Mod: S$GLB,,, | Performed by: PHYSICIAN ASSISTANT

## 2024-09-16 PROCEDURE — 3078F DIAST BP <80 MM HG: CPT | Mod: CPTII,S$GLB,, | Performed by: PHYSICIAN ASSISTANT

## 2024-09-16 PROCEDURE — 80061 LIPID PANEL: CPT | Performed by: PHYSICIAN ASSISTANT

## 2024-09-16 PROCEDURE — 3008F BODY MASS INDEX DOCD: CPT | Mod: CPTII,S$GLB,, | Performed by: PHYSICIAN ASSISTANT

## 2024-09-16 PROCEDURE — 83036 HEMOGLOBIN GLYCOSYLATED A1C: CPT | Performed by: INTERNAL MEDICINE

## 2024-09-16 PROCEDURE — 85025 COMPLETE CBC W/AUTO DIFF WBC: CPT | Performed by: INTERNAL MEDICINE

## 2024-09-16 PROCEDURE — 3074F SYST BP LT 130 MM HG: CPT | Mod: CPTII,S$GLB,, | Performed by: PHYSICIAN ASSISTANT

## 2024-09-16 PROCEDURE — 1159F MED LIST DOCD IN RCRD: CPT | Mod: CPTII,S$GLB,, | Performed by: PHYSICIAN ASSISTANT

## 2024-09-16 PROCEDURE — 80053 COMPREHEN METABOLIC PANEL: CPT | Performed by: INTERNAL MEDICINE

## 2024-09-16 PROCEDURE — 1160F RVW MEDS BY RX/DR IN RCRD: CPT | Mod: CPTII,S$GLB,, | Performed by: PHYSICIAN ASSISTANT
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
## 2024-09-22 ENCOUNTER — PATIENT MESSAGE (OUTPATIENT)
Dept: INTERNAL MEDICINE | Facility: CLINIC | Age: 59
End: 2024-09-22
Payer: COMMERCIAL

## 2024-10-11 ENCOUNTER — OFFICE VISIT (OUTPATIENT)
Dept: URGENT CARE | Facility: CLINIC | Age: 59
End: 2024-10-11
Payer: COMMERCIAL

## 2024-10-11 ENCOUNTER — PATIENT MESSAGE (OUTPATIENT)
Dept: INTERNAL MEDICINE | Facility: CLINIC | Age: 59
End: 2024-10-11
Payer: COMMERCIAL

## 2024-10-11 ENCOUNTER — PATIENT MESSAGE (OUTPATIENT)
Dept: PSYCHIATRY | Facility: CLINIC | Age: 59
End: 2024-10-11
Payer: COMMERCIAL

## 2024-10-11 VITALS
WEIGHT: 166 LBS | HEART RATE: 90 BPM | SYSTOLIC BLOOD PRESSURE: 123 MMHG | TEMPERATURE: 98 F | BODY MASS INDEX: 27.66 KG/M2 | HEIGHT: 65 IN | RESPIRATION RATE: 20 BRPM | DIASTOLIC BLOOD PRESSURE: 86 MMHG | OXYGEN SATURATION: 99 %

## 2024-10-11 DIAGNOSIS — J01.90 ACUTE BACTERIAL SINUSITIS: Primary | ICD-10-CM

## 2024-10-11 DIAGNOSIS — F33.1 MODERATE EPISODE OF RECURRENT MAJOR DEPRESSIVE DISORDER: ICD-10-CM

## 2024-10-11 DIAGNOSIS — B96.89 ACUTE BACTERIAL SINUSITIS: Primary | ICD-10-CM

## 2024-10-11 DIAGNOSIS — J45.909 ASTHMA, UNSPECIFIED ASTHMA SEVERITY, UNSPECIFIED WHETHER COMPLICATED, UNSPECIFIED WHETHER PERSISTENT: ICD-10-CM

## 2024-10-11 PROCEDURE — 71046 X-RAY EXAM CHEST 2 VIEWS: CPT | Mod: FY,S$GLB,, | Performed by: RADIOLOGY

## 2024-10-11 RX ORDER — DULOXETIN HYDROCHLORIDE 60 MG/1
CAPSULE, DELAYED RELEASE ORAL
Qty: 90 CAPSULE | Refills: 0 | Status: SHIPPED | OUTPATIENT
Start: 2024-10-11

## 2024-10-11 RX ORDER — FLUTICASONE FUROATE AND VILANTEROL TRIFENATATE 200; 25 UG/1; UG/1
1 POWDER RESPIRATORY (INHALATION) DAILY
Qty: 60 EACH | Refills: 6 | Status: SHIPPED | OUTPATIENT
Start: 2024-10-11

## 2024-10-11 RX ORDER — FLUTICASONE FUROATE AND VILANTEROL TRIFENATATE 200; 25 UG/1; UG/1
1 POWDER RESPIRATORY (INHALATION)
Qty: 60 EACH | Refills: 6 | Status: CANCELLED | OUTPATIENT
Start: 2024-10-11

## 2024-10-11 RX ORDER — ALBUTEROL SULFATE 0.83 MG/ML
2.5 SOLUTION RESPIRATORY (INHALATION)
Status: COMPLETED | OUTPATIENT
Start: 2024-10-11 | End: 2024-10-11

## 2024-10-11 RX ORDER — ALBUTEROL SULFATE 90 UG/1
2 INHALANT RESPIRATORY (INHALATION) EVERY 6 HOURS PRN
Qty: 20.1 G | Refills: 1 | Status: SHIPPED | OUTPATIENT
Start: 2024-10-11

## 2024-10-11 RX ORDER — IPRATROPIUM BROMIDE 0.5 MG/2.5ML
0.5 SOLUTION RESPIRATORY (INHALATION)
Status: COMPLETED | OUTPATIENT
Start: 2024-10-11 | End: 2024-10-11

## 2024-10-11 RX ORDER — AMOXICILLIN AND CLAVULANATE POTASSIUM 875; 125 MG/1; MG/1
1 TABLET, FILM COATED ORAL EVERY 12 HOURS
Qty: 14 TABLET | Refills: 0 | Status: SHIPPED | OUTPATIENT
Start: 2024-10-11 | End: 2024-10-18

## 2024-10-11 RX ADMIN — ALBUTEROL SULFATE 2.5 MG: 0.83 SOLUTION RESPIRATORY (INHALATION) at 03:10

## 2024-10-11 RX ADMIN — IPRATROPIUM BROMIDE 0.5 MG: 0.5 SOLUTION RESPIRATORY (INHALATION) at 03:10

## 2024-10-11 NOTE — TELEPHONE ENCOUNTER
----- Message from [place] sent at 10/11/2024 12:41 PM CDT -----  Contact: BREO ELLIPTA 200-25 mcg/dose DsDv diskus inhaler  .[email or web address]     Patient is calling for Medical Advice regarding: cough    How long has patient had these symptoms:2 weeks     Pharmacy name and phone#:    Patient wants a call back or thru [facility]:call back     Comments:    Please advise patient replies from provider may take up to 48 hours.    Requesting an RX refill or new RX.    Is this a refill or new RX: Refill     RX name and strength (copy/paste from chart):  BREO ELLIPTA 200-25 mcg/dose DsDv diskus inhaler    Is this a 30 day or 90 day RX:     Pharmacy name and phone # (copy/paste from chart):    [facility]/pharmacy #[id number] - [place], [place] - [id number] [facility] [address]  Phone: [phone] Fax: [phone]    The doctors have asked that we provide their patients with the following 2 reminders -- prescription refills can take up to 72 hours, and a friendly reminder that in the future you can use your [facility] account to request refills: call back

## 2024-10-11 NOTE — PATIENT INSTRUCTIONS
"Asthma Discharge   If your condition worsens or fails to improve we recommend that you receive another evaluation at the ER immediately or contact your PCP to discuss your concerns or return here. You must understand that you've received an urgent care treatment only and that you may be released before all your medical problems are known or treated. You the patient will arrange for follouwp care as instructed.   Keep the scheduled appointment with your specialist as discussed.    You received a steroid shot on today   Rest and fluids are important  Take inhaler as prescribed and needed for wheezing  -  Flonase (fluticasone) is a nasal spray which is available over the counter and may help with your symptoms.   -  If you have hypertension avoid using the "D" which is the decongestant.  Instead you can use Coricidin HBP for cold and cough symptoms.    -  If you just have drainage you can take plain Zyrtec, Claritin or Allegra   -  If you just have a congested feeling you can take pseudoephedrine (unless you have high blood pressure) which you have to sign for behind the counter. Do not buy the phenylephrine which is on the shelf as it is not effective   -  Rest and fluids are also important.   -  Tylenol or ibuprofen can also be used as directed for pain unless you have an allergy to them or medical condition such as stomach ulcers, kidney or liver disease or blood thinners etc for which you should not be taking these type of medications.   Please follow up with your primary care doctor or specialist in the next 48-72hrs as needed and if no improvement  If you  smoke, please stop smoking.     If your condition worsens or fails to improve we recommend that you receive another evaluation at the ER immediately or contact your PCP to discuss your concerns or return here. You must understand that you've received an urgent care treatment only and that you may be released before all your medical problems are known or " treated. You the patient will arrange for followup care as instructed.    If you were prescribed antibiotics, please take them to completion.  If you were prescribed a narcotic medication, do not drive or operate heavy equipment or machinery while taking these medications.  Please follow up with your primary care doctor or specialist as needed.  If you  smoke, please stop smoking.

## 2024-10-11 NOTE — PROGRESS NOTES
"Subjective:      Patient ID: Joy Crawford is a 59 y.o. female.    Vitals:  height is 5' 5" (1.651 m) and weight is 75.3 kg (166 lb). Her oral temperature is 98 °F (36.7 °C). Her blood pressure is 123/86 and her pulse is 90. Her respiration is 20 and oxygen saturation is 99%.     Chief Complaint: Cough and X2 weeks/ hx of asthma    Pt states sx started 2 weeks ago with  productive cough, wheezing with SOB, congestion,  denies fever, Home tx: inhalers  and mucinex , Hx of asthma   No breo for a week  Provider note below:  This is a 59 y.o. female who presents today with a chief complaint of productive cough, wheezing, congestion, sinus congestion started 2 weeks ago, patient reports she ran out of her albuterol inhaler as well as Breo and requested the refill from her primary that they recently sent, patient reports she use the albuterol inhaler 3-4 hours prior to arrival to clinic.  Patient reports she also has some shortness of breath secondary to not using her Breo for 1 week.  Patient reports she is supposed to have surgery on her foot at the end of October and was advised by the surgeon that it is better for her not to take any steroids as it increases the risk of infection, but okay the IM steroid injection if needed denies fever, body aches or chills, denies cough, wheezing or shortness of breath, denies nausea, vomiting, diarrhea or abdominal pain, denies chest pain or dizziness positional lightheadedness, denies sore throat or trouble swallowing, denies loss of taste or smell, or any other symptoms  Patient reported she had COVID 6 weeks ago       Cough  This is a new problem. The current episode started 1 to 4 weeks ago. The problem has been gradually worsening. The problem occurs constantly. The cough is Productive of sputum. Associated symptoms include nasal congestion, shortness of breath (due to not using breo for one week) and wheezing.       Respiratory:  Positive for cough, shortness of breath (due " to not using breo for one week), wheezing and asthma.    Allergic/Immunologic: Positive for asthma.      Objective:     Physical Exam   Constitutional: She is oriented to person, place, and time. She appears well-developed. She is cooperative.  Non-toxic appearance. She does not appear ill. No distress.      Comments:Patient sitting comfortably on the exam table, non toxic appearance  and answering questions appropriately, no acute distress, talking in full sentences without pause        HENT:   Head: Normocephalic and atraumatic.   Ears:   Right Ear: Hearing, tympanic membrane, external ear and ear canal normal.   Left Ear: Hearing, tympanic membrane, external ear and ear canal normal.   Nose: Nose normal. No mucosal edema, rhinorrhea or nasal deformity. No epistaxis. Right sinus exhibits no maxillary sinus tenderness and no frontal sinus tenderness. Left sinus exhibits no maxillary sinus tenderness and no frontal sinus tenderness.   Mouth/Throat: Uvula is midline, oropharynx is clear and moist and mucous membranes are normal. No trismus in the jaw. Normal dentition. No uvula swelling. No oropharyngeal exudate, posterior oropharyngeal edema or posterior oropharyngeal erythema.   Eyes: Conjunctivae and lids are normal. No scleral icterus.   Neck: Trachea normal and phonation normal. Neck supple. No edema present. No erythema present. No neck rigidity present.   Cardiovascular: Normal rate, regular rhythm, normal heart sounds and normal pulses.   Pulmonary/Chest: Effort normal. No stridor. No respiratory distress. She has no decreased breath sounds. She has wheezes. She has no rhonchi. She has no rales.   Post treatment assessment: subjective improvement in symptoms - improvement in wheezing  Good air movement throughout all lung fields post treatment   Pulse ox pre Tx 94%  Pulse ox post Tx 99%         Comments: Post treatment assessment: subjective improvement in symptoms - improvement in wheezing  Good air movement  throughout all lung fields post treatment   Pulse ox pre Tx 94%  Pulse ox post Tx 99%    Abdominal: Normal appearance.   Musculoskeletal: Normal range of motion.         General: No deformity. Normal range of motion.   Neurological: She is alert and oriented to person, place, and time. She exhibits normal muscle tone. Coordination normal.   Skin: Skin is warm, dry, intact, not diaphoretic and not pale.   Psychiatric: Her speech is normal and behavior is normal. Judgment and thought content normal.   Nursing note and vitals reviewed.    Past Medical History:   Diagnosis Date    Anxiety     Asthma     Condyloma     Depression     Esophageal reflux     H/O mammogram 01/20/2017    Normal  (DIS)     Herpes simplex virus (HSV) infection     no out breaks    Hypertension     Peptic ulcer     Sleep apnea     doesnt use cpap     XR CHEST PA AND LATERAL    Result Date: 10/11/2024  EXAMINATION: XR CHEST PA AND LATERAL CLINICAL HISTORY: Acute cough TECHNIQUE: PA and lateral views of the chest were performed. COMPARISON: Chest radiograph dated December 23, 2022 FINDINGS: The trachea and cardiomediastinal silhouette are within normal limits.  There is no evidence of pleural effusions, pneumothoraces or consolidations.  Lungs are clear.  Osseous structures demonstrate no evidence for acute fractures or dislocations.     No acute abnormality. Electronically signed by: Kt Stanford MD Date:    10/11/2024 Time:    16:08      Patient in no acute distress.  Vitals reassuring.  Discussed results/diagnosis/plan in depth with patient in clinic. Strict precautions given to patient to monitor for worsening signs and symptoms. Advised to follow up with primary.All questions answered. Strict ER precautions given. If your symptoms worsens or fail to improve you should go to the Emergency Room. Discharge and follow-up instructions given verbally/printed. Discharge and follow-up instructions discussed with the patient who expressed understanding  and willingness to comply with my recommendations.Patient voiced understanding and in agreement with current treatment plan.     Please be advised this text was dictated with Dyn software and may contain errors due to translation.   Assessment:     1. Acute bacterial sinusitis    2. Asthma, unspecified asthma severity, unspecified whether complicated, unspecified whether persistent        Plan:       Acute bacterial sinusitis  -     XR CHEST PA AND LATERAL; Future; Expected date: 10/11/2024  -     Cancel: SARS Coronavirus 2 Antigen, POCT Manual Read  -     amoxicillin-clavulanate 875-125mg (AUGMENTIN) 875-125 mg per tablet; Take 1 tablet by mouth every 12 (twelve) hours. for 7 days  Dispense: 14 tablet; Refill: 0    Asthma, unspecified asthma severity, unspecified whether complicated, unspecified whether persistent  -     XR CHEST PA AND LATERAL; Future; Expected date: 10/11/2024  -     albuterol nebulizer solution 2.5 mg  -     ipratropium 0.02 % nebulizer solution 0.5 mg  -     Cancel: SARS Coronavirus 2 Antigen, POCT Manual Read          Medical Decision Making:   History:   Old Medical Records: I decided to obtain old medical records.  Old Records Summarized: records from clinic visits and records from previous admission(s).  Clinical Tests:   Radiological Study: Ordered and Reviewed  Urgent Care Management:  Patient in no acute distress.  Vitals reassuring.  On exam, patient is nontoxic appearing and afebrile.  Initial examination with wheezing noted pulse ox 94% patient is in no acute distress.  DuoNeb treatment given in clinic.  Pulse ox improved to 99%.  With good air movement throughout all lung fields post DuoNeb treatment.  Patient reports mild shortness of breath secondary to not using a Breo for past 1 week.  Recently sent to pharmacy by her primary that she will .  Patient did use the albuterol inhaler.  Patient reports she is supposed to have surgery on her foot and advised by the surgeon  "is better not to have steroids oral as it increased risk of infection but they okayed the IM steroid injection if needed.  Post breathing treatment patient reports significant improvement with resolution of her shortness of breath and pulse ox improved to 99%.  Shared decision-making with patient, patient deferred steroids/steroid injection at this time secondary to her having surgery soon.  Patient will  her Beau and will use it as needed.  Re-evaluation recommended if no improvement symptoms or worsening symptoms.  Negative chest x-ray results discussed with patient in detail  Medication prescribed and over-the-counter medication discussed with patient at length.  Proper hydration advised.  I reiterated the importance of further evaluation if no improvement symptoms and follow-up with primary. Patient voiced understanding and in agreement with current treatment plan.             Patient Instructions   Asthma Discharge   If your condition worsens or fails to improve we recommend that you receive another evaluation at the ER immediately or contact your PCP to discuss your concerns or return here. You must understand that you've received an urgent care treatment only and that you may be released before all your medical problems are known or treated. You the patient will arrange for follouwp care as instructed.   Keep the scheduled appointment with your specialist as discussed.    You received a steroid shot on today   Rest and fluids are important  Take inhaler as prescribed and needed for wheezing  -  Flonase (fluticasone) is a nasal spray which is available over the counter and may help with your symptoms.   -  If you have hypertension avoid using the "D" which is the decongestant.  Instead you can use Coricidin HBP for cold and cough symptoms.    -  If you just have drainage you can take plain Zyrtec, Claritin or Allegra   -  If you just have a congested feeling you can take pseudoephedrine (unless you have " high blood pressure) which you have to sign for behind the counter. Do not buy the phenylephrine which is on the shelf as it is not effective   -  Rest and fluids are also important.   -  Tylenol or ibuprofen can also be used as directed for pain unless you have an allergy to them or medical condition such as stomach ulcers, kidney or liver disease or blood thinners etc for which you should not be taking these type of medications.   Please follow up with your primary care doctor or specialist in the next 48-72hrs as needed and if no improvement  If you  smoke, please stop smoking.     If your condition worsens or fails to improve we recommend that you receive another evaluation at the ER immediately or contact your PCP to discuss your concerns or return here. You must understand that you've received an urgent care treatment only and that you may be released before all your medical problems are known or treated. You the patient will arrange for followup care as instructed.    If you were prescribed antibiotics, please take them to completion.  If you were prescribed a narcotic medication, do not drive or operate heavy equipment or machinery while taking these medications.  Please follow up with your primary care doctor or specialist as needed.  If you  smoke, please stop smoking.

## 2024-10-11 NOTE — TELEPHONE ENCOUNTER
No care due was identified.  [facility] Embedded Care Due Messages. Reference number: [id number].   10/11/2024 7:11:13 AM CDT

## 2024-10-11 NOTE — TELEPHONE ENCOUNTER
No care due was identified.  [facility] Embedded Care Due Messages. Reference number: [id number].   10/11/2024 1:57:16 PM CDT

## 2024-10-11 NOTE — TELEPHONE ENCOUNTER
No care due was identified.  [facility] Embedded Care Due Messages. Reference number: [id number].   10/11/2024 2:47:35 PM CDT

## 2024-10-16 ENCOUNTER — PATIENT MESSAGE (OUTPATIENT)
Dept: PSYCHIATRY | Facility: CLINIC | Age: 59
End: 2024-10-16
Payer: COMMERCIAL

## 2024-10-17 ENCOUNTER — OFFICE VISIT (OUTPATIENT)
Dept: PSYCHIATRY | Facility: CLINIC | Age: 59
End: 2024-10-17
Payer: COMMERCIAL

## 2024-10-17 DIAGNOSIS — F90.0 ATTENTION DEFICIT HYPERACTIVITY DISORDER (ADHD), PREDOMINANTLY INATTENTIVE TYPE: ICD-10-CM

## 2024-10-17 DIAGNOSIS — F33.1 MODERATE EPISODE OF RECURRENT MAJOR DEPRESSIVE DISORDER: ICD-10-CM

## 2024-10-17 DIAGNOSIS — G47.00 INSOMNIA, UNSPECIFIED TYPE: Primary | ICD-10-CM

## 2024-10-17 RX ORDER — TRAZODONE HYDROCHLORIDE 150 MG/1
TABLET ORAL
Qty: 90 TABLET | Refills: 0 | Status: SHIPPED | OUTPATIENT
Start: 2024-10-17 | End: 2024-10-17 | Stop reason: SDUPTHER

## 2024-10-17 RX ORDER — VORTIOXETINE 5 MG/1
5 TABLET, FILM COATED ORAL DAILY
Qty: 30 TABLET | Refills: 1 | Status: SHIPPED | OUTPATIENT
Start: 2024-10-17 | End: 2024-10-17 | Stop reason: SDUPTHER

## 2024-10-17 RX ORDER — VORTIOXETINE 5 MG/1
5 TABLET, FILM COATED ORAL DAILY
Qty: 30 TABLET | Refills: 1 | Status: SHIPPED | OUTPATIENT
Start: 2024-10-17 | End: 2024-11-17

## 2024-10-17 RX ORDER — TRAZODONE HYDROCHLORIDE 150 MG/1
TABLET ORAL
Qty: 90 TABLET | Refills: 0 | Status: SHIPPED | OUTPATIENT
Start: 2024-10-17

## 2024-10-17 NOTE — PROGRESS NOTES
.Outpatient Psychiatry Follow-Up Visit (MD/NP)    10/17/2024     The patient location is [place]  The chief complaint leading to consultation is: depression, ADHD, insomnia, and anxiety      Visit type: audiovisual    Face to Face time with patient:  21 minutes  25  minutes of total time spent on the encounter, which includes face to face time and non-face to face time preparing to see the patient (eg, review of tests), Obtaining and/or reviewing separately obtained history, Documenting clinical information in the electronic or other health record, Independently interpreting results (not separately reported) and communicating results to the patient/family/caregiver, or Care coordination (not separately reported).         Each patient to whom he or she provides medical services by telemedicine is:  (1) informed of the relationship between the physician and patient and the respective role of any other health care provider with respect to management of the patient; and (2) notified that he or she may decline to receive medical services by telemedicine and may withdraw from such care at any time.    Notes:       Clinical Status of Patient:  Outpatient (Ambulatory)    Chief Complaint:  Joy Crawford is a 59 y.o. female who presents today for follow-up of depression, anxiety and attention problems.  Met with patient.      Interval History and Content of Current Session:  Interim Events/Subjective Report/Content of Current Session:   Joy Crawford checked in on time for her virtual follow up appointment today. She presented as depressed and tearful.  She rated her depression at 8/10 with 10/10 being the most severe. She reported low mood, fatigue, low motivation, crying spells, feelings of sadness, and decreased enjoyment for the past 4 weeks. She reported feelings of loneliness.  She reported good support system of her parents, her son, and a small [place] friends.  She stated she would like to increase her  Problem: PAIN -   Goal: Displays adequate comfort level or baseline comfort level  Description: INTERVENTIONS:  - Perform pain scoring using age-appropriate tool with hands-on care as needed. Notify physician/AP of high pain scores not responsive to comfort measures  - Administer analgesics based on type and severity of pain and evaluate response  - Sucrose analgesia per protocol for brief minor painful procedures  - Teach parents interventions for comforting infant  Outcome: Progressing     Problem: THERMOREGULATION - PEDIATRICS  Goal: Maintains normal body temperature  Description: Interventions:  - Monitor temperature (axillary for Newborns) as ordered  - Monitor for signs of hypothermia or hyperthermia  - Provide thermal support measures  - Wean to open crib when appropriate  Outcome: Progressing     Problem: INFECTION -   Goal: No evidence of infection  Description: INTERVENTIONS:  - Instruct family/visitors to use good hand hygiene technique  - Identify and instruct in appropriate isolation precautions for identified infection/condition  - Change incubator every 2 weeks or as needed. - Monitor for symptoms of infection  - Monitor surgical sites and insertion sites for all indwelling lines, tubes, and drains for drainage, redness, or edema.  - Monitor endotracheal and nasal secretions for changes in amount and color  - Monitor culture and CBC results  - Administer antibiotics as ordered. Monitor drug levels  Outcome: Progressing     Problem: RISK FOR INFECTION (RISK FACTORS FOR MATERNAL CHORIOAMNIOITIS - )  Goal: No evidence of infection  Description: INTERVENTIONS:  - Instruct family/visitors to use good hand hygiene technique  - Monitor for symptoms of infection  - Monitor culture and CBC results  - Administer antibiotics as ordered.   Monitor drug levels  Outcome: Progressing     Problem: SAFETY -   Goal: Patient will remain free from falls  Description: INTERVENTIONS:  - Instruct family/caregiver on patient safety  - Keep incubator doors and portholes closed when unattended  - Keep radiant warmer side rails and crib rails up when unattended  - Based on caregiver fall risk screen, instruct family/caregiver to ask for assistance with transferring infant if caregiver noted to have fall risk factors  Outcome: Progressing     Problem: Knowledge Deficit  Goal: Patient/family/caregiver demonstrates understanding of disease process, treatment plan, medications, and discharge instructions  Description: Complete learning assessment and assess knowledge base.   Interventions:  - Provide teaching at level of understanding  - Provide teaching via preferred learning methods  Outcome: Progressing  Goal: Infant caregiver verbalizes understanding of benefits of skin-to-skin with healthy   Description: Prior to delivery, educate patient regarding skin-to-skin practice and its benefits  Initiate immediate and uninterrupted skin-to-skin contact after birth until breastfeeding is initiated or a minimum of one hour  Encourage continued skin-to-skin contact throughout the post partum stay    Outcome: Progressing  Goal: Infant caregiver verbalizes understanding of benefits and management of breastfeeding their healthy   Description: Help initiate breastfeeding within one hour of birth  Educate/assist with breastfeeding positioning and latch  Educate on safe positioning and to monitor their  for safety  Educate on how to maintain lactation even if they are  from their   Educate/initiate pumping for a mom with a baby in the NICU within 6 hours after birth  Give infants no food or drink other than breast milk unless medically indicated  Educate on feeding cues and encourage breastfeeding on demand    Outcome: Progressing  Goal: Infant caregiver verbalizes understanding of benefits to rooming-in with their healthy   Description: Promote rooming in 23 out of 24 hours socialization.  She stated she prioritize her work over taking care of herself and we discussed the importance of self-care. She stated she does not think duloxetine 60 mg po daily is effective enough in treating her depression and didn't like the way she felt with duloxetine 80 mg po daily and didn't find it effective depressive symptoms.    She stated she is also having physical/health problems and reported problems with my foot and plans to have foot surgery on October 28, 2024. 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    She stated she lost 86 lbs since October 2022 and is no longer doing the mounjaro injections. She stated blood pressure is managed well and is within the normal range, follows up with her PCP, and takes a low dose of lisinopril 10 mg po daily from her PCP.     She stated Vyvanse 60 mg po daily is better that vyvanse 50 mg daily but it still wears off late in the afternoon but she wants to continue with vyvanse 60 mg po daily at this time.  She stated she is thinking of getting off Vyvanse in the future but not at this time.    She reported increased depressive symptoms but reported anxiety is still managed.  She denied excessive worry, restlessness, or irritability.  She reported good sleep with trazodone 150 mg po qhs prn. She stated she puts a lot of effort and thoughts into her classes and is usually non-stop once her day starts. She stated she still enjoys her job as a . She denied any problems with headache, stomach upset, weight loss, insomnia, chest pain, palpitations, tics, or tremors. She denied suicidal ideation and suicide plan or intent currently, recently, or in the past.  She denied previous suicide attempts.  She denied access to guns. She denied SI/HI/AVH. No objective s/sx of psychosis or guillermo. She denied alcohol or drug use. No changes in medical health.   Patient verbalized motivation for compliance with medications and all other elements of treatment plan.       Psychiatric Review Of Systems - Is patient experiencing or having changes in:  sleep: no. Good sleep with trazodone  appetite: no  weight: yes, lost weight 91 total  energy/anergy: yes low  interest/pleasure/anhedonia:  yes low motivation  somatic symptoms: no  anxiety/panic: yes but managed  guilty/hopelessness: no  concentration: yes but improved with vyvanse  S.I.B.s/risky behavior: no  Irritability: no  Racing thoughts: no  Impulsive behaviors: no  Paranoia:no  AVH:no  Suicidal thoughts/plan/intent: no      Current Medications:  Cymbalta 60 mg daily   Vyvanse  60 mg daily  Checked LA  and no irregularities were noted.  Wellbutrin  mg daily  Buspar 10 mg BID occasionally not daily  Trazodone 150 mg nightly - from sleep medicine.     Previous medication trials:  Gabapentin - nausea  90 mg of Cymbalta - made symptoms worse, cried for 3 days  Wellbutrin- caused insomnia with XL  Zoloft- ineffective  Abilify- insomnia    Unsure if she has tried Effexor or Pristiq  Review of Systems   PSYCHIATRIC: Pertinant items are noted in the narrative.  CONSTITUTIONAL: No weight gain or loss.   MUSCULOSKELETAL: No pain or stiffness of the joints.  CARDIOVASCULAR: No tachycardia or chest pain.  GASTROINTESTINAL: No nausea, vomiting, pain, constipation or diarrhea.    Past Medical, Family and Social History: The patient's past medical, family and social history have been reviewed and updated as appropriate within the electronic medical record - see encounter notes.    Compliance: yes    Side effects: None    Risk Parameters:  Patient reports no suicidal ideation  Patient reports no homicidal ideation  Patient reports no self-injurious behavior  Patient reports no violent behavior    Exam (detailed: at least 9 elements; comprehensive: all 15 elements)   Constitutional  Vitals:         General:  unremarkable, age  per day  Educate on benefits to rooming-in  Provide  care in room with parents as long as infant and mother condition allow    Outcome: Progressing  Goal: Provide formula feeding instructions and preparation information to caregivers who do not wish to breastfeed their   Description: Provide one on one information on frequency, amount, and burping for formula feeding caregivers throughout their stay and at discharge. Provide written information/video on formula preparation. Outcome: Progressing  Goal: Infant caregiver verbalizes understanding of support and resources for follow up after discharge  Description: Provide individual discharge education on when to call the doctor. Provide resources and contact information for post-discharge support.     Outcome: Progressing     Problem: DISCHARGE PLANNING  Goal: Discharge to home or other facility with appropriate resources  Description: INTERVENTIONS:  - Identify barriers to discharge w/patient and caregiver  - Arrange for needed discharge resources and transportation as appropriate  - Identify discharge learning needs (meds, wound care, etc.)  - Arrange for interpretive services to assist at discharge as needed  - Refer to Case Management Department for coordinating discharge planning if the patient needs post-hospital services based on physician/advanced practitioner order or complex needs related to functional status, cognitive ability, or social support system  Outcome: Progressing     Problem: NORMAL   Goal: Experiences normal transition  Description: INTERVENTIONS:  - Monitor vital signs  - Maintain thermoregulation  - Assess for hypoglycemia risk factors or signs and symptoms  - Assess for sepsis risk factors or signs and symptoms  - Assess for jaundice risk and/or signs and symptoms  Outcome: Progressing  Goal: Total weight loss less than 10% of birth weight  Description: INTERVENTIONS:  - Assess feeding patterns  - Weigh daily  Outcome: "appropriate     Musculoskeletal  Muscle Strength/Tone:  not examined   Gait & Station:  Normal      Psychiatric  Speech:  no latency; no press   Mood & Affect:  "low"  Down , depressed and tearful   Thought Process:  normal and logical   Associations:  intact   Thought Content:  normal, no suicidality, no homicidality, delusions, or paranoia   Insight:  intact   Judgement: behavior is adequate to circumstances   Orientation:  grossly intact   Memory: intact for content of interview   Language: grossly intact, able to name, able to repeat   Attention Span & Concentration:  able to focus with vyvanse    Fund of Knowledge:  intact and appropriate to age and level of education     Assessment and Diagnosis   Status/Progress: Based on the examination today, the patient's problem(s) is/are adequately but not ideally controlled.  New problems have not been presented today.   Co-morbidities, Diagnostic uncertainty and Lack of compliance are not complicating management of the primary condition.  There are no active rule-out diagnoses for this patient at this time.     General Impression: Joy Crawford is a 56 y.o. female who presents today for follow-up of depression and anxiety. Patient seen and chart reviewed. Previous patient of Dr. Mckeon - no medication changes made. Presents 4/21/21- anxiety and motivation remain an issues, Abilify started. Presents 7/12/21 failed Abilify, wants to restart Vyvanse Presents 10/26/21- anxiety improved with Buspar, never started Vyvanse, Wellbutrin added for depression Presents 5/30/22- Wellbutrin switched to SR due to insomnia on XL Presents 7/11/22- insomnia resolved, Vyvanse restarted Presents 12/19/22- symptom partially improved. Some depression due to paulette season.  Presents 2-24-23 and doing better - refilled all for 90 days except for Vyvanse refilled x 2 total. 11/30/23 doing well on her current medication regimen and requested keeping Vyvane 50 mg po daily, Wellbutrin sr 200 " Progressing     Problem: Adequate NUTRIENT INTAKE -   Goal: Nutrient/Hydration intake appropriate for improving, restoring or maintaining nutritional needs  Description: INTERVENTIONS:  - Assess growth and nutritional status of patients and recommend course of action  - Monitor nutrient intake, labs, and treatment plans  - Recommend appropriate diets and vitamin/mineral supplements  - Monitor and recommend adjustments to tube feedings and TPN/PPN based on assessed needs  - Provide specific nutrition education as appropriate  Outcome: Progressing  Goal: Breast feeding baby will demonstrate adequate intake  Description: Interventions:  - Monitor/record daily weights and I&O  - Monitor milk transfer  - Increase maternal fluid intake  - Increase breastfeeding frequency and duration  - Teach mother to massage breast before feeding/during infant pauses during feeding  - Pump breast after feeding  - Review breastfeeding discharge plan with mother.  Refer to breast feeding support groups  - Initiate discussion/inform physician of weight loss and interventions taken  - Help mother initiate breast feeding within an hour of birth  - Encourage skin to skin time with  within 5 minutes of birth  - Give  no food or drink other than breast milk  - Encourage rooming in  - Encourage breast feeding on demand  - Initiate SLP consult as needed  Outcome: Progressing  Goal: Bottle fed baby will demonstrate adequate intake  Description: Interventions:  - Monitor/record daily weights and I&O  - Increase feeding frequency and volume  - Teach bottle feeding techniques to care provider/s  - Initiate discussion/inform physician of weight loss and interventions taken  - Initiate SLP consult as needed  Outcome: Progressing mg po daily, Trazodone 150 mg po qhs, and Cymbalta 60 mg po daily due  to their effectiveness in treating her symptoms. 1/10/24 doing well since her last visit and wants to continue with Vyvane 50 mg po daily, Wellbutrin sr 200 mg po daily, Trazodone 150 mg po qhs, and Cymbalta 60 mg po daily due  to their effectiveness in treating her symptoms. 8/15/24 stable and okay but reported increased depression and is interested increasing duloxetine to 80 mg p.o. daily.  She requested keeping Vyvanse 60 mg p.o. daily due to its effectiveness in improving her focus treating her ADHD symptoms.  She reported good sleep with trazodone 150 mg p.o. q.h.s. p.r.n. She wants to continue Wellbutrin  mg daily because she finds it helpful. 10/17/2024 patient reported increased depressive symptoms and does not find duloxetine 60 or 80 effective enough treating her depression.  Patient wants to start Trintellix. Went over the risks, benefits, side effects and alternatives of taking Trintellix and she agreed to try it.  She reported good sleep with trazodone 150 mg p.o. q.h.s. p.r.n. and wants to continue Wellbutrin  mg daily at this time.          ICD-10-CM ICD-9-CM   1. Attention deficit hyperactivity disorder (ADHD), predominantly inattentive type  F90.0 314.00   2. Moderate episode of recurrent major depressive disorder  F33.1 296.32       Intervention/Counseling/Treatment Plan   Medication Management: Continue current medications.   In person visit 1/10/2024  Continue Cymbalta 60  mg daily for anxiety and depression.  Did not like the way she felt with duloxetine 80 mg daily and does not find duloxetine to be effective enough in treating her depression.  Start Trintellix 5 mg p.o. daily for depression  We discussed risk of Serotonin Syndrome including symtpoms in order of appearance: diarrhea, restlessness, extreme agitation, autonomic instability, hyperthermia, rigidity, coma, and death.  Continue Vyvanse 60 mg daily for  ADHD  Checked LA  and no irregularities were noted.  Continue Wellbutrin   mg daily for depression   Continue Trazodone 150 mg nightly - from sleep medicine.   The risks and benefits of medication were discussed with the patient.  Discussed diagnosis, risks and benefits of proposed treatment above vs alternative treatments vs no treatment, and potential side effects of these treatments. The patient expresses understanding of the above and displays the capacity to agree with this treatment given said understanding. Patient also agrees that, currently, the benefits outweigh the risks and would like to pursue treatment at this time.  Discussed inherent unpredictability of medications in each individual.   Encouraged Patient to keep future appointments.   Take medications as prescribed and abstain from substance abuse.   In the event of an emergency patient was advised to go to the emergency room      Return to Clinic: follow up in 1 months or earlier as needed    LUANN Romero-BC

## 2024-10-29 ENCOUNTER — TELEPHONE (OUTPATIENT)
Dept: PHARMACY | Facility: CLINIC | Age: 59
End: 2024-10-29
Payer: COMMERCIAL

## 2024-11-08 ENCOUNTER — PATIENT MESSAGE (OUTPATIENT)
Dept: PSYCHIATRY | Facility: CLINIC | Age: 59
End: 2024-11-08
Payer: COMMERCIAL

## 2024-11-08 DIAGNOSIS — F90.0 ATTENTION DEFICIT HYPERACTIVITY DISORDER (ADHD), PREDOMINANTLY INATTENTIVE TYPE: ICD-10-CM

## 2024-11-08 RX ORDER — VORTIOXETINE 5 MG/1
5 TABLET, FILM COATED ORAL DAILY
Qty: 30 TABLET | Refills: 1 | Status: SHIPPED | OUTPATIENT
Start: 2024-11-08 | End: 2024-12-08

## 2024-11-08 RX ORDER — LISDEXAMFETAMINE DIMESYLATE 60 MG/1
60 CAPSULE ORAL EVERY MORNING
Qty: 30 CAPSULE | Refills: 0 | Status: SHIPPED | OUTPATIENT
Start: 2024-11-08 | End: 2024-12-08

## 2024-11-08 RX ORDER — LISDEXAMFETAMINE DIMESYLATE 60 MG/1
60 CAPSULE ORAL EVERY MORNING
Qty: 30 CAPSULE | Refills: 0 | Status: SHIPPED | OUTPATIENT
Start: 2024-11-08 | End: 2024-11-08 | Stop reason: SDUPTHER

## 2024-11-11 ENCOUNTER — PATIENT MESSAGE (OUTPATIENT)
Dept: PSYCHIATRY | Facility: CLINIC | Age: 59
End: 2024-11-11
Payer: COMMERCIAL

## 2024-12-06 ENCOUNTER — PATIENT MESSAGE (OUTPATIENT)
Dept: INTERNAL MEDICINE | Facility: CLINIC | Age: 59
End: 2024-12-06
Payer: COMMERCIAL

## 2024-12-06 RX ORDER — LISINOPRIL 10 MG/1
10 TABLET ORAL DAILY
Qty: 30 TABLET | Refills: 0 | Status: SHIPPED | OUTPATIENT
Start: 2024-12-06

## 2024-12-06 NOTE — TELEPHONE ENCOUNTER
Care Due:                  Date            Visit Type   Department     Provider  --------------------------------------------------------------------------------                                SAME DAY -                              ESTABLISHED   [facility] INTERNAL  Last Visit: 11-      PATIENT      MEDICINE       Shira Christianson  Next Visit: None Scheduled  None         None Found                                                            Last  Test          Frequency    Reason                     Performed    Due Date  --------------------------------------------------------------------------------    Office Visit  15 months..  BREO, albuterol,           11- 02-                             lisinopriL, tirzepatide..    [facility] Embedded Care Due Messages. Reference number: [id number].   12/06/2024 9:37:29 AM CST

## 2024-12-07 NOTE — TELEPHONE ENCOUNTER
Provider Staff:  Action required for this patient    Requires appointment      Please see care gap opportunities below in Care Due Message.    Thanks!  [facility]     Appointments      Date Provider   Last Visit   11/27/2023 eJnnifer Ramos MD   Next Visit   Visit date not found Jennifer Ramos MD     Refill Decision Note   Joy Crawford  is requesting a refill authorization.  Brief Assessment and Rationale for Refill:  Approve     Medication Therapy Plan:        Comments:     Note composed:7:09 PM 12/06/2024

## 2024-12-26 ENCOUNTER — OFFICE VISIT (OUTPATIENT)
Dept: PSYCHIATRY | Facility: CLINIC | Age: 59
End: 2024-12-26
Payer: COMMERCIAL

## 2024-12-26 ENCOUNTER — PATIENT MESSAGE (OUTPATIENT)
Dept: PSYCHIATRY | Facility: CLINIC | Age: 59
End: 2024-12-26
Payer: COMMERCIAL

## 2024-12-26 ENCOUNTER — PATIENT MESSAGE (OUTPATIENT)
Dept: INTERNAL MEDICINE | Facility: CLINIC | Age: 59
End: 2024-12-26
Payer: COMMERCIAL

## 2024-12-26 DIAGNOSIS — E66.9 OBESITY (BMI 35.0-39.9 WITHOUT COMORBIDITY): Primary | ICD-10-CM

## 2024-12-26 DIAGNOSIS — F90.0 ATTENTION DEFICIT HYPERACTIVITY DISORDER (ADHD), PREDOMINANTLY INATTENTIVE TYPE: ICD-10-CM

## 2024-12-26 DIAGNOSIS — F41.1 GAD (GENERALIZED ANXIETY DISORDER): ICD-10-CM

## 2024-12-26 DIAGNOSIS — F33.1 MODERATE EPISODE OF RECURRENT MAJOR DEPRESSIVE DISORDER: Primary | ICD-10-CM

## 2024-12-26 DIAGNOSIS — G47.00 INSOMNIA, UNSPECIFIED TYPE: ICD-10-CM

## 2024-12-26 PROCEDURE — 4010F ACE/ARB THERAPY RXD/TAKEN: CPT | Mod: CPTII,95,, | Performed by: NURSE PRACTITIONER

## 2024-12-26 PROCEDURE — 99215 OFFICE O/P EST HI 40 MIN: CPT | Mod: 95,,, | Performed by: NURSE PRACTITIONER

## 2024-12-26 PROCEDURE — 3044F HG A1C LEVEL LT 7.0%: CPT | Mod: CPTII,95,, | Performed by: NURSE PRACTITIONER

## 2024-12-26 PROCEDURE — G2211 COMPLEX E/M VISIT ADD ON: HCPCS | Mod: 95,,, | Performed by: NURSE PRACTITIONER

## 2024-12-26 RX ORDER — DULOXETIN HYDROCHLORIDE 60 MG/1
60 CAPSULE, DELAYED RELEASE ORAL DAILY
Qty: 90 CAPSULE | Refills: 0 | Status: SHIPPED | OUTPATIENT
Start: 2024-12-26 | End: 2025-03-26

## 2024-12-26 RX ORDER — VORTIOXETINE 10 MG/1
10 TABLET, FILM COATED ORAL DAILY
Qty: 30 TABLET | Refills: 1 | Status: SHIPPED | OUTPATIENT
Start: 2024-12-26 | End: 2025-01-25

## 2024-12-26 NOTE — PATIENT INSTRUCTIONS
I want to make you aware of a rare side effect called Serotonin Syndrome. This condition typically begins with symptoms in the following order: diarrhea, restlessness, extreme agitation, autonomic instability, fever, and muscle rigidity. If you experience these symptoms, please discontinue trintellix and seek immediate medical attention to emergency department. Once you stop Trintllix and your other serotonin medications, the symptoms usually resolve within 24 hours. If Serotonin Syndrome isn't addressed promptly, it can lead to severe complications like seizures, coma, and even death. While this is rare, it's important to be informed.

## 2024-12-26 NOTE — PROGRESS NOTES
.Outpatient Psychiatry Follow-Up Visit (MD/NP)    12/26/2024     The patient location is: [place]  The chief complaint leading to consultation is: depression, ADHD, insomnia, and anxiety      Visit type: audiovisual    Face to Face time with patient:  51 minutes  60 minutes of total time spent on the encounter, which includes face to face time and non-face to face time preparing to see the patient (eg, review of tests), Obtaining and/or reviewing separately obtained history, Documenting clinical information in the electronic or other health record, Independently interpreting results (not separately reported) and communicating results to the patient/family/caregiver, or Care coordination (not separately reported).         Each patient to whom he or she provides medical services by telemedicine is:  (1) informed of the relationship between the physician and patient and the respective role of any other health care provider with respect to management of the patient; and (2) notified that he or she may decline to receive medical services by telemedicine and may withdraw from such care at any time.    Notes:       Clinical Status of Patient:  Outpatient (Ambulatory)    Chief Complaint:  Joy Crawford is a 59 y.o. female who presents today for follow-up of depression, anxiety and attention problems.  Met with patient.      Interval History and Content of Current Session:  Interim Events/Subjective Report/Content of Current Session:   Joy Crawford presented for follow up. She stated she had foot surgery, in a boot now,  is able to walk again, and is feeling better. She stated trintellix 5 mg po daily improved her depression and mood but she still has low mood days, fatigue, feelings of loneliness, and low level of enjoyment. She stated she would like to try to increase trintellix to 10 mg po daily to improve her depressive symptoms. She stated she wants to increase trintellix to improve her mood, be less lethargic,  "and wants to address her anhedonia.       She reported mood is better and good today and rated her depression today at 3/10 with 10/10 being the most severe but has low mood days.  She reported low mood days, fatigue, crying spells, and low level of enjoyment but to a lessor degree with trintellix 5 mg po daily and hopes "increasing trintellix to 10 mg daily will further improve my depressive symptoms". She reported good support system of her parents, son, and friends.  She stated she would like to increase her socialization.  She stated she prioritize her work over taking care of herself and we discussed the importance of self-care. She stated she is taking duloxetine 60 mg po daily and didn't like the like the way she felt with duloxetine 80 mg po daily and didn't find it effective in improving her depressive symptoms.    She stated she is feeling better medically and physically. She reported healing well after her foot surgery on October 28, 2024. 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    She stated she lost 92 lbs from mounjaro since October 2022 and it helped with weight, arthritis, and inflammation. She plans to restart it again.     She stated blood pressure is managed well and is within the normal range, follows up with her PCP, and is longer taking lisinopril 10 mg po daily.     She stated she is not taking Vyvanse 60 mg po daily at this time and is thinking about restarting Vyvanse 60 mg po daily in Jan or Feb 2025. She plans to get recent vitals ASAP.    She reported improved depressive and anxiety symptoms at this time. She denied excessive worry, restlessness, or irritability.  She reported good sleep with trazodone 150 mg po qhs.    She denied any problems with headache, stomach upset, weight loss, insomnia, chest pain, palpitations, tics, or tremors. She denied suicidal ideation and " suicide plan or intent currently, recently, or in the past.  She denied previous suicide attempts.  She denied access to guns. She denied SI/HI/AVH. No objective s/sx of psychosis or guillermo. She denied alcohol or drug use. No changes in medical health.  Patient verbalized motivation for compliance with medications and all other elements of treatment plan.     Cautioned against serotonin syndrome with increasing trintellix to 10 mg po daily and she reported understanding that she will stop Trintellix, Cymbalta, and Buspar, Wellbutrin and go the nearest emergency room if she experienced serotonin syndrome symptoms.      Psychiatric Review Of Systems - Is patient experiencing or having changes in:  sleep: no. Good sleep with trazodone  appetite: no  weight: yes, lost weight 92 total  energy/anergy: yes low  interest/pleasure/anhedonia:  yes low motivation and low enjoyment  somatic symptoms: no  anxiety/panic: yes but managed  guilty/hopelessness: no  concentration: yes but improved with vyvanse  S.I.B.s/risky behavior: no  Irritability: no  Racing thoughts: no  Impulsive behaviors: no  Paranoia:no  AVH:no  Suicidal thoughts/plan/intent: no      Current Medications:  Cymbalta 60 mg daily   Vyvanse  60 mg daily (not taking it at this time due to being at home recovering from surgery)  Wellbutrin  mg daily  Trazodone 150 mg nightly - from sleep medicine.   Trintellix 5 mg po daily for depression    Previous medication trials:  Gabapentin - nausea  90 mg of Cymbalta - made symptoms worse, cried for 3 days  Wellbutrin- caused insomnia with XL  Zoloft- ineffective  Abilify- insomnia  Buspar 10 mg BID     Unsure if she has tried Effexor or Pristiq  Review of Systems   PSYCHIATRIC: Pertinant items are noted in the narrative.  CONSTITUTIONAL: No weight gain or loss.   MUSCULOSKELETAL: No pain or stiffness of the joints.  CARDIOVASCULAR: No tachycardia or chest pain.  GASTROINTESTINAL: No nausea, vomiting, pain,  "constipation or diarrhea.    Past Medical, Family and Social History: The patient's past medical, family and social history have been reviewed and updated as appropriate within the electronic medical record - see encounter notes.    Compliance: yes    Side effects: None    Risk Parameters:  Patient reports no suicidal ideation  Patient reports no homicidal ideation  Patient reports no self-injurious behavior  Patient reports no violent behavior    Exam (detailed: at least 9 elements; comprehensive: all 15 elements)   Constitutional  Vitals:         General:  unremarkable, age appropriate     Musculoskeletal  Muscle Strength/Tone:  not examined   Gait & Station:  Normal      Psychiatric  Speech:  no latency; no press   Mood & Affect:  "better"  Appropriate   Thought Process:  normal and logical   Associations:  intact   Thought Content:  normal, no suicidality, no homicidality, delusions, or paranoia   Insight:  intact   Judgement: behavior is adequate to circumstances   Orientation:  grossly intact   Memory: intact for content of interview   Language: grossly intact, able to name, able to repeat   Attention Span & Concentration:  able to focus with vyvanse    Fund of Knowledge:  intact and appropriate to age and level of education     Assessment and Diagnosis   Status/Progress: Based on the examination today, the patient's problem(s) is/are adequately but not ideally controlled.  New problems have not been presented today.   Co-morbidities, Diagnostic uncertainty and Lack of compliance are not complicating management of the primary condition.  There are no active rule-out diagnoses for this patient at this time.     General Impression: Joy Crawford is a 56 y.o. female who presents today for follow-up of depression and anxiety. Patient seen and chart reviewed. Previous patient of Dr. Mckeon - no medication changes made. Presents 4/21/21- anxiety and motivation remain an issues, Abilify started. Presents 7/12/21 " failed Abilify, wants to restart Vyvanse Presents 10/26/21- anxiety improved with Buspar, never started Vyvanse, Wellbutrin added for depression Presents 5/30/22- Wellbutrin switched to SR due to insomnia on XL Presents 7/11/22- insomnia resolved, Vyvanse restarted Presents 12/19/22- symptom partially improved. Some depression due to paulette season.  Presents 2-24-23 and doing better - refilled all for 90 days except for Vyvanse refilled x 2 total. 11/30/23 doing well on her current medication regimen and requested keeping Vyvane 50 mg po daily, Wellbutrin sr 200 mg po daily, Trazodone 150 mg po qhs, and Cymbalta 60 mg po daily due  to their effectiveness in treating her symptoms. 1/10/24 doing well since her last visit and wants to continue with Vyvane 50 mg po daily, Wellbutrin sr 200 mg po daily, Trazodone 150 mg po qhs, and Cymbalta 60 mg po daily due  to their effectiveness in treating her symptoms. 8/15/24 stable and okay but reported increased depression and is interested increasing duloxetine to 80 mg p.o. daily.  She requested keeping Vyvanse 60 mg p.o. daily due to its effectiveness in improving her focus treating her ADHD symptoms.  She reported good sleep with trazodone 150 mg p.o. q.h.s. p.r.n. She wants to continue Wellbutrin  mg daily because she finds it helpful. 10/17/2024 patient reported increased depressive symptoms and does not find duloxetine 60 or 80 effective enough treating her depression.  Patient wants to start Trintellix. Went over the risks, benefits, side effects and alternatives of taking Trintellix and she agreed to try it.  She reported good sleep with trazodone 150 mg p.o. q.h.s. p.r.n. and wants to continue Wellbutrin  mg daily at this time. 12/26/24 Patient is stable and doing well.Patient requested increasing trintellix to 10 mg po daily to further improve her depressive symptoms. She also requested keeping the same dosages of her other medication due to their  effectiveness in improving her symptom             ICD-10-CM ICD-9-CM   1. Moderate episode of recurrent major depressive disorder  F33.1 296.32   2. RAYNA (generalized anxiety disorder)  F41.1 300.02   3. Insomnia, unspecified type  G47.00 780.52   4. Attention deficit hyperactivity disorder (ADHD), predominantly inattentive type  F90.0 314.00         Intervention/Counseling/Treatment Plan   Medication Management: Continue current medications.   In person visit 1/10/2024  Continue Cymbalta 60  mg daily for anxiety and depression.  Did not like the way she felt with duloxetine 80 mg daily and does not find duloxetine to be effective enough in treating her depression.  Increase Trintellix 10 mg p.o. daily for depression  We discussed risk of Serotonin Syndrome including symptoms in order of appearance: diarrhea, restlessness, extreme agitation, autonomic instability, hyperthermia, rigidity, coma, and death.  Continue Vyvanse 60 mg daily for ADHD but not taking at this time due to not working and recovering from foot surgery  Checked LA  and no irregularities were noted.  Continue Wellbutrin   mg daily for depression   Continue Trazodone 150 mg nightly - from sleep medicine.   The risks and benefits of medication were discussed with the patient.  Discussed diagnosis, risks and benefits of proposed treatment above vs alternative treatments vs no treatment, and potential side effects of these treatments. The patient expresses understanding of the above and displays the capacity to agree with this treatment given said understanding. Patient also agrees that, currently, the benefits outweigh the risks and would like to pursue treatment at this time.  Discussed inherent unpredictability of medications in each individual.   Encouraged Patient to keep future appointments.   Take medications as prescribed and abstain from substance abuse.   In the event of an emergency patient was advised to go to the emergency  room      Return to Clinic: follow up in 1 months or earlier as needed    Matilda Jensen, PMHNP-BC

## 2025-01-09 ENCOUNTER — TELEPHONE (OUTPATIENT)
Dept: PAIN MEDICINE | Facility: CLINIC | Age: 60
End: 2025-01-09
Payer: COMMERCIAL

## 2025-01-16 DIAGNOSIS — E11.9 TYPE 2 DIABETES MELLITUS WITHOUT COMPLICATION: ICD-10-CM

## 2025-01-22 ENCOUNTER — PATIENT MESSAGE (OUTPATIENT)
Dept: INTERNAL MEDICINE | Facility: CLINIC | Age: 60
End: 2025-01-22
Payer: COMMERCIAL

## 2025-01-22 ENCOUNTER — OFFICE VISIT (OUTPATIENT)
Dept: INTERNAL MEDICINE | Facility: CLINIC | Age: 60
End: 2025-01-22
Payer: COMMERCIAL

## 2025-01-22 DIAGNOSIS — Z12.31 SCREENING MAMMOGRAM FOR BREAST CANCER: ICD-10-CM

## 2025-01-22 DIAGNOSIS — I10 HYPERTENSION, UNSPECIFIED TYPE: ICD-10-CM

## 2025-01-22 DIAGNOSIS — J45.909 ASTHMA, CURRENTLY ACTIVE: ICD-10-CM

## 2025-01-22 DIAGNOSIS — G47.30 SLEEP APNEA, UNSPECIFIED TYPE: ICD-10-CM

## 2025-01-22 DIAGNOSIS — F90.0 ADHD (ATTENTION DEFICIT HYPERACTIVITY DISORDER), INATTENTIVE TYPE: ICD-10-CM

## 2025-01-22 DIAGNOSIS — J45.901 EXACERBATION OF ASTHMA, UNSPECIFIED ASTHMA SEVERITY, UNSPECIFIED WHETHER PERSISTENT: Primary | ICD-10-CM

## 2025-01-22 DIAGNOSIS — E66.01 MORBID OBESITY: ICD-10-CM

## 2025-01-22 PROCEDURE — 98006 SYNCH AUDIO-VIDEO EST MOD 30: CPT | Mod: 95,,, | Performed by: INTERNAL MEDICINE

## 2025-01-22 RX ORDER — PREDNISONE 10 MG/1
TABLET ORAL
Qty: 10 TABLET | Refills: 0 | Status: SHIPPED | OUTPATIENT
Start: 2025-01-22

## 2025-01-22 RX ORDER — MONTELUKAST SODIUM 10 MG/1
10 TABLET ORAL NIGHTLY
Qty: 30 TABLET | Refills: 6 | Status: SHIPPED | OUTPATIENT
Start: 2025-01-22

## 2025-01-22 RX ORDER — ALBUTEROL SULFATE 0.83 MG/ML
2.5 SOLUTION RESPIRATORY (INHALATION) EVERY 6 HOURS PRN
Qty: 90 ML | Refills: 3 | Status: SHIPPED | OUTPATIENT
Start: 2025-01-22 | End: 2026-01-22

## 2025-01-22 RX ORDER — NEBULIZER AND COMPRESSOR
EACH MISCELLANEOUS
Qty: 1 EACH | Refills: 0 | Status: SHIPPED | OUTPATIENT
Start: 2025-01-22

## 2025-01-22 NOTE — PROGRESS NOTES
The patient location is: home  The chief complaint leading to consultation is: asthma    Visit type: audiovisual    Face to Face time with patient: 20 minutes  26 minutes of total time spent on the encounter, which includes face to face time and non-face to face time preparing to see the patient (eg, review of tests), Obtaining and/or reviewing separately obtained history, Documenting clinical information in the electronic or other health record, Independently interpreting results (not separately reported) and communicating results to the patient/family/caregiver, or Care coordination (not separately reported).         Each patient to whom he or she provides medical services by telemedicine is:  (1) informed of the relationship between the physician and patient and the respective role of any other health care provider with respect to management of the patient; and (2) notified that he or she may decline to receive medical services by telemedicine and may withdraw from such care at any time.    Notes:   Chief Complaint: Follow up of multiple issues     HPI: This is a 59 year old woman who presents for above.      She is having allergies for the last 4 weeks. She has had sneezing and  watery eyes which has improved.  Her breathing has been worse the last month. She feels that she is not getting all of her air in.  She has had clear drainage. She is doing nasal cleansing. She was wheezing yesterday.  Her inhalers do help. She is taking Breo once daily.  She has been using her albuterol every 2 hours.  No swelling in legs.  NO chest pain. No fever or chills.     SHe had left foot surgery on October 28, 2024.  Her left foot is much improved.  She is still in physical therapy.  She was able to start walking on 1/9/2025 in a regular shoe.  Dr Herbert did the surgery through [facility].     She just started on trizepatide 5 mg once a week- compounded due to lack of insurance coverage - took one dose.  She just  started back at work and did not want to be sick.  She had slight nausea.  Ozempic did not work.  Trucilicty caused severe nausea.      Back is doing well. It will flare at times. She did not have an epidural in September.      End of April 2022 - tore her right meniscus - had a scope 7/7/2022 with Elyssa Mitchell MD- knee is doing fine. She had some difficulty on the right knee - takes naproxen 500 mg once daily as needed.      She has had asthma since a young child. She was hospitalized as a child. She lived in [place] as a child. She has not been hospitalized for her asthma as an adult.  She saw Dr Nunn in allergy on 10/3/2022. She has been using Neril Med Sinus rinse twice daily which is helping. She has not needed astelin nasal spray. She takes Breo one puff daily.  She takes prednisone when she feels a flare coming.   She has lots of allergies.  She has been breathing well. She saw pulmonary on 5/26/20 and has moderate to severe obstruction.  She has seen allergy clinic on 6/2/20 - she has a very high IGE level and has lots of allergies. She has had allergy shots as a child.  She had an allergic reaction to peanut butter and jelly sandwich Jan 2022.  She has avoided peanuts since then.  No problems. .      She is seeing Matilda Jensen NP in psychiatry and did therapy with Robel TABOR. She is feeling better.  She continues to take Cymbalta 60 mg daily (80 mg did not work, Wellbutrin  mg daily and Trintellix 10 mg daily . SHe takes Vyvanse 60 mg daily when working for ADHD. .  SHe is taking trazodone 150 mg at bedtime which is helping her sleep.  Mood is doing well    SHe has seen sleep medicine and has been diagnoed with sleep apnea. She has a CPAP machine. She uses the CPAP machine sporadically.  Weight loss has helped the snoring.   She feels more refreshed in the morning when she wakes up.     BP has been fine.  She is off lisinopril.      No periods.                Past Medical History:    Diagnosis Date    Anxiety      Asthma      Depression      Esophageal reflux      H/O mammogram 01/20/2017     Normal  (DIS)     Herpes simplex virus (HSV) infection       no out breaks    Hypertension      Peptic ulcer      Sleep apnea       doesnt use cpap                Past Surgical History:   Procedure Laterality Date    ENDOMETRIAL BIOPSY N/A 3/11/2020     Procedure: BIOPSY, ENDOMETRIUM;  Surgeon: José Miguel Leon MD;  Location: [facility];  Service: OB/GYN;  Laterality: N/A;    RHINOPLASTY   2006    SINUS SURGERY   2006    VULVECTOMY N/A 3/11/2020     Procedure: VULVECTOMY;  Surgeon: José Miguel Leon MD;  Location: [facility];  Service: OB/GYN;  Laterality: N/A;            Meds and allergies: updated on EPIC     REVIEW OF SYSTEMS: She denies fevers, chills, night sweats, fatigue, visual change, hearing loss, sinus congestion, sore throat, chest pain, nausea, vomiting, diarrhea, dysuria, hematuria, polydipsia, polyuria headaches        Physical exam:           General: alert, oriented x 3, no apparent distress.  Affect normal  HEENT: Conjunctivae:   Neck: supple,   Resp: effort normal, no labored breathing  Speech normal      labs 10/6/23 reviewed - A1C 5.2 on Ozempic     Assessment/Plan:     Asthma exacerbation - add singulair 10 mg daily, nebulizer and albuterol nebs three times daily as needed. Prednisone taper.      Obesity - on Tirzepatide 5 mg once weekly compounded.    Anxiety and depression -stable     Sleep apnea - try to wear CPAP regluarly     S/p left ankle surgery - doing better.     Vitamin D deficiency -on vitamin D 1000 units daily     Vitamin B12 deficiency - on vitamin B12 1000 mcg daily,      Screening mammogram ordered        PAP 12/2020  GYn exam 12/28/22     She had to cancel her c olonoscopy this past week.  Reordered  She is to return in 3 weeks as scheduled, sooner if problems arise    Answers submitted by the patient for this visit:  Review of Systems Questionnaire (Submitted on  1/22/2025)  activity change: Yes  unexpected weight change: No  neck pain: No  hearing loss: No  rhinorrhea: Yes  trouble swallowing: No  eye discharge: No  visual disturbance: No  chest tightness: Yes  wheezing: No  chest pain: No  palpitations: No  blood in stool: No  constipation: No  vomiting: No  diarrhea: No  polydipsia: No  polyuria: No  difficulty urinating: No  hematuria: No  menstrual problem: No  dysuria: No  joint swelling: No  arthralgias: No  headaches: No  weakness: No  confusion: No  dysphoric mood: No

## 2025-02-14 ENCOUNTER — PATIENT MESSAGE (OUTPATIENT)
Dept: PSYCHIATRY | Facility: CLINIC | Age: 60
End: 2025-02-14
Payer: COMMERCIAL

## 2025-02-14 DIAGNOSIS — F33.1 MODERATE EPISODE OF RECURRENT MAJOR DEPRESSIVE DISORDER: ICD-10-CM

## 2025-02-14 RX ORDER — TRAZODONE HYDROCHLORIDE 150 MG/1
TABLET ORAL
Qty: 90 TABLET | Refills: 0 | Status: SHIPPED | OUTPATIENT
Start: 2025-02-14

## 2025-02-18 ENCOUNTER — OFFICE VISIT (OUTPATIENT)
Dept: INTERNAL MEDICINE | Facility: CLINIC | Age: 60
End: 2025-02-18
Payer: COMMERCIAL

## 2025-02-18 ENCOUNTER — IMMUNIZATION (OUTPATIENT)
Dept: INTERNAL MEDICINE | Facility: CLINIC | Age: 60
End: 2025-02-18
Payer: COMMERCIAL

## 2025-02-18 ENCOUNTER — HOSPITAL ENCOUNTER (OUTPATIENT)
Dept: RADIOLOGY | Facility: HOSPITAL | Age: 60
Discharge: HOME | End: 2025-02-18
Attending: INTERNAL MEDICINE
Payer: COMMERCIAL

## 2025-02-18 VITALS — BODY MASS INDEX: 27.46 KG/M2 | WEIGHT: 165 LBS

## 2025-02-18 VITALS
HEIGHT: 65 IN | OXYGEN SATURATION: 98 % | WEIGHT: 170.88 LBS | DIASTOLIC BLOOD PRESSURE: 80 MMHG | HEART RATE: 73 BPM | BODY MASS INDEX: 28.47 KG/M2 | SYSTOLIC BLOOD PRESSURE: 116 MMHG

## 2025-02-18 DIAGNOSIS — J45.909 ASTHMA, CURRENTLY ACTIVE: ICD-10-CM

## 2025-02-18 DIAGNOSIS — Z12.31 SCREENING MAMMOGRAM FOR BREAST CANCER: ICD-10-CM

## 2025-02-18 DIAGNOSIS — F33.1 MODERATE EPISODE OF RECURRENT MAJOR DEPRESSIVE DISORDER: ICD-10-CM

## 2025-02-18 DIAGNOSIS — Z12.11 SCREENING FOR MALIGNANT NEOPLASM OF COLON: ICD-10-CM

## 2025-02-18 DIAGNOSIS — R79.9 ABNORMAL BLOOD CHEMISTRY: ICD-10-CM

## 2025-02-18 DIAGNOSIS — E55.9 VITAMIN D DEFICIENCY DISEASE: ICD-10-CM

## 2025-02-18 DIAGNOSIS — I10 HYPERTENSION, UNSPECIFIED TYPE: Primary | ICD-10-CM

## 2025-02-18 DIAGNOSIS — Z23 NEED FOR VACCINATION: Primary | ICD-10-CM

## 2025-02-18 DIAGNOSIS — K21.9 GASTROESOPHAGEAL REFLUX DISEASE WITHOUT ESOPHAGITIS: ICD-10-CM

## 2025-02-18 PROCEDURE — 77067 SCR MAMMO BI INCL CAD: CPT | Mod: TC

## 2025-02-18 RX ORDER — LISINOPRIL 10 MG/1
10 TABLET ORAL DAILY
Qty: 90 TABLET | Refills: 3 | Status: SHIPPED | OUTPATIENT
Start: 2025-02-18 | End: 2025-02-18 | Stop reason: SDUPTHER

## 2025-02-18 RX ORDER — LISINOPRIL 10 MG/1
10 TABLET ORAL DAILY
Qty: 90 TABLET | Refills: 3 | Status: SHIPPED | OUTPATIENT
Start: 2025-02-18

## 2025-02-18 NOTE — PROGRESS NOTES
Chief Complaint: Follow up of multiple issues     HPI: This is a 59 year old woman who presents for above.      Asthma exacerbation has resolved after prednisone taper.  She is taking Singulair 10 mg daily which is helping with her asthma control. She continues to take  Breo once daily.  She now uses albuterol several times a week.  No shortness of breath, chest  pain.  No swelling in legs.  NO chest pain. No fever or chills.     She went back to work on 1/27/25.  Foot is doing great. SHe had left foot surgery on October 28, 2024.  Her left foot is much improved.  She is still in physical therapy.  Dr Herbert did the surgery through [facility].      She just started on trizepatide 5 mg once a week- compounded due to lack of insurance coverage   She had slight nausea which then resolved.   No constipation or diarrhea.  She has gained a few pounds back recently.      Back is doing well.       End of April 2022 - tore her right meniscus - had a scope 7/7/2022 with Elyssa Mitchell MD- knee is doing fine. She had some difficulty on the right knee - takes naproxen 500 mg once daily as needed.      She has had asthma since a young child. She was hospitalized as a child. She lived in [place] as a child. She has not been hospitalized for her asthma as an adult.  She saw Dr Nunn in allergy on 10/3/2022. She has been using Neril Med Sinus rinse twice daily which is helping. She has not needed astelin nasal spray. She takes Breo one puff daily.   She has lots of allergies.  She has been breathing well. She saw pulmonary on 5/26/20 and has moderate to severe obstruction.  She has seen allergy clinic on 6/2/20 - she has a very high IGE level and has lots of allergies. She has had allergy shots as a child.  She had an allergic reaction to peanut butter and jelly sandwich Jan 2022.  She has avoided peanuts and Kiwi since then.  No problems.     She has had  lost of post nasal drip from allergies.  Clearing up.     "  She is seeing Matilda Jensen NP in psychiatry (last visit 12/26/24). She is feeling better.  She continues to take Cymbalta 60 mg daily (80 mg did not work, Wellbutrin  mg daily and Trintellix 10 mg daily . SHe takes Vyvanse 60 mg daily when working for ADHD (has not taken lately because has not worked).  SHe is taking trazodone 150 mg at bedtime which is helping her sleep.  Mood is doing well     SHe has seen sleep medicine and has been diagnoed with sleep apnea. She has a CPAP machine. She uses the CPAP machine sporadically.  Weight loss has helped the snoring.   She feels more refreshed in the morning when she wakes up.     She had some headaches - she is back on lisinopril 10 mg daily.         No periods.                Past Medical History:   Diagnosis Date    Anxiety      Asthma      Depression      Esophageal reflux      H/O mammogram 01/20/2017     Normal  (DIS)     Herpes simplex virus (HSV) infection       no out breaks    Hypertension      Peptic ulcer      Sleep apnea       doesnt use cpap                Past Surgical History:   Procedure Laterality Date    ENDOMETRIAL BIOPSY N/A 3/11/2020     Procedure: BIOPSY, ENDOMETRIUM;  Surgeon: José Miguel Leon MD;  Location: [facility];  Service: OB/GYN;  Laterality: N/A;    RHINOPLASTY   2006    SINUS SURGERY   2006    VULVECTOMY N/A 3/11/2020     Procedure: VULVECTOMY;  Surgeon: José Miguel Leon MD;  Location: [facility];  Service: OB/GYN;  Laterality: N/A;            Meds and allergies: updated on EPIC     REVIEW OF SYSTEMS: She denies fevers, chills, night sweats, fatigue, visual change, hearing loss, sinus congestion, sore throat, chest pain, nausea, vomiting, diarrhea, dysuria, hematuria, polydipsia, polyuria headaches        Physical exam:      /80 (BP Location: Left arm, Patient Position: Sitting)   Pulse 73   Ht 5' 5" (1.651 m)   Wt 77.5 kg (170 lb 13.7 oz)   LMP 07/12/2018 (Approximate)   SpO2 98%   BMI 28.43 kg/m²        General: alert, oriented x " 3, no apparent distress.  Affect normal  HEENT: Conjunctivae: TM clear. OP clear  Neck: supple,   Resp: effort normal, no labored breathing  Lung clear bilaterally  CV regular rate and rhythm, no murmurs.   No edema  Speech normal     Assessment/Plan:     Asthma - better.     HTN - back on lisinopril 10 mg daily     Obesity - on Tirzepatide 5 mg once weekly compounded.     Anxiety and depression -stable     Sleep apnea - try to wear CPAP regluarly     S/p left ankle surgery - doing better.     Vitamin D deficiency -on vitamin D 1000 units daily     Vitamin B12 deficiency - on vitamin B12 1000 mcg daily,      Screening mammogram today         PAP 12/2020  GYn exam 12/28/22     She had to cancel her c olonoscopy this past week.  - does not have time to take off school   She is to return in 4 months, sooner if problems arise

## 2025-02-19 ENCOUNTER — RESULTS FOLLOW-UP (OUTPATIENT)
Dept: INTERNAL MEDICINE | Facility: CLINIC | Age: 60
End: 2025-02-19

## 2025-02-28 ENCOUNTER — PATIENT MESSAGE (OUTPATIENT)
Dept: INTERNAL MEDICINE | Facility: CLINIC | Age: 60
End: 2025-02-28
Payer: COMMERCIAL

## 2025-03-02 ENCOUNTER — TELEPHONE (OUTPATIENT)
Dept: PHARMACY | Facility: CLINIC | Age: 60
End: 2025-03-02
Payer: COMMERCIAL

## 2025-03-02 NOTE — TELEPHONE ENCOUNTER
[facility]/Population Health Chart Review & Patient Outreach Details For Medication Adherence Project    Reason for Outreach Encounter: 3rd Party payor non-compliance report ([employer], [employer], C, etc)  2.  Patient Outreach Method: Reviewed Patient Chart  3.   Medication in question: lisinopril   LAST FILLED: 2/18/25 for 90 day supply  Hypertension Medications              lisinopriL 10 MG tablet Take 1 tablet (10 mg total) by mouth once daily.              4.  Reviewed and or Updates Made To: Patient Chart  5. Outreach Outcomes and/or actions taken: Patient filled medication and is on track to be adherent

## 2025-03-07 ENCOUNTER — PATIENT MESSAGE (OUTPATIENT)
Dept: PSYCHIATRY | Facility: CLINIC | Age: 60
End: 2025-03-07
Payer: COMMERCIAL

## 2025-03-07 DIAGNOSIS — F90.0 ATTENTION DEFICIT HYPERACTIVITY DISORDER (ADHD), PREDOMINANTLY INATTENTIVE TYPE: ICD-10-CM

## 2025-03-07 RX ORDER — LISDEXAMFETAMINE DIMESYLATE 60 MG/1
60 CAPSULE ORAL EVERY MORNING
Qty: 30 CAPSULE | Refills: 0 | Status: SHIPPED | OUTPATIENT
Start: 2025-03-07 | End: 2025-04-06

## 2025-04-02 DIAGNOSIS — E11.9 TYPE 2 DIABETES MELLITUS WITHOUT COMPLICATION, UNSPECIFIED WHETHER LONG TERM INSULIN USE: ICD-10-CM

## 2025-04-18 ENCOUNTER — PATIENT MESSAGE (OUTPATIENT)
Dept: INTERNAL MEDICINE | Facility: CLINIC | Age: 60
End: 2025-04-18
Payer: COMMERCIAL

## 2025-04-18 ENCOUNTER — PATIENT MESSAGE (OUTPATIENT)
Dept: PSYCHIATRY | Facility: CLINIC | Age: 60
End: 2025-04-18
Payer: COMMERCIAL

## 2025-04-21 ENCOUNTER — OFFICE VISIT (OUTPATIENT)
Dept: PSYCHIATRY | Facility: CLINIC | Age: 60
End: 2025-04-21
Payer: COMMERCIAL

## 2025-04-21 DIAGNOSIS — F33.1 MODERATE EPISODE OF RECURRENT MAJOR DEPRESSIVE DISORDER: ICD-10-CM

## 2025-04-21 DIAGNOSIS — F90.0 ATTENTION DEFICIT HYPERACTIVITY DISORDER (ADHD), PREDOMINANTLY INATTENTIVE TYPE: Primary | ICD-10-CM

## 2025-04-21 DIAGNOSIS — G47.00 INSOMNIA, UNSPECIFIED TYPE: ICD-10-CM

## 2025-04-21 DIAGNOSIS — F41.1 GAD (GENERALIZED ANXIETY DISORDER): ICD-10-CM

## 2025-04-21 PROCEDURE — 98006 SYNCH AUDIO-VIDEO EST MOD 30: CPT | Mod: 95,,, | Performed by: NURSE PRACTITIONER

## 2025-04-21 PROCEDURE — 3044F HG A1C LEVEL LT 7.0%: CPT | Mod: CPTII,95,, | Performed by: NURSE PRACTITIONER

## 2025-04-21 PROCEDURE — G2211 COMPLEX E/M VISIT ADD ON: HCPCS | Mod: 95,,, | Performed by: NURSE PRACTITIONER

## 2025-04-21 PROCEDURE — 4010F ACE/ARB THERAPY RXD/TAKEN: CPT | Mod: CPTII,95,, | Performed by: NURSE PRACTITIONER

## 2025-04-21 RX ORDER — BUPROPION HYDROCHLORIDE 200 MG/1
200 TABLET, EXTENDED RELEASE ORAL DAILY
Qty: 90 TABLET | Refills: 0 | Status: SHIPPED | OUTPATIENT
Start: 2025-04-21 | End: 2025-07-20

## 2025-04-21 RX ORDER — DEXTROAMPHETAMINE SACCHARATE, AMPHETAMINE ASPARTATE MONOHYDRATE, DEXTROAMPHETAMINE SULFATE AND AMPHETAMINE SULFATE 6.25; 6.25; 6.25; 6.25 MG/1; MG/1; MG/1; MG/1
25 CAPSULE, EXTENDED RELEASE ORAL EVERY MORNING
Qty: 30 CAPSULE | Refills: 0 | Status: SHIPPED | OUTPATIENT
Start: 2025-04-21 | End: 2025-05-24

## 2025-04-21 RX ORDER — DULOXETIN HYDROCHLORIDE 60 MG/1
60 CAPSULE, DELAYED RELEASE ORAL DAILY
Qty: 90 CAPSULE | Refills: 0 | Status: SHIPPED | OUTPATIENT
Start: 2025-04-21 | End: 2025-07-20

## 2025-04-21 RX ORDER — VORTIOXETINE 5 MG/1
5 TABLET, FILM COATED ORAL DAILY
Qty: 30 TABLET | Refills: 1 | Status: SHIPPED | OUTPATIENT
Start: 2025-04-21 | End: 2025-05-21

## 2025-04-21 NOTE — PROGRESS NOTES
.Outpatient Psychiatry Follow-Up Visit (MD/NP)    4/21/2025     The patient location is: [place]  The chief complaint leading to consultation is: depression, ADHD, insomnia, and anxiety      Visit type: audiovisual    Face to Face time with patient: 30 minutes  35 minutes of total time spent on the encounter, which includes face to face time and non-face to face time preparing to see the patient (eg, review of tests), Obtaining and/or reviewing separately obtained history, Documenting clinical information in the electronic or other health record, Independently interpreting results (not separately reported) and communicating results to the patient/family/caregiver, or Care coordination (not separately reported).         Each patient to whom he or she provides medical services by telemedicine is:  (1) informed of the relationship between the physician and patient and the respective role of any other health care provider with respect to management of the patient; and (2) notified that he or she may decline to receive medical services by telemedicine and may withdraw from such care at any time.    Notes:       Clinical Status of Patient:  Outpatient (Ambulatory)    Chief Complaint:  Joy Crawford is a 60 y.o. female who presents today for follow-up of depression, anxiety and attention problems.  Met with patient.      Interval History and Content of Current Session:  Interim Events/Subjective Report/Content of Current Session:   Joy Crawford presented for follow up. She stated she had foot surgery but is having problems with her toe, has ankle and back pain. and has a follow-up with her orthopedic surgeon tomorrow. She stated her pain affects her mood and anxiety.    She stated increasing trintellix to 10 mg po daily didn't improve her mood and was worried about side or adverse effects and stopped taking trintellix 10 mg po daily.  Patient stated she is feeling more depressed and endorsed low mood,  "amoltivation, anhedonia, fatigue, feelings of loneliness and frustration.  She stated she would like to restart trintellix 5 mg po daily to improve her depressive symptoms.     She reported poor concentration, cannot focus, being easily distracted, and being unable to finish tasks.  She stated,"I can't get things completed and I am having difficulties filling out form. I am losing things on my own desk. I can not focus or concentrate.  I am all over the place". She also stated, "taking vyvanse 60 mg daily is no longer working and I am all over the place. I am interested in trying a new medicine".  She agreed to try Adderall 25 mg p.o. daily to treat her ADHD symptoms.       She reported mood is "down" and affect was tearful and down. She reported good support system of her parents, son, and friends. She stated she would like to increase her socialization.  She stated she prioritize her work over taking care of herself and we discussed the importance of self-care. She stated she is taking duloxetine 60 mg po daily and didn't like the like the way she felt with duloxetine 80 mg po daily and didn't find it effective in improving her depressive symptoms.    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    She stated she lost 92 lbs from mounjaro since October 2022.    She stated blood pressure is managed well and is within the normal range, follows up with her PCP, and is longer taking lisinopril 10 mg po daily.     She reported good sleep with trazodone 150 mg po qhs.    She denied any problems with headache, stomach upset, weight loss, insomnia, chest pain, palpitations, tics, or tremors. She denied suicidal ideation and suicide plan or intent currently, recently, or in the past.  She denied previous suicide attempts.  She denied access to guns. She denied SI/HI/AVH. No objective s/sx of psychosis or guillermo. She denied alcohol or drug use. No changes in medical health.  Patient verbalized motivation " for compliance with medications and all other elements of treatment plan.     Cautioned against serotonin syndrome with increasing trintellix to 10 mg po daily and she reported understanding that she will stop Trintellix, Cymbalta, and Buspar, Wellbutrin and go the nearest emergency room if she experienced serotonin syndrome symptoms.      Psychiatric Review Of Systems - Is patient experiencing or having changes in:  sleep: no. Good sleep with trazodone  appetite: no  weight: yes, lost weight 92 total  energy/anergy: yes low  interest/pleasure/anhedonia:  yes low motivation and low enjoyment  somatic symptoms: no  anxiety/panic: yes but managed  guilty/hopelessness: no  concentration: yes  S.I.B.s/risky behavior: no  Irritability: no  Racing thoughts: no  Impulsive behaviors: no  Paranoia:no  AVH:no  Suicidal thoughts/plan/intent: no      Current Medications:  Cymbalta 60 mg daily   Vyvanse  60 mg daily   Wellbutrin  mg daily  Trazodone 150 mg nightly - from sleep medicine.       Previous medication trials:  Gabapentin - nausea  90 mg of Cymbalta - made symptoms worse, cried for 3 days  Wellbutrin- caused insomnia with XL  Zoloft- ineffective  Abilify- insomnia  Buspar 10 mg BID   Trintellix 10 mg po daily    Unsure if she has tried Effexor or Pristiq  Review of Systems   PSYCHIATRIC: Pertinant items are noted in the narrative.  CONSTITUTIONAL: No weight gain or loss.   MUSCULOSKELETAL: No pain or stiffness of the joints.  CARDIOVASCULAR: No tachycardia or chest pain.  GASTROINTESTINAL: No nausea, vomiting, pain, constipation or diarrhea.    Past Medical, Family and Social History: The patient's past medical, family and social history have been reviewed and updated as appropriate within the electronic medical record - see encounter notes.    Compliance: yes    Side effects: None    Risk Parameters:  Patient reports no suicidal ideation  Patient reports no homicidal ideation  Patient reports no self-injurious  "behavior  Patient reports no violent behavior    Exam (detailed: at least 9 elements; comprehensive: all 15 elements)   Constitutional  Vitals:         General:  unremarkable, age appropriate     Musculoskeletal  Muscle Strength/Tone:  not examined   Gait & Station:  Normal      Psychiatric  Speech:  no latency; no press   Mood & Affect:  "down"  Down and tearful   Thought Process:  normal and logical   Associations:  intact   Thought Content:  normal, no suicidality, no homicidality, delusions, or paranoia   Insight:  intact   Judgement: behavior is adequate to circumstances   Orientation:  grossly intact   Memory: intact for content of interview   Language: grossly intact, able to name, able to repeat   Attention Span & Concentration:  able to focus with vyvanse    Fund of Knowledge:  intact and appropriate to age and level of education     Assessment and Diagnosis   Status/Progress: Based on the examination today, the patient's problem(s) is/are adequately but not ideally controlled.  New problems have not been presented today.   Co-morbidities, Diagnostic uncertainty and Lack of compliance are not complicating management of the primary condition.  There are no active rule-out diagnoses for this patient at this time.     General Impression: Joy Crawford is a 56 y.o. female who presents today for follow-up of depression and anxiety. Patient seen and chart reviewed. Previous patient of Dr. Mckeon - no medication changes made. Presents 4/21/21- anxiety and motivation remain an issues, Abilify started. Presents 7/12/21 failed Abilify, wants to restart Vyvanse Presents 10/26/21- anxiety improved with Buspar, never started Vyvanse, Wellbutrin added for depression Presents 5/30/22- Wellbutrin switched to SR due to insomnia on XL Presents 7/11/22- insomnia resolved, Vyvanse restarted Presents 12/19/22- symptom partially improved. Some depression due to paulette season.  Presents 2-24-23 and doing better - refilled all " for 90 days except for Vyvanse refilled x 2 total. 11/30/23 doing well on her current medication regimen and requested keeping Vyvane 50 mg po daily, Wellbutrin sr 200 mg po daily, Trazodone 150 mg po qhs, and Cymbalta 60 mg po daily due  to their effectiveness in treating her symptoms. 1/10/24 doing well since her last visit and wants to continue with Vyvane 50 mg po daily, Wellbutrin sr 200 mg po daily, Trazodone 150 mg po qhs, and Cymbalta 60 mg po daily due  to their effectiveness in treating her symptoms. 8/15/24 stable and okay but reported increased depression and is interested increasing duloxetine to 80 mg p.o. daily.  She requested keeping Vyvanse 60 mg p.o. daily due to its effectiveness in improving her focus treating her ADHD symptoms.  She reported good sleep with trazodone 150 mg p.o. q.h.s. p.r.n. She wants to continue Wellbutrin  mg daily because she finds it helpful. 10/17/2024 patient reported increased depressive symptoms and does not find duloxetine 60 or 80 effective enough treating her depression.  Patient wants to start Trintellix. Went over the risks, benefits, side effects and alternatives of taking Trintellix and she agreed to try it.  She reported good sleep with trazodone 150 mg p.o. q.h.s. p.r.n. and wants to continue Wellbutrin  mg daily at this time. 12/26/24 Patient is stable and doing well.Patient requested increasing trintellix to 10 mg po daily to further improve her depressive symptoms. She also requested keeping the same dosages of her other medication due to their effectiveness in improving her symptom. 4/21/2025 stable but c/o increased depressive symptoms and struggling with ADHD symptoms. She agreed to restart trintellix 5 mg po daily to treat her depression. She requested a different medicine to treat her ADHD symptoms because Vyvanse 60 mg p.o. daily is no longer effective in managing her ADHD.  She agreed to start Adderall xr 25 mg p.o. daily to treat her  ADHD.  She reported overall optimistic and hopeful toward her future and she denied having any suicidal or homicidal ideations.  She denied any suicide plan or intent.            ICD-10-CM ICD-9-CM   1. Attention deficit hyperactivity disorder (ADHD), predominantly inattentive type  F90.0 314.00   2. Moderate episode of recurrent major depressive disorder  F33.1 296.32   3. Insomnia, unspecified type  G47.00 780.52   4. RAYNA (generalized anxiety disorder)  F41.1 300.02         Intervention/Counseling/Treatment Plan   Medication Management: Continue current medications.   In person visit 1/10/2024  Continue Cymbalta 60  mg daily for anxiety and depression.  Did not like the way she felt with duloxetine 80 mg daily and does not find duloxetine to be effective enough in treating her depression.  Restart Trintellix 5 mg p.o. daily for depression  We discussed risk of Serotonin Syndrome including symptoms in order of appearance: diarrhea, restlessness, extreme agitation, autonomic instability, hyperthermia, rigidity, coma, and death.  Discontinue Vyvanse 60 mg daily for ADHD.  Per patient does no longer effective  Start by Adderall extended release 25 mg po daily for ADHD  Checked LA  and no irregularities were noted.  Continue Wellbutrin   mg daily for depression   Continue Trazodone 150 mg nightly - from sleep medicine.   The risks and benefits of medication were discussed with the patient.  Discussed diagnosis, risks and benefits of proposed treatment above vs alternative treatments vs no treatment, and potential side effects of these treatments. The patient expresses understanding of the above and displays the capacity to agree with this treatment given said understanding. Patient also agrees that, currently, the benefits outweigh the risks and would like to pursue treatment at this time.  Discussed inherent unpredictability of medications in each individual.   Encouraged Patient to keep future appointments.    Take medications as prescribed and abstain from substance abuse.   In the event of an emergency patient was advised to go to the emergency room      Return to Clinic: follow up in 1 months or earlier as needed    LUANN Romero-BC

## 2025-04-24 NOTE — TELEPHONE ENCOUNTER
Left message for patient to call back to discuss surgery options.    no history of blood product transfusion

## 2025-04-28 ENCOUNTER — CLINICAL SUPPORT (OUTPATIENT)
Dept: ENDOSCOPY | Facility: HOSPITAL | Age: 60
End: 2025-04-28
Attending: INTERNAL MEDICINE
Payer: COMMERCIAL

## 2025-04-28 ENCOUNTER — TELEPHONE (OUTPATIENT)
Dept: ENDOSCOPY | Facility: HOSPITAL | Age: 60
End: 2025-04-28

## 2025-04-28 VITALS — WEIGHT: 165 LBS | HEIGHT: 65 IN | BODY MASS INDEX: 27.49 KG/M2

## 2025-04-28 DIAGNOSIS — Z12.11 SCREENING FOR MALIGNANT NEOPLASM OF COLON: ICD-10-CM

## 2025-04-28 DIAGNOSIS — Z12.11 SPECIAL SCREENING FOR MALIGNANT NEOPLASMS, COLON: Primary | ICD-10-CM

## 2025-04-28 RX ORDER — SOD SULF/POT CHLORIDE/MAG SULF 1.479 G
12 TABLET ORAL DAILY
Qty: 24 TABLET | Refills: 0 | Status: SHIPPED | OUTPATIENT
Start: 2025-04-28

## 2025-04-28 NOTE — TELEPHONE ENCOUNTER
Referral for procedure from PAT appointment      Spoke to PATIENT to schedule procedure(s) Colonoscopy       Physician to perform procedure(s) Dr. Stephens  Date of Procedure (s) 6/4/25  Arrival Time 7:00 AM  Time of Procedure(s) 8:00 AM   Location of Procedure(s) [facility] [address] Floor  Type of Rx Prep sent to patient: Sutab  Instructions provided to patient via [facility]    Patient was informed on the following information and verbalized understanding. Screening questionnaire reviewed with patient and complete. If procedure requires anesthesia, a responsible adult needs to be present to accompany the patient home, patient cannot drive after receiving anesthesia. Appointment details are tentative, especially check-in time. Patient will receive a prep-op call 7 days prior to confirm check-in time for procedure. If applicable the patient should contact their pharmacy to verify Rx for procedure prep is ready for pick-up. Patient was advised to call the scheduling department at [phone] if pharmacy states no Rx is available. Patient was advised to call the endoscopy scheduling department if any questions or concerns arise.      [facility] Scheduling Department

## 2025-05-02 ENCOUNTER — PATIENT MESSAGE (OUTPATIENT)
Dept: INTERNAL MEDICINE | Facility: CLINIC | Age: 60
End: 2025-05-02
Payer: COMMERCIAL

## 2025-05-07 ENCOUNTER — PATIENT MESSAGE (OUTPATIENT)
Dept: PSYCHIATRY | Facility: CLINIC | Age: 60
End: 2025-05-07
Payer: COMMERCIAL

## 2025-05-07 DIAGNOSIS — F33.1 MODERATE EPISODE OF RECURRENT MAJOR DEPRESSIVE DISORDER: Primary | ICD-10-CM

## 2025-05-08 RX ORDER — VORTIOXETINE 5 MG/1
5 TABLET, FILM COATED ORAL DAILY
Qty: 30 TABLET | Refills: 0 | Status: SHIPPED | OUTPATIENT
Start: 2025-05-08 | End: 2025-06-07

## 2025-05-10 ENCOUNTER — PATIENT MESSAGE (OUTPATIENT)
Dept: INTERNAL MEDICINE | Facility: CLINIC | Age: 60
End: 2025-05-10
Payer: COMMERCIAL

## 2025-05-10 DIAGNOSIS — L98.9 SKIN LESION: Primary | ICD-10-CM

## 2025-05-13 ENCOUNTER — PATIENT OUTREACH (OUTPATIENT)
Dept: ADMINISTRATIVE | Facility: HOSPITAL | Age: 60
End: 2025-05-13
Payer: COMMERCIAL

## 2025-05-13 NOTE — PROGRESS NOTES
Chart reviewed and updated. Reconciled immunizations, health maintenance and care everywhere.  Scheduled aeye appt after in office appt.      Kathy Medraon [facility]  Panel Care Coordinator  [facility]  [phone]  For Jennifer Ramos MD

## 2025-05-27 DIAGNOSIS — F33.1 MODERATE EPISODE OF RECURRENT MAJOR DEPRESSIVE DISORDER: ICD-10-CM

## 2025-05-27 RX ORDER — DULOXETIN HYDROCHLORIDE 60 MG/1
60 CAPSULE, DELAYED RELEASE ORAL DAILY
Qty: 90 CAPSULE | Refills: 0 | Status: SHIPPED | OUTPATIENT
Start: 2025-05-27 | End: 2025-08-25

## 2025-05-27 RX ORDER — TRAZODONE HYDROCHLORIDE 150 MG/1
TABLET ORAL
Qty: 90 TABLET | Refills: 0 | OUTPATIENT
Start: 2025-05-27

## 2025-05-27 RX ORDER — DULOXETIN HYDROCHLORIDE 20 MG/1
CAPSULE, DELAYED RELEASE ORAL
Qty: 90 CAPSULE | Refills: 1 | OUTPATIENT
Start: 2025-05-27

## 2025-05-27 RX ORDER — TRAZODONE HYDROCHLORIDE 150 MG/1
TABLET ORAL
Qty: 90 TABLET | Refills: 0 | Status: SHIPPED | OUTPATIENT
Start: 2025-05-27

## 2025-05-28 ENCOUNTER — TELEPHONE (OUTPATIENT)
Dept: ENDOSCOPY | Facility: HOSPITAL | Age: 60
End: 2025-05-28
Payer: COMMERCIAL

## 2025-05-28 ENCOUNTER — TELEPHONE (OUTPATIENT)
Dept: INTERNAL MEDICINE | Facility: CLINIC | Age: 60
End: 2025-05-28
Payer: COMMERCIAL

## 2025-05-28 ENCOUNTER — OFFICE VISIT (OUTPATIENT)
Dept: DERMATOLOGY | Facility: CLINIC | Age: 60
End: 2025-05-28
Payer: COMMERCIAL

## 2025-05-28 DIAGNOSIS — D23.9 DERMATOFIBROMA: Primary | ICD-10-CM

## 2025-05-28 DIAGNOSIS — L82.1 SEBORRHEIC KERATOSES: ICD-10-CM

## 2025-05-28 DIAGNOSIS — L72.0 MILIA: ICD-10-CM

## 2025-05-28 DIAGNOSIS — D23.9 INTRADERMAL NEVUS: ICD-10-CM

## 2025-05-28 PROCEDURE — 1159F MED LIST DOCD IN RCRD: CPT | Mod: CPTII,S$GLB,, | Performed by: DERMATOLOGY

## 2025-05-28 PROCEDURE — 3044F HG A1C LEVEL LT 7.0%: CPT | Mod: CPTII,S$GLB,, | Performed by: DERMATOLOGY

## 2025-05-28 PROCEDURE — 99999 PR PBB SHADOW E&M-EST. PATIENT-LVL IV: CPT | Mod: PBBFAC,,, | Performed by: DERMATOLOGY

## 2025-05-28 PROCEDURE — 1160F RVW MEDS BY RX/DR IN RCRD: CPT | Mod: CPTII,S$GLB,, | Performed by: DERMATOLOGY

## 2025-05-28 PROCEDURE — 4010F ACE/ARB THERAPY RXD/TAKEN: CPT | Mod: CPTII,S$GLB,, | Performed by: DERMATOLOGY

## 2025-05-28 PROCEDURE — 99202 OFFICE O/P NEW SF 15 MIN: CPT | Mod: S$GLB,,, | Performed by: DERMATOLOGY

## 2025-05-28 NOTE — PROGRESS NOTES
Subjective:      Patient ID:  Joy Crawford is a 60 y.o. female who presents for   Chief Complaint   Patient presents with    Lesion     Patient here for Upper Body Skin Exam    Last seen by dermatologist: years    yes - personal history of atypical moles removed  no - personal history of MM   no - family history of MM  yes - childhood blistering sunburns  yes - tanning bed use  no - personal history of NMSC    Patient with specific complaint of lesion(s)  Location: R arm, bridge of nose  Duration: years  Symptoms: flaky (r arm)  Relieving factors/Previous treatments: none    Changes to upper nose bilateral  Thinks maybe related to glasses  Lesion right arm x years  Maybe enlarging    Review of Systems   Skin:  Positive for activity-related sunscreen use and wears hat. Negative for daily sunscreen use and recent sunburn.   Hematologic/Lymphatic: Bruises/bleeds easily.       Objective:   Physical Exam   Constitutional: She appears well-developed and well-nourished. No distress.   Neurological: She is alert and oriented to person, place, and time. She is not disoriented.   Psychiatric: She has a normal mood and affect.   Skin:   Areas Examined (abnormalities noted in diagram):   Head / Face Inspection Performed  Neck Inspection Performed  Chest / Axilla Inspection Performed  Back Inspection Performed  RUE Inspected  LUE Inspection Performed                 Diagram Legend     Erythematous scaling macule/papule c/w actinic keratosis       Vascular papule c/w angioma      Pigmented verrucoid papule/plaque c/w seborrheic keratosis      Yellow umbilicated papule c/w sebaceous hyperplasia      Irregularly shaped tan macule c/w lentigo     1-2 mm smooth white papules consistent with Milia      Movable subcutaneous cyst with punctum c/w epidermal inclusion cyst      Subcutaneous movable cyst c/w pilar cyst      Firm pink to brown papule c/w dermatofibroma      Pedunculated fleshy papule(s) c/w skin tag(s)      Evenly  pigmented macule c/w junctional nevus     Mildly variegated pigmented, slightly irregular-bordered macule c/w mildly atypical nevus      Flesh colored to evenly pigmented papule c/w intradermal nevus       Pink pearly papule/plaque c/w basal cell carcinoma      Erythematous hyperkeratotic cursted plaque c/w SCC      Surgical scar with no sign of skin cancer recurrence      Open and closed comedones      Inflammatory papules and pustules      Verrucoid papule consistent consistent with wart     Erythematous eczematous patches and plaques     Dystrophic onycholytic nail with subungual debris c/w onychomycosis     Umbilicated papule    Erythematous-base heme-crusted tan verrucoid plaque consistent with inflamed seborrheic keratosis     Erythematous Silvery Scaling Plaque c/w Psoriasis     See annotation                        Assessment / Plan:        Intradermal nevus    Dermatofibroma  -     Ambulatory referral/consult to Dermatology    [place]ynn    The diagnoses, options, risks, benefits and alternatives were discussed with the patient.    I reviewed the benign nature of the lesions noted and the absence of any medical indication for treatment.     Sufficient time was available for questions, and all questions were answered to the her satisfaction.    Reassured, no treatment indicated.    Followup PRN changes or symptom development.

## 2025-06-02 ENCOUNTER — TELEPHONE (OUTPATIENT)
Dept: ENDOSCOPY | Facility: HOSPITAL | Age: 60
End: 2025-06-02
Payer: COMMERCIAL

## 2025-06-03 ENCOUNTER — NURSE TRIAGE (OUTPATIENT)
Dept: ADMINISTRATIVE | Facility: CLINIC | Age: 60
End: 2025-06-03
Payer: COMMERCIAL

## 2025-06-04 ENCOUNTER — TELEPHONE (OUTPATIENT)
Dept: ENDOSCOPY | Facility: HOSPITAL | Age: 60
End: 2025-06-04
Payer: COMMERCIAL

## 2025-06-06 ENCOUNTER — PATIENT MESSAGE (OUTPATIENT)
Dept: INTERNAL MEDICINE | Facility: CLINIC | Age: 60
End: 2025-06-06
Payer: COMMERCIAL

## 2025-06-06 ENCOUNTER — PATIENT MESSAGE (OUTPATIENT)
Dept: PSYCHIATRY | Facility: CLINIC | Age: 60
End: 2025-06-06
Payer: COMMERCIAL

## 2025-06-06 RX ORDER — BUPROPION HYDROCHLORIDE 300 MG/1
300 TABLET ORAL DAILY
Qty: 30 TABLET | Refills: 0 | Status: SHIPPED | OUTPATIENT
Start: 2025-06-06 | End: 2025-07-06

## 2025-06-06 NOTE — TELEPHONE ENCOUNTER
Care Due:                  Date            Visit Type   Department     Provider  --------------------------------------------------------------------------------                                MYCHART                              FOLLOWUP/OF  [facility] INTERNAL  Last Visit: 02-      FICE VISIT   MEDICINE       Jennifer Ramos                              EP -                              PRIMARY      [facility] INTERNAL  Next Visit: 07-      CARE ([facility])   MEDICINE       Jennifer Ramos                                                            Last  Test          Frequency    Reason                     Performed    Due Date  --------------------------------------------------------------------------------    HBA1C.......  6 months...  tirzepatide..............  02- 08-    [employer] Embedded Care Due Messages. Reference number: [id number].   6/06/2025 3:15:12 PM CDT

## 2025-06-07 RX ORDER — ALBUTEROL SULFATE 90 UG/1
2 INHALANT RESPIRATORY (INHALATION) EVERY 6 HOURS PRN
Qty: 20.1 G | Refills: 2 | Status: SHIPPED | OUTPATIENT
Start: 2025-06-07

## 2025-06-07 RX ORDER — ALBUTEROL SULFATE 0.83 MG/ML
2.5 SOLUTION RESPIRATORY (INHALATION) EVERY 6 HOURS PRN
Qty: 270 ML | Refills: 2 | Status: SHIPPED | OUTPATIENT
Start: 2025-06-07

## 2025-06-07 NOTE — TELEPHONE ENCOUNTER
Provider Staff:  Action required for this patient    Requires labs      Please see care gap opportunities below in Care Due Message.    Thanks!  [facility]     Appointments      Date Provider   Last Visit   2/18/2025 Jennifer Ramos MD   Next Visit   7/15/2025 Jennifer Ramos MD     Refill Decision Note   Joy Crawford  is requesting a refill authorization.  Brief Assessment and Rationale for Refill:  Approve     Medication Therapy Plan:        Comments:     Note composed:11:00 AM 06/07/2025

## 2025-06-12 ENCOUNTER — TELEPHONE (OUTPATIENT)
Dept: PHARMACY | Facility: CLINIC | Age: 60
End: 2025-06-12
Payer: COMMERCIAL

## 2025-06-12 NOTE — TELEPHONE ENCOUNTER
[facility]/Population Health Chart Review & Patient Outreach Details For Medication Adherence Project    Reason for Outreach Encounter: 3rd Party payor non-compliance report ([employer], [employer], C, etc)  2.  Patient Outreach Method: Reviewed Patient Chart  3.   Medication in question: lisinopril    LAST FILLED: 5/14/25 for 90 day supply  Hypertension Medications              lisinopriL 10 MG tablet Take 1 tablet (10 mg total) by mouth once daily.              4.  Reviewed and or Updates Made To: Patient Chart  5. Outreach Outcomes and/or actions taken: Patient filled medication and is on track to be adherent

## 2025-06-26 ENCOUNTER — PATIENT MESSAGE (OUTPATIENT)
Dept: ENDOSCOPY | Facility: HOSPITAL | Age: 60
End: 2025-06-26

## 2025-06-26 DIAGNOSIS — Z12.11 COLON CANCER SCREENING: Primary | ICD-10-CM

## 2025-06-27 ENCOUNTER — ANESTHESIA EVENT (OUTPATIENT)
Dept: ENDOSCOPY | Facility: HOSPITAL | Age: 60
End: 2025-06-27
Payer: COMMERCIAL

## 2025-06-28 NOTE — ANESTHESIA PREPROCEDURE EVALUATION
06/27/2025  Joy Crawford is a 60 y.o., female.    Procedure: COLONOSCOPY (N/A)       Problem List[1]    Past Medical History:   Diagnosis Date    Anxiety     Asthma     Condyloma     Depression     Esophageal reflux     H/O mammogram 01/20/2017    Normal  (DIS)     Herpes simplex virus (HSV) infection     no out breaks    Hypertension     Peptic ulcer     Sleep apnea     doesnt use cpap       ECHO: Results for orders placed during the hospital encounter of 05/27/20    Echo Color Flow Doppler? Yes    Interpretation Summary  · Normal left ventricular systolic function. The estimated ejection fraction is 60%.  · Normal LV diastolic function.  · Normal right ventricular systolic function.  · The estimated PA systolic pressure is 22 mmHg.  · Normal central venous pressure (3 mmHg).      There is no height or weight on file to calculate BMI.    Tobacco Use: Low Risk  (6/7/2025)    Patient History     Smoking Tobacco Use: Never     Smokeless Tobacco Use: Never     Passive Exposure: Not on file       Social History     Substance and Sexual Activity   Drug Use No        Alcohol Use: Patient Declined (11/30/2023)    AUDIT-C     Frequency of Alcohol Consumption: Patient declined     Average Number of Drinks: Patient declined     Frequency of Binge Drinking: Patient declined       Review of patient's allergies indicates:   Allergen Reactions    Kiwi (actinidia chinensis)     Peanut          Airway:  No value filed.    Pre-op Assessment    I have reviewed the Patient Summary Reports.    I have reviewed the NPO Status.   I have reviewed the Medications.     Review of Systems  Anesthesia Hx:             Denies Family Hx of Anesthesia complications.    Denies Personal Hx of Anesthesia complications.                    Hematology/Oncology:  Hematology Normal   Oncology Normal                                    EENT/Dental:  EENT/Dental Normal           Cardiovascular:     Hypertension                                          Pulmonary:    Asthma    Sleep Apnea                Renal/:  Renal/ Normal                 Hepatic/GI:   PUD,  GERD                Musculoskeletal:  Musculoskeletal Normal                Neurological:  Neurology Normal                                      Endocrine:  Endocrine Normal            Dermatological:  Skin Normal    Psych:   anxiety               Physical Exam  General: Well nourished    Airway:  Mallampati: II   Mouth Opening: Normal  TM Distance: Normal    Chest/Lungs:  Normal Respiratory Rate    Heart:  Rate: Normal    Anesthesia Plan  Type of Anesthesia, risks & benefits discussed:    Anesthesia Type: Gen Natural Airway  Intra-op Monitoring Plan: Standard ASA Monitors  Post Op Pain Control Plan: multimodal analgesia  Induction:  IV  Informed Consent: Informed consent signed with the Patient and all parties understand the risks and agree with anesthesia plan.  All questions answered.   ASA Score: 3  Day of Surgery Review of History & Physical: H&P Update referred to the surgeon/provider.    Ready For Surgery From Anesthesia Perspective.     .             [1]  Patient Active Problem List  Diagnosis    Anxiety    Gastroesophageal reflux disease    Peptic ulcer    Herpetic ulceration of vulva    Fatigue    AVELINA I (vulvar intraepithelial neoplasia I)    Asthma, currently active    Sleep apnea    Ganglion cyst of left foot    Allergy    Vitamin B12 deficiency    Vitamin D deficiency disease    Adjustment disorder with mixed anxiety and depressed mood    Recurrent major depressive disorder, in partial remission    ADHD (attention deficit hyperactivity disorder), inattentive type    Acute lateral meniscus tear of right knee    Acute medial meniscus tear of right knee    Acute pain of right knee    Decreased range of motion    Decreased strength    Hypertension     Decreased range of motion of left ankle    Moderate episode of recurrent major depressive disorder

## 2025-07-11 ENCOUNTER — TELEPHONE (OUTPATIENT)
Dept: OTOLARYNGOLOGY | Facility: CLINIC | Age: 60
End: 2025-07-11
Payer: COMMERCIAL

## 2025-07-11 ENCOUNTER — ANESTHESIA (OUTPATIENT)
Dept: ENDOSCOPY | Facility: HOSPITAL | Age: 60
End: 2025-07-11
Payer: COMMERCIAL

## 2025-07-11 ENCOUNTER — HOSPITAL ENCOUNTER (OUTPATIENT)
Facility: HOSPITAL | Age: 60
Discharge: HOME | End: 2025-07-11
Attending: INTERNAL MEDICINE | Admitting: INTERNAL MEDICINE
Payer: COMMERCIAL

## 2025-07-11 VITALS
BODY MASS INDEX: 27.49 KG/M2 | TEMPERATURE: 98 F | OXYGEN SATURATION: 96 % | WEIGHT: 165 LBS | SYSTOLIC BLOOD PRESSURE: 125 MMHG | HEART RATE: 72 BPM | HEIGHT: 65 IN | RESPIRATION RATE: 20 BRPM | DIASTOLIC BLOOD PRESSURE: 68 MMHG

## 2025-07-11 DIAGNOSIS — Z12.11 COLON CANCER SCREENING: Primary | ICD-10-CM

## 2025-07-11 DIAGNOSIS — Z12.11 SCREEN FOR COLON CANCER: ICD-10-CM

## 2025-07-11 PROCEDURE — 25000003 PHARM REV CODE 250: Performed by: INTERNAL MEDICINE

## 2025-07-11 PROCEDURE — 45385 COLONOSCOPY W/LESION REMOVAL: CPT | Mod: 33,,, | Performed by: INTERNAL MEDICINE

## 2025-07-11 PROCEDURE — 94761 N-INVAS EAR/PLS OXIMETRY MLT: CPT

## 2025-07-11 PROCEDURE — 27201089 HC SNARE, DISP (ANY): Performed by: INTERNAL MEDICINE

## 2025-07-11 PROCEDURE — 88305 TISSUE EXAM BY PATHOLOGIST: CPT | Mod: TC | Performed by: INTERNAL MEDICINE

## 2025-07-11 PROCEDURE — 99900035 HC TECH TIME PER 15 MIN (STAT)

## 2025-07-11 PROCEDURE — 88305 TISSUE EXAM BY PATHOLOGIST: CPT | Mod: 26,,, | Performed by: PATHOLOGY

## 2025-07-11 PROCEDURE — 37000009 HC ANESTHESIA EA ADD 15 MINS: Performed by: INTERNAL MEDICINE

## 2025-07-11 PROCEDURE — 45385 COLONOSCOPY W/LESION REMOVAL: CPT | Mod: 33 | Performed by: INTERNAL MEDICINE

## 2025-07-11 PROCEDURE — 37000008 HC ANESTHESIA 1ST 15 MINUTES: Performed by: INTERNAL MEDICINE

## 2025-07-11 PROCEDURE — 63600175 PHARM REV CODE 636 W HCPCS: Performed by: NURSE ANESTHETIST, CERTIFIED REGISTERED

## 2025-07-11 RX ORDER — LIDOCAINE HYDROCHLORIDE 20 MG/ML
INJECTION INTRAVENOUS
Status: DISCONTINUED | OUTPATIENT
Start: 2025-07-11 | End: 2025-07-11

## 2025-07-11 RX ORDER — PROPOFOL 10 MG/ML
VIAL (ML) INTRAVENOUS CONTINUOUS PRN
Status: DISCONTINUED | OUTPATIENT
Start: 2025-07-11 | End: 2025-07-11

## 2025-07-11 RX ORDER — PROPOFOL 10 MG/ML
VIAL (ML) INTRAVENOUS
Status: DISCONTINUED | OUTPATIENT
Start: 2025-07-11 | End: 2025-07-11

## 2025-07-11 RX ORDER — SODIUM CHLORIDE 9 MG/ML
INJECTION, SOLUTION INTRAVENOUS CONTINUOUS
Status: DISCONTINUED | OUTPATIENT
Start: 2025-07-11 | End: 2025-07-11 | Stop reason: HOSPADM

## 2025-07-11 RX ADMIN — PROPOFOL 100 MG: 10 INJECTION, EMULSION INTRAVENOUS at 11:07

## 2025-07-11 RX ADMIN — LIDOCAINE HYDROCHLORIDE 40 MG: 20 INJECTION INTRAVENOUS at 11:07

## 2025-07-11 RX ADMIN — PROPOFOL 100 MCG/KG/MIN: 10 INJECTION, EMULSION INTRAVENOUS at 11:07

## 2025-07-11 RX ADMIN — SODIUM CHLORIDE: 0.9 INJECTION, SOLUTION INTRAVENOUS at 11:07

## 2025-07-11 NOTE — ANESTHESIA POSTPROCEDURE EVALUATION
Anesthesia Post Evaluation    Patient: Joy Crawford    Procedure(s) Performed: Procedure(s) (LRB):  COLONOSCOPY (N/A)    Final Anesthesia Type: general      Patient location during evaluation: PACU  Patient participation: Yes- Able to Participate  Level of consciousness: awake and alert  Post-procedure vital signs: reviewed and stable  Pain management: adequate  Airway patency: patent    PONV status at discharge: No PONV  Anesthetic complications: no      Cardiovascular status: stable  Respiratory status: spontaneous ventilation  Hydration status: euvolemic  Follow-up not needed.          Vitals Value Taken Time   /68 07/11/25 12:08   Temp 36.5 °C (97.7 °F) 07/11/25 11:47   Pulse 68 07/11/25 12:07   Resp 34 07/11/25 12:07   SpO2 94 % 07/11/25 12:06   Vitals shown include unfiled device data.      Event Time   Out of Recovery 12:02:00         Pain/Osmar Score: Osmar Score: 10 (7/11/2025 12:02 PM)

## 2025-07-11 NOTE — PROVATION PATIENT INSTRUCTIONS
Discharge Summary/Instructions after an Endoscopic Procedure  Patient Name: Joy Crawford  Patient MRN: [id number]  Patient YOB: 1965 Friday, July 11, 2025  Levy Sotelo MD  Dear patient,  As a result of recent federal legislation (The Federal Cures Act), you may   receive lab or pathology results from your procedure in your [facility]   account before your physician is able to contact you. Your physician or   their representative will relay the results to you with their   recommendations at their soonest availability.  Thank you,  RESTRICTIONS:  During your procedure today, you received medications for sedation.  These   medications may affect your judgment, balance and coordination.  Therefore,   for 24 hours, you have the following restrictions:   - DO NOT drive a car, operate machinery, make legal/financial decisions,   sign important papers or drink alcohol.    ACTIVITY:  Today: no heavy lifting, straining or running due to procedural   sedation/anesthesia.  The following day: return to full activity including work.  DIET:  Eat and drink normally unless instructed otherwise.     TREATMENT FOR COMMON SIDE EFFECTS:  - Mild abdominal pain, nausea, belching, bloating or excessive gas:  rest,   eat lightly and use a heating pad.  - Sore Throat: treat with throat lozenges and/or gargle with warm salt   water.  - Because air was used during the procedure, expelling large amounts of air   from your rectum or belching is normal.  - If a bowel prep was taken, you may not have a bowel movement for 1-3 days.    This is normal.  SYMPTOMS TO WATCH FOR AND REPORT TO YOUR PHYSICIAN:  1. Abdominal pain or bloating, other than gas cramps.  2. Chest pain.  3. Back pain.  4. Signs of infection such as: chills or fever occurring within 24 hours   after the procedure.  5. Rectal bleeding, which would show as bright red, maroon, or black stools.   (A tablespoon of blood from the rectum is not serious, especially if    hemorrhoids are present.)  6. Vomiting.  7. Weakness or dizziness.  GO DIRECTLY TO THE NEAREST EMERGENCY ROOM IF YOU HAVE ANY OF THE FOLLOWING:      Difficulty breathing              Chills and/or fever over 101 F   Persistent vomiting and/or vomiting blood   Severe abdominal pain   Severe chest pain   Black, tarry stools   Bleeding- more than one tablespoon   Any other symptom or condition that you feel may need urgent attention  Your doctor recommends these additional instructions:  If any biopsies were taken, your doctors clinic will contact you in 1 to 2   weeks with any results.  - Patient has a contact number available for emergencies.  The signs and   symptoms of potential delayed complications were discussed with the   patient.  Return to normal activities tomorrow.  Written discharge   instructions were provided to the patient.   - Discharge patient to home.   - Resume previous diet.   - Continue present medications.   - Await pathology results.   - Repeat colonoscopy in 5 years for surveillance.   For questions, problems or results please call your physician - Levy Sotelo MD at Work:  [phone].  [facility] [place], EMERGENCY ROOM PHONE NUMBER: [phone]  IF A COMPLICATION OR EMERGENCY SITUATION ARISES AND YOU ARE UNABLE TO REACH   YOUR PHYSICIAN - GO DIRECTLY TO THE EMERGENCY ROOM.  Levy Sotelo MD  7/11/2025 11:46:15 AM  This report has been verified and signed electronically.  Dear patient,  As a result of recent federal legislation (The Federal Cures Act), you may   receive lab or pathology results from your procedure in your [facility]   account before your physician is able to contact you. Your physician or   their representative will relay the results to you with their   recommendations at their soonest availability.  Thank you,  PROVATION

## 2025-07-11 NOTE — H&P
Short Stay Endoscopy History and Physical    PCP - Jennifer Ramos MD    Procedure - Colonoscopy  Sedation: GA  ASA - per anesthesia  Mallampati - per anesthesia  History of Anesthesia problems - no  Family history Anesthesia problems -  no     HPI:  This is a 60 y.o. female here for evaluation of : Screening for CRC    Reflux - no  Dysphagia - no  Abdominal pain - no  Diarrhea - no    ROS:  Constitutional: No fevers, chills, No weight loss  ENT: No allergies  CV: No chest pain  Pulm: No cough, No shortness of breath  Ophtho: No vision changes  GI: see HPI  Medical History:  has a past medical history of Anxiety, Asthma, Condyloma, Depression, Esophageal reflux, H/O mammogram (01/20/2017), Herpes simplex virus (HSV) infection, Hypertension, Peptic ulcer, and Sleep apnea.    Surgical History:  has a past surgical history that includes Rhinoplasty (2006); Vulvectomy (N/A, 03/11/2020); Endometrial biopsy (N/A, 03/11/2020); Sinus surgery (2006); Cyst Removal (06/25/2020); foot ankle sx (Left, 06/25/2020); Spinal fusion (09/21/2021); Knee arthroscopy w/ meniscectomy (Right, 7/7/2022); Arthroscopic chondroplasty of knee joint (Right, 7/7/2022); Synovectomy of knee (Right, 7/7/2022); Knee debridement (7/7/2022); and Knee arthroscopy w/ plica excision (Right, 7/7/2022).    Family History: family history includes Depression in her brother; Diabetes in her mother; Hyperlipidemia in her father and mother; Hypertension in her father and mother; No Known Problems in her son; Skin cancer in her father.. Otherwise no colon cancer, inflammatory bowel disease, or GI malignancies.    Social History:  reports that she has never smoked. She has never used smokeless tobacco. She reports current alcohol use. She reports that she does not use drugs.    Review of patient's allergies indicates:   Allergen Reactions    Kiwi (actinidia chinensis)     Peanut        Medications:   Prescriptions Prior to Admission[1]    Objective  Findings:    Vital Signs: Per nursing notes.    Physical Exam:  General Appearance: Well appearing in no acute distress  Head:   Normocephalic, without obvious abnormality  Eyes:    No scleral icterus  Airway: Open  Neck: No restriction in mobility  Lungs: CTA bilaterally in anterior and posterior fields, no wheezes, no crackles.  Heart:  Regular rate and rhythm, S1, S2 normal, no murmurs heard  Abdomen: Soft, non tender, non distended      Labs:  Lab Results   Component Value Date    WBC 6.67 06/07/2025    HGB 12.8 06/07/2025    HCT 38.6 06/07/2025     06/07/2025    CHOL 172 09/16/2024    TRIG 86 09/16/2024    HDL 62 09/16/2024    ALT 10 06/07/2025    AST 14 06/07/2025     06/07/2025    K 3.9 06/07/2025     06/07/2025    CREATININE 0.8 06/07/2025    BUN 19 06/07/2025    CO2 25 06/07/2025    TSH 0.702 02/18/2025    HGBA1C 5.1 02/18/2025         I have explained the risks and benefits of endoscopy procedures to the patient including but not limited to bleeding, perforation, infection, and death.    Thank you so much for allowing me to participate in the care of Joy Sotelo MD         [1]   Medications Prior to Admission   Medication Sig Dispense Refill Last Dose/Taking    albuterol (PROVENTIL) 2.5 mg /3 mL (0.083 %) nebulizer solution Take 3 mLs (2.5 mg total) by nebulization every 6 (six) hours as needed for Wheezing. Rescue 270 mL 2     albuterol (PROVENTIL/VENTOLIN HFA) 90 mcg/actuation inhaler Inhale 2 puffs into the lungs every 6 (six) hours as needed for Wheezing. Rescue 20.1 g 2     BREO ELLIPTA 200-25 mcg/dose DsDv diskus inhaler Inhale 1 puff into the lungs once daily. 60 each 6     buPROPion (WELLBUTRIN XL) 300 MG 24 hr tablet Take 1 tablet (300 mg total) by mouth once daily. 30 tablet 0     busPIRone (BUSPAR) 10 MG tablet Take 1 tablet (10 mg total) by mouth 2 (two) times daily. 180 tablet 0     dextroamphetamine-amphetamine (ADDERALL XR) 25 MG 24 hr capsule Take  1 capsule (25 mg total) by mouth every morning. 30 capsule 0     DULoxetine (CYMBALTA) 20 MG capsule Take one cap of duloxetine 60 mg po daily plus duloxetine 20 mg po daily, total duloxetine 80 mg po daily 90 capsule 1     DULoxetine (CYMBALTA) 60 MG capsule Take 1 capsule (60 mg total) by mouth once daily. 90 capsule 0     gabapentin (NEURONTIN) 300 MG capsule Take 1 capsule (300 mg total) by mouth every evening for 7 days, THEN 1 capsule (300 mg total) 2 (two) times daily for 7 days, THEN 1 capsule (300 mg total) 3 (three) times daily for 16 days. 69 capsule 0     imiquimod (ALDARA) 5 % cream Apply topically 3 (three) times a week. 12 packet 3     lisinopriL 10 MG tablet Take 1 tablet (10 mg total) by mouth once daily. 90 tablet 3     meclizine (ANTIVERT) 25 mg tablet Take 1 tablet (25 mg total) by mouth 3 (three) times daily as needed. 20 tablet 0     montelukast (SINGULAIR) 10 mg tablet Take 1 tablet (10 mg total) by mouth every evening. 30 tablet 6     nebulizer and compressor Shelby Use as directed for breathing treatments 1 each 0     ondansetron (ZOFRAN) 4 MG tablet Take 1 tablet (4 mg total) by mouth every 8 (eight) hours as needed for Nausea. 30 tablet 1     ondansetron (ZOFRAN-ODT) 4 MG TbDL Take 1 tablet (4 mg total) by mouth every 8 (eight) hours as needed. 14 tablet 1     predniSONE (DELTASONE) 10 MG tablet 2 tablets daily for 3 days then one tablet daily for 4 days 10 tablet 0     sod sulf-pot chloride-mag sulf (SUTAB) 1.479-0.188- 0.225 gram tablet Take 12 tablets by mouth once daily. BIN: [id number]  PCN: [id number]  GROUP: [id number] MEMBER ID: [id number] (Patient not taking: Reported on 10/9/2023) 24 tablet 0     sod sulf-pot chloride-mag sulf (SUTAB) 1.479-0.188- 0.225 gram tablet Take 12 tablets by mouth once daily. Please follow Endoscopy 's instructions. Please apply coupon code. Please apply coupon code. BIN: [id number], PCN: [id number], GROUP: [id number], MEMBER ID: [id number] 24 tablet 0     tirzepatide  5 mg/0.5 mL PnIj Inject 5 mg into the skin every 7 days. 5 mL 5     traZODone (DESYREL) 150 MG tablet TAKE 1 TABLET(150 MG) BY MOUTH EVERY NIGHT 90 tablet 0     vortioxetine (TRINTELLIX) 5 mg Tab Take 1 tablet (5 mg total) by mouth once daily. 30 tablet 2

## 2025-07-11 NOTE — TELEPHONE ENCOUNTER
Called and left message stating her July 21st appointment needed to be rescheduled. It was scheduled incorrectly. Stated appointment date is now July 23rd starting at 1pm. I asked patient to call if she could not make this appointment.

## 2025-07-11 NOTE — TRANSFER OF CARE
"Anesthesia Transfer of Care Note    Patient: Joy Crawford    Procedure(s) Performed: Procedure(s) (LRB):  COLONOSCOPY (N/A)    Patient location: PACU    Anesthesia Type: general    Transport from OR: Transported from OR on room air with adequate spontaneous ventilation    Post pain: adequate analgesia    Post assessment: no apparent anesthetic complications and tolerated procedure well    Post vital signs: stable    Level of consciousness: awake and alert    Nausea/Vomiting: no nausea/vomiting    Complications: none    Transfer of care protocol was followed      Last vitals: Visit Vitals  BP 99/70 (BP Location: Left arm, Patient Position: Lying)   Pulse 76   Temp 36.5 °C (97.7 °F) (Temporal)   Resp 20   Ht 5' 5" (1.651 m)   Wt 74.8 kg (165 lb)   LMP 07/12/2018 (Approximate)   SpO2 99%   Breastfeeding No   BMI 27.46 kg/m²     "

## 2025-07-14 ENCOUNTER — TELEPHONE (OUTPATIENT)
Dept: GASTROENTEROLOGY | Facility: CLINIC | Age: 60
End: 2025-07-14
Payer: COMMERCIAL

## 2025-07-14 LAB
ESTROGEN SERPL-MCNC: NORMAL PG/ML
INSULIN SERPL-ACNC: NORMAL U[IU]/ML
LAB AP CLINICAL INFORMATION: NORMAL
LAB AP GROSS DESCRIPTION: NORMAL
LAB AP PERFORMING LOCATION(S): NORMAL
LAB AP REPORT FOOTNOTES: NORMAL

## 2025-07-14 NOTE — TELEPHONE ENCOUNTER
----- Message from Levy Sotelo MD sent at 7/14/2025  1:52 PM CDT -----  Please call and notify patient, the colon polyp was benign.      ----- Message -----  From: Lab, Background User  Sent: 7/14/2025   1:33 PM CDT  To: Levy Sotelo MD

## 2025-07-21 ENCOUNTER — PATIENT MESSAGE (OUTPATIENT)
Dept: ADMINISTRATIVE | Facility: HOSPITAL | Age: 60
End: 2025-07-21
Payer: COMMERCIAL

## 2025-07-23 ENCOUNTER — OFFICE VISIT (OUTPATIENT)
Dept: OTOLARYNGOLOGY | Facility: CLINIC | Age: 60
End: 2025-07-23
Payer: COMMERCIAL

## 2025-07-23 ENCOUNTER — CLINICAL SUPPORT (OUTPATIENT)
Dept: OTOLARYNGOLOGY | Facility: CLINIC | Age: 60
End: 2025-07-23
Payer: COMMERCIAL

## 2025-07-23 VITALS
DIASTOLIC BLOOD PRESSURE: 86 MMHG | BODY MASS INDEX: 28.14 KG/M2 | WEIGHT: 169.06 LBS | HEART RATE: 70 BPM | SYSTOLIC BLOOD PRESSURE: 118 MMHG

## 2025-07-23 DIAGNOSIS — R42 VERTIGO: Primary | ICD-10-CM

## 2025-07-23 DIAGNOSIS — R42 DIZZINESS: Primary | ICD-10-CM

## 2025-07-23 DIAGNOSIS — J30.89 NON-SEASONAL ALLERGIC RHINITIS, UNSPECIFIED TRIGGER: ICD-10-CM

## 2025-07-23 DIAGNOSIS — H93.293 ABNORMAL AUDITORY PERCEPTION OF BOTH EARS: ICD-10-CM

## 2025-07-23 PROCEDURE — 1160F RVW MEDS BY RX/DR IN RCRD: CPT | Mod: CPTII,S$GLB,, | Performed by: NURSE PRACTITIONER

## 2025-07-23 PROCEDURE — 99204 OFFICE O/P NEW MOD 45 MIN: CPT | Mod: S$GLB,,, | Performed by: NURSE PRACTITIONER

## 2025-07-23 PROCEDURE — 99999 PR PBB SHADOW E&M-EST. PATIENT-LVL I: CPT | Mod: PBBFAC,,,

## 2025-07-23 PROCEDURE — 3079F DIAST BP 80-89 MM HG: CPT | Mod: CPTII,S$GLB,, | Performed by: NURSE PRACTITIONER

## 2025-07-23 PROCEDURE — 4010F ACE/ARB THERAPY RXD/TAKEN: CPT | Mod: CPTII,S$GLB,, | Performed by: NURSE PRACTITIONER

## 2025-07-23 PROCEDURE — 99999 PR PBB SHADOW E&M-EST. PATIENT-LVL IV: CPT | Mod: PBBFAC,,, | Performed by: NURSE PRACTITIONER

## 2025-07-23 PROCEDURE — 3074F SYST BP LT 130 MM HG: CPT | Mod: CPTII,S$GLB,, | Performed by: NURSE PRACTITIONER

## 2025-07-23 PROCEDURE — 3008F BODY MASS INDEX DOCD: CPT | Mod: CPTII,S$GLB,, | Performed by: NURSE PRACTITIONER

## 2025-07-23 PROCEDURE — 1159F MED LIST DOCD IN RCRD: CPT | Mod: CPTII,S$GLB,, | Performed by: NURSE PRACTITIONER

## 2025-07-23 PROCEDURE — 3044F HG A1C LEVEL LT 7.0%: CPT | Mod: CPTII,S$GLB,, | Performed by: NURSE PRACTITIONER

## 2025-07-23 NOTE — PROGRESS NOTES
"  Chief Complaint   Patient presents with    Dizziness     Stated that she has only had it once but have been constantly dealing with headaches and ear infections overtime         HPI:   The patient who is referred to me by Dr. Abi Pappas in consultation for evaluation of dizziness episode. She experienced a single vertigo episode in June while shopping, triggered by turning her head while looking at a store shelf. She experienced an incomplete visual spinning sensation lasting approximately 5 minutes. Her friend noted she appeared pale and attempted to cool her with baby wipes. The episode was followed by a headache persisting for 3-4 days. She sought emergency medical care and underwent a CT. She self-treated with magnesium after online research, which helped alleviate symptoms.    She has chronic sinus problems with frequent severe infections that typically become "full blown" and often result in voice loss. She underwent two surgeries for deviated septum - first at age 18 and second in 2006 with a plastic surgeon, during which a large nasal polyp was discovered. Her nose remains visibly deviated despite surgical interventions. She has discontinued Afrin nasal spray after learning about potential nasal lining deterioration. She currently experiences intermittent ear and sinus concerns without chronic infection.    She describes herself as having significant allergies with multiple medication trials including Montelukast, Flonase (which she believes triggers sinus problems), Azelastine, Allegra, and Xyzal (which she occasionally rotates with other medications). She currently denies consistent use of any specific allergy medication.      Past Medical History:   Diagnosis Date    Anxiety     Asthma     Condyloma     Depression     Esophageal reflux     H/O mammogram 01/20/2017    Normal  (DIS)     Herpes simplex virus (HSV) infection     no out breaks    Hypertension     Peptic ulcer     Sleep apnea     " doesnt use cpap     Social History[1]  Past Surgical History:   Procedure Laterality Date    ARTHROSCOPIC CHONDROPLASTY OF KNEE JOINT Right 7/7/2022    Procedure: ARTHROSCOPY, KNEE, WITH CHONDROPLASTY;  Surgeon: Elyssa Mitchell MD;  Location: [facility] OR;  Service: Orthopedics;  Laterality: Right;    COLONOSCOPY N/A 7/11/2025    Procedure: COLONOSCOPY;  Surgeon: Levy Sotelo MD;  Location: [facility] ENDOSCOPY;  Service: Endoscopy;  Laterality: N/A;  7/3 r/s inst handed to patient; pre call complete HSH  7/11 Precall attempted;LVM for patient to confirm/cancel;MLB    CYST REMOVAL  06/25/2020    Procedure: EXCISION, CYST;  Surgeon: Gallo Maddox MD;  Location: [facility] OR;  Service: Orthopedics;;  COMPLEXT CYST LEFT  ANKLE AND FOOT    ENDOMETRIAL BIOPSY N/A 03/11/2020    Procedure: BIOPSY, ENDOMETRIUM;  Surgeon: José Miguel Leon MD;  Location: [facility] OR;  Service: OB/GYN;  Laterality: N/A;    foot ankle sx Left 06/25/2020    KNEE ARTHROSCOPY W/ MENISCECTOMY Right 7/7/2022    Procedure: ARTHROSCOPY, KNEE, MEDIAL AND LATERAL MENISCECTOMY - POLAR CARE;  Surgeon: Elyssa Mitchell MD;  Location: [facility] OR;  Service: Orthopedics;  Laterality: Right;    KNEE ARTHROSCOPY W/ PLICA EXCISION Right 7/7/2022    Procedure: EXCISION, PLICA, KNEE, ARTHROSCOPIC;  Surgeon: Elyssa Mitchell MD;  Location: [facility] OR;  Service: Orthopedics;  Laterality: Right;    KNEE DEBRIDEMENT  7/7/2022    Procedure: DEBRIDEMENT, KNEE;  Surgeon: Elyssa Mitchell MD;  Location: [facility] OR;  Service: Orthopedics;;    RHINOPLASTY  2006    SINUS SURGERY  2006    SPINAL FUSION  09/21/2021    L4-5    SYNOVECTOMY OF KNEE Right 7/7/2022    Procedure: PARTIAL SYNOVECTOMY, KNEE;  Surgeon: Elyssa Mitchell MD;  Location: [facility] OR;  Service: Orthopedics;  Laterality: Right;    VULVECTOMY N/A 03/11/2020    Procedure: VULVECTOMY;  Surgeon: José Miguel Leon MD;  Location: [facility] OR;  Service: OB/GYN;  Laterality: N/A;     Family History   Problem Relation Name Age of Onset    Skin cancer Father       Hypertension Father      Hyperlipidemia Father      Hypertension Mother      Diabetes Mother      Hyperlipidemia Mother      Depression Brother      No Known Problems Son      Breast cancer Neg Hx             Review of Systems  General: negative for chills, fever or weight loss  Psychological: negative for mood changes or depression  Ophthalmic: negative for blurry vision, photophobia or eye pain  ENT: see HPI  Respiratory: no cough, shortness of breath, or wheezing  Cardiovascular: no chest pain or dyspnea on exertion  Gastrointestinal: no abdominal pain, change in bowel habits, or black/ bloody stools  Musculoskeletal: negative for gait disturbance or muscular weakness  Neurological: no syncope or seizures; no ataxia  Dermatological: negative for pruritis,  rash and jaundice  Hematologic/lymphatic: no easy bruising, no new adenopathy    Physical Exam:    Vitals:    07/23/25 1346   BP: 118/86   Pulse: 70       Constitutional: Well appearing / communicating without difficutly.  NAD.  Eyes: EOM I Bilaterally  Head/Face: Normocephalic.  Negative paranasal sinus pressure/tenderness.  Salivary glands WNL.  House Brackmann I Bilaterally.    Right Ear: Auricle normal appearance. External Auditory Canal within normal limits no lesions or masses,TM w/o masses/lesions/perforations. TM mobility noted.   Left Ear: Auricle normal appearance. External Auditory Canal within normal limits no lesions or masses,TM w/o masses/lesions/perforations. TM mobility noted.  Vestibular exam: negative Right Dixx- Hallpike; negative left Dixx- Hallpike  Nose: +nasal septal deviation. Inferior Turbinates 3+ bilaterally. No septal perforation. No masses/lesions. External nasal skin appears normal without masses/lesions.  Oral Cavity: Gingiva/lips within normal limits.  Dentition/gingiva healthy appearing. Mucus membranes moist. Floor of mouth soft, no masses palpated. Oral Tongue mobile. Hard Palate appears normal.    Oropharynx: Base of tongue  appears normal. No masses/lesions noted. Tonsillar fossa/pharyngeal wall without lesions. Posterior oropharynx WNL.  Soft palate without masses. Midline uvula.   Neck/Lymphatic: No LAD I-VI bilaterally.  No thyromegaly.  No masses noted on exam.    Mirror laryngoscopy/nasopharyngoscopy: Active gag reflex.  Unable to perform.    Neuro/Psychiatric: AOx3.  Normal mood and affect.   Cardiovascular: Normal carotid pulses bilaterally, no increasing jugular venous distention noted at cervical region bilaterally.    Respiratory: Normal respiratory effort, no stridor, no retractions noted.      Audiogram: Reviewed and interpreted by me, and discussed with the patient today.    Pure tone testing revealed essentially normal hearing, bilaterally. Speech reception thresholds were obtained at 15 dBHL in the right ear and 10 dBHL in the left ear. Speech discrimination scores were 100% in the right ear and 100% in the left ear. Tympanometry revealed Type A tympanograms in both ears.     CT HEAD WITHOUT CONTRAST 6/7/2025     CLINICAL HISTORY:  Dizziness, persistent/recurrent, cardiac or vascular cause suspected; Dizziness and giddiness     TECHNIQUE:  Low dose axial images were obtained through the head.  Coronal and sagittal reformations were also performed. Contrast was not administered.     COMPARISON:  06/26/2020 CT brain     FINDINGS:  There is no acute intra or extra-axial hemorrhage or hematoma.  The gray-white matter junction differentiation is intact.  Ventricles, cisterns and sulci are prominent consistent with senescent atrophic changes and the patient's age.  There is no midline shift/mass effect.  Posterior fossa structures and pituitary gland are unremarkable.  Bony calvarium is intact.  Visualized paranasal sinuses, mastoid air cells and middle ears bilaterally are clear.  Orbital structures appear symmetric and grossly intact noting postoperative changes lenses.     Impression:     No acute intracranial abnormalities  or fracture.     Senescent atrophic changes.      Assessment:    ICD-10-CM ICD-9-CM    1. Vertigo  R42 780.4 Ambulatory referral/consult to ENT      2. Abnormal auditory perception of both ears  H93.293 388.40       3. Non-seasonal allergic rhinitis, unspecified trigger  J30.89 477.8         The primary encounter diagnosis was Vertigo. Diagnoses of Abnormal auditory perception of both ears and Non-seasonal allergic rhinitis, unspecified trigger were also pertinent to this visit.      Plan:  No orders of the defined types were placed in this encounter.      BENIGN PAROXYSMAL POSITIONAL VERTIGO (BPPV):  - Assessed reported episode of dizziness as likely benign paroxysmal positional vertigo (BPPV) based on symptom description and negative CT head results.  - Performed basic neurological exam including Garrett-Hallpike maneuver to check for current BPPV, which was negative.  - We had a long discussion regarding the relevant anatomy and pathology relevant to BPPV.  We discussed that in the ear there are three semicircular canals that detect rotational movement.  BPPV occurs as a result of otoconia, tiny crystals of calcium carbonate that are a normal part of the inner ears anatomy, detaching from their normal anatomic position and collecting in one of the semicircular canals.  When the head moves, the otoconia shift. This stimulates the cupula to send false signals to the brain, producing vertigo and triggering nystagmus (involuntary eye movements).   -she knows to return to clinic if dizziness returns    ABNORMAL AUDITORY PERCEPTION  -audiogram today revealed normal hearing in bilateral ears      CHRONIC SINUSITIS:  - CT head on 6/7/2025 showed clear paranasal sinuses    ALLERGIC RHINITIS  -start on Zyrtec daily    HEADACHE:  - Evaluated for possible migraine as cause of post-vertigo headache, but deferred definitive diagnosis pending neurology consultation if symptoms persist.  - Refer to neurology if headaches persist or  worsen, for evaluation and potential migraine treatment.    FOLLOW-UP:  - Consider annual hearing tests to monitor for any changes.           Salome Sheridan NP     This note was generated with the assistance of ambient listening technology. Verbal consent was obtained by the patient and accompanying visitor(s) for the recording of patient appointment to facilitate this note. I attest to having reviewed and edited the generated note for accuracy, though some syntax or spelling errors may persist. Please contact the author of this note for any clarification.            Answers submitted by the patient for this visit:  Review of Symptoms Questionnaire  (Submitted on 7/21/2025)  Fatigue (Tiredness)?: Yes  sinus pressure : Yes  postnasal drip: Yes  Light sensitivity / Light hurts the eyes?: Yes  Snoring?: Yes  Sleep Apnea?: Yes  None of these : Yes  None of these: Yes  None of these: Yes  Muscle aches / pain?: Yes  back pain: Yes  neck pain: Yes  None of these : Yes  Seasonal Allergies?: Yes  None of these : Yes  headaches: Yes  Light-headedness: Yes  Bruises or bleeds easily: Yes  None of these: Yes         [1]   Social History  Socioeconomic History    Marital status: Single   Tobacco Use    Smoking status: Never    Smokeless tobacco: Never   Substance and Sexual Activity    Alcohol use: Yes     Comment: social    Drug use: No    Sexual activity: Not Currently     Partners: Male     Birth control/protection: Post-menopausal   Social History Narrative    , one son (20 years old).  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      Social Drivers of Health     Financial Resource Strain: Patient Declined (12/26/2024)    Overall Financial Resource Strain (CARDIA)     Difficulty of Paying Living Expenses: Patient declined   Food Insecurity: Patient Declined (12/26/2024)    Hunger Vital Sign     Worried About Running Out of Food in the Last Year: Patient declined     Ran Out of Food in the Last Year:  Patient declined   Transportation Needs: Patient Declined (11/30/2023)    PRAPARE - Transportation     Lack of Transportation (Medical): Patient declined     Lack of Transportation (Non-Medical): Patient declined   Physical Activity: Unknown (11/30/2023)    Exercise Vital Sign     Days of Exercise per Week: Patient declined   Stress: Patient Declined (11/30/2023)    [employer] [place] [employer] - Occupational Stress Questionnaire     Feeling of Stress : Patient declined   Housing Stability: Unknown (12/26/2024)    Housing Stability Vital Sign     Unable to Pay for Housing in the Last Year: Patient declined

## 2025-07-23 NOTE — PROGRESS NOTES
Joy Crawford, a 60 y.o. female was seen today in the clinic for an audiologic evaluation. The patient's primary complaint was dizziness.  The following information was gathered through patient interview and/or record review:    Previous Audio: none; patient does perceive some hearing loss  Hearing Aids use: none  History of noise exposure: none  Tinnitus: none  Ear pain: patient reports history of pain with sinus pressure, AU  Dizziness: Patient reports ED visit 6-7-25; onset while walking in a store (turning corner); no subsequent episodes    Pure tone testing revealed essentially normal hearing, bilaterally. Speech reception thresholds were obtained at 15 dBHL in the right ear and 10 dBHL in the left ear. Speech discrimination scores were 100% in the right ear and 100% in the left ear. Tympanometry revealed Type A tympanograms in both ears.     Recommendations:  Otologic evaluation  Annual audiologic evaluation  Hearing protection in noise

## 2025-08-22 ENCOUNTER — PATIENT MESSAGE (OUTPATIENT)
Dept: PSYCHIATRY | Facility: CLINIC | Age: 60
End: 2025-08-22
Payer: COMMERCIAL

## 2025-08-22 DIAGNOSIS — F33.1 MODERATE EPISODE OF RECURRENT MAJOR DEPRESSIVE DISORDER: ICD-10-CM

## 2025-08-22 DIAGNOSIS — F90.0 ATTENTION DEFICIT HYPERACTIVITY DISORDER (ADHD), PREDOMINANTLY INATTENTIVE TYPE: ICD-10-CM

## 2025-08-22 RX ORDER — TRAZODONE HYDROCHLORIDE 150 MG/1
TABLET ORAL
Qty: 90 TABLET | Refills: 0 | Status: SHIPPED | OUTPATIENT
Start: 2025-08-22

## 2025-08-22 RX ORDER — DULOXETIN HYDROCHLORIDE 60 MG/1
60 CAPSULE, DELAYED RELEASE ORAL DAILY
Qty: 90 CAPSULE | Refills: 0 | Status: SHIPPED | OUTPATIENT
Start: 2025-08-22

## 2025-08-27 DIAGNOSIS — E11.9 TYPE 2 DIABETES MELLITUS WITHOUT COMPLICATION: ICD-10-CM

## 2025-08-28 RX ORDER — BUPROPION HYDROCHLORIDE 300 MG/1
300 TABLET ORAL DAILY
Qty: 30 TABLET | Refills: 0 | Status: SHIPPED | OUTPATIENT
Start: 2025-08-28 | End: 2025-09-27

## 2025-08-28 RX ORDER — TRAZODONE HYDROCHLORIDE 150 MG/1
TABLET ORAL
Qty: 90 TABLET | Refills: 0 | Status: SHIPPED | OUTPATIENT
Start: 2025-08-28

## 2025-08-28 RX ORDER — VORTIOXETINE 5 MG/1
5 TABLET, FILM COATED ORAL DAILY
Qty: 30 TABLET | Refills: 0 | Status: SHIPPED | OUTPATIENT
Start: 2025-08-28 | End: 2025-09-27

## 2025-08-28 RX ORDER — DEXTROAMPHETAMINE SACCHARATE, AMPHETAMINE ASPARTATE MONOHYDRATE, DEXTROAMPHETAMINE SULFATE AND AMPHETAMINE SULFATE 6.25; 6.25; 6.25; 6.25 MG/1; MG/1; MG/1; MG/1
25 CAPSULE, EXTENDED RELEASE ORAL EVERY MORNING
Qty: 30 CAPSULE | Refills: 0 | Status: SHIPPED | OUTPATIENT
Start: 2025-08-28 | End: 2025-09-27

## 2025-08-30 ENCOUNTER — PATIENT MESSAGE (OUTPATIENT)
Dept: INTERNAL MEDICINE | Facility: CLINIC | Age: 60
End: 2025-08-30
Payer: COMMERCIAL

## 2025-08-31 ENCOUNTER — OFFICE VISIT (OUTPATIENT)
Dept: URGENT CARE | Facility: CLINIC | Age: 60
End: 2025-08-31
Payer: COMMERCIAL

## 2025-08-31 VITALS
HEIGHT: 65 IN | OXYGEN SATURATION: 98 % | RESPIRATION RATE: 19 BRPM | TEMPERATURE: 99 F | WEIGHT: 169.06 LBS | BODY MASS INDEX: 28.17 KG/M2 | SYSTOLIC BLOOD PRESSURE: 122 MMHG | HEART RATE: 80 BPM | DIASTOLIC BLOOD PRESSURE: 85 MMHG

## 2025-08-31 DIAGNOSIS — J45.41 MODERATE PERSISTENT ASTHMA WITH EXACERBATION: Primary | ICD-10-CM

## 2025-08-31 PROCEDURE — 96372 THER/PROPH/DIAG INJ SC/IM: CPT | Mod: S$GLB,,, | Performed by: FAMILY MEDICINE

## 2025-08-31 PROCEDURE — 99214 OFFICE O/P EST MOD 30 MIN: CPT | Mod: 25,S$GLB,, | Performed by: FAMILY MEDICINE

## 2025-08-31 RX ORDER — FLUTICASONE FUROATE AND VILANTEROL TRIFENATATE 200; 25 UG/1; UG/1
1 POWDER RESPIRATORY (INHALATION) DAILY
Qty: 60 EACH | Refills: 6 | Status: SHIPPED | OUTPATIENT
Start: 2025-08-31

## 2025-08-31 RX ORDER — DEXAMETHASONE SODIUM PHOSPHATE 10 MG/ML
10 INJECTION INTRAMUSCULAR; INTRAVENOUS
Status: COMPLETED | OUTPATIENT
Start: 2025-08-31 | End: 2025-08-31

## 2025-08-31 RX ORDER — PREDNISONE 20 MG/1
40 TABLET ORAL DAILY
Qty: 10 TABLET | Refills: 0 | Status: SHIPPED | OUTPATIENT
Start: 2025-08-31 | End: 2025-09-05

## 2025-08-31 RX ADMIN — DEXAMETHASONE SODIUM PHOSPHATE 10 MG: 10 INJECTION INTRAMUSCULAR; INTRAVENOUS at 11:08

## (undated) DEVICE — PAD CAST SPECIALIST STRL 6

## (undated) DEVICE — GLOVE BIOGEL SKINSENSE PI 7.0

## (undated) DEVICE — HEMOSTAT SURGICEL 4X8IN

## (undated) DEVICE — JELLY SURGILUBE 5GR

## (undated) DEVICE — SOL IRR NACL .9% 3000ML

## (undated) DEVICE — NDL HYPO REG 25G X 1 1/2

## (undated) DEVICE — PAD ELECTRODE STER 1.5X3

## (undated) DEVICE — STOCKINET TUBULAR 1 PLY 6X60IN

## (undated) DEVICE — BLADE SURG #15 CARBON STEEL

## (undated) DEVICE — PAD ABD 8X10 STERILE

## (undated) DEVICE — GOWN POLY REINF X-LONG XL

## (undated) DEVICE — Device

## (undated) DEVICE — GLOVE BIOGEL SKINSENSE PI 6.5

## (undated) DEVICE — SYR B-D DISP CONTROL 10CC100/C

## (undated) DEVICE — CLOSURE SKIN STERI STRIP 1/2X4

## (undated) DEVICE — COVER MAYO STAND REINFRCD 30

## (undated) DEVICE — SCRUB 10% POVIDONE IODINE 4OZ

## (undated) DEVICE — GAUZE SPONGE 4X4 12PLY

## (undated) DEVICE — TRAY MINOR ORTHO

## (undated) DEVICE — GLOVE ORTHO PF SZ 8.5

## (undated) DEVICE — DRAPE UNDER BUTTOCKS WITH POUCH

## (undated) DEVICE — PACK LAPSCP/PELVSCPY III TIBRN

## (undated) DEVICE — TUBE SET INFLOW/OUTFLOW

## (undated) DEVICE — ELECTRODE REM PLYHSV RETURN 9

## (undated) DEVICE — SEE MEDLINE ITEM 146298

## (undated) DEVICE — NDL SPINAL 20GX3.5 HUB

## (undated) DEVICE — TOURNIQUET SB QC DP 24X4IN

## (undated) DEVICE — DRESSING XEROFORM FOIL PK 1X8

## (undated) DEVICE — DRAPE HEAD/BAR 40 X 27 W/ADH

## (undated) DEVICE — PAD ABDOMINAL 5X9 STERILE

## (undated) DEVICE — SOL PVP-I SCRUB 7.5% 4OZ

## (undated) DEVICE — GLOVE SURGEON SYN PF SZ 9

## (undated) DEVICE — SOL 9P NACL IRR PIC IL

## (undated) DEVICE — PAD KNEE POLAR XL

## (undated) DEVICE — PAD CAST SPECIALIST STRL 4

## (undated) DEVICE — SUT MCRYL PLUS 4-0 PS2 27IN

## (undated) DEVICE — BANDAGE ESMARK 6X12

## (undated) DEVICE — BLADE 4.2MM PREBENT ULTRACUT

## (undated) DEVICE — SPONGE GAUZE 16PLY 4X4

## (undated) DEVICE — SUT VICRYL 3-0 27 SH

## (undated) DEVICE — APPLICATOR CHLORAPREP ORN 26ML

## (undated) DEVICE — ADHESIVE MASTISOL VIAL 48/BX

## (undated) DEVICE — TRAY DRY SKIN SCRUB PREP

## (undated) DEVICE — SEE MEDLINE ITEM 156930

## (undated) DEVICE — SYR 10CC LUER LOCK

## (undated) DEVICE — GOWN SMART IMP BREATHABLE XXLG

## (undated) DEVICE — SEE MEDLINE ITEM 157150

## (undated) DEVICE — SEE MEDLINE ITEM 157169

## (undated) DEVICE — UNDERGLOVES BIOGEL PI SIZE 7.5

## (undated) DEVICE — SUT ETHILON 3/0 18IN PS-1